# Patient Record
Sex: MALE | Race: WHITE | NOT HISPANIC OR LATINO | Employment: FULL TIME | URBAN - METROPOLITAN AREA
[De-identification: names, ages, dates, MRNs, and addresses within clinical notes are randomized per-mention and may not be internally consistent; named-entity substitution may affect disease eponyms.]

---

## 2017-03-17 ENCOUNTER — ALLSCRIPTS OFFICE VISIT (OUTPATIENT)
Dept: OTHER | Facility: OTHER | Age: 57
End: 2017-03-17

## 2017-03-24 ENCOUNTER — ALLSCRIPTS OFFICE VISIT (OUTPATIENT)
Dept: OTHER | Facility: OTHER | Age: 57
End: 2017-03-24

## 2017-03-31 ENCOUNTER — ALLSCRIPTS OFFICE VISIT (OUTPATIENT)
Dept: OTHER | Facility: OTHER | Age: 57
End: 2017-03-31

## 2017-05-18 ENCOUNTER — GENERIC CONVERSION - ENCOUNTER (OUTPATIENT)
Dept: OTHER | Facility: OTHER | Age: 57
End: 2017-05-18

## 2017-05-27 ENCOUNTER — OFFICE VISIT (OUTPATIENT)
Dept: URGENT CARE | Facility: CLINIC | Age: 57
End: 2017-05-27
Payer: COMMERCIAL

## 2017-05-27 PROCEDURE — 99213 OFFICE O/P EST LOW 20 MIN: CPT

## 2017-08-11 ENCOUNTER — ALLSCRIPTS OFFICE VISIT (OUTPATIENT)
Dept: OTHER | Facility: OTHER | Age: 57
End: 2017-08-11

## 2017-12-08 ENCOUNTER — GENERIC CONVERSION - ENCOUNTER (OUTPATIENT)
Dept: OTHER | Facility: OTHER | Age: 57
End: 2017-12-08

## 2017-12-18 ENCOUNTER — TRANSCRIBE ORDERS (OUTPATIENT)
Dept: ADMINISTRATIVE | Facility: HOSPITAL | Age: 57
End: 2017-12-18

## 2017-12-26 ENCOUNTER — GENERIC CONVERSION - ENCOUNTER (OUTPATIENT)
Dept: OTHER | Facility: OTHER | Age: 57
End: 2017-12-26

## 2017-12-26 ENCOUNTER — TRANSCRIBE ORDERS (OUTPATIENT)
Dept: ADMINISTRATIVE | Facility: HOSPITAL | Age: 57
End: 2017-12-26

## 2017-12-26 ENCOUNTER — LAB (OUTPATIENT)
Dept: LAB | Facility: HOSPITAL | Age: 57
End: 2017-12-26
Attending: ORTHOPAEDIC SURGERY
Payer: COMMERCIAL

## 2017-12-26 ENCOUNTER — HOSPITAL ENCOUNTER (OUTPATIENT)
Dept: NON INVASIVE DIAGNOSTICS | Facility: HOSPITAL | Age: 57
Discharge: HOME | End: 2017-12-26
Attending: ORTHOPAEDIC SURGERY

## 2017-12-26 DIAGNOSIS — M17.12 OSTEOARTHRITIS OF LEFT KNEE, UNSPECIFIED OSTEOARTHRITIS TYPE: ICD-10-CM

## 2017-12-26 DIAGNOSIS — M17.12 OSTEOARTHRITIS OF LEFT KNEE, UNSPECIFIED OSTEOARTHRITIS TYPE: Primary | ICD-10-CM

## 2017-12-26 LAB
ANION GAP SERPL CALCULATED.3IONS-SCNC: 10 MMOL/L (ref 4–13)
APTT PPP: 27 SECONDS (ref 23–35)
ATRIAL RATE: 89 BPM
BACTERIA UR QL AUTO: ABNORMAL /HPF
BASOPHILS # BLD AUTO: 0.08 THOUSANDS/ΜL (ref 0–0.1)
BASOPHILS NFR BLD AUTO: 1 % (ref 0–1)
BILIRUB UR QL STRIP: NEGATIVE
BUN SERPL-MCNC: 15 MG/DL (ref 5–25)
CALCIUM SERPL-MCNC: 9.2 MG/DL (ref 8.3–10.1)
CHLORIDE SERPL-SCNC: 105 MMOL/L (ref 100–108)
CLARITY UR: CLEAR
CO2 SERPL-SCNC: 24 MMOL/L (ref 21–32)
COLOR UR: YELLOW
CREAT SERPL-MCNC: 1.06 MG/DL (ref 0.6–1.3)
EOSINOPHIL # BLD AUTO: 0.23 THOUSAND/ΜL (ref 0–0.61)
EOSINOPHIL NFR BLD AUTO: 3 % (ref 0–6)
ERYTHROCYTE [DISTWIDTH] IN BLOOD BY AUTOMATED COUNT: 13.3 % (ref 11.6–15.1)
EST. AVERAGE GLUCOSE BLD GHB EST-MCNC: 151 MG/DL
GFR SERPL CREATININE-BSD FRML MDRD: 78 ML/MIN/1.73SQ M
GLUCOSE SERPL-MCNC: 168 MG/DL (ref 65–140)
GLUCOSE UR STRIP-MCNC: ABNORMAL MG/DL
HBA1C MFR BLD: 6.9 % (ref 4.2–6.3)
HCT VFR BLD AUTO: 48.7 % (ref 36.5–49.3)
HGB BLD-MCNC: 16.5 G/DL (ref 12–17)
HGB UR QL STRIP.AUTO: NEGATIVE
INR PPP: 0.9 (ref 0.86–1.16)
KETONES UR STRIP-MCNC: NEGATIVE MG/DL
LEUKOCYTE ESTERASE UR QL STRIP: ABNORMAL
LYMPHOCYTES # BLD AUTO: 1.4 THOUSANDS/ΜL (ref 0.6–4.47)
LYMPHOCYTES NFR BLD AUTO: 19 % (ref 14–44)
MCH RBC QN AUTO: 31.1 PG (ref 26.8–34.3)
MCHC RBC AUTO-ENTMCNC: 33.9 G/DL (ref 31.4–37.4)
MCV RBC AUTO: 92 FL (ref 82–98)
MONOCYTES # BLD AUTO: 0.89 THOUSAND/ΜL (ref 0.17–1.22)
MONOCYTES NFR BLD AUTO: 12 % (ref 4–12)
NEUTROPHILS # BLD AUTO: 4.96 THOUSANDS/ΜL (ref 1.85–7.62)
NEUTS SEG NFR BLD AUTO: 65 % (ref 43–75)
NITRITE UR QL STRIP: NEGATIVE
NON-SQ EPI CELLS URNS QL MICRO: ABNORMAL /HPF
P AXIS: 68 DEGREES
PH UR STRIP.AUTO: 5.5 [PH] (ref 4.5–8)
PLATELET # BLD AUTO: 161 THOUSANDS/UL (ref 149–390)
PMV BLD AUTO: 11.6 FL (ref 8.9–12.7)
POTASSIUM SERPL-SCNC: 4.2 MMOL/L (ref 3.5–5.3)
PR INTERVAL: 224 MS
PROT UR STRIP-MCNC: NEGATIVE MG/DL
PROTHROMBIN TIME: 12 SECONDS (ref 12.1–14.4)
QRS AXIS: 60 DEGREES
QRSD INTERVAL: 102 MS
QT INTERVAL: 366 MS
QTC INTERVAL: 445 MS
RBC # BLD AUTO: 5.31 MILLION/UL (ref 3.88–5.62)
RBC #/AREA URNS AUTO: ABNORMAL /HPF
SODIUM SERPL-SCNC: 139 MMOL/L (ref 136–145)
SP GR UR STRIP.AUTO: 1.01 (ref 1–1.03)
T WAVE AXIS: 13 DEGREES
UROBILINOGEN UR QL STRIP.AUTO: 0.2 E.U./DL
VENTRICULAR RATE: 89 BPM
WBC # BLD AUTO: 7.56 THOUSAND/UL (ref 4.31–10.16)
WBC #/AREA URNS AUTO: ABNORMAL /HPF

## 2017-12-26 PROCEDURE — 86850 RBC ANTIBODY SCREEN: CPT

## 2017-12-26 PROCEDURE — 81001 URINALYSIS AUTO W/SCOPE: CPT | Performed by: ORTHOPAEDIC SURGERY

## 2017-12-26 PROCEDURE — 86901 BLOOD TYPING SEROLOGIC RH(D): CPT

## 2017-12-26 PROCEDURE — 85025 COMPLETE CBC W/AUTO DIFF WBC: CPT

## 2017-12-26 PROCEDURE — 85610 PROTHROMBIN TIME: CPT

## 2017-12-26 PROCEDURE — 36415 COLL VENOUS BLD VENIPUNCTURE: CPT

## 2017-12-26 PROCEDURE — 85730 THROMBOPLASTIN TIME PARTIAL: CPT

## 2017-12-26 PROCEDURE — 83036 HEMOGLOBIN GLYCOSYLATED A1C: CPT

## 2017-12-26 PROCEDURE — 93005 ELECTROCARDIOGRAM TRACING: CPT

## 2017-12-26 PROCEDURE — 86900 BLOOD TYPING SEROLOGIC ABO: CPT

## 2017-12-26 PROCEDURE — 80048 BASIC METABOLIC PNL TOTAL CA: CPT

## 2017-12-26 NOTE — PERIOPERATIVE NURSING NOTE
Met with patient for Total Joint Class  All questions were answered to patient's verbal satisfaction  Printed exercise sheets were reviewed and given to patient along with the incentive spirometer  Patient was taken to [address] [place] [address] and explained the process of his surgical day  All questions were answered to patient's satisfaction

## 2017-12-27 RX ORDER — FLUTICASONE PROPIONATE 50 MCG
1 SPRAY, SUSPENSION (ML) NASAL 2 TIMES DAILY
COMMUNITY
Start: 2017-05-27 | End: 2017-12-27 | Stop reason: ALTCHOICE

## 2017-12-27 RX ORDER — ASCORBIC ACID 500 MG
500 TABLET ORAL 2 TIMES DAILY
COMMUNITY
End: 2022-06-27

## 2017-12-27 RX ORDER — FERROUS SULFATE 325(65) MG
325 TABLET ORAL 2 TIMES DAILY WITH MEALS
COMMUNITY
End: 2022-06-27

## 2017-12-27 RX ORDER — IBUPROFEN 400 MG/1
600 TABLET ORAL EVERY 6 HOURS PRN
COMMUNITY

## 2017-12-27 RX ORDER — ALPRAZOLAM 1 MG/1
TABLET ORAL
COMMUNITY
Start: 2016-12-16 | End: 2021-04-12 | Stop reason: SDUPTHER

## 2017-12-27 RX ORDER — ATORVASTATIN CALCIUM 40 MG/1
40 TABLET, FILM COATED ORAL
COMMUNITY
Start: 2016-01-26 | End: 2019-01-24 | Stop reason: SDUPTHER

## 2017-12-27 RX ORDER — LISINOPRIL AND HYDROCHLOROTHIAZIDE 20; 12.5 MG/1; MG/1
TABLET ORAL DAILY
COMMUNITY
Start: 2015-11-23 | End: 2019-06-18 | Stop reason: SDUPTHER

## 2017-12-27 RX ORDER — FOLIC ACID 1 MG/1
TABLET ORAL DAILY
COMMUNITY
End: 2022-06-27

## 2017-12-27 NOTE — PRE-PROCEDURE INSTRUCTIONS
Preop strength exercises explained and visual aide given for total knee  Before your operation, you play an important role in decreasing your risk for infection by washing with special antiseptic soap  This is an effective way to reduce bacteria on the skin which may help to prevent infections at the surgical site  Please read the following directions in advance  1  In the week before your operation purchase a 4 ounce bottle of antiseptic soap containing chlorhexidine gluconate 4%  Some brand names include: Aplicare, Endure, and Hibiclens  The cost is usually less than $5 00  · For your convenience, the [address] [place] [address] carries the soap  · It may also be available at your doctor's office or pre-admission testing center, and at most retail pharmacies  · If you are allergic or sensitive to soaps containing chlorhexidine gluconate (CHG), please let your doctor know so another antiseptic soap can be suggested  · CHG antiseptic soap is for external use only  2      The day before your operation follow these directions carefully to get ready  · Place clean lines (sheets) on your bed; you should sleep on clean sheets after your evening shower  · Get clean towels and washcloths ready - you need enough for 2 showers  · Set aside clean underwear, pajamas, and clothing to wear after the shower  Reminders:  · DO NOT use any other soap or body rinse on your skin during or after the antiseptic showers  · DO NOT use lotion , powder, deodorant, or perfume/aftershave of any kind on your skin after your antiseptic shower  · DO NOT shave any body parts in the 24 hours/the day before your operation  · DO NOT get the antiseptic soap in your eyes, ears, nose, mouth, or vaginal area  3      You will need to shower the night before AND the morning of your Surgery  Shower 1:  · The evening before your operation, take the fist shower    · First, shampoo your hair with regular shampoo and rinse it completely before you use the anitseptic soap  After washing and rinsing your hair, rinse your body  · Next, use a clean wash cloth to apply the antiseptic soap and wash your body from the neck down to your toes using 1/2 bottle of the antiseptic soap  You will use the other 1/2 bottle for the second shower  · Clean the area where your incision will be; later this area well for about 2 minutes  · If you ar having head or neck surgery, wash areas with the antiseptic soap  · Rinse yourself completely with running water  · Use a clean towel to dry off  · Wear clean underwear and clothing/pajamas  Shower 2:  · The Morning of your operation, take the second shower following the same steps as Shower 1 using the second 1/2 of the bottle of antiseptic soap  · Use clean cloths and towels to was and dry yourself off  · Wear clean underwear and clothing  Continue taking supplements for Blood Program as ordered by your Doctor  The following information was developed to assist you to prepare for your operation  What do I need to do before coming to the hospital?  · Arrange for a responsible person to drive you to and from the hospital   · Arrange care for your children at home  Children are not allowed in the recovery area of the hospital   · Plan to wear clothing that is easy to put on and take off  If you are having shoulder surgery, wear a shirt that buttons or zippers in front  Bathing  · Shower the evening before and the morning of your surgery with antibacterial soap  Please refer to the Pre Op Showering Instructions for Surgery Patient Sheet  · Remove nail polish and all body piercing jewelry  · Do not shave any body part for at least 24 hours before surgery-this includes face, arm, legs and upper body  Food  · Nothing to eat or drink after midnight the night before your surgery  This includes candy and chewing gum    Exception: If your surgery is after 12:00 pm (noon), you may have clear liquids such as 7-Up, ginger ale, apple or cranberry juice, Jello-O, water, or clear broth until 8:00 am   · Do not drink mild or juice with pulp on the morning before surgery  · Do not drink alcohol 24 hours before surgery  · Do not smoke 12 hours before surgery  Medicine  · Follow instructions you are received from your surgeon about which medicines you may take on the day of surgery  · If instructed to take medicine on the morning of surgery, take pills with just a small sip of water  Call your prescribing doctor for specific instructions on what to do if you take insulin  What should I bring to the hospital?  Bring  · Crutches or a walker, if you have them, for foot or knee surgery  · A list of the daily medications, vitamins, minerals, herbals and nutritional supplements you take  Include the dosages of medicines and the time you take them each day  · Glasses, dentures or hearing aids  · Minimal clothing; you will be wearing hospital sleepwear  · Photo ID; required to verify your identity  · If you have a Living Will or Power of , bring a copy of the documents  (only if being admitted)  · If you have an ostomy, bring an extra pouch and any supplies you use  Do Not Bring  · Medicines or Inhalers  · Money, valuables or jewelry    What other information should I know about the day of surgery? · Notify your surgeon if you develop a cold, sore throat, cough, fever, rash or any other illness  · Report to the Ambulatory Surgical/Same Day Surgery Unit  You will be instructed to stop at Registration only if you have not been pre-registered  · Inform your  if they do not stay that they will be asked by the staff to leave a phone number where they can be reached  · Be available to be reached before surgery  In the even the operating room schedule changes, you may be asked to come in earlier or later than expected      It is important to tell your doctor and others involved in your health care if you are taking or have been taking any non-prescription drugs, vitamins, minerals, herbals or other nutritional supplements  Any of these may interact with some food or medicines and cause a reaction  Pre-Surgery Instructions:   Medication Instructions    ALPRAZolam (XANAX) 1 mg tablet Patient was instructed to contact Physician for medication instruction   ascorbic acid (VITAMIN C) 500 mg tablet Patient was instructed to contact Physician for medication instruction   atorvastatin (LIPITOR) 40 mg tablet Patient was instructed to contact Physician for medication instruction   Canagliflozin (INVOKANA) 300 MG TABS Patient was instructed to contact Physician for medication instruction   Exenatide ER (BYDUREON) 2 MG PEN Patient was instructed to contact Physician for medication instruction   ferrous sulfate 325 (65 Fe) mg tablet Patient was instructed to contact Physician for medication instruction   folic acid (FOLVITE) 1 mg tablet Patient was instructed to contact Physician for medication instruction   ibuprofen (MOTRIN) 400 mg tablet Patient was instructed to contact Physician for medication instruction   Lansoprazole (PREVACID 24HR PO) Patient was instructed to contact Physician for medication instruction   lisinopril-hydrochlorothiazide (PRINZIDE,ZESTORETIC) 20-12 5 MG per tablet Patient was instructed to contact Physician for medication instruction   metFORMIN (GLUCOPHAGE) 1000 MG tablet Patient was instructed to contact Physician for medication instruction

## 2017-12-28 ENCOUNTER — ALLSCRIPTS OFFICE VISIT (OUTPATIENT)
Dept: OTHER | Facility: OTHER | Age: 57
End: 2017-12-28

## 2017-12-29 NOTE — PROGRESS NOTES
Assessment   1  Primary localized osteoarthritis of left knee (715 16) (M17 12)   2  Preoperative examination (V72 84) (Z01 818)   3  Controlled diabetes mellitus (250 00) (E11 9)   4  Hypertension (401 9) (I10)    Discussion/Summary   Surgical Clearance: He is at a LOW TO MODERATE risk from a cardiovascular standpoint at this time without any additional cardiac testing  Reevaluation needed, if he should present with symptoms prior to surgery/procedure  Comments: Miranda France May undergo the planned surgery!         Take no medications the morning of the surgery, be nothing by mouth  You can take Tylenol 500 mg 2 pills 3 times a day as needed for knee pain before the surgery, please avoid taking ibuprofen!      The patient was counseled regarding instructions for management  Chief Complaint   pt here today for p/o for LTK ON 1/8 18 WITH DR MCCONNELL , C/O PAIN      History of Present Illness   Pre-Op Visit (Brief): The patient is being seen for a preoperative visit  The procedure is a(n) L TKR scheduled for 1/8/18 with Dr Sean Sandhu  The indication for surgery is DJD  Surgical Risk Assessment: Pertinent Past Medical History: diabetes, but-- no angina,-- no arrhythmia,-- no CAD,-- no CHF,-- no pulmonary embolism,-- no DVT,-- no seizure disorder,-- no CVA,-- no asthma-- and-- no COPD  Exercise Capacity: physical activity is limited by knee pain but 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  Lifestyle Factors: denies tobacco use and drinks 4-5 beers a day  Chews tobacco  Symptoms: no chest pain,-- no cough,-- no dyspnea,-- no edema,-- no palpitations-- and-- no wheezing  Review of Systems        Constitutional: no fever-- and-- no chills  Cardiovascular: as noted in HPI  Respiratory: as noted in HPI  Active Problems   1  Controlled diabetes mellitus (250 00) (E11 9)   2  Esophageal reflux (530 81) (K21 9)   3  Hemorrhoids (455 6) (K64 9)   4   Hyperlipidemia (272 4) (E78 5) 5  Hypertension (401 9) (I10)   6  Primary localized osteoarthritis of left knee (715 16) (M17 12)    Past Medical History    · History of H/O colonoscopy (V45 89) (K42 041)   · History of Puncture wound of foot (892 0) (K97 013G)     The active problems and past medical history were reviewed and updated today  Surgical History    · Denied: History Of Prior Surgery     The surgical history was reviewed and updated today  Family History   Father    · Family history of Colon carcinoma     The family history was reviewed and updated today  Social History    · Chewing tobacco use (305 1) (Z72 0)   · Former smoker (V15 82) (J66 606)   · No drug use   · Social alcohol use (Z78 9)  The social history was reviewed and updated today  Current Meds    1  ALPRAZolam 1 MG Oral Tablet; TAKE 1 TAB PO 6-8 HRS AS NEEDED; Therapy: 20BVX6475 to (Last Rx:74Ils9010) Ordered   2  Atorvastatin Calcium 40 MG Oral Tablet; TAKE 1 TABLET DAILY (DUE FOR LAB WORK     AND OFFICE VISIT); Therapy: 19XAJ8159 to (Last Rx:95Oec7299)  Requested for: 59Spd1109 Ordered   3  Bydureon 2 MG Subcutaneous Pen-injector; INJECT 1 SQ WEEKLY; Therapy: 08FJG7660 to (Last Rx:60Kmi6610)  Requested for: 07MQZ6582 Ordered   4  Invokana 300 MG Oral Tablet; Take 1 tablet daily; Therapy: 47SFL5732 to (Daril Lean)  Requested for: 28Bli4882; Last     Rx:[id number]; Status: ACTIVE - Retrospective By Protocol Authorization Ordered   5  Lisinopril-Hydrochlorothiazide 20-12 5 MG Oral Tablet; Take 1 tablet daily; Therapy: 51ANT1893 to (Last Rx:27Htw4048)  Requested for: 35NTU6418 Ordered   6  MetFORMIN HCl - 1000 MG Oral Tablet; TAKE 1 TABLET TWICE DAILY; Therapy: 35QSP8752 to (Evaluate:31Mtc7175)  Requested for: 30JEA9697; Last     Rx:[id number] Ordered   7  OrthoVisc 30 MG/2ML Intra-articular Solution Prefilled Syringe; INJECT 2  ML     Intra-articular 2 ML's weekly in knee for 3 weeks;      Therapy: 66ZGS0035 to (Last Rx:81Ixf8935) Ordered     The medication list was reviewed and updated today  Allergies   1  No Known Drug Allergies    Vitals    Recorded: 28Dec2017 03:58PM Recorded: 28Dec2017 03:40PM   Temperature  96 8 F, Tympanic   Heart Rate 80 100   Respiration 14 16   Systolic 912, LUE, Sitting 160, LUE, Sitting   Diastolic 84, LUE, Sitting 90, LUE, Sitting   Height  5 ft 10 in   Weight  267 lb 5 oz   BMI Calculated  38 36   BSA Calculated  2 36   O2 Saturation  97     Physical Exam        Constitutional      General appearance: Abnormal   well developed,-- comfortable-- and-- obese  Ears, Nose, Mouth, and Throat      Oropharynx: Normal with no erythema, edema, exudate or lesions  Pulmonary      Auscultation of lungs: Clear to auscultation  Cardiovascular      Auscultation of heart: Normal rate and rhythm, normal S1 and S2, no murmurs  Carotid pulses: 2+ bilaterally  no bruit heard over the right carotid-- and-- no bruit heard over the left carotid  Abdomen      Abdomen: Non-tender, no masses  Musculoskeletal      Gait and station: Normal        Psychiatric      Orientation to person, place and time: Normal        Mood and affect: Normal        End of Encounter Meds   1  ALPRAZolam 1 MG Oral Tablet (Xanax); TAKE 1 TAB PO 6-8 HRS AS NEEDED; Therapy: 84RGY9378 to (Last Rx:75Nbm5562) Ordered   2  Bydureon 2 MG Subcutaneous Pen-injector; INJECT 1 SQ WEEKLY; Therapy: 97SKJ2602 to (Last Rx:85Phj6698)  Requested for: 79SCF5512 Ordered   3  Invokana 300 MG Oral Tablet; Take 1 tablet daily; Therapy: 81ACI8207 to ([id number])  Requested for: 07Yrc6783; Last     Rx:[id number]; Status: ACTIVE - Retrospective By Protocol Authorization Ordered   4  MetFORMIN HCl - 1000 MG Oral Tablet; TAKE 1 TABLET TWICE DAILY; Therapy: 39KPD7662 to (Evaluate:49Eyq9445)  Requested for: 07XQB3810; Last     Rx:[id number] Ordered  5   Atorvastatin Calcium 40 MG Oral Tablet; TAKE 1 TABLET DAILY (DUE FOR LAB WORK     AND OFFICE VISIT); Therapy: 98FWS2300 to (Last Rx:56Ofj4356)  Requested for: 73Cnz6128 Ordered  6  Lisinopril-Hydrochlorothiazide 20-12 5 MG Oral Tablet; Take 1 tablet daily; Therapy: 58IYA9619 to (Last Rx:97Lha6348)  Requested for: 39XUK0832 Ordered  7  OrthoVisc 30 MG/2ML Intra-articular Solution Prefilled Syringe; INJECT 2  ML     Intra-articular 2 ML's weekly in knee for 3 weeks;      Therapy: 86ODE8962 to (Last Rx:46Lsa2661) Ordered    Future Appointments      Date/Time Provider Specialty Site   01/08/2018 08:30 AM Hank Marmolejo MD Orthopedic Surgery [address] OR   01/23/2018 09:30 AM Hank Marmolejo MD Orthopedic Surgery [facility] [place]     Signatures    Electronically signed by : JUAN R Braxton ; Dec 28 2017  4:05PM EST                       (Author)

## 2017-12-30 LAB
ABO GROUP BLD: NORMAL
BLD GP AB SCN SERPL QL: NEGATIVE
RH BLD: POSITIVE
SPECIMEN EXPIRATION DATE: NORMAL

## 2018-01-08 ENCOUNTER — HOSPITAL ENCOUNTER (INPATIENT)
Facility: HOSPITAL | Age: 58
LOS: 2 days | Discharge: HOME HEALTH | DRG: 470 | End: 2018-01-10
Attending: ORTHOPAEDIC SURGERY | Admitting: ORTHOPAEDIC SURGERY
Payer: COMMERCIAL

## 2018-01-08 ENCOUNTER — ANESTHESIA EVENT (OUTPATIENT)
Dept: PERIOP | Facility: HOSPITAL | Age: 58
DRG: 470 | End: 2018-01-08
Payer: COMMERCIAL

## 2018-01-08 ENCOUNTER — ANESTHESIA (OUTPATIENT)
Dept: PERIOP | Facility: HOSPITAL | Age: 58
DRG: 470 | End: 2018-01-08
Payer: COMMERCIAL

## 2018-01-08 DIAGNOSIS — M17.12 PRIMARY LOCALIZED OSTEOARTHRITIS OF LEFT KNEE: Primary | ICD-10-CM

## 2018-01-08 PROBLEM — I10 HYPERTENSION: Status: ACTIVE | Noted: 2018-01-08

## 2018-01-08 PROBLEM — E78.5 HYPERLIPIDEMIA: Status: ACTIVE | Noted: 2018-01-08

## 2018-01-08 PROBLEM — K21.9 ESOPHAGEAL REFLUX: Status: ACTIVE | Noted: 2018-01-08

## 2018-01-08 PROBLEM — E11.9 DIABETES MELLITUS (HCC): Status: ACTIVE | Noted: 2018-01-08

## 2018-01-08 LAB
GLUCOSE SERPL-MCNC: 150 MG/DL (ref 65–140)
GLUCOSE SERPL-MCNC: 171 MG/DL (ref 65–140)
GLUCOSE SERPL-MCNC: 177 MG/DL (ref 65–140)
GLUCOSE SERPL-MCNC: 179 MG/DL (ref 65–140)
GLUCOSE SERPL-MCNC: 181 MG/DL (ref 65–140)

## 2018-01-08 PROCEDURE — 82948 REAGENT STRIP/BLOOD GLUCOSE: CPT

## 2018-01-08 PROCEDURE — C1776 JOINT DEVICE (IMPLANTABLE): HCPCS | Performed by: ORTHOPAEDIC SURGERY

## 2018-01-08 PROCEDURE — G8979 MOBILITY GOAL STATUS: HCPCS

## 2018-01-08 PROCEDURE — G8978 MOBILITY CURRENT STATUS: HCPCS

## 2018-01-08 PROCEDURE — 97116 GAIT TRAINING THERAPY: CPT

## 2018-01-08 PROCEDURE — 0SRD069 REPLACEMENT OF LEFT KNEE JOINT WITH OXIDIZED ZIRCONIUM ON POLYETHYLENE SYNTHETIC SUBSTITUTE, CEMENTED, OPEN APPROACH: ICD-10-PCS | Performed by: ORTHOPAEDIC SURGERY

## 2018-01-08 PROCEDURE — C1713 ANCHOR/SCREW BN/BN,TIS/BN: HCPCS | Performed by: ORTHOPAEDIC SURGERY

## 2018-01-08 PROCEDURE — 97163 PT EVAL HIGH COMPLEX 45 MIN: CPT

## 2018-01-08 DEVICE — ATTUNE KNEE SYSTEM TIBIAL INSERT ROTATING PLATFORM POSTERIOR STABILIZED 7 6MM AOX
Type: IMPLANTABLE DEVICE | Site: KNEE | Status: FUNCTIONAL
Brand: ATTUNE

## 2018-01-08 DEVICE — ATTUNE KNEE SYSTEM TIBIAL BASE ROTATING PLATFORM SIZE 7 CEMENTED
Type: IMPLANTABLE DEVICE | Site: KNEE | Status: FUNCTIONAL
Brand: ATTUNE

## 2018-01-08 DEVICE — ATTUNE PATELLA MEDIALIZED DOME 38MM CEMENTED AOX
Type: IMPLANTABLE DEVICE | Site: KNEE | Status: FUNCTIONAL
Brand: ATTUNE

## 2018-01-08 DEVICE — SMARTSET HIGH PERFORMANCE MV MEDIUM VISCOSITY BONE CEMENT 40G
Type: IMPLANTABLE DEVICE | Status: FUNCTIONAL
Brand: SMARTSET

## 2018-01-08 DEVICE — ATTUNE KNEE SYSTEM FEMORAL POSTERIOR STABILIZED SIZE 7 LEFT CEMENTED
Type: IMPLANTABLE DEVICE | Site: KNEE | Status: FUNCTIONAL
Brand: ATTUNE

## 2018-01-08 RX ORDER — ONDANSETRON 2 MG/ML
4 INJECTION INTRAMUSCULAR; INTRAVENOUS ONCE AS NEEDED
Status: DISCONTINUED | OUTPATIENT
Start: 2018-01-08 | End: 2018-01-08 | Stop reason: HOSPADM

## 2018-01-08 RX ORDER — SIMETHICONE 80 MG
80 TABLET,CHEWABLE ORAL 4 TIMES DAILY PRN
Status: DISCONTINUED | OUTPATIENT
Start: 2018-01-08 | End: 2018-01-10 | Stop reason: HOSPADM

## 2018-01-08 RX ORDER — CALCIUM CARBONATE 200(500)MG
1000 TABLET,CHEWABLE ORAL DAILY PRN
Status: DISCONTINUED | OUTPATIENT
Start: 2018-01-08 | End: 2018-01-10 | Stop reason: HOSPADM

## 2018-01-08 RX ORDER — SODIUM CHLORIDE, SODIUM LACTATE, POTASSIUM CHLORIDE, CALCIUM CHLORIDE 600; 310; 30; 20 MG/100ML; MG/100ML; MG/100ML; MG/100ML
100 INJECTION, SOLUTION INTRAVENOUS CONTINUOUS
Status: DISCONTINUED | OUTPATIENT
Start: 2018-01-08 | End: 2018-01-09

## 2018-01-08 RX ORDER — ALPRAZOLAM 0.5 MG/1
1 TABLET ORAL
Status: DISCONTINUED | OUTPATIENT
Start: 2018-01-08 | End: 2018-01-10 | Stop reason: HOSPADM

## 2018-01-08 RX ORDER — NICOTINE 21 MG/24HR
21 PATCH, TRANSDERMAL 24 HOURS TRANSDERMAL DAILY
Status: DISCONTINUED | OUTPATIENT
Start: 2018-01-08 | End: 2018-01-10 | Stop reason: HOSPADM

## 2018-01-08 RX ORDER — BISACODYL 10 MG
10 SUPPOSITORY, RECTAL RECTAL DAILY PRN
Status: DISCONTINUED | OUTPATIENT
Start: 2018-01-08 | End: 2018-01-10 | Stop reason: HOSPADM

## 2018-01-08 RX ORDER — MAGNESIUM HYDROXIDE 1200 MG/15ML
LIQUID ORAL AS NEEDED
Status: DISCONTINUED | OUTPATIENT
Start: 2018-01-08 | End: 2018-01-08 | Stop reason: HOSPADM

## 2018-01-08 RX ORDER — PROPOFOL 10 MG/ML
INJECTION, EMULSION INTRAVENOUS CONTINUOUS PRN
Status: DISCONTINUED | OUTPATIENT
Start: 2018-01-08 | End: 2018-01-08 | Stop reason: SURG

## 2018-01-08 RX ORDER — DOCUSATE SODIUM 100 MG/1
100 CAPSULE, LIQUID FILLED ORAL 2 TIMES DAILY
Status: DISCONTINUED | OUTPATIENT
Start: 2018-01-08 | End: 2018-01-10 | Stop reason: HOSPADM

## 2018-01-08 RX ORDER — SENNOSIDES 8.6 MG
1 TABLET ORAL DAILY
Status: DISCONTINUED | OUTPATIENT
Start: 2018-01-08 | End: 2018-01-10 | Stop reason: HOSPADM

## 2018-01-08 RX ORDER — ATORVASTATIN CALCIUM 40 MG/1
40 TABLET, FILM COATED ORAL
Status: DISCONTINUED | OUTPATIENT
Start: 2018-01-08 | End: 2018-01-10 | Stop reason: HOSPADM

## 2018-01-08 RX ORDER — FENTANYL CITRATE 50 UG/ML
INJECTION, SOLUTION INTRAMUSCULAR; INTRAVENOUS AS NEEDED
Status: DISCONTINUED | OUTPATIENT
Start: 2018-01-08 | End: 2018-01-08 | Stop reason: SURG

## 2018-01-08 RX ORDER — MORPHINE SULFATE 2 MG/ML
2 INJECTION, SOLUTION INTRAMUSCULAR; INTRAVENOUS EVERY 4 HOURS PRN
Status: DISCONTINUED | OUTPATIENT
Start: 2018-01-08 | End: 2018-01-10 | Stop reason: HOSPADM

## 2018-01-08 RX ORDER — TRAMADOL HYDROCHLORIDE 50 MG/1
50 TABLET ORAL EVERY 6 HOURS SCHEDULED
Status: DISCONTINUED | OUTPATIENT
Start: 2018-01-08 | End: 2018-01-10 | Stop reason: HOSPADM

## 2018-01-08 RX ORDER — SODIUM CHLORIDE, SODIUM LACTATE, POTASSIUM CHLORIDE, CALCIUM CHLORIDE 600; 310; 30; 20 MG/100ML; MG/100ML; MG/100ML; MG/100ML
20 INJECTION, SOLUTION INTRAVENOUS CONTINUOUS
Status: DISCONTINUED | OUTPATIENT
Start: 2018-01-08 | End: 2018-01-09

## 2018-01-08 RX ORDER — ACETAMINOPHEN 325 MG/1
650 TABLET ORAL EVERY 6 HOURS PRN
Status: DISCONTINUED | OUTPATIENT
Start: 2018-01-08 | End: 2018-01-10 | Stop reason: HOSPADM

## 2018-01-08 RX ORDER — FOLIC ACID 1 MG/1
1 TABLET ORAL DAILY
Status: DISCONTINUED | OUTPATIENT
Start: 2018-01-08 | End: 2018-01-10 | Stop reason: HOSPADM

## 2018-01-08 RX ORDER — ACETAMINOPHEN,DIPHENHYDRAMINE HCL 500; 25 MG/1; MG/1
1 TABLET, FILM COATED ORAL
COMMUNITY

## 2018-01-08 RX ORDER — OXYCODONE HYDROCHLORIDE AND ACETAMINOPHEN 5; 325 MG/1; MG/1
2 TABLET ORAL EVERY 4 HOURS PRN
Status: DISCONTINUED | OUTPATIENT
Start: 2018-01-08 | End: 2018-01-10 | Stop reason: HOSPADM

## 2018-01-08 RX ORDER — MIDAZOLAM HYDROCHLORIDE 1 MG/ML
INJECTION INTRAMUSCULAR; INTRAVENOUS AS NEEDED
Status: DISCONTINUED | OUTPATIENT
Start: 2018-01-08 | End: 2018-01-08 | Stop reason: SURG

## 2018-01-08 RX ORDER — GABAPENTIN 100 MG/1
100 CAPSULE ORAL EVERY 8 HOURS SCHEDULED
Status: DISCONTINUED | OUTPATIENT
Start: 2018-01-08 | End: 2018-01-10 | Stop reason: HOSPADM

## 2018-01-08 RX ORDER — HYDROCHLOROTHIAZIDE 25 MG/1
12.5 TABLET ORAL DAILY
Status: DISCONTINUED | OUTPATIENT
Start: 2018-01-08 | End: 2018-01-10 | Stop reason: HOSPADM

## 2018-01-08 RX ORDER — LISINOPRIL 20 MG/1
20 TABLET ORAL DAILY
Status: DISCONTINUED | OUTPATIENT
Start: 2018-01-08 | End: 2018-01-10 | Stop reason: HOSPADM

## 2018-01-08 RX ORDER — FERROUS SULFATE 325(65) MG
325 TABLET ORAL 2 TIMES DAILY WITH MEALS
Status: DISCONTINUED | OUTPATIENT
Start: 2018-01-08 | End: 2018-01-10 | Stop reason: HOSPADM

## 2018-01-08 RX ORDER — HYDROMORPHONE HCL 110MG/55ML
2 PATIENT CONTROLLED ANALGESIA SYRINGE INTRAVENOUS
Status: DISCONTINUED | OUTPATIENT
Start: 2018-01-08 | End: 2018-01-10 | Stop reason: HOSPADM

## 2018-01-08 RX ORDER — ASCORBIC ACID 500 MG
500 TABLET ORAL 2 TIMES DAILY
Status: DISCONTINUED | OUTPATIENT
Start: 2018-01-08 | End: 2018-01-10 | Stop reason: HOSPADM

## 2018-01-08 RX ORDER — OXYCODONE HYDROCHLORIDE AND ACETAMINOPHEN 5; 325 MG/1; MG/1
1 TABLET ORAL EVERY 4 HOURS PRN
Status: DISCONTINUED | OUTPATIENT
Start: 2018-01-08 | End: 2018-01-10 | Stop reason: HOSPADM

## 2018-01-08 RX ORDER — PANTOPRAZOLE SODIUM 20 MG/1
20 TABLET, DELAYED RELEASE ORAL
Status: DISCONTINUED | OUTPATIENT
Start: 2018-01-08 | End: 2018-01-10 | Stop reason: HOSPADM

## 2018-01-08 RX ORDER — BUPIVACAINE HYDROCHLORIDE 7.5 MG/ML
INJECTION, SOLUTION INTRASPINAL AS NEEDED
Status: DISCONTINUED | OUTPATIENT
Start: 2018-01-08 | End: 2018-01-08 | Stop reason: SURG

## 2018-01-08 RX ORDER — MAGNESIUM HYDROXIDE/ALUMINUM HYDROXICE/SIMETHICONE 120; 1200; 1200 MG/30ML; MG/30ML; MG/30ML
30 SUSPENSION ORAL EVERY 6 HOURS PRN
Status: DISCONTINUED | OUTPATIENT
Start: 2018-01-08 | End: 2018-01-10 | Stop reason: HOSPADM

## 2018-01-08 RX ORDER — ONDANSETRON 2 MG/ML
4 INJECTION INTRAMUSCULAR; INTRAVENOUS EVERY 4 HOURS PRN
Status: DISCONTINUED | OUTPATIENT
Start: 2018-01-08 | End: 2018-01-10 | Stop reason: HOSPADM

## 2018-01-08 RX ADMIN — SODIUM CHLORIDE, SODIUM LACTATE, POTASSIUM CHLORIDE, AND CALCIUM CHLORIDE: .6; .31; .03; .02 INJECTION, SOLUTION INTRAVENOUS at 08:32

## 2018-01-08 RX ADMIN — ATORVASTATIN CALCIUM 40 MG: 40 TABLET, FILM COATED ORAL at 16:36

## 2018-01-08 RX ADMIN — DOCUSATE SODIUM 100 MG: 100 CAPSULE, LIQUID FILLED ORAL at 11:24

## 2018-01-08 RX ADMIN — FENTANYL CITRATE 10 MCG: 50 INJECTION, SOLUTION INTRAMUSCULAR; INTRAVENOUS at 07:43

## 2018-01-08 RX ADMIN — CEFAZOLIN SODIUM 2000 MG: 2 SOLUTION INTRAVENOUS at 19:09

## 2018-01-08 RX ADMIN — CEFAZOLIN SODIUM 2000 MG: 2 SOLUTION INTRAVENOUS at 07:45

## 2018-01-08 RX ADMIN — Medication 1 TABLET: at 11:24

## 2018-01-08 RX ADMIN — OXYCODONE HYDROCHLORIDE AND ACETAMINOPHEN 500 MG: 500 TABLET ORAL at 11:24

## 2018-01-08 RX ADMIN — OXYCODONE HYDROCHLORIDE AND ACETAMINOPHEN 2 TABLET: 5; 325 TABLET ORAL at 18:53

## 2018-01-08 RX ADMIN — INSULIN LISPRO 2 UNITS: 100 INJECTION, SOLUTION INTRAVENOUS; SUBCUTANEOUS at 17:13

## 2018-01-08 RX ADMIN — GABAPENTIN 100 MG: 100 CAPSULE ORAL at 21:39

## 2018-01-08 RX ADMIN — MIDAZOLAM HYDROCHLORIDE 2 MG: 1 INJECTION, SOLUTION INTRAMUSCULAR; INTRAVENOUS at 07:12

## 2018-01-08 RX ADMIN — TRAMADOL HYDROCHLORIDE 50 MG: 50 TABLET, FILM COATED ORAL at 23:55

## 2018-01-08 RX ADMIN — MIDAZOLAM HYDROCHLORIDE 2 MG: 1 INJECTION, SOLUTION INTRAMUSCULAR; INTRAVENOUS at 07:47

## 2018-01-08 RX ADMIN — OXYCODONE HYDROCHLORIDE AND ACETAMINOPHEN 2 TABLET: 5; 325 TABLET ORAL at 12:56

## 2018-01-08 RX ADMIN — SODIUM CHLORIDE, POTASSIUM CHLORIDE, SODIUM LACTATE AND CALCIUM CHLORIDE 100 ML/HR: 600; 310; 30; 20 INJECTION, SOLUTION INTRAVENOUS at 11:27

## 2018-01-08 RX ADMIN — SENNOSIDES 8.6 MG: 8.6 TABLET, FILM COATED ORAL at 11:24

## 2018-01-08 RX ADMIN — MORPHINE SULFATE 2 MG: 2 INJECTION, SOLUTION INTRAMUSCULAR; INTRAVENOUS at 13:53

## 2018-01-08 RX ADMIN — LISINOPRIL 20 MG: 20 TABLET ORAL at 11:24

## 2018-01-08 RX ADMIN — TRAMADOL HYDROCHLORIDE 50 MG: 50 TABLET, FILM COATED ORAL at 17:13

## 2018-01-08 RX ADMIN — Medication 325 MG: at 16:36

## 2018-01-08 RX ADMIN — FOLIC ACID 1 MG: 1 TABLET ORAL at 11:24

## 2018-01-08 RX ADMIN — PROPOFOL 50 MCG/KG/MIN: 10 INJECTION, EMULSION INTRAVENOUS at 07:54

## 2018-01-08 RX ADMIN — METFORMIN HYDROCHLORIDE 500 MG: 500 TABLET, FILM COATED ORAL at 16:36

## 2018-01-08 RX ADMIN — OXYCODONE HYDROCHLORIDE AND ACETAMINOPHEN 500 MG: 500 TABLET ORAL at 17:13

## 2018-01-08 RX ADMIN — SODIUM CHLORIDE, SODIUM LACTATE, POTASSIUM CHLORIDE, AND CALCIUM CHLORIDE 20 ML/HR: .6; .31; .03; .02 INJECTION, SOLUTION INTRAVENOUS at 06:36

## 2018-01-08 RX ADMIN — HYDROMORPHONE HYDROCHLORIDE 2 MG: 2 INJECTION, SOLUTION INTRAMUSCULAR; INTRAVENOUS; SUBCUTANEOUS at 16:29

## 2018-01-08 RX ADMIN — HYDROMORPHONE HYDROCHLORIDE 2 MG: 2 INJECTION, SOLUTION INTRAMUSCULAR; INTRAVENOUS; SUBCUTANEOUS at 20:45

## 2018-01-08 RX ADMIN — MIDAZOLAM HYDROCHLORIDE 2 MG: 1 INJECTION, SOLUTION INTRAMUSCULAR; INTRAVENOUS at 08:53

## 2018-01-08 RX ADMIN — DOCUSATE SODIUM 100 MG: 100 CAPSULE, LIQUID FILLED ORAL at 17:13

## 2018-01-08 RX ADMIN — BUPIVACAINE HYDROCHLORIDE IN DEXTROSE 1.4 ML: 7.5 INJECTION, SOLUTION SUBARACHNOID at 07:43

## 2018-01-08 RX ADMIN — SODIUM CHLORIDE, POTASSIUM CHLORIDE, SODIUM LACTATE AND CALCIUM CHLORIDE 100 ML/HR: 600; 310; 30; 20 INJECTION, SOLUTION INTRAVENOUS at 18:56

## 2018-01-08 RX ADMIN — TRAMADOL HYDROCHLORIDE 50 MG: 50 TABLET, FILM COATED ORAL at 11:26

## 2018-01-08 RX ADMIN — FENTANYL CITRATE 90 MCG: 50 INJECTION, SOLUTION INTRAMUSCULAR; INTRAVENOUS at 07:12

## 2018-01-08 RX ADMIN — HYDROCHLOROTHIAZIDE 12.5 MG: 25 TABLET ORAL at 11:24

## 2018-01-08 RX ADMIN — GABAPENTIN 100 MG: 100 CAPSULE ORAL at 13:31

## 2018-01-08 NOTE — SOCIAL WORK
Met with patient discussed role of Care Management  Patient resides in  2 story home with his wife  PTA he was independent of ADL's and is employed full time  He has a ME on the first floor and could sleep on the couch/recliner   Discussed discharge options and he is unsure if he will go home with [facility] or need SNF  Will follow

## 2018-01-08 NOTE — OP NOTE
Orthopedics ARTHROPLASTY KNEE TOTAL  Op Note      Pre-op Diagnosis:   Primary osteoarthritis of left knee    Post-op Diagnosis:  Same    Procedure:  Left total knee arthroplasty     Surgeon:  Royer Coffman MD    Assistants:  Chrystal Spatz, MD Vennie Croft, PA-C    I was present for the entire procedure and A co-surgeon was required because of skills and techniques relevant to speciality  NOTE:  The presence of a physician assistant was necessary to help with patient positioning, surgical exposure, wound retraction, wound closure, and other key portions of the procedure  No qualified resident was available for this case  Anesthesia:  Regional block with spinal    Tourniquet Time:  65 min    Estimated Blood Loss:  Minimal    Material sent to lab:  None      Complications:  None    Findings:  Severe tricompartmental knee osteoarthritis of medial compartment and patellofemoral compartment    Indications:  62 y o  y/o male with pain in his left knee with exam and X-rays consistent with osteoarthritis of the knee  The patient was unresponsive to nonoperative management  Treatment options were discussed, and the patient wished to proceed with a total knee replacement  Risks and benefits of surgery were discussed which included but were not limited to infection, neurovascular damage, incomplete resolution of symptoms, wound healing problems, stiffness, instability, need for further surgery, failure of the implants, fracture, need for revision surgery, MI, DVT, PE, and death  Informed consent was obtained  Implants:  DePuy Attune Total Knee System  Femur: 7  Tibia: 7  Patella: 38  Poly: 6, rotating platform        Procedure:  Patient was met preoperatively and the left knee was identified and signed  The patient was taken back to the operating room where a Spinal was performed  A well-padded tourniquet was placed on the left thigh and the leg was sterilely prepped and draped in the usual fashion   A timeout was held identifying the patient, side, site, antibiotics, and allergies  The patient received Ancef preoperatively  The leg was exsanguinated with an Esmarch and the tourniquet inflated to 250 mmHg  A longitudinal incision was made on the anterior aspect of the knee  I dissected down through the subcutaneous tissue to Ruslan's layer  Ruslan's layer was split and elevated a full-thickness fashion over the tendon  A standard medial parapatellar approach was then made  The patella was everted, the fat pad was excised, the soft tissue off the proximal medial tibia was elevated, the medial and lateral menisci were debrided as tolerated  The knee was flexed and the ACL and PCL were excised  The femoral entry reamer was placed down the femoral canal, and the distal femoral cutting jig was placed  The distal femoral cut was made, removing 9 mm of distal femur  My attention was then turned to the proximal tibia  The external cutting guide was used  I secured it proximally, set the varus and valgus as well as the posterior slope, and elected to remove 2 mm off the  medial tibial plateau  I checked the extension gaps, removed an additional 2 mm of the distal femur, and once pleased with this returned to the femur  I then turned my attention back to the distal femur  All remaining menisci were removed at this time  The distal femur was sized at a size 7  The cutting block was then secured, the flexion gap checked, and then the anterior, posterior, anterior chamfer, and posterior chamfer cuts were made  The cutting block was removed, the notch cutting block was placed in the notch cut was made  I returned back to the tibia  I sized the proximal tibia at a size 7  The rotation was set, and the proximal tibia was reamed and then broached  The trial implants were then placed, the knee was ranged, the stability was assessed  At this time the stability and range of motion were excellent        Attention was then turned to the patella  The patella was measured at a thickness of 30 mm  9 5 mm of patella were removed  The patella was sized at a 38, and the drill was used for the peg holes  The knee was ranged with the trial patella and which showed excellent patellar tracking  The trial implants were removed  Osteophytes were removed posteriorly from the knee  Aqua mantis was used posteriorly and laterally and medially on the capsule  Duramorph was placed in the posterior capsule  Pulse lavage was used to thoroughly irrigate and clean the knee and remove all fluid from the cancellus bone  In a standard fashion the implants were cemented in, first the tibia, then the femur, then the patella  The poly was placed  The knee was held until the cement had hardened  At this point the knee had excellent range of motion and good stability with varus and valgus stress and no significant anterior posterior instability  The tourniquet was let down  Adequate hemostasis was obtained  The deep fascia was closed with interrupted Ethibond, subcutaneous skin was closed with 0 Vicryl, 2-0 Vicryl, and strata fix  Steri-Strips and a sterile dressing were placed  Disposition:  Patient tolerated the procedure well and was taken to PACU postoperatively  Plan:  Patient will be admitted postoperatively  - 24 hours of IV antibiotics  - DVT prophylaxis: Lovenox for 2 weeks followed by one month of aspirin, 325 mg twice a day, foot pumps  - Physical therapy and occupational therapy consults  - Weightbearing as tolerated  - Internal medicine consult for medical management postoperatively  - Social work consult for discharge planning  - Incentive spirometer every hour when awake  - We'll monitor patient's hematocrit daily, assess acute blood loss anemia, and transfuse only if necessary  - We'll follow patient's vital signs, blood pressure, temperature and address issues as needed    - Follow-up 10-14 days postoperatively with Dr Kari Schmidt in clinic  No x-rays will be needed at that time          @[facility]@     Date: 1/8/2018  Time: 9:50 AM

## 2018-01-08 NOTE — PLAN OF CARE
Problem: Potential for Falls  Goal: Patient will remain free of falls  INTERVENTIONS:  - Assess patient frequently for physical needs  -  Identify cognitive and physical deficits and behaviors that affect risk of falls    -  [place] fall precautions as indicated by assessment   - Educate patient/family on patient safety including physical limitations  - Instruct patient to call for assistance with activity based on assessment  - Modify environment to reduce risk of injury  - Consider OT/PT consult to assist with strengthening/mobility    Outcome: Progressing      Problem: Prexisting or High Potential for Compromised Skin Integrity  Goal: Skin integrity is maintained or improved  INTERVENTIONS:  - Identify patients at risk for skin breakdown  - Assess and monitor skin integrity  - Assess and monitor nutrition and hydration status  - Monitor labs (i e  albumin)  - Assess for incontinence   - Turn and reposition patient  - Assist with mobility/ambulation  - Relieve pressure over bony prominences  - Avoid friction and shearing  - Provide appropriate hygiene as needed including keeping skin clean and dry  - Evaluate need for skin moisturizer/barrier cream  - Collaborate with interdisciplinary team (i e  Nutrition, Rehabilitation, etc )   - Patient/family teaching   Outcome: Progressing      Problem: METABOLIC, FLUID AND ELECTROLYTES - ADULT  Goal: Glucose maintained within target range  INTERVENTIONS:  - Monitor Blood Glucose as ordered  - Assess for signs and symptoms of hyperglycemia and hypoglycemia  - Administer ordered medications to maintain glucose within target range  - Assess nutritional intake and initiate nutrition service referral as needed   Outcome: Progressing      Problem: SKIN/TISSUE INTEGRITY - ADULT  Goal: Skin integrity remains intact  INTERVENTIONS  - Identify patients at risk for skin breakdown  - Assess and monitor skin integrity  - Assess and monitor nutrition and hydration status  - Monitor labs (i e  albumin)  - Assess for incontinence   - Turn and reposition patient  - Assist with mobility/ambulation  - Relieve pressure over bony prominences  - Avoid friction and shearing  - Provide appropriate hygiene as needed including keeping skin clean and dry  - Evaluate need for skin moisturizer/barrier cream  - Collaborate with interdisciplinary team (i e  Nutrition, Rehabilitation, etc )   - Patient/family teaching   Outcome: Progressing    Goal: Incision(s), wounds(s) or drain site(s) healing without S/S of infection  INTERVENTIONS  - Assess and document risk factors for skin impairment   - Assess and document dressing, incision, wound bed, drain sites and surrounding tissue  - Initiate Nutrition services consult and/or wound management as needed   Outcome: Progressing      Problem: MUSCULOSKELETAL - ADULT  Goal: Maintain or return mobility to safest level of function  INTERVENTIONS:  - Assess patient's ability to carry out ADLs; assess patient's baseline for ADL function and identify physical deficits which impact ability to perform ADLs (bathing, care of mouth/teeth, toileting, grooming, dressing, etc )  - Assess/evaluate cause of self-care deficits   - Assess range of motion  - Assess patient's mobility; develop plan if impaired  - Assess patient's need for assistive devices and provide as appropriate  - Encourage maximum independence but intervene and supervise when necessary  - Involve family in performance of ADLs  - Assess for home care needs following discharge   - Request OT consult to assist with ADL evaluation and planning for discharge  - Provide patient education as appropriate   Outcome: Progressing    Goal: Maintain proper alignment of affected body part  INTERVENTIONS:  - Support, maintain and protect limb and body alignment  - Provide pt/fam with appropriate education   Outcome: Progressing      Problem: Nutrition/Hydration-ADULT  Goal: Nutrient/Hydration intake appropriate for improving, restoring or maintaining nutritional needs  Monitor and assess patient's nutrition/hydration status for malnutrition (ex- brittle hair, bruises, dry skin, pale skin and conjunctiva, muscle wasting, smooth red tongue, and disorientation)  Collaborate with interdisciplinary team and initiate plan and interventions as ordered  Monitor patient's weight and dietary intake as ordered or per policy  Utilize nutrition screening tool and intervene per policy  Determine patient's food preferences and provide high-protein, high-caloric foods as appropriate       INTERVENTIONS:  - Monitor oral intake, urinary output, labs, and treatment plans  - Assess nutrition and hydration status and recommend course of action  - Evaluate amount of meals eaten  - Assist patient with eating if necessary   - Allow adequate time for meals  - Recommend/ encourage appropriate diets, oral nutritional supplements, and vitamin/mineral supplements  - Order, calculate, and assess calorie counts as needed  - Recommend, monitor, and adjust tube feedings and TPN/PPN based on assessed needs  - Assess need for intravenous fluids  - Provide specific nutrition/hydration education as appropriate  - Include patient/family/caregiver in decisions related to nutrition   Outcome: Progressing      Problem: PAIN - ADULT  Goal: Verbalizes/displays adequate comfort level or baseline comfort level  Interventions:  - Encourage patient to monitor pain and request assistance  - Assess pain using appropriate pain scale  - Administer analgesics based on type and severity of pain and evaluate response  - Implement non-pharmacological measures as appropriate and evaluate response  - Consider cultural and social influences on pain and pain management  - Notify physician/advanced practitioner if interventions unsuccessful or patient reports new pain   Outcome: Progressing      Problem: SAFETY ADULT  Goal: Maintain or return to baseline ADL function  INTERVENTIONS:  -  Assess patient's ability to carry out ADLs; assess patient's baseline for ADL function and identify physical deficits which impact ability to perform ADLs (bathing, care of mouth/teeth, toileting, grooming, dressing, etc )  - Assess/evaluate cause of self-care deficits   - Assess range of motion  - Assess patient's mobility; develop plan if impaired  - Assess patient's need for assistive devices and provide as appropriate  - Encourage maximum independence but intervene and supervise when necessary  ¯ Involve family in performance of ADLs  ¯ Assess for home care needs following discharge   ¯ Request OT consult to assist with ADL evaluation and planning for discharge  ¯ Provide patient education as appropriate   Outcome: Progressing    Goal: Maintain or return mobility status to optimal level  INTERVENTIONS:  - Assess patient's baseline mobility status (ambulation, transfers, stairs, etc )    - Identify cognitive and physical deficits and behaviors that affect mobility  - Identify mobility aids required to assist with transfers and/or ambulation (gait belt, sit-to-stand, lift, walker, cane, etc )  - [place] fall precautions as indicated by assessment  - Record patient progress and toleration of activity level on Mobility SBAR; progress patient to next Phase/Stage  - Instruct patient to call for assistance with activity based on assessment  - Request Rehabilitation consult to assist with strengthening/weightbearing, etc    Outcome: Progressing    Goal: Patient will remain free of falls  INTERVENTIONS:  - Assess patient frequently for physical needs  -  Identify cognitive and physical deficits and behaviors that affect risk of falls    -  [place] fall precautions as indicated by assessment   - Educate patient/family on patient safety including physical limitations  - Instruct patient to call for assistance with activity based on assessment  - Modify environment to reduce risk of injury  - Consider OT/PT consult to assist with strengthening/mobility    Outcome: Progressing      Problem: DISCHARGE PLANNING  Goal: Discharge to home or other facility with appropriate resources  INTERVENTIONS:  - Identify barriers to discharge w/patient and caregiver  - Arrange for needed discharge resources and transportation as appropriate  - Identify discharge learning needs (meds, wound care, etc )  - Arrange for interpretive services to assist at discharge as needed  - Refer to Case Management Department for coordinating discharge planning if the patient needs post-hospital services based on physician/advanced practitioner order or complex needs related to functional status, cognitive ability, or social support system   Outcome: Progressing      Problem: Knowledge Deficit  Goal: Patient/family/caregiver demonstrates understanding of disease process, treatment plan, medications, and discharge instructions  Complete learning assessment and assess knowledge base    Interventions:  - Provide teaching at level of understanding  - Provide teaching via preferred learning methods   Outcome: Progressing

## 2018-01-08 NOTE — ANESTHESIA PREPROCEDURE EVALUATION
Review of Systems/Medical History          Cardiovascular  Hyperlipidemia, Hypertension controlled,    Pulmonary       GI/Hepatic    GERD well controlled,             Endo/Other  Diabetes , Arthritis     GYN       Hematology   Musculoskeletal  Obesity ,        Neurology   Psychology   Anxiety,            Physical Exam    Airway    Mallampati score: II         Dental   Comment: Chipped, broken, rotten teeth,     Cardiovascular  Rhythm: regular, Rate: normal,     Pulmonary  Breath sounds clear to auscultation,     Other Findings        Anesthesia Plan  ASA Score- 3     Anesthesia Type- spinal and regional with ASA Monitors  Additional Monitors:   Airway Plan:         Plan Factors-    Induction- intravenous  Postoperative Plan- Plan for postoperative opioid use  Informed Consent- Anesthetic plan and risks discussed with patient

## 2018-01-08 NOTE — CONSULTS
Consult Note  Earle Banks 62 y o  male MRN: [id number]  Unit/Bed#: [address] [place] [address] [id number] Encounter: [id number]      Reason for consult - management of medical comorbidities  HPI:  Earle Banks is a 62 y o  male who underwent left total knee arthroplasty earlier today by orthopedic surgeon Dr Tamera Tee  Patient tolerated the procedure well  Postoperatively patient denies of any left knee pain  Patient had a preoperative clearance done on December 28th by primary care physician Dr Ludin Aj  Medical consult was requested for management of patient's diabetes mellitus and hypertension  Patient went to OR and was given preoperatively Ancef  Denies any fever, chills, headache, cough, shortness of breath, nausea, vomiting, palpitation, diarrhea, abdominal pain or any urinary complaints  HISTORICAL INFO:  Past Medical History:   Diagnosis Date    Chews tobacco regularly     Consumes three beers daily     Controlled diabetes mellitus ([address] )     Esophageal reflux     Hyperlipidemia     Hypertension     Osteoarthritis     LEFT KNEE     Past Surgical History:   Procedure Laterality Date    COLONOSCOPY         SOCIAL HISTORY:  History   Alcohol Use    12 0 oz/week    20 Cans of beer per week     Comment: daily beer & "gin & tonics" on a weekend      History   Drug Use No     History   Smoking Status    Former Smoker    Packs/day: 1 00    Years: 11 00    Types: Cigarettes   Smokeless Tobacco    Current User     Comment: quit 30 + yrs ago      History reviewed  No pertinent family history      MEDS & ALLERGIES:  Prescriptions Prior to Admission   Medication    ALPRAZolam (XANAX) 1 mg tablet    ascorbic acid (VITAMIN C) 500 mg tablet    atorvastatin (LIPITOR) 40 mg tablet    Canagliflozin (INVOKANA) 300 MG TABS    diphenhydrAMINE-acetaminophen (TYLENOL PM)  MG TABS    Exenatide ER (BYDUREON) 2 MG PEN    ferrous sulfate 325 (65 Fe) mg tablet    folic acid (FOLVITE) 1 mg tablet    ibuprofen (MOTRIN) 400 mg tablet    Lansoprazole (PREVACID 24HR PO)    lisinopril-hydrochlorothiazide (PRINZIDE,ZESTORETIC) 20-12 5 MG per tablet    metFORMIN (GLUCOPHAGE) 1000 MG tablet     No Known Allergies    Review of Systems   Constitutional: Negative  HENT: Negative  Eyes: Negative  Respiratory: Negative  Cardiovascular: Negative  Gastrointestinal: Negative  Endocrine: Negative  Genitourinary: Negative  Musculoskeletal: Positive for arthralgias and joint swelling  Skin: Negative  Allergic/Immunologic: Negative  Neurological: Negative  Hematological: Negative  Psychiatric/Behavioral: Negative  OBJECTIVE:  Vitals: Blood pressure 118/62, pulse 82, temperature (!) 97 2 °F (36 2 °C), temperature source Oral, resp  rate 18, height 5' 10" (1 778 m), weight 120 kg (265 lb), SpO2 95 %  Intake/Output Summary (Last 24 hours) at 01/08/18 1139  Last data filed at 01/08/18 0950   Gross per 24 hour   Intake             1950 ml   Output                0 ml   Net             1950 ml     Invasive Devices     Peripheral Intravenous Line            Peripheral IV 01/08/18 Right Hand less than 1 day                Physical Exam   Constitutional: He is oriented to person, place, and time  He appears well-developed and well-nourished  No distress  HENT:   Head: Normocephalic and atraumatic  Mouth/Throat: Oropharynx is clear and moist    Eyes: Conjunctivae and EOM are normal  Pupils are equal, round, and reactive to light  Neck: Normal range of motion  Neck supple  No JVD present  Cardiovascular: Normal rate, regular rhythm, normal heart sounds and intact distal pulses  Pulmonary/Chest: Effort normal and breath sounds normal  He has no wheezes  He has no rales  Abdominal: Soft  Bowel sounds are normal  There is no tenderness  There is no rebound and no guarding  Musculoskeletal: Normal range of motion  He exhibits no edema, tenderness or deformity     Left lower extremity in Ace bandage which is clean, dry and intact   Neurological: He is alert and oriented to person, place, and time  No cranial nerve deficit  No focal deficits   Skin: Skin is warm  No rash noted  No erythema  Psychiatric: He has a normal mood and affect  His behavior is normal  Judgment normal    Nursing note and vitals reviewed  Lab Results:   Admission on 01/08/2018   Component Date Value    POC Glucose 01/08/2018 171*    POC Glucose 01/08/2018 150*    POC Glucose 01/08/2018 181*     Imaging: No results found  EKG, Pathology, and Other Studies: I have personally reviewed pertinent reports  Assessment:  Principal Problem:    Primary localized osteoarthritis of left knee  Active Problems:    Hyperlipidemia    Hypertension    Diabetes mellitus ([facility] [address] )    Esophageal reflux  Resolved Problems:    * No resolved hospital problems  *      Plan:  · Total left knee arthroplasty for primary osteoarthritis of left knee  Pain management and stool softener  IV fluids  Encourage incentive spirometer use  Monitor H&H and transfuse as needed  On iron and vitamin-C  Wound care and dressing changes per Orthopedics  · Diabetes mellitus  Accu-Chek a c  HS with Humalog sliding scale  Continue on metformin  · Hypertension  On lisinopril and hydrochlorothiazide  · Hyperlipidemia-on statin  · GERD-on Protonix  · DVT prophylaxis  · Discussed with patient and RN in detail  Continue rest of the management as per primary orthopedic service  Many thanks for the consult      "This note has been constructed using a voice recognition system"      Carline Wilde MD  1/8/2018,11:39 AM

## 2018-01-08 NOTE — PLAN OF CARE
Problem: PHYSICAL THERAPY ADULT  Goal: Performs mobility at highest level of function for planned discharge setting  See evaluation for individualized goals  Treatment/Interventions: ADL retraining, Functional transfer training, Therapeutic exercise, Endurance training, Patient/family training, Equipment eval/education, Bed mobility, Gait training, Spoke to MD, Spoke to case management, Spoke to nursing  Equipment Recommended: Rodgers Cranker       See flowsheet documentation for full assessment, interventions and recommendations  Outcome: Progressing  Prognosis: Good  Problem List: Decreased strength, Decreased range of motion, Impaired balance, Decreased mobility, Decreased coordination, Orthopedic restrictions  Assessment: Pt presents s/p L TKR with impaired rom, strength, trnasfers and giat  Able ot mobilize to chair and tx initiated for gait training wbat L with immobilizer  Ice and foot pumps repplaied after session  PT oob in recliner, needs in reach  Will conitnue to benefit form skilled TP services ot regain safe idn funcitonal mboility, improve L knee rom and strength for safe home d/c  will follow see goals        Recommendation: Home PT          See flowsheet documentation for full assessment

## 2018-01-08 NOTE — PHYSICAL THERAPY NOTE
PT eval/tx   01/08/18 1230   Note Type   Note type Eval/Treat   Pain Assessment   Pain Assessment No/denies pain   Pain Score No Pain   Home Living   Type of Home House   Home Layout Two level;Stairs to enter with rails   Home Equipment Walker   Additional Comments wife will bring in   Prior Function   Level of [place] Independent with ADLs and functional mobility   Lives With Spouse   Receives Help From Family   ADL Assistance Independent   IADLs Independent   Falls in the last 6 months 0   Vocational Full time employment   Comments  ind no AD pta pain L knee limits amb distance,steps   Restrictions/Precautions   Weight Bearing Precautions Per Order Yes   LLE Weight Bearing Per Order WBAT   Other Precautions (no knee flexion POD#0)   General   Additional Pertinent History VSS post-op, ate lunch ready for eval/tx   Family/Caregiver Present No   Cognition   Overall Cognitive Status WFL   Arousal/Participation Alert   Orientation Level Oriented X4   Memory Within functional limits   Following Commands Follows all commands and directions without difficulty   Comments anxious to progress   RUE Assessment   RUE Assessment WFL   LUE Assessment   LUE Assessment WFL   RLE Assessment   RLE Assessment WFL   LLE Assessment   LLE Assessment (hip and ankle wfl knee immobilized)   Coordination   Movements are Fluid and Coordinated 1   Proprioception   RLE Proprioception Grossly intact   LLE Proprioception Grossly Intact   Bed Mobility   Rolling R 4  Minimal assistance   Additional items LE management; Increased time required   Rolling L 4  Minimal assistance   Additional items Assist x 1; Increased time required;LE management   Supine to Sit 4  Minimal assistance   Additional items Assist x 1; Increased time required;LE management;Verbal cues   Transfers   Sit to Stand 6  Modified independent   Additional items Verbal cues; Increased time required;Armrests   Stand to Sit 6  Modified independent Additional items Verbal cues;Armrests; Increased time required   Stand pivot 4  Minimal assistance   Additional items Assist x 1; Increased time required;Verbal cues;Armrests  (assist for iv pole)   Ambulation/Elevation   Gait pattern Improper Weight shift;Narrow HYUN; Forward Flexion; Shuffling; Inconsistent fausto; Short stride;Decreased L stance   Gait Assistance 4  Minimal assist   Additional items Assist x 1;Verbal cues  (chair follow and ivpole)   Assistive Device Rolling walker  (knee immobilizer L)   Distance 5', 15'   Balance   Static Sitting Good   Dynamic Sitting Fair   Static Standing Fair -   Dynamic Standing Fair -   Ambulatory Fair -   Endurance Deficit   Endurance Deficit No   Activity Tolerance   Activity Tolerance Patient tolerated treatment well   Nurse Made Aware yes   Assessment   Prognosis Good   Problem List Decreased strength;Decreased range of motion; Impaired balance;Decreased mobility; Decreased coordination;Orthopedic restrictions   Assessment Pt presents s/p L TKR with impaired rom, strength, trnasfers and giat  Able ot mobilize to chair and tx initiated for gait training wbat L with immobilizer  Ice and foot pumps repplaied after session  PT oob in recliner, needs in reach  Will conitnue to benefit form skilled TP services ot regain safe idn funcitonal mboility, improve L knee rom and strength for safe home d/c  will follow see goals   Goals   Patient Goals dante rose [facility]   STG Expiration Date 01/13/18   Short Term Goal #1 1) safe ind transfers with rw adn wbat L le 2) safe ind amb w/ ' level wbat L le 3) up/down 5 steps with AD/rails 4) increase rom L knee0-90 degrees5) increase strength to 3/5 L knee available range   Plan   Treatment/Interventions ADL retraining;Functional transfer training; Therapeutic exercise; Endurance training;Patient/family training;Equipment eval/education; Bed mobility;Gait training;Spoke to MD;Spoke to case management;Spoke to nursing   PT Frequency 5x/wk; Twice a day   Recommendation   Recommendation Home PT   Equipment Recommended Walker   Barthel Index   Feeding 10   Bathing 0   Grooming Score 5   Dressing Score 5   Bladder Score 10   Bowels Score 10   Toilet Use Score 5   Transfers (Bed/Chair) Score 10   Mobility (Level Surface) Score 0   Stairs Score 0   Barthel Index Score 55   [place], PT

## 2018-01-09 LAB
ANION GAP SERPL CALCULATED.3IONS-SCNC: 11 MMOL/L (ref 4–13)
BUN SERPL-MCNC: 12 MG/DL (ref 5–25)
CALCIUM SERPL-MCNC: 8.8 MG/DL (ref 8.3–10.1)
CHLORIDE SERPL-SCNC: 102 MMOL/L (ref 100–108)
CO2 SERPL-SCNC: 25 MMOL/L (ref 21–32)
CREAT SERPL-MCNC: 0.87 MG/DL (ref 0.6–1.3)
ERYTHROCYTE [DISTWIDTH] IN BLOOD BY AUTOMATED COUNT: 13.3 % (ref 11.6–15.1)
GFR SERPL CREATININE-BSD FRML MDRD: 96 ML/MIN/1.73SQ M
GLUCOSE SERPL-MCNC: 173 MG/DL (ref 65–140)
GLUCOSE SERPL-MCNC: 179 MG/DL (ref 65–140)
GLUCOSE SERPL-MCNC: 193 MG/DL (ref 65–140)
GLUCOSE SERPL-MCNC: 202 MG/DL (ref 65–140)
GLUCOSE SERPL-MCNC: 226 MG/DL (ref 65–140)
HCT VFR BLD AUTO: 41.6 % (ref 36.5–49.3)
HGB BLD-MCNC: 13.8 G/DL (ref 12–17)
MCH RBC QN AUTO: 30.7 PG (ref 26.8–34.3)
MCHC RBC AUTO-ENTMCNC: 33.2 G/DL (ref 31.4–37.4)
MCV RBC AUTO: 93 FL (ref 82–98)
PLATELET # BLD AUTO: 135 THOUSANDS/UL (ref 149–390)
PMV BLD AUTO: 11.9 FL (ref 8.9–12.7)
POTASSIUM SERPL-SCNC: 3.8 MMOL/L (ref 3.5–5.3)
RBC # BLD AUTO: 4.49 MILLION/UL (ref 3.88–5.62)
SODIUM SERPL-SCNC: 138 MMOL/L (ref 136–145)
WBC # BLD AUTO: 7.9 THOUSAND/UL (ref 4.31–10.16)

## 2018-01-09 PROCEDURE — 85027 COMPLETE CBC AUTOMATED: CPT | Performed by: PHYSICIAN ASSISTANT

## 2018-01-09 PROCEDURE — 82948 REAGENT STRIP/BLOOD GLUCOSE: CPT

## 2018-01-09 PROCEDURE — 97530 THERAPEUTIC ACTIVITIES: CPT

## 2018-01-09 PROCEDURE — 97535 SELF CARE MNGMENT TRAINING: CPT

## 2018-01-09 PROCEDURE — G8988 SELF CARE GOAL STATUS: HCPCS

## 2018-01-09 PROCEDURE — G8987 SELF CARE CURRENT STATUS: HCPCS

## 2018-01-09 PROCEDURE — 97116 GAIT TRAINING THERAPY: CPT

## 2018-01-09 PROCEDURE — 97110 THERAPEUTIC EXERCISES: CPT

## 2018-01-09 PROCEDURE — 97165 OT EVAL LOW COMPLEX 30 MIN: CPT

## 2018-01-09 PROCEDURE — 80048 BASIC METABOLIC PNL TOTAL CA: CPT | Performed by: PHYSICIAN ASSISTANT

## 2018-01-09 RX ORDER — HYDRALAZINE HYDROCHLORIDE 20 MG/ML
10 INJECTION INTRAMUSCULAR; INTRAVENOUS EVERY 6 HOURS PRN
Status: DISCONTINUED | OUTPATIENT
Start: 2018-01-09 | End: 2018-01-10 | Stop reason: HOSPADM

## 2018-01-09 RX ADMIN — HYDROMORPHONE HYDROCHLORIDE 2 MG: 2 INJECTION, SOLUTION INTRAMUSCULAR; INTRAVENOUS; SUBCUTANEOUS at 01:07

## 2018-01-09 RX ADMIN — INSULIN LISPRO 4 UNITS: 100 INJECTION, SOLUTION INTRAVENOUS; SUBCUTANEOUS at 18:06

## 2018-01-09 RX ADMIN — METFORMIN HYDROCHLORIDE 500 MG: 500 TABLET, FILM COATED ORAL at 15:44

## 2018-01-09 RX ADMIN — ATORVASTATIN CALCIUM 40 MG: 40 TABLET, FILM COATED ORAL at 15:44

## 2018-01-09 RX ADMIN — OXYCODONE HYDROCHLORIDE AND ACETAMINOPHEN 500 MG: 500 TABLET ORAL at 08:01

## 2018-01-09 RX ADMIN — SENNOSIDES 8.6 MG: 8.6 TABLET, FILM COATED ORAL at 08:01

## 2018-01-09 RX ADMIN — TRAMADOL HYDROCHLORIDE 50 MG: 50 TABLET, FILM COATED ORAL at 06:19

## 2018-01-09 RX ADMIN — NICOTINE 21 MG: 21 PATCH, EXTENDED RELEASE TRANSDERMAL at 08:23

## 2018-01-09 RX ADMIN — GABAPENTIN 100 MG: 100 CAPSULE ORAL at 21:16

## 2018-01-09 RX ADMIN — HYDROMORPHONE HYDROCHLORIDE 2 MG: 2 INJECTION, SOLUTION INTRAMUSCULAR; INTRAVENOUS; SUBCUTANEOUS at 15:44

## 2018-01-09 RX ADMIN — DOCUSATE SODIUM 100 MG: 100 CAPSULE, LIQUID FILLED ORAL at 08:01

## 2018-01-09 RX ADMIN — HYDROMORPHONE HYDROCHLORIDE 2 MG: 2 INJECTION, SOLUTION INTRAMUSCULAR; INTRAVENOUS; SUBCUTANEOUS at 21:16

## 2018-01-09 RX ADMIN — Medication 325 MG: at 15:44

## 2018-01-09 RX ADMIN — SODIUM CHLORIDE, POTASSIUM CHLORIDE, SODIUM LACTATE AND CALCIUM CHLORIDE 100 ML/HR: 600; 310; 30; 20 INJECTION, SOLUTION INTRAVENOUS at 05:57

## 2018-01-09 RX ADMIN — INSULIN LISPRO 4 UNITS: 100 INJECTION, SOLUTION INTRAVENOUS; SUBCUTANEOUS at 11:44

## 2018-01-09 RX ADMIN — TRAMADOL HYDROCHLORIDE 50 MG: 50 TABLET, FILM COATED ORAL at 18:07

## 2018-01-09 RX ADMIN — LISINOPRIL 20 MG: 20 TABLET ORAL at 08:05

## 2018-01-09 RX ADMIN — HYDROMORPHONE HYDROCHLORIDE 2 MG: 2 INJECTION, SOLUTION INTRAMUSCULAR; INTRAVENOUS; SUBCUTANEOUS at 04:47

## 2018-01-09 RX ADMIN — HYDROCHLOROTHIAZIDE 12.5 MG: 25 TABLET ORAL at 08:01

## 2018-01-09 RX ADMIN — INSULIN LISPRO 2 UNITS: 100 INJECTION, SOLUTION INTRAVENOUS; SUBCUTANEOUS at 07:56

## 2018-01-09 RX ADMIN — TRAMADOL HYDROCHLORIDE 50 MG: 50 TABLET, FILM COATED ORAL at 11:43

## 2018-01-09 RX ADMIN — PANTOPRAZOLE SODIUM 20 MG: 20 TABLET, DELAYED RELEASE ORAL at 06:19

## 2018-01-09 RX ADMIN — METFORMIN HYDROCHLORIDE 500 MG: 500 TABLET, FILM COATED ORAL at 08:01

## 2018-01-09 RX ADMIN — Medication 1 TABLET: at 08:01

## 2018-01-09 RX ADMIN — OXYCODONE HYDROCHLORIDE AND ACETAMINOPHEN 2 TABLET: 5; 325 TABLET ORAL at 13:14

## 2018-01-09 RX ADMIN — FOLIC ACID 1 MG: 1 TABLET ORAL at 08:01

## 2018-01-09 RX ADMIN — OXYCODONE HYDROCHLORIDE AND ACETAMINOPHEN 2 TABLET: 5; 325 TABLET ORAL at 07:57

## 2018-01-09 RX ADMIN — ENOXAPARIN SODIUM 40 MG: 40 INJECTION SUBCUTANEOUS at 09:34

## 2018-01-09 RX ADMIN — DOCUSATE SODIUM 100 MG: 100 CAPSULE, LIQUID FILLED ORAL at 18:07

## 2018-01-09 RX ADMIN — GABAPENTIN 100 MG: 100 CAPSULE ORAL at 13:16

## 2018-01-09 RX ADMIN — Medication 325 MG: at 08:01

## 2018-01-09 RX ADMIN — GABAPENTIN 100 MG: 100 CAPSULE ORAL at 06:19

## 2018-01-09 RX ADMIN — OXYCODONE HYDROCHLORIDE AND ACETAMINOPHEN 500 MG: 500 TABLET ORAL at 18:07

## 2018-01-09 RX ADMIN — HYDROMORPHONE HYDROCHLORIDE 2 MG: 2 INJECTION, SOLUTION INTRAMUSCULAR; INTRAVENOUS; SUBCUTANEOUS at 09:34

## 2018-01-09 NOTE — CASE MANAGEMENT
Initial Clinical Review    Age/Sex: 62 y o  male    Surgery Date: 1/8/2018    Procedure: Left total knee arthroplasty     Anesthesia: Regional block with spinal    Admission Orders: Date/Time/Statement: 1/8/18 @ 0739     Orders Placed This Encounter   Procedures    Inpatient Admission     Standing Status:   Standing     Number of Occurrences:   1     Order Specific Question:   Admitting Physician     Answer:   Jim April     Order Specific Question:   Level of Care     Answer:   Med Surg [16]     Order Specific Question:   Estimated length of stay     Answer:   More than 2 Midnights     Order Specific Question:   Certification     Answer:   I certify that inpatient services are medically necessary for this patient for a duration of greater than two midnights  See H&P and MD Progress Notes for additional information about the patient's course of treatment  Vital Signs: /88   Pulse (!) 108   Temp 97 8 °F (36 6 °C) (Oral)   Resp 20   Ht 5' 10" (1 778 m)   Wt 132 kg (291 lb 3 6 oz)   SpO2 95%   BMI 41 79 kg/m²     Diet:        Diet Orders            Start     Ordered    01/08/18 1136  Room Service  Once     Question:  Type of Service  Answer:  Room Service-Appropriate    01/08/18 1135    01/08/18 1045  Diet Cardiac; Sodium 2 GM  Diet effective now     Question Answer Comment   Diet Type Cardiac    Cardiac Sodium 2 GM    RD to adjust diet per protocol? Yes        01/08/18 1044          Mobility:  Up with assistance  PT/OT    DVT Prophylaxis: scds  lovenox 40 sq daily  Pain Control:   Percocet 2 tabs prn - used x 2  Morphine 2 mg iv - used x 1  Dilaudid 2 mg iv - used x 2     Pain Medications             ibuprofen (MOTRIN) 400 mg tablet Take 600 mg by mouth every 6 (six) hours as needed for mild pain Also takes 400mg in the evening          Continued Stay Review  Date/POD#: 1/9/2018    Vital Signs: /88   Pulse (!) 108   Temp 97 8 °F (36 6 °C) (Oral)   Resp 20   Ht 5' 10" (1 778 m)   Wt 132 kg (291 lb 3 6 oz)   SpO2 95%   BMI 41 79 kg/m²     Medication:   Scheduled Meds:   ascorbic acid 500 mg Oral BID   atorvastatin 40 mg Oral Daily With Dinner   docusate sodium 100 mg Oral BID   enoxaparin 40 mg Subcutaneous Daily   ferrous sulfate 325 mg Oral BID With Meals   folic acid 1 mg Oral Daily   gabapentin 100 mg Oral Q8H TRACEY   lisinopril 20 mg Oral Daily   And      hydrochlorothiazide 12 5 mg Oral Daily   insulin lispro 2-12 Units Subcutaneous TID AC   metFORMIN 500 mg Oral BID With Meals   multivitamin-minerals 1 tablet Oral Daily   nicotine 21 mg Transdermal Daily   pantoprazole 20 mg Oral Early Morning   senna 1 tablet Oral Daily   traMADol 50 mg Oral Q6H [address]     Continuous Infusions:    PRN Meds:   acetaminophen    ALPRAZolam    aluminum-magnesium hydroxide-simethicone    bisacodyl    calcium carbonate    HYDROmorphone  2 mg iv - used x 3 thus far today    morphine injection    ondansetron    oxyCODONE-acetaminophen - used x 1      oxyCODONE-acetaminophen    simethicone    Abnormal Labs/Diagnostic Results: platelets 398  Glucose 193  Age/Sex: 62 y o  male     Assessment/Plan: 62 y  o male post operative day #1 left total knee arthroplasty  Doing well     Plan:  · Weight Bearing as tolerated  · Up and out of bed  · DVT prophylaxis- Lovenox and foot pumps  · Analgesics  · PT/OT  · Will continue to assess for acute blood loss anemia   · May open up knee immobilizer while in bed and put ice of knee  Knee immobilizer can come off when out of bed  DC planning    Discharge Plan: PT recommends home PT        Thank you,  [facility] in the [facility] by Yash Donovan for 2017  Network Utilization Review Department  Phone: [phone]; Fax [phone]  ATTENTION: The Network Utilization Review Department is now centralized for our 7 Facilities  Make a note that we have a new phone and fax numbers for our Department   Please call with any questions or concerns to [phone] and carefully follow the prompts so that you are directed to the right person  All voicemails are confidential  Fax any determinations, approvals, denials, and requests for initial or continue stay review clinical to [phone]  Due to HIGH CALL volume, it would be easier if you could please send faxed requests to expedite your requests and in part, help us provide discharge notifications faster

## 2018-01-09 NOTE — PROGRESS NOTES
Progress Note - Earle Banks 62 y o  male MRN: [id number]  Unit/Bed#: [address] [place] [address] [id number] Encounter: [id number]    Assessment:  Principal Problem:    Primary localized osteoarthritis of left knee  Active Problems:    Hyperlipidemia    Hypertension    Diabetes mellitus ([address] )    Esophageal reflux  Resolved Problems:    * No resolved hospital problems  *      Plan:  · Total left knee arthroplasty for primary osteoarthritis of left knee  Pain management and stool softener  Discontinue IV fluids  Encourage incentive spirometer use  Monitor H&H and transfuse as needed  On iron and vitamin-C  Wound care and dressing changes per Orthopedics  · Diabetes mellitus  Accu-Chek a c  HS with Humalog sliding scale  Continue on metformin  · Hypertension  On lisinopril and hydrochlorothiazide  · Hyperlipidemia-on statin  · GERD-on Protonix  · Continue rest of the management as per primary orthopedic service  · DVT prophylaxis  · Discussed with patient and RN in detail  Subjective:   Patient is seen and examined at bedside  Patient complains of left knee pain which is 6/10 in intensity, sharp, constant, worse with movement  Afebrile  Denies any other complaints  All other ROS are negative  Objective:   Vitals: Blood pressure 169/88, pulse (!) 108, temperature 97 8 °F (36 6 °C), temperature source Oral, resp  rate 20, height 5' 10" (1 778 m), weight 132 kg (291 lb 3 6 oz), SpO2 95 %  ,Body mass index is 41 79 kg/m²  SPO2 RA Rest    Flowsheet Row Admission (Current) from 1/8/2018 in [facility] Surg Unit   SpO2  95 %   SpO2 Activity  At Rest   O2 Device  None (Room air)   O2 Flow Rate  2 L/min        I&O:   Intake/Output Summary (Last 24 hours) at 01/09/18 1015  Last data filed at 01/09/18 1001   Gross per 24 hour   Intake          1461 67 ml   Output             2000 ml   Net          -538 33 ml       Physical Exam:    General- Alert, lying comfortably in bed  Not in any acute distress    HEENT- RENÉ, EOM intact  Neck- Supple, No JVD  CVS- regular, S1 and S2 normal   Chest- Bilateral Air entry, No rhochi, crackles or wheezing present  Abdomen- soft, nontender, not distended, no guarding or rigidity, BS+  Extremities-  No pedal edema, No calf tenderness  Left knee in a dressing which is clean, dry and intact  CNS-   Alert, awake and orientedx3  No focal deficits present  Invasive Devices     Peripheral Intravenous Line            Peripheral IV 01/08/18 Right Hand less than 1 day                      Social History  reviewed  History reviewed  No pertinent family history   reviewed    Meds:  Current Facility-Administered Medications   Medication Dose Route Frequency Provider Last Rate Last Dose    acetaminophen (TYLENOL) tablet 650 mg  650 mg Oral Q6H PRN Nany Hendrickson PA-C        ALPRAZolam Scherer Glance) tablet 1 mg  1 mg Oral HS PRN Nany Hendrickson PA-C        aluminum-magnesium hydroxide-simethicone (MYLANTA) 200-200-20 mg/5 mL oral suspension 30 mL  30 mL Oral Q6H PRN Nany Hendrickson PA-C        ascorbic acid (VITAMIN C) tablet 500 mg  500 mg Oral BID Nany Hendrickson PA-C   500 mg at 01/09/18 0801    atorvastatin (LIPITOR) tablet 40 mg  40 mg Oral Daily With General EMELY Kelsey   40 mg at 01/08/18 1636    bisacodyl (DULCOLAX) rectal suppository 10 mg  10 mg Rectal Daily PRN Nany Hendrickson PA-C        calcium carbonate (TUMS) chewable tablet 1,000 mg  1,000 mg Oral Daily PRN Nany Hendrickson PA-C        docusate sodium (COLACE) capsule 100 mg  100 mg Oral BID Nany Hendrickson PA-C   100 mg at 01/09/18 0801    enoxaparin (LOVENOX) subcutaneous injection 40 mg  40 mg Subcutaneous Daily Nany Hendrickson PA-C   40 mg at 01/09/18 6342    ferrous sulfate tablet 325 mg  325 mg Oral BID With Meals Nany Hendrickson PA-C   325 mg at 67/44/67 7393    folic acid (FOLVITE) tablet 1 mg  1 mg Oral Daily Nany Hendrickson PA-C   1 mg at 01/09/18 0801    gabapentin (NEURONTIN) capsule 100 mg  100 mg Oral [facility] Nany Hendrickson PA-C   100 mg at 01/09/18 3365    lisinopril (ZESTRIL) tablet 20 mg  20 mg Oral Daily Nathanel Pippins, PA-C   20 mg at 01/09/18 0805    And    hydrochlorothiazide (HYDRODIURIL) tablet 12 5 mg  12 5 mg Oral Daily Nathanel Pippins, PA-C   12 5 mg at 01/09/18 0801    HYDROmorphone (DILAUDID) injection 2 mg  2 mg Intravenous Q3H PRN Prateek Gruber MD   2 mg at 01/09/18 0934    insulin lispro (HumaLOG) 100 units/mL subcutaneous injection 2-12 Units  2-12 Units Subcutaneous TID [facility] Carline Wilde MD   2 Units at 01/09/18 0756    lactated ringers infusion  100 mL/hr Intravenous Continuous Nathanel Pippins, PA-C   Stopped at 01/09/18 1750    metFORMIN (GLUCOPHAGE) tablet 500 mg  500 mg Oral BID With Meals Nathanel Pipamishs, PA-C   500 mg at 01/09/18 0801    morphine injection 2 mg  2 mg Intravenous Q4H PRN Nathanel Pippins, PA-C   2 mg at 01/08/18 1353    multivitamin-minerals (CENTRUM) tablet 1 tablet  1 tablet Oral Daily Nathanel Pippins, PA-C   1 tablet at 01/09/18 0801    nicotine (NICODERM CQ) 21 mg/24 hr TD 24 hr patch 21 mg  21 mg Transdermal Daily Carline Wilde MD   21 mg at 01/09/18 0823    ondansetron (ZOFRAN) injection 4 mg  4 mg Intravenous Q4H PRN Nathanel Pippins, PA-C        oxyCODONE-acetaminophen (PERCOCET) 5-325 mg per tablet 1 tablet  1 tablet Oral Q4H PRN Nathanel Pippins, PA-C        oxyCODONE-acetaminophen (PERCOCET) 5-325 mg per tablet 2 tablet  2 tablet Oral Q4H PRN Nathanel Pippins, PA-C   2 tablet at 01/09/18 0757    pantoprazole (PROTONIX) EC tablet 20 mg  20 mg Oral Early Morning Nathanel Pippins, PA-C   20 mg at 01/09/18 2202    senna (SENOKOT) tablet 8 6 mg  1 tablet Oral Daily Nathanel Pippins, PA-C   8 6 mg at 01/09/18 0801    simethicone (MYLICON) chewable tablet 80 mg  80 mg Oral 4x Daily PRN Tayo Mendoza PA-C        traMADol Dyane Pellant) tablet 50 mg  50 mg Oral Q6H [facility] Natwang Murphys, PA-C   50 mg at 01/09/18 5249      Prescriptions Prior to Admission   Medication    ALPRAZolam (XANAX) 1 mg tablet    ascorbic acid (VITAMIN C) 500 mg tablet    atorvastatin (LIPITOR) 40 mg tablet    Canagliflozin (INVOKANA) 300 MG TABS    diphenhydrAMINE-acetaminophen (TYLENOL PM)  MG TABS    Exenatide ER (BYDUREON) 2 MG PEN    ferrous sulfate 325 (65 Fe) mg tablet    folic acid (FOLVITE) 1 mg tablet    ibuprofen (MOTRIN) 400 mg tablet    Lansoprazole (PREVACID 24HR PO)    lisinopril-hydrochlorothiazide (PRINZIDE,ZESTORETIC) 20-12 5 MG per tablet    metFORMIN (GLUCOPHAGE) 1000 MG tablet       Labs:    Results from last 7 days  Lab Units 01/09/18  0430   WBC Thousand/uL 7 90   HEMOGLOBIN g/dL 13 8   HEMATOCRIT % 41 6   PLATELETS Thousands/uL 135*       Results from last 7 days  Lab Units 01/09/18  0430   SODIUM mmol/L 138   POTASSIUM mmol/L 3 8   CHLORIDE mmol/L 102   CO2 mmol/L 25   BUN mg/dL 12   CREATININE mg/dL 0 87   CALCIUM mg/dL 8 8   GLUCOSE RANDOM mg/dL 193*     No results found for: TROPONINI, CKMB, CKTOTAL      No results found for: Teresa Blnad, SPUTUMCULTUR      Imaging:  No results found for this or any previous visit  No results found for this or any previous visit  Labs & Imaging: I have personally reviewed pertinent reports        VTE Pharmacologic Prophylaxis: Enoxaparin (Lovenox)  VTE Mechanical Prophylaxis: sequential compression device    Code Status:   Level 1 - Full Code      "This note has been constructed using a voice recognition system"      Sahara Rodriguez MD  1/9/2018,10:15 AM

## 2018-01-09 NOTE — PHYSICAL THERAPY NOTE
PT tx     01/09/18 1015   Pain Assessment   Pain Assessment 0-10   Pain Score 6   Pain Type Surgical pain   Pain Location Knee   Pain Orientation Left   Precautions   Total Knee Replacement Knee immobilizer  (in bed only)   Restrictions/Precautions   LLE Weight Bearing Per Order WBAT   General   Chart Reviewed Yes   Additional Pertinent History ok to tx   Cognition   Overall Cognitive Status WFL   Arousal/Participation Alert   Attention Within functional limits   Orientation Level Oriented X4   Memory Within functional limits   Following Commands Follows all commands and directions without difficulty   Comments was going to try to quit tobacco this adm but requested nicotine replacement   Subjective   Subjective had pain meds prior to session  ate breakfast   Bed Mobility   Supine to Sit 4  Minimal assistance   Additional items Increased time required;LE management;Verbal cues   Transfers   Sit to Stand 6  Modified independent   Additional items Assist x 1;Verbal cues; Increased time required   Stand to Sit 4  Minimal assistance   Additional items Assist x 1; Increased time required;Verbal cues;Armrests   Stand pivot 4  Minimal assistance   Additional items Assist x 1   Ambulation/Elevation   Gait pattern Wide HYUN; Forward Flexion; Shuffling; Inconsistent fausto; Short stride; Excessively slow; Step to   Gait Assistance 4  Minimal assist   Additional items Assist x 1;Verbal cues   Assistive Device Rolling walker   Distance 5'x2   Balance   Static Sitting Normal   Dynamic Sitting Fair   Static Standing Fair   Dynamic Standing Fair -   Ambulatory Fair -   Endurance Deficit   Endurance Deficit Yes   Endurance Deficit Description tires quickly   Activity Tolerance   Activity Tolerance Patient tolerated treatment well;Patient limited by pain   Nurse Made Aware yes   Exercises   Quad Sets Supine;10 reps;AROM; Left   Knee AROM Extension Supine (-10 degrees full ext with asssit)   Knee Flexion Stretch (AA flexion to 60 degrees) Knee AROM Long Arc Quad 10 reps;AAROM; Left   Ankle Pumps 10 reps; Sitting;Left;AROM   Assessment   Prognosis Good   Problem List Decreased strength;Decreased range of motion;Decreased endurance; Impaired balance;Decreased mobility; Decreased safety awareness;Orthopedic restrictions   Assessment Pt is able to mobilize oob with rw adn few steps without immobilizer  Abl eto hold L knee in extension w o buckling  ernesto ther ex well  Ice for after session will focus on gait training next session  Goals   Patient Goals get better   Plan   Treatment/Interventions ADL retraining;Functional transfer training;LE strengthening/ROM; Elevations; Therapeutic exercise; Endurance training;Cognitive reorientation;Patient/family training;Equipment eval/education; Bed mobility;Gait training;Spoke to nursing;OT   Progress Improving as expected   PT Frequency 5x/wk; Twice a day   Recommendation   Recommendation Home PT   Equipment Recommended Walker   PT - OK to Discharge Yes   Soheila Garcia, PT

## 2018-01-09 NOTE — SOCIAL WORK
Met with patient, discussed his progress and he feels he will be able to return home with his wife and Home PT/SN  Elmer Burnett Discussed VNA and Freedom of Choice given, he has no preference  Referral to [facility] made via [facility]   He has a RW at [facility] Will follow,

## 2018-01-09 NOTE — PLAN OF CARE
DISCHARGE PLANNING     Discharge to home or other facility with appropriate resources Progressing        DISCHARGE PLANNING - CARE MANAGEMENT     Discharge to post-acute care or home with appropriate resources Progressing        Knowledge Deficit     Patient/family/caregiver demonstrates understanding of disease process, treatment plan, medications, and discharge instructions Progressing        METABOLIC, FLUID AND ELECTROLYTES - ADULT     Glucose maintained within target range Progressing        MUSCULOSKELETAL - ADULT     Maintain or return mobility to safest level of function Progressing     Maintain proper alignment of affected body part Progressing        Nutrition/Hydration-ADULT     Nutrient/Hydration intake appropriate for improving, restoring or maintaining nutritional needs Progressing        PAIN - ADULT     Verbalizes/displays adequate comfort level or baseline comfort level Progressing        Potential for Falls     Patient will remain free of falls Progressing        Prexisting or High Potential for Compromised Skin Integrity     Skin integrity is maintained or improved Progressing        SAFETY ADULT     Maintain or return to baseline ADL function Progressing     Maintain or return mobility status to optimal level Progressing     Patient will remain free of falls Progressing        SKIN/TISSUE INTEGRITY - ADULT     Skin integrity remains intact Progressing     Incision(s), wounds(s) or drain site(s) healing without S/S of infection Progressing

## 2018-01-09 NOTE — OCCUPATIONAL THERAPY NOTE
[address]  Evaluation     Patient Name: Kacy Marley  AWHWE'P Date: 1/9/2018  Problem List  Patient Active Problem List   Diagnosis    Primary localized osteoarthritis of left knee    Hyperlipidemia    Hypertension    Diabetes mellitus [facility] [address] )    Esophageal reflux     Past Medical History  Past Medical History:   Diagnosis Date    Chews tobacco regularly     Consumes three beers daily     Controlled diabetes mellitus ([facility] [address] )     Esophageal reflux     Hyperlipidemia     Hypertension     Osteoarthritis     LEFT KNEE     Past Surgical History  Past Surgical History:   Procedure Laterality Date    COLONOSCOPY      IL TOTAL KNEE ARTHROPLASTY Left 1/8/2018    Procedure: ARTHROPLASTY KNEE TOTAL;  Surgeon: Dani Vargas MD;  Location:  MAIN OR;  Service: Orthopedics           01/09/18 1005   Note Type   Note type Eval/Treat   Restrictions/Precautions   LLE Weight Bearing Per Order WBAT   Pain Assessment   Pain Score 7   Pain Type Acute pain   Pain Location Knee   Pain Orientation Left   Home Living   Type of [address] [facility] Two level; Able to live on main level with bedroom/bathroom   Bathroom Equipment (no BR dme)   [address] [facility]   Prior Function   Level of [place] Independent with ADLs and functional mobility   Lives With Spouse  (works)   Ovidio Horta 32 in the last 6 months 0   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)   Comments indep all adl, aidl, drives   Psychosocial   Patient Behaviors/Mood Cooperative   Subjective   Subjective ' now the pain is 7-8" " not like yesterdaY- block wore off"   ADL   Where Assessed Chair   Grooming Assistance 6  Modified Independent   Grooming Deficit Setup   UB Bathing Assistance 6  Modified Independent   UB Bathing Deficit Setup  (seated)   LB Bathing Assistance 5  Supervision/Setup   LB Bathing Deficit Setup; Increased time to complete LB Dressing Assistance 5  Supervision/Setup   LB Dressing Deficit Setup;Verbal cueing; Increased time to complete; Thread RLE into pants; Thread LLE into pants;Pull up over hips   Transfers   Sit to Stand 6  Modified independent   Additional items Armrests; Verbal cues   Stand to Sit 6  Modified independent   Additional items Armrests; Verbal cues   Stand pivot 5  Supervision   Additional items Armrests; Increased time required;Verbal cues  (rw)   Additional Comments reinfroce no twisitng LLE   Balance   Static Sitting Normal   Dynamic Sitting Good   Static Standing Fair   Dynamic Standing Fair -   Ambulatory Fair -   Activity Tolerance   Activity Tolerance Patient limited by pain   Nurse Made Aware yes, tru hamilton pain level and nicotene patch falling off   RUE Assessment   RUE Assessment WFL   LUE Assessment   LUE Assessment WFL   Hand Function   Gross Motor Coordination Functional   Fine Motor Coordination Functional   Sensation   Light Touch No apparent deficits   Cognition   Overall Cognitive Status WFL   Arousal/Participation Cooperative   Attention Within functional limits   Orientation Level Oriented X4   Memory Within functional limits   Following Commands Follows all commands and directions without difficulty   Assessment   Limitation Decreased ADL status; Decreased Safe judgement during ADL;Decreased self-care trans   Prognosis Good   Assessment This 62 yom is adm to [address] for LTKA  He is wbat and toelrating no immobilizer  His pain is signnficant since block wore off   Pt requires vc for safe technique all adls and mobility  but expect will be safe for d/c home   Goals   Patient Goals get better   STG Time Frame 3-5   Short Term Goal #1 stadn ernesto 1-3 min w f/f+ bal for adls and funcitonal moiblity, adls to mod I w safe technique   Plan   Treatment Interventions ADL retraining;Functional transfer training;Patient/family training;Neuromuscular reeducation   Goal Expiration Date 01/14/18   OT Frequency 2-3x/wk Additional Treatment Session   Start Time 1010   End Time 1020   Treatment Assessment adl- reinforced stabilizing self in stance before follow through w LB adl  pt demos reach to feet to don pants  vc to not twist on LLE during stand pvt txfers  pt w significant pain   pt has no home BR dme   Recommendation   OT Discharge Recommendation Home with family support   Equipment Recommended Bedside commode  (prn)   Barthel Index   Feeding 10   Bathing 0   Grooming Score 5   Dressing Score 5   Bladder Score 10   Bowels Score 10   Toilet Use Score 5   Transfers (Bed/Chair) Score 10   Mobility (Level Surface) Score 0   Stairs Score 0   Barthel Index Score 55   Cruz Mcqueen, OT

## 2018-01-09 NOTE — PLAN OF CARE
Problem: OCCUPATIONAL THERAPY ADULT  Goal: Performs self-care activities at highest level of function for planned discharge setting  See evaluation for individualized goals  Treatment Interventions: ADL retraining, Functional transfer training, Patient/family training, Neuromuscular reeducation  Equipment Recommended: Bedside commode (prn)       See flowsheet documentation for full assessment, interventions and recommendations  Limitation: Decreased ADL status, Decreased Safe judgement during ADL, Decreased self-care trans  Prognosis: Good  Assessment: This 62 yom is adm to [facility] for [place] [address]  He is wbat and toelrating no immobilizer  His pain is signnficant since block wore off   Pt requires vc for safe technique all adls and mobility  but expect will be safe for d/c home     OT Discharge Recommendation: Home with family support

## 2018-01-09 NOTE — PROGRESS NOTES
Orthopedics   Caitlyn Cornejo 62 y o  male MRN: [id number]  Unit/Bed#: [address] [place], [place] [address]      Subjective:  62 y  o male post operative day #1 left total knee arthroplasty  Pt doing well overall  Pain not controlled well with Percocet and getting Dilaudid for breakthrough pain  Denies chest pain, SOB, nausea, vomiting, fever or chills       Labs:    0  Lab Value Date/Time   HCT 41 6 01/09/2018 0430   HCT 48 7 12/26/2017 1051   HGB 13 8 01/09/2018 0430   HGB 16 5 12/26/2017 1051   INR 0 90 12/26/2017 1051   WBC 7 90 01/09/2018 0430   WBC 7 56 12/26/2017 1051       Meds:    Current Facility-Administered Medications:     acetaminophen (TYLENOL) tablet 650 mg, 650 mg, Oral, Q6H PRN, RENETTA Colvin-C    ALPRAZolam Lata Pearce) tablet 1 mg, 1 mg, Oral, HS PRN, RENETTA Colvin-C    aluminum-magnesium hydroxide-simethicone (MYLANTA) 200-200-20 mg/5 mL oral suspension 30 mL, 30 mL, Oral, Q6H PRN, Hayley Devries PA-C    ascorbic acid (VITAMIN C) tablet 500 mg, 500 mg, Oral, BID, RENETTA Colvin-SKYLER, 500 mg at 01/08/18 1713    atorvastatin (LIPITOR) tablet 40 mg, 40 mg, Oral, Daily With Dinner, RENETTA Colvin-C, 40 mg at 01/08/18 1636    bisacodyl (DULCOLAX) rectal suppository 10 mg, 10 mg, Rectal, Daily PRN, Hayley Devries PA-C    calcium carbonate (TUMS) chewable tablet 1,000 mg, 1,000 mg, Oral, Daily PRN, RENETTA Colvin-C    docusate sodium (COLACE) capsule 100 mg, 100 mg, Oral, BID, RENETTA Colvin-C, 100 mg at 01/08/18 1713    enoxaparin (LOVENOX) subcutaneous injection 40 mg, 40 mg, Subcutaneous, Daily, RENETTA Colvin-C    ferrous sulfate tablet 325 mg, 325 mg, Oral, BID With Meals, RENETTA Colvin-C, 325 mg at 32/94/26 9095    folic acid (FOLVITE) tablet 1 mg, 1 mg, Oral, Daily, [facility], 1 mg at 01/08/18 1124    gabapentin (NEURONTIN) capsule 100 mg, 100 mg, Oral, Q8H [facility], [facility] PAHOWARD, 100 mg at 01/09/18 8543    lisinopril (ZESTRIL) tablet 20 mg, 20 mg, Oral, Daily, 20 mg at 01/08/18 1124 **AND** hydrochlorothiazide (HYDRODIURIL) tablet 12 5 mg, 12 5 mg, Oral, Daily, Willie Lucas PA-C, 12 5 mg at 01/08/18 1124    HYDROmorphone (DILAUDID) injection 2 mg, 2 mg, Intravenous, Q3H PRN, Rosario Borges MD, 2 mg at 01/09/18 0447    insulin lispro (HumaLOG) 100 units/mL subcutaneous injection 2-12 Units, 2-12 Units, Subcutaneous, TID AC, 2 Units at 01/09/18 0756 **AND** Fingerstick Glucose (POCT), , , TID AC, Bang Coombs MD    lactated ringers infusion, 100 mL/hr, Intravenous, Continuous, Willie Lucas PA-C, Last Rate: 100 mL/hr at 01/09/18 0557, 100 mL/hr at 01/09/18 0557    metFORMIN (GLUCOPHAGE) tablet 500 mg, 500 mg, Oral, BID With Meals, Willie Lucas PA-C, 500 mg at 01/08/18 1636    morphine injection 2 mg, 2 mg, Intravenous, Q4H PRN, Willie Lucas PA-C, 2 mg at 01/08/18 1353    multivitamin-minerals (CENTRUM) tablet 1 tablet, 1 tablet, Oral, Daily, Willie Lucas PA-C, 1 tablet at 01/08/18 1124    nicotine (NICODERM CQ) 21 mg/24 hr TD 24 hr patch 21 mg, 21 mg, Transdermal, Daily, Bang Coombs MD    ondansetron (ZOFRAN) injection 4 mg, 4 mg, Intravenous, Q4H PRN, Willie Lucas PA-C    oxyCODONE-acetaminophen (PERCOCET) 5-325 mg per tablet 1 tablet, 1 tablet, Oral, Q4H PRN, Willie Lucas PA-C    oxyCODONE-acetaminophen (PERCOCET) 5-325 mg per tablet 2 tablet, 2 tablet, Oral, Q4H PRN, Willie Lucas PA-C, 2 tablet at 01/08/18 1853    pantoprazole (PROTONIX) EC tablet 20 mg, 20 mg, Oral, Early Morning, Willie Lucas PA-C, 20 mg at 01/09/18 9012    senna (SENOKOT) tablet 8 6 mg, 1 tablet, Oral, Daily, Willie Lucas PA-C, 8 6 mg at 01/08/18 1124    simethicone (MYLICON) chewable tablet 80 mg, 80 mg, Oral, 4x Daily PRN, Willie Lucas PA-C    traMADol Floretta Cleaves) tablet 50 mg, 50 mg, Oral, Q6H [facility], Willie Lucas PA-C, 50 mg at 01/09/18 0452    Blood Culture:   No results found for: BLOODCX    Wound Culture:   No results found for: WOUNDCULT    Ins and Outs:  I/O last 24 hours:   In: 2940 [I V :2940]  Out: 850 [Urine:850]          Physical:  Vitals:    01/09/18 0322   BP: 137/83   Pulse: 100   Resp: 18   Temp: 98 3 °F (36 8 °C)   SpO2: 92%     left lower extremity:  · Dressings C/D/I  · Toes warm and well perfused  · Knee immobilizer on  · Calf soft, nontender    _*_*_*_*_*_*_*_*_*_*_*_*_*_*_*_*_*_*_*_*_*_*_*_*_*_*_*_*_*_*_*_*_*_*_*_*_*_*_*_*_*    Assessment: 62 y  o male post operative day #1 left total knee arthroplasty  Doing well    Plan:  · Weight Bearing as tolerated  · Up and out of bed  · DVT prophylaxis- Lovenox and foot pumps  · Analgesics  · PT/OT  · Will continue to assess for acute blood loss anemia   · May open up knee immobilizer while in bed and put ice of knee  Knee immobilizer can come off when out of bed    · DC planning    Ruth Harper PA-C

## 2018-01-09 NOTE — PLAN OF CARE
Problem: PHYSICAL THERAPY ADULT  Goal: Performs mobility at highest level of function for planned discharge setting  See evaluation for individualized goals  Treatment/Interventions: ADL retraining, Functional transfer training, LE strengthening/ROM, Elevations, Therapeutic exercise, Endurance training, Cognitive reorientation, Patient/family training, Equipment eval/education, Bed mobility, Gait training, Spoke to nursing, OT  Equipment Recommended: Lorna Cornejo       See flowsheet documentation for full assessment, interventions and recommendations  Outcome: Progressing  Prognosis: Good  Problem List: Decreased strength, Decreased range of motion, Decreased endurance, Impaired balance, Decreased mobility, Decreased safety awareness, Orthopedic restrictions  Assessment: Pt is able to mobilize oob with rw adn few steps without immobilizer  Abl eto hold L knee in extension w o buckling  ernesto ther ex well  Ice for after session will focus on gait training next session  Recommendation: Home PT     PT - OK to Discharge: Yes    See flowsheet documentation for full assessment

## 2018-01-09 NOTE — PHYSICAL THERAPY NOTE
PT tx     01/09/18 1350   Pain Assessment   Pain Assessment 0-10   Pain Score 6   Pain Location Knee   Pain Orientation Left   Restrictions/Precautions   LLE Weight Bearing Per Order WBAT   General   Chart Reviewed Yes   Cognition   Overall Cognitive Status WFL   Attention Within functional limits   Orientation Level Oriented X4   Memory Within functional limits   Following Commands Follows all commands and directions without difficulty   Subjective   Subjective ready to walk   Bed Mobility   Supine to Sit 4  Minimal assistance   Additional items Assist x 1   Sit to Supine 4  Minimal assistance   Additional items Assist x 1   Transfers   Sit to Stand 6  Modified independent   Additional items Assist x 1; Increased time required;Verbal cues   Stand to Sit 6  Modified independent   Additional items Assist x 1; Armrests; Verbal cues   Stand pivot 4  Minimal assistance   Additional items Assist x 1; Increased time required;Verbal cues  (rw)   Ambulation/Elevation   Gait pattern Decreased L stance; Inconsistent fausto; Short stride   Gait Assistance 4  Minimal assist   Additional items Assist x 1   Assistive Device Rolling walker   Distance 15'x2   Balance   Static Sitting Good   Dynamic Sitting Fair +   Static Standing Fair   Dynamic Standing Fair   Ambulatory Fair   Activity Tolerance   Activity Tolerance Patient tolerated treatment well   Nurse Made Aware yes   Exercises   Knee AROM Long Arc Quad Sitting;10 reps;AROM; Left   TKR Supine;10 reps;AAROM; Left   Assessment   Prognosis Good   Problem List Decreased strength;Decreased range of motion;Decreased endurance; Impaired balance;Decreased mobility;Orthopedic restrictions   Assessment Progressing well  Longer amb distance tolerated but pt tired by end of session  Ice applied after session  Pt requesting iv pain meds nurse aware    Gradual progress   Con't PT bid   Goals   Patient Goals get better   Plan   Treatment/Interventions ADL retraining;Functional transfer training;LE strengthening/ROM; Elevations; Therapeutic exercise; Endurance training;Patient/family training;Equipment eval/education; Bed mobility;Gait training;Spoke to nursing   Progress Progressing toward goals   PT Frequency 5x/wk; Twice a day   Recommendation   Recommendation Home PT   Equipment Recommended Tamara Kelley, PT

## 2018-01-10 VITALS
WEIGHT: 291.01 LBS | HEIGHT: 70 IN | RESPIRATION RATE: 20 BRPM | OXYGEN SATURATION: 96 % | HEART RATE: 114 BPM | DIASTOLIC BLOOD PRESSURE: 90 MMHG | TEMPERATURE: 98.2 F | SYSTOLIC BLOOD PRESSURE: 142 MMHG | BODY MASS INDEX: 41.66 KG/M2

## 2018-01-10 LAB
ANION GAP SERPL CALCULATED.3IONS-SCNC: 9 MMOL/L (ref 4–13)
BUN SERPL-MCNC: 10 MG/DL (ref 5–25)
CALCIUM SERPL-MCNC: 8.6 MG/DL (ref 8.3–10.1)
CHLORIDE SERPL-SCNC: 101 MMOL/L (ref 100–108)
CO2 SERPL-SCNC: 27 MMOL/L (ref 21–32)
CREAT SERPL-MCNC: 0.86 MG/DL (ref 0.6–1.3)
ERYTHROCYTE [DISTWIDTH] IN BLOOD BY AUTOMATED COUNT: 13.1 % (ref 11.6–15.1)
GFR SERPL CREATININE-BSD FRML MDRD: 96 ML/MIN/1.73SQ M
GLUCOSE SERPL-MCNC: 164 MG/DL (ref 65–140)
GLUCOSE SERPL-MCNC: 188 MG/DL (ref 65–140)
GLUCOSE SERPL-MCNC: 195 MG/DL (ref 65–140)
HCT VFR BLD AUTO: 40.6 % (ref 36.5–49.3)
HGB BLD-MCNC: 13.6 G/DL (ref 12–17)
MCH RBC QN AUTO: 30.9 PG (ref 26.8–34.3)
MCHC RBC AUTO-ENTMCNC: 33.5 G/DL (ref 31.4–37.4)
MCV RBC AUTO: 92 FL (ref 82–98)
PLATELET # BLD AUTO: 148 THOUSANDS/UL (ref 149–390)
PMV BLD AUTO: 11.6 FL (ref 8.9–12.7)
POTASSIUM SERPL-SCNC: 3.7 MMOL/L (ref 3.5–5.3)
RBC # BLD AUTO: 4.4 MILLION/UL (ref 3.88–5.62)
SODIUM SERPL-SCNC: 137 MMOL/L (ref 136–145)
WBC # BLD AUTO: 9.81 THOUSAND/UL (ref 4.31–10.16)

## 2018-01-10 PROCEDURE — 97535 SELF CARE MNGMENT TRAINING: CPT

## 2018-01-10 PROCEDURE — 85027 COMPLETE CBC AUTOMATED: CPT | Performed by: PHYSICIAN ASSISTANT

## 2018-01-10 PROCEDURE — 97116 GAIT TRAINING THERAPY: CPT

## 2018-01-10 PROCEDURE — 82948 REAGENT STRIP/BLOOD GLUCOSE: CPT

## 2018-01-10 PROCEDURE — 97530 THERAPEUTIC ACTIVITIES: CPT

## 2018-01-10 PROCEDURE — 97110 THERAPEUTIC EXERCISES: CPT

## 2018-01-10 PROCEDURE — 80048 BASIC METABOLIC PNL TOTAL CA: CPT | Performed by: PHYSICIAN ASSISTANT

## 2018-01-10 RX ORDER — CEPHALEXIN 500 MG/1
500 CAPSULE ORAL EVERY 6 HOURS SCHEDULED
Qty: 28 CAPSULE | Refills: 0 | Status: SHIPPED | OUTPATIENT
Start: 2018-01-10 | End: 2018-01-17

## 2018-01-10 RX ORDER — OXYCODONE HYDROCHLORIDE AND ACETAMINOPHEN 5; 325 MG/1; MG/1
TABLET ORAL
Qty: 60 TABLET | Refills: 0 | Status: SHIPPED | OUTPATIENT
Start: 2018-01-10

## 2018-01-10 RX ORDER — ASPIRIN 325 MG
325 TABLET, DELAYED RELEASE (ENTERIC COATED) ORAL 2 TIMES DAILY
Qty: 60 TABLET | Refills: 0 | Status: SHIPPED | OUTPATIENT
Start: 2018-01-10 | End: 2018-02-09

## 2018-01-10 RX ADMIN — HYDROCHLOROTHIAZIDE 12.5 MG: 25 TABLET ORAL at 08:18

## 2018-01-10 RX ADMIN — GABAPENTIN 100 MG: 100 CAPSULE ORAL at 06:10

## 2018-01-10 RX ADMIN — TRAMADOL HYDROCHLORIDE 50 MG: 50 TABLET, FILM COATED ORAL at 06:09

## 2018-01-10 RX ADMIN — Medication 1 TABLET: at 08:19

## 2018-01-10 RX ADMIN — ENOXAPARIN SODIUM 40 MG: 40 INJECTION SUBCUTANEOUS at 08:20

## 2018-01-10 RX ADMIN — OXYCODONE HYDROCHLORIDE AND ACETAMINOPHEN 500 MG: 500 TABLET ORAL at 08:18

## 2018-01-10 RX ADMIN — PANTOPRAZOLE SODIUM 20 MG: 20 TABLET, DELAYED RELEASE ORAL at 06:10

## 2018-01-10 RX ADMIN — HYDROMORPHONE HYDROCHLORIDE 2 MG: 2 INJECTION, SOLUTION INTRAMUSCULAR; INTRAVENOUS; SUBCUTANEOUS at 01:44

## 2018-01-10 RX ADMIN — LISINOPRIL 20 MG: 20 TABLET ORAL at 08:19

## 2018-01-10 RX ADMIN — OXYCODONE HYDROCHLORIDE AND ACETAMINOPHEN 2 TABLET: 5; 325 TABLET ORAL at 08:25

## 2018-01-10 RX ADMIN — TRAMADOL HYDROCHLORIDE 50 MG: 50 TABLET, FILM COATED ORAL at 01:39

## 2018-01-10 RX ADMIN — SENNOSIDES 8.6 MG: 8.6 TABLET, FILM COATED ORAL at 08:18

## 2018-01-10 RX ADMIN — DOCUSATE SODIUM 100 MG: 100 CAPSULE, LIQUID FILLED ORAL at 08:18

## 2018-01-10 RX ADMIN — INSULIN LISPRO 2 UNITS: 100 INJECTION, SOLUTION INTRAVENOUS; SUBCUTANEOUS at 11:39

## 2018-01-10 RX ADMIN — TRAMADOL HYDROCHLORIDE 50 MG: 50 TABLET, FILM COATED ORAL at 11:38

## 2018-01-10 RX ADMIN — FOLIC ACID 1 MG: 1 TABLET ORAL at 08:19

## 2018-01-10 RX ADMIN — INSULIN LISPRO 2 UNITS: 100 INJECTION, SOLUTION INTRAVENOUS; SUBCUTANEOUS at 08:20

## 2018-01-10 RX ADMIN — NICOTINE 21 MG: 21 PATCH, EXTENDED RELEASE TRANSDERMAL at 08:19

## 2018-01-10 RX ADMIN — METFORMIN HYDROCHLORIDE 500 MG: 500 TABLET, FILM COATED ORAL at 08:18

## 2018-01-10 RX ADMIN — Medication 325 MG: at 08:19

## 2018-01-10 NOTE — PROGRESS NOTES
Orthopedics   Yanet Session 62 y o  male MRN: [id number]  Unit/Bed#: [address] [place] [address]      Subjective:  62 y  o male post operative day #2 left total knee arthroplasty  Pt doing well overall  Pain much better controlled today  Passing gas but no BM yet       Labs:    0  Lab Value Date/Time   HCT 40 6 01/10/2018 0432   HCT 41 6 01/09/2018 0430   HCT 48 7 12/26/2017 1051   HGB 13 6 01/10/2018 0432   HGB 13 8 01/09/2018 0430   HGB 16 5 12/26/2017 1051   INR 0 90 12/26/2017 1051   WBC 9 81 01/10/2018 0432   WBC 7 90 01/09/2018 0430   WBC 7 56 12/26/2017 1051       Meds:    Current Facility-Administered Medications:     acetaminophen (TYLENOL) tablet 650 mg, 650 mg, Oral, Q6H PRN, Italo Valentin PA-C    ALPRAZolam [place] [facility]) tablet 1 mg, 1 mg, Oral, HS PRN, Italo Valentin PA-C    aluminum-magnesium hydroxide-simethicone (MYLANTA) 200-200-20 mg/5 mL oral suspension 30 mL, 30 mL, Oral, Q6H PRN, Italo Valentin PA-C    ascorbic acid (VITAMIN C) tablet 500 mg, 500 mg, Oral, BID, Italo Valentin PA-C, 500 mg at 01/10/18 0818    atorvastatin (LIPITOR) tablet 40 mg, 40 mg, Oral, Daily With Dinner, Italo Valentin PA-C, 40 mg at 01/09/18 1544    bisacodyl (DULCOLAX) rectal suppository 10 mg, 10 mg, Rectal, Daily PRN, Italo Valentin PA-C    calcium carbonate (TUMS) chewable tablet 1,000 mg, 1,000 mg, Oral, Daily PRN, Italo Valentin PA-C    docusate sodium (COLACE) capsule 100 mg, 100 mg, Oral, BID, Italo Valentin PA-C, 100 mg at 01/10/18 0818    enoxaparin (LOVENOX) subcutaneous injection 40 mg, 40 mg, Subcutaneous, Daily, Italo Valentin PA-C, 40 mg at 01/10/18 0820    ferrous sulfate tablet 325 mg, 325 mg, Oral, BID With Meals, Italo Valentin PA-C, 325 mg at 88/57/51 4670    folic acid (FOLVITE) tablet 1 mg, 1 mg, Oral, Daily, Italo Valentin PA-C, 1 mg at 01/10/18 8215    gabapentin (NEURONTIN) capsule 100 mg, 100 mg, Oral, Q8H [address], Italo Valentin PA-C, 100 mg at 01/10/18 0610    hydrALAZINE (APRESOLINE) injection 10 mg, 10 mg, Intravenous, Q6H PRN, Stalin JIMÉNEZ Eric Levine MD    lisinopril (ZESTRIL) tablet 20 mg, 20 mg, Oral, Daily, 20 mg at 01/10/18 0819 **AND** hydrochlorothiazide (HYDRODIURIL) tablet 12 5 mg, 12 5 mg, Oral, Daily, Willie Lucas PA-C, 12 5 mg at 01/10/18 0818    HYDROmorphone (DILAUDID) injection 2 mg, 2 mg, Intravenous, Q3H PRN, Rosario Borges MD, 2 mg at 01/10/18 0144    insulin lispro (HumaLOG) 100 units/mL subcutaneous injection 2-12 Units, 2-12 Units, Subcutaneous, TID AC, 2 Units at 01/10/18 0820 **AND** Fingerstick Glucose (POCT), , , TID AC, Bang Coombs MD    metFORMIN (GLUCOPHAGE) tablet 500 mg, 500 mg, Oral, BID With Meals, Willie Lucas PA-C, 500 mg at 01/10/18 0818    morphine injection 2 mg, 2 mg, Intravenous, Q4H PRN, Willie Lucas PA-C, 2 mg at 01/08/18 1353    multivitamin-minerals (CENTRUM) tablet 1 tablet, 1 tablet, Oral, Daily, Willie Lucas PA-C, 1 tablet at 01/10/18 0819    nicotine (NICODERM CQ) 21 mg/24 hr TD 24 hr patch 21 mg, 21 mg, Transdermal, Daily, Bang Coombs MD, 21 mg at 01/10/18 0819    ondansetron (ZOFRAN) injection 4 mg, 4 mg, Intravenous, Q4H PRN, Willie Lucas PA-C    oxyCODONE-acetaminophen (PERCOCET) 5-325 mg per tablet 1 tablet, 1 tablet, Oral, Q4H PRN, Willie Lucas PA-C    oxyCODONE-acetaminophen (PERCOCET) 5-325 mg per tablet 2 tablet, 2 tablet, Oral, Q4H PRN, Willie Lucas PA-C, 2 tablet at 01/10/18 0825    pantoprazole (PROTONIX) EC tablet 20 mg, 20 mg, Oral, Early Morning, Willie Lucas PA-C, 20 mg at 01/10/18 1544    senna (SENOKOT) tablet 8 6 mg, 1 tablet, Oral, Daily, Willie Lucas PA-C, 8 6 mg at 01/10/18 0818    simethicone (MYLICON) chewable tablet 80 mg, 80 mg, Oral, 4x Daily PRN, RENETTA Billings-SKYLER    traMADol Floretta Cleaves) tablet 50 mg, 50 mg, Oral, Q6H [address], RENETTA Billings-SKYLER, 50 mg at 01/10/18 0609    Blood Culture:   No results found for: BLOODCX    Wound Culture:   No results found for: WOUNDCULT    Ins and Outs:  I/O last 24 hours: In: 951 7 [P O :600;  I V :351 7]  Out: 6440 [Urine:3875]          Physical:  Vitals:    01/10/18 0747   BP: 142/90   Pulse: (!) 114   Resp: 20   Temp: 98 2 °F (36 8 °C)   SpO2: 96%     left lower extremity:  · Dressings C/D/I  · Dressing taken down: healing and intact incision  Mild erythema over anterior knee  · Toes warm and well perfused  · Calf soft, nontender    _*_*_*_*_*_*_*_*_*_*_*_*_*_*_*_*_*_*_*_*_*_*_*_*_*_*_*_*_*_*_*_*_*_*_*_*_*_*_*_*_*    Assessment: 62 y  o male post operative day #2 left total knee arthroplasty  Doing well    Plan:  · Weight Bearing as tolerated  · Up and out of bed  · DVT prophylaxis- Lovenox and foot pumps  Will discharge on  mg BID x 30 days  · Analgesics  · PT/OT  · ABLA- stable  Monitor H/H and vitals   · DC home today  Follow up in office with Dr Deangelo Crowley in 10-14 days  · DC with Keflex PO for cellulitis over knee       Willie Lucas PA-C

## 2018-01-10 NOTE — DISCHARGE SUMMARY
ORTHOPEDICS DISCHARGE SUMMARY  Smiley Hernandez 62 y o  male MRN: [id number]  Unit/Bed#:     Attending Physician: Dr Mell Langley    Admitting diagnosis: Primary osteoarthritis of left knee [M17 12]    Discharge diagnosis: Primary osteoarthritis of left knee [M17 12]    Date of admission: 1/8/2018    Date of discharge: 01/10/18         Procedure: left total knee arthroplasty    HPI:  This is a 62y o  year old male that presented to the office with signs and symptoms of left knee osteoarthritis  They tried and failed conservative treatment measures and wished to proceed with surgical intervention  The risks, benefits, and complications of the procedure were discussed with the patient and informed consent was obtained  Hospital Course: The patient was admitted to the hospital on 1/8/2018 and underwent an uncomplicated left total knee arthroplasty  They were transferred to the floor after a brief stay in the post-anesthesia care unit  Their pain was well managed with IV and oral pain medications  They began therapy on post operative day #1  Lovenox was also started for DVT prophylaxis post operative day #1  On discharge date pt was cleared by PT and the medicine team and determined to be safe for discharge  Daily discussion was had with the patient, nursing staff, orthopaedic team, and family members if present  All questions were answered to the patients satisifaction  0  Lab Value Date/Time   HGB 13 6 01/10/2018 0432   HGB 13 8 01/09/2018 0430   HGB 16 5 12/26/2017 1051       Greater than 2 gram drop which qualifies for diagnosis of acute blood loss anemia  Vital signs remained stable and pt was resuscitated with IVF as needed     Discharge Instructions: The patient was discharged weight bearing as tolerated to the left lower extremity  He will take  mg BID x 30 days  Continue PT/OT  Take pain medications as instructed  Discharge Medications:   For the complete list of discharge medications, please refer to the patient's medication reconciliation

## 2018-01-10 NOTE — PLAN OF CARE

## 2018-01-10 NOTE — PLAN OF CARE
DISCHARGE PLANNING     Discharge to home or other facility with appropriate resources Adequate for Discharge        DISCHARGE PLANNING - CARE MANAGEMENT     Discharge to post-acute care or home with appropriate resources Adequate for Discharge        Knowledge Deficit     Patient/family/caregiver demonstrates understanding of disease process, treatment plan, medications, and discharge instructions Adequate for Discharge        METABOLIC, FLUID AND ELECTROLYTES - ADULT     Glucose maintained within target range Adequate for Discharge        MUSCULOSKELETAL - ADULT     Maintain or return mobility to safest level of function Adequate for Discharge     Maintain proper alignment of affected body part Adequate for Discharge        Nutrition/Hydration-ADULT     Nutrient/Hydration intake appropriate for improving, restoring or maintaining nutritional needs Adequate for Discharge        PAIN - ADULT     Verbalizes/displays adequate comfort level or baseline comfort level Adequate for Discharge        Potential for Falls     Patient will remain free of falls Adequate for Discharge        Prexisting or High Potential for Compromised Skin Integrity     Skin integrity is maintained or improved Adequate for Discharge        SAFETY ADULT     Maintain or return to baseline ADL function Adequate for Discharge     Maintain or return mobility status to optimal level Adequate for Discharge     Patient will remain free of falls Adequate for Discharge        SKIN/TISSUE INTEGRITY - ADULT     Skin integrity remains intact Adequate for Discharge     Incision(s), wounds(s) or drain site(s) healing without S/S of infection Adequate for Discharge

## 2018-01-10 NOTE — PLAN OF CARE
Problem: OCCUPATIONAL THERAPY ADULT  Goal: Performs self-care activities at highest level of function for planned discharge setting  See evaluation for individualized goals  Treatment Interventions: ADL retraining, Functional transfer training, Patient/family training, Neuromuscular reeducation  Equipment Recommended: Bedside commode (prn)       See flowsheet documentation for full assessment, interventions and recommendations  Outcome: Progressing  Limitation: Decreased ADL status, Decreased Safe judgement during ADL, Decreased self-care trans  Prognosis: Good  Assessment: this 62 yom conts to progress towards goals and home d/c is expected   He will stay 1st fl and bsc has been orderdd     OT Discharge Recommendation: Home with family support

## 2018-01-10 NOTE — RESULT NOTES
Verified Results  [facility]) CMP14+eGFR [id number] 08:49AM Kimberley Escalante     Test Name Result Flag Reference   Glucose, Serum 119 mg/dL H 65-99   BUN 14 mg/dL  6-24   Creatinine, Serum 1 08 mg/dL  0 76-1 27   eGFR If NonAfricn Am 76 mL/min/1 73  >59   eGFR If Africn Am 88 mL/min/1 73  >59   BUN/Creatinine Ratio 13  9-20   Sodium, Serum 137 mmol/L  134-144   **Please note reference interval change**   Potassium, Serum 4 2 mmol/L  3 5-5 2   Chloride, Serum 98 mmol/L     **Please note reference interval change**   Carbon Dioxide, Total 22 mmol/L  18-29   Calcium, Serum 9 4 mg/dL  8 7-10 2   Protein, Total, Serum 6 9 g/dL  6 0-8 5   Albumin, Serum 4 3 g/dL  3 5-5 5   Globulin, Total 2 6 g/dL  1 5-4 5   A/G Ratio 1 7  1 1-2 5   Bilirubin, Total 0 3 mg/dL  0 0-1 2   Alkaline Phosphatase, S 80 IU/L     AST (SGOT) 28 IU/L  0-40   ALT (SGPT) 35 IU/L  0-44     (LC) Lipid Panel With LDL/HDL Ratio [id number] 08:49AM Kimberley Escalante     Test Name Result Flag Reference   Cholesterol, Total 193 mg/dL  100-199   Triglycerides 431 mg/dL H 0-149   HDL Cholesterol 32 mg/dL L >39   VLDL Cholesterol Armaan Comment mg/dL  5-40   The calculation for the VLDL cholesterol is not valid when  triglyceride level is >400 mg/dL  LDL Cholesterol Calc Comment mg/dL  0-99   Triglyceride result indicated is too high for an accurate LDL  cholesterol estimation  LDL/HDL Ratio UPTCAL ratio units     Unable to calculate result since non-numeric result obtained for  component test                                                      LDL/HDL Ratio                                                             Men  Women                                               1/2 Avg  Risk  1 0    1 5                                                   Avg Risk  3 6    3 2                                                2X Avg  Risk  6 2    5 0                                                3X Avg  Risk  8 0    6 1     (1) HEMOGLOBIN A1C [id number] 08:49AM Mavis Zuniga Robbi     Test Name Result Flag Reference   Hemoglobin A1c 6 6 % H 4 8-5 6   Pre-diabetes: 5 7 - 6 4           Diabetes: >6 4           Glycemic control for adults with diabetes: <7 0     (1) PSA (SCREEN) (Dx V76 44 Screen for Prostate Cancer) [id number] 08:49AM Evgeny Tripathi     Test Name Result Flag Reference   Prostate Specific Ag, Serum 0 3 ng/mL  0 0-4 0   Roche ECLIA methodology  According to the American [employer] Association, Serum PSA should  decrease and remain at undetectable levels after radical  prostatectomy  The AUA defines biochemical recurrence as an initial  PSA value 0 2 ng/mL or greater followed by a subsequent confirmatory  PSA value 0 2 ng/mL or greater  Values obtained with different assay methods or kits cannot be used  interchangeably  Results cannot be interpreted as absolute evidence  of the presence or absence of malignant disease

## 2018-01-10 NOTE — PROGRESS NOTES
Progress Note - Shari Ragland 62 y o  male MRN: [id number]  Unit/Bed#: [address] [place] [address] 202-01 Encounter: [id number]    Assessment:  Principal Problem:    Primary localized osteoarthritis of left knee  Active Problems:    Hyperlipidemia    Hypertension    Diabetes mellitus ([address] )    Esophageal reflux  Resolved Problems:    * No resolved hospital problems  *      Plan:  · Total left knee arthroplasty for primary osteoarthritis of left knee  Pain management and stool softener  Encourage incentive spirometer use  H&H is stable  On iron and vitamin-C  Wound care and dressing changes per Orthopedics  · Diabetes mellitus  Accu-Chek a c  HS with Humalog sliding scale  Continue on metformin  · Hypertension  On lisinopril and hydrochlorothiazide  · Hyperlipidemia-on statin  · GERD-on Protonix  · On nicotine patch  · Continue rest of the management as per primary orthopedic service  · DVT prophylaxis  · Discussed with patient in detail  Subjective:   Patient is seen and examined at bedside  Pain is well controlled  Afebrile  Denies any other complaints  All other ROS are negative  Objective:   Vitals: Blood pressure 142/90, pulse (!) 114, temperature 98 2 °F (36 8 °C), temperature source Oral, resp  rate 20, height 5' 10" (1 778 m), weight 132 kg (291 lb 0 1 oz), SpO2 96 %  ,Body mass index is 41 76 kg/m²  SPO2 RA Rest    Flowsheet Row Admission (Current) from 1/8/2018 in [facility]   SpO2  96 %   SpO2 Activity  At Rest   O2 Device  None (Room air)   O2 Flow Rate  2 L/min        I&O:   Intake/Output Summary (Last 24 hours) at 01/10/18 0802  Last data filed at 01/10/18 0441   Gross per 24 hour   Intake           951 67 ml   Output             3875 ml   Net         -2923 33 ml       Physical Exam:    General- Alert, sitting comfortably in chair  Not in any acute distress  HEENT- RENÉ, EOM intact    Neck- Supple, No JVD  CVS- regular, S1 and S2 normal   Chest- Bilateral Air entry, No rhochi, crackles or wheezing present  Abdomen- soft, nontender, not distended, no guarding or rigidity, BS+  Extremities-  No pedal edema, No calf tenderness  Left knee-dressing and brace in place  CNS-   Alert, awake and orientedx3  No focal deficits present  Invasive Devices     Peripheral Intravenous Line            Peripheral IV 01/10/18 Left Hand less than 1 day                      Social History  reviewed  History reviewed  No pertinent family history   reviewed    Meds:  Current Facility-Administered Medications   Medication Dose Route Frequency Provider Last Rate Last Dose    acetaminophen (TYLENOL) tablet 650 mg  650 mg Oral Q6H PRN Tyson Dixon PA-C        ALPRAZolam [facility]) tablet 1 mg  1 mg Oral HS PRN Tyson Dixon PA-C        aluminum-magnesium hydroxide-simethicone (MYLANTA) 200-200-20 mg/5 mL oral suspension 30 mL  30 mL Oral Q6H PRN Tyson Dixon PA-C        ascorbic acid (VITAMIN C) tablet 500 mg  500 mg Oral BID Tyson Dixon PA-C   500 mg at 01/09/18 1807    atorvastatin (LIPITOR) tablet 40 mg  40 mg Oral Daily With General EMELY Kelsey   40 mg at 01/09/18 1544    bisacodyl (DULCOLAX) rectal suppository 10 mg  10 mg Rectal Daily PRN Tyson Dixon PA-C        calcium carbonate (TUMS) chewable tablet 1,000 mg  1,000 mg Oral Daily PRN Tyson Dixon PA-C        docusate sodium (COLACE) capsule 100 mg  100 mg Oral BID Tyson Dixon PA-C   100 mg at 01/09/18 1807    enoxaparin (LOVENOX) subcutaneous injection 40 mg  40 mg Subcutaneous Daily Tyson Dixon PA-C   40 mg at 01/09/18 5926    ferrous sulfate tablet 325 mg  325 mg Oral BID With Meals Tyson Dixon PA-C   325 mg at 27/95/31 0765    folic acid (FOLVITE) tablet 1 mg  1 mg Oral Daily Tyson Dixon PA-C   1 mg at 01/09/18 0801    gabapentin (NEURONTIN) capsule 100 mg  100 mg Oral [facility] Tyson Dixon PA-C   100 mg at 01/10/18 8980    hydrALAZINE (APRESOLINE) injection 10 mg  10 mg Intravenous Q6H PRN Rod Enrique MD        lisinopril (ZESTRIL) tablet 20 mg  20 mg Oral Daily Jed Hoyt PA-C   20 mg at 01/09/18 0805    And    hydrochlorothiazide (HYDRODIURIL) tablet 12 5 mg  12 5 mg Oral Daily Jed Hoyt PA-C   12 5 mg at 01/09/18 0801    HYDROmorphone (DILAUDID) injection 2 mg  2 mg Intravenous Q3H PRN Howard Cedeno MD   2 mg at 01/10/18 0144    insulin lispro (HumaLOG) 100 units/mL subcutaneous injection 2-12 Units  2-12 Units Subcutaneous TID [facility] Dorota Martino MD   4 Units at 01/09/18 1806    metFORMIN (GLUCOPHAGE) tablet 500 mg  500 mg Oral BID With Meals Jed Hoyt PA-C   500 mg at 01/09/18 1544    morphine injection 2 mg  2 mg Intravenous Q4H PRN Jed Hoyt PA-C   2 mg at 01/08/18 1353    multivitamin-minerals (CENTRUM) tablet 1 tablet  1 tablet Oral Daily Jed Hoyt PA-C   1 tablet at 01/09/18 0801    nicotine (NICODERM CQ) 21 mg/24 hr TD 24 hr patch 21 mg  21 mg Transdermal Daily Dorota Martino MD   21 mg at 01/09/18 0823    ondansetron (ZOFRAN) injection 4 mg  4 mg Intravenous Q4H PRN Jed Hoyt PA-C        oxyCODONE-acetaminophen (PERCOCET) 5-325 mg per tablet 1 tablet  1 tablet Oral Q4H PRN Jed Hoyt PA-C        oxyCODONE-acetaminophen (PERCOCET) 5-325 mg per tablet 2 tablet  2 tablet Oral Q4H PRN Jed Hoyt PA-C   2 tablet at 01/09/18 1314    pantoprazole (PROTONIX) EC tablet 20 mg  20 mg Oral Early Morning Jed Hoyt PA-C   20 mg at 01/10/18 7694    senna (SENOKOT) tablet 8 6 mg  1 tablet Oral Daily Jed Hyot PA-C   8 6 mg at 01/09/18 0801    simethicone (MYLICON) chewable tablet 80 mg  80 mg Oral 4x Daily PRN Jed Hoyt PA-C        traMADol [place]) tablet 50 mg  50 mg Oral Q6H [address] Jed Hoyt PA-C   50 mg at 01/10/18 4445      Prescriptions Prior to Admission   Medication    ALPRAZolam (XANAX) 1 mg tablet    ascorbic acid (VITAMIN C) 500 mg tablet    atorvastatin (LIPITOR) 40 mg tablet    Canagliflozin (INVOKANA) 300 MG TABS    diphenhydrAMINE-acetaminophen (TYLENOL PM)  MG TABS    Exenatide ER (BYDUREON) 2 MG PEN    ferrous sulfate 325 (65 Fe) mg tablet    folic acid (FOLVITE) 1 mg tablet    ibuprofen (MOTRIN) 400 mg tablet    Lansoprazole (PREVACID 24HR PO)    lisinopril-hydrochlorothiazide (PRINZIDE,ZESTORETIC) 20-12 5 MG per tablet    metFORMIN (GLUCOPHAGE) 1000 MG tablet       Labs:    Results from last 7 days  Lab Units 01/10/18  0432 01/09/18  0430   WBC Thousand/uL 9 81 7 90   HEMOGLOBIN g/dL 13 6 13 8   HEMATOCRIT % 40 6 41 6   PLATELETS Thousands/uL 148* 135*       Results from last 7 days  Lab Units 01/10/18  0432 01/09/18  0430   SODIUM mmol/L 137 138   POTASSIUM mmol/L 3 7 3 8   CHLORIDE mmol/L 101 102   CO2 mmol/L 27 25   BUN mg/dL 10 12   CREATININE mg/dL 0 86 0 87   CALCIUM mg/dL 8 6 8 8   GLUCOSE RANDOM mg/dL 188* 193*     No results found for: TROPONINI, CKMB, CKTOTAL      No results found for: Channing Goltz, SPUTUMCULTUR      Imaging:  No results found for this or any previous visit  No results found for this or any previous visit  Labs & Imaging: I have personally reviewed pertinent reports        VTE Pharmacologic Prophylaxis: Enoxaparin (Lovenox)  VTE Mechanical Prophylaxis: sequential compression device    Code Status:   Level 1 - Full Code      "This note has been constructed using a voice recognition system"      Bang Coombs MD  1/10/2018,8:02 AM

## 2018-01-10 NOTE — PHYSICAL THERAPY NOTE
PHYSICAL THERAPY NOTE          Patient Name: Katie Velasquez  EECQC'M Date: 1/10/2018   01/10/18 0044   Pain Assessment   Pain Assessment FLACC   Pain Type Acute pain;Surgical pain   Pain Location Knee   Pain Orientation Left   Pain Descriptors Aching   Pain Frequency Intermittent   Pain Onset Ongoing   Clinical Progression Gradually improving   Effect of Pain on Daily Activities initial guarding and discomfort; appeared to improve as the session went on   Patient's Stated Pain Goal No pain   Hospital Pain Intervention(s) Medication (See MAR); Cold applied;Repositioned; Ambulation/increased activity; Distraction;Elevated; Emotional support   Response to Interventions appears to be more comfortable at the end of session   Pain Rating: FLACC (Rest) - Face 0   Pain Rating: FLACC (Rest) - Legs 0   Pain Rating: FLACC (Rest) - Activity 0   Pain Rating: FLACC (Rest) - Cry 0   Pain Rating: FLACC (Rest) - Consolability 0   Score: FLACC (Rest) 0   Pain Rating: FLACC (Activity) - Face 1   Pain Rating: FLACC (Activity) - Legs 0   Pain Rating: FLACC (Activity) - Activity 0   Pain Rating: FLACC (Activity) - Cry 1   Pain Rating: FLACC (Activity) - Consolability 1   Score: FLACC (Activity) 3   Restrictions/Precautions   LLE Weight Bearing Per Order WBAT   Braces or Orthoses LE Immobilizer  (in bed only)   Other Precautions Fall Risk;Pain   General   Chart Reviewed Yes   Additional Pertinent History cleared for Tx session (spoke to nsg)   Response to Previous Treatment Patient with no complaints from previous session     Cognition   Overall Cognitive Status WFL   Arousal/Participation Alert   Attention Attends with cues to redirect   Orientation Level Oriented to person;Oriented to place;Oriented to situation   Memory Decreased recall of precautions   Following Commands Follows one step commands without difficulty   Subjective   Subjective Pt is observed reclined in the chair; reports he got OOB to chair on his own; agreeable to perform therex and mobilize;   Transfers   Sit to Stand 6  Modified independent  (2 trials;)   Stand to Sit 6  Modified independent  (2 trals; reviewed the technique incl (L) LE management)   Additional Comments Reviewed rw set up prior to steps negotiation at home; pt reports he is able to access the house w/ 2 HAILEY and hand rail; family support is also available if needed as per pt; Ambulation/Elevation   Gait pattern Step to;Excessively slow; Short stride;Decreased L stance   Gait Assistance 5  Supervision   Additional items Assist x 1;Verbal cues   Assistive Device Rolling walker   Distance 2 x 15 ft w/ steps negotiation in between; after sitted rest period, another 2 x 15 ft w/ steps negotiation in between;   Stair Management Assistance 4  Minimal assist  (side ways w/ (L) hand rail only; fwd w/ hand rail and SPC)   Additional items Assist x 1;Verbal cues; Tactile cues  ( steps --> 4" each (similar to at home as per pt))   Stair Management Technique One rail L;Step to pattern; Sideways;Nonreciprocal;With cane; Foreward  (hand rail only --> hand rail and SPC (2 trials))   Number of Stairs 3  (2 trials; )   Balance   Static Sitting Good   Static Standing Fair   Ambulatory Fair -   Activity Tolerance   Activity Tolerance Patient limited by fatigue;Patient limited by pain   Nurse Made Aware spoke to [place] sh[place], RN  (spoke to [place] w/ ortho)   Exercises   TKR Sitting;10 reps;AAROM; Left  (2 sets (reclined and sitting up))   Equipment Use   Comments Pt was reclined in the chair w/ LE elevated at the end of session; pillow placed for distal (L) LE support (while promoting knee extension); foot pumps back on and activated; call bell placed w/in reach; Assessment   Prognosis Good   Problem List Decreased strength;Decreased range of motion;Decreased endurance; Impaired balance;Obesity;Pain   Assessment Pt cont to demonstrate improvement in functional mobility skills progressing to steps negotiation w/ close guarding required; overall level of (I) is also improving --> anticipate pt will return home w/ family support upon D/C when medically cleared; home PT follow up is recommended; will follow  Goals   Patient Goals to go home   STG Expiration Date 01/13/18   Treatment Day 4   Plan   Treatment/Interventions Functional transfer training;LE strengthening/ROM; Elevations; Therapeutic exercise; Endurance training;Bed mobility;Gait training;Spoke to nursing;Spoke to advanced practitioner;Spoke to case management;OT   Progress Progressing toward goals   PT Frequency 5x/wk; Twice a day   Recommendation   Recommendation Home with family support;Home PT  (1st floor set-up)   Equipment Recommended Walker  (for amb; [facility] w/ hand rail on the steps (pt prefers);  BSC)       Rosalio Ocampo PT

## 2018-01-10 NOTE — PLAN OF CARE
Problem: PHYSICAL THERAPY ADULT  Goal: Performs mobility at highest level of function for planned discharge setting  See evaluation for individualized goals  Treatment/Interventions: Functional transfer training, LE strengthening/ROM, Elevations, Therapeutic exercise, Endurance training, Bed mobility, Gait training, Spoke to nursing, Spoke to advanced practitioner, Spoke to case management, OT  Equipment Recommended: Maren Aj (for amb; [facility] w/ hand rail on the steps (pt prefers); BSC)       See flowsheet documentation for full assessment, interventions and recommendations  Outcome: Progressing  Prognosis: Good  Problem List: Decreased strength, Decreased range of motion, Decreased endurance, Impaired balance, Obesity, Pain  Assessment: Pt cont to demonstrate improvement in functional mobility skills progressing to steps negotiation w/ close guarding required; overall level of (I) is also improving --> anticipate pt will return home w/ family support upon D/C when medically cleared; home PT follow up is recommended; will follow  Recommendation: Home with family support, Home PT (1st floor set-up)     PT - OK to Discharge: Yes    See flowsheet documentation for full assessment

## 2018-01-10 NOTE — SOCIAL WORK
Continuing to follow patient, as per PT pateint will need a BSC  Met with patient and hs is agreeable to having a BSC delivered to his home  [place] [facility] [place] given and he has no preference  Referral to [employer] DME made via [facility]  Patient's wife will transport home this afternoon

## 2018-01-10 NOTE — OCCUPATIONAL THERAPY NOTE
[address] Progress Note    Patient Name: Chad Londono  BLGXR'X Date: 1/10/2018  Problem List  Patient Active Problem List   Diagnosis    Primary localized osteoarthritis of left knee    Hyperlipidemia    Hypertension    Diabetes mellitus ([address] )    Esophageal reflux               01/10/18 1055   Restrictions/Precautions   LLE Weight Bearing Per Order WBAT   Pain Assessment   Pain Score 4   Pain Type Surgical pain   Pain Location Knee   Pain Orientation Left   ADL   LB Dressing Assistance 4  Minimal Assistance   LB Dressing Deficit (reaches to  L midfoot wo diff)   Functional Standing Tolerance   Time 3 min - tends to guard LLE WB   Comments able to inc WB when cued   Bed Mobility   Sit to Supine 6  Modified independent   Additional items Increased time required   Additional Comments pt uses pant leg to pull LLE into bed   Transfers   Sit to Stand 6  Modified independent   Stand to Sit 6  Modified independent   Stand pivot 6  Modified independent   Functional Mobility   Functional Mobility 5  Supervision   Additional Comments rw   Cognition   Overall Cognitive Status WFL   Arousal/Participation Cooperative   Attention Within functional limits   Orientation Level Oriented X4   Memory Within functional limits   Following Commands Follows all commands and directions without difficulty   Activity Tolerance   Activity Tolerance Patient tolerated treatment well   Assessment   Assessment this 57 yom conts to progress towards goals and home d/c is expected   He will stay 1st fl and bsc has been orderdd   Recommendation   OT Discharge Recommendation Home with family support   Equipment Recommended Bedside commode  (has been ordered)   Dany Mendoza, OT

## 2018-01-10 NOTE — PLAN OF CARE
DISCHARGE PLANNING     Discharge to home or other facility with appropriate resources Not Progressing        DISCHARGE PLANNING - CARE MANAGEMENT     Discharge to post-acute care or home with appropriate resources Not Progressing        Knowledge Deficit     Patient/family/caregiver demonstrates understanding of disease process, treatment plan, medications, and discharge instructions Not Progressing        METABOLIC, FLUID AND ELECTROLYTES - ADULT     Glucose maintained within target range Not Progressing        MUSCULOSKELETAL - ADULT     Maintain or return mobility to safest level of function Not Progressing     Maintain proper alignment of affected body part Not Progressing        Nutrition/Hydration-ADULT     Nutrient/Hydration intake appropriate for improving, restoring or maintaining nutritional needs Not Progressing        PAIN - ADULT     Verbalizes/displays adequate comfort level or baseline comfort level Not Progressing        Potential for Falls     Patient will remain free of falls Not Progressing        Prexisting or High Potential for Compromised Skin Integrity     Skin integrity is maintained or improved Not Progressing        SAFETY ADULT     Maintain or return to baseline ADL function Not Progressing     Maintain or return mobility status to optimal level Not Progressing     Patient will remain free of falls Not Progressing        SKIN/TISSUE INTEGRITY - ADULT     Skin integrity remains intact Not Progressing     Incision(s), wounds(s) or drain site(s) healing without S/S of infection Not Progressing

## 2018-01-11 NOTE — CASE MANAGEMENT
Nany Hendrickson PA-C Physician Assistant Attested Orthopedics  Discharge Summaries Date of Service: 1/10/2018  1:02 PM      Attestation signed by Ricco Broussard MD at 1/10/2018 1:30 PM       Split/Shared Statement  I saw/examined the patient  I agree with the Advanced Practitioner's note          []Hide copied text  []Hover for attribution information  ORTHOPEDICS DISCHARGE SUMMARY  Caitlin Mclaughlin 62 y o  male MRN: [id number]  Unit/Bed#:      Attending Physician: Dr Ricco Broussard     Admitting diagnosis: Primary osteoarthritis of left knee [M17 12]     Discharge diagnosis: Primary osteoarthritis of left knee [M17 12]     Date of admission: 1/8/2018     Date of discharge: 01/10/18          Procedure: left total knee arthroplasty     HPI:  This is a 62y o  year old male that presented to the office with signs and symptoms of left knee osteoarthritis  They tried and failed conservative treatment measures and wished to proceed with surgical intervention  The risks, benefits, and complications of the procedure were discussed with the patient and informed consent was obtained   [facility] Course: The patient was admitted to the hospital on 1/8/2018 and underwent an uncomplicated left total knee arthroplasty  They were transferred to the floor after a brief stay in the post-anesthesia care unit  Their pain was well managed with IV and oral pain medications  They began therapy on post operative day #1  Lovenox was also started for DVT prophylaxis post operative day #1  On discharge date pt was cleared by PT and the medicine team and determined to be safe for discharge  Daily discussion was had with the patient, nursing staff, orthopaedic team, and family members if present    All questions were answered to the patients satisifaction            0  Lab Value Date/Time   HGB 13 6 01/10/2018 0432   HGB 13 8 01/09/2018 0430   HGB 16 5 12/26/2017 1051         Greater than 2 gram drop which qualifies for diagnosis of acute blood loss anemia  Vital signs remained stable and pt was resuscitated with IVF as needed      Discharge Instructions: The patient was discharged weight bearing as tolerated to the left lower extremity  He will take  mg BID x 30 days  Continue PT/OT  Take pain medications as instructed      Discharge Medications:   For the complete list of discharge medications, please refer to the patient's medication reconciliation                 Cosigned by: Breann Vines MD at 1/10/2018  1:30 PM   Revision History

## 2018-01-13 VITALS
HEART RATE: 84 BPM | DIASTOLIC BLOOD PRESSURE: 87 MMHG | WEIGHT: 263 LBS | SYSTOLIC BLOOD PRESSURE: 148 MMHG | BODY MASS INDEX: 37.65 KG/M2 | HEIGHT: 70 IN

## 2018-01-13 NOTE — RESULT NOTES
Verified Results  (1) MICROALBUMIN CREATININE RATIO, RANDOM URINE [id number] 08:20AM Giorgi Maria     Test Name Result Flag Reference   Creatinine, Urine 118 2 mg/dL  22 0-328 0   **Effective May 9, 2016 the reference interval for**                   Creatinine, Urine will be changing to: Not Estab  Microalbumin, Urine 11 6 ug/mL  0 0-17 0   **Effective May 9, 2016 the reference interval for**                   Microalbumin, Urine will be changing to: Not Estab     Microalb/Creat Ratio 9 8 mg/g creat  0 0-30 0

## 2018-01-14 VITALS
RESPIRATION RATE: 20 BRPM | HEART RATE: 96 BPM | DIASTOLIC BLOOD PRESSURE: 94 MMHG | BODY MASS INDEX: 38.02 KG/M2 | SYSTOLIC BLOOD PRESSURE: 146 MMHG | WEIGHT: 265 LBS

## 2018-01-14 VITALS
BODY MASS INDEX: 37.74 KG/M2 | RESPIRATION RATE: 20 BRPM | WEIGHT: 263 LBS | SYSTOLIC BLOOD PRESSURE: 149 MMHG | DIASTOLIC BLOOD PRESSURE: 97 MMHG | HEART RATE: 88 BPM

## 2018-01-15 VITALS
HEART RATE: 90 BPM | DIASTOLIC BLOOD PRESSURE: 92 MMHG | HEIGHT: 70 IN | SYSTOLIC BLOOD PRESSURE: 164 MMHG | WEIGHT: 264.13 LBS | BODY MASS INDEX: 37.81 KG/M2

## 2018-01-15 NOTE — RESULT NOTES
Verified Results  (1) HEMOGLOBIN A1C [id number] 08:20AM [place] Sill     Test Name Result Flag Reference   Hemoglobin A1c 6 4 % H 4 8-5 6   Pre-diabetes: 5 7 - 6 4           Diabetes: >6 4           Glycemic control for adults with diabetes: <7 0     (1) COMPREHENSIVE METABOLIC PANEL [id number] 30:84DJ Brian Sill     Test Name Result Flag Reference   Glucose, Serum 110 mg/dL H 65-99   BUN 12 mg/dL  6-24   Creatinine, Serum 0 91 mg/dL  0 76-1 27   eGFR If NonAfricn Am 94 mL/min/1 73  >59   eGFR If Africn Am 109 mL/min/1 73  >59   BUN/Creatinine Ratio 13  9-20   Sodium, Serum 139 mmol/L  134-144   Potassium, Serum 4 4 mmol/L  3 5-5 2   Chloride, Serum 99 mmol/L     Carbon Dioxide, Total 24 mmol/L  18-29   Calcium, Serum 9 5 mg/dL  8 7-10 2   Protein, Total, Serum 7 0 g/dL  6 0-8 5   Albumin, Serum 4 4 g/dL  3 5-5 5   Globulin, Total 2 6 g/dL  1 5-4 5   A/G Ratio 1 7  1 1-2 5   Bilirubin, Total 0 5 mg/dL  0 0-1 2   Alkaline Phosphatase, S 66 IU/L     AST (SGOT) 25 IU/L  0-40   ALT (SGPT) 32 IU/L  0-44     (LC) Lipid Panel With LDL/HDL Ratio [id number] 08:20AM Brian Sill     Test Name Result Flag Reference   Cholesterol, Total 193 mg/dL  100-199   Triglycerides 347 mg/dL H 0-149   HDL Cholesterol 36 mg/dL L >39   According to ATP-III Guidelines, HDL-C >59 mg/dL is considered a  negative risk factor for CHD  VLDL Cholesterol Armaan 69 mg/dL H 5-40   LDL Cholesterol Calc 88 mg/dL  0-99   LDL/HDL Ratio 2 4 ratio units  0 0-3 6   LDL/HDL Ratio                                                             Men  Women                                               1/2 Avg  Risk  1 0    1 5                                                   Avg Risk  3 6    3 2                                                2X Avg  Risk  6 2    5 0                                                3X Avg  Risk  8 0    6 1

## 2018-01-16 ENCOUNTER — ALLSCRIPTS OFFICE VISIT (OUTPATIENT)
Dept: OTHER | Facility: OTHER | Age: 58
End: 2018-01-16

## 2018-01-16 NOTE — MISCELLANEOUS
Message   Recorded as Task   Date: 05/18/2017 12:56 PM, Created By: Aldo Rubio   Task Name: Call Back   Assigned To: Aldo Rubio   Regarding Patient: Jun Rosen, Status: Active   Comment:    Aldo Rubio - 18 May 2017 12:56 PM     TASK CREATED  Caller: Self; [phone] (Home); [phone] (Work)  PT HAS SINUS INFECTION AGAIN AND NEEDS ANTIBIOTIC OR CB IF HE NEEDS TO COME IN FOR OV  Mabel Kellogg - 18 May 2017 1:13 PM     TASK EDITED   France Cedillo - 18 May 2017 1:22 PM     TASK EDITED  PT HAD A COLD FOR 10DAYS BUT FOUND OUT IT WAS A SINUS INFECTION OVER THE WEEKEND  TOOK 4 PILLS OF AMOXICILLIN HAS 4 LEFT OVER  [facility]  WILL SEND RX OF AMOX TO PHARM        Active Problems    1  Controlled diabetes mellitus (250 00) (E11 9)   2  Encounter for prostate cancer screening (V76 44) (Z12 5)   3  Esophageal reflux (530 81) (K21 9)   4  Hemorrhoids (455 6) (K64 9)   5  Hyperlipidemia (272 4) (E78 5)   6  Hypertension (401 9) (I10)   7  Knee bursitis, left (726 60) (M70 52)   8  Left knee pain (719 46) (M25 562)   9  Primary localized osteoarthritis of left knee (715 16) (M17 12)   10  Puncture wound of foot (892 0) (S91 339A)    Current Meds   1  ALPRAZolam 1 MG Oral Tablet (Xanax); TAKE 1 TAB PO 6-8 HRS AS NEEDED; Therapy: 23SUL6232 to (Last Rx:82Rsz5592) Ordered   2  Atorvastatin Calcium 40 MG Oral Tablet; TAKE 1 TABLET DAILY (DUE FOR LAB WORK   AND OFFICE VISIT); Therapy: 01TXZ3606 to (Last Rx:71Cvz0456)  Requested for: 92See2969 Ordered   3  Bydureon 2 MG Subcutaneous Pen-injector; INJECT 1 SQ WEEKLY; Therapy: 39IKH6926 to (Last Rx:39Dmf1897)  Requested for: 83JMX5057 Ordered   4  Invokana 300 MG Oral Tablet; Take 1 tablet daily; Therapy: 21ABC8893 to (Evaluate:76Kck2007); Last Rx:05Yya1445 Ordered   5  Lisinopril-Hydrochlorothiazide 20-12 5 MG Oral Tablet; Take 1 tablet daily; Therapy: 67KKQ1465 to (Last Rx:97Nqj4829)  Requested for: 36JFN9763 Ordered   6   MetFORMIN HCl - 1000 MG Oral Tablet; TAKE 1 TABLET TWICE DAILY; Therapy: 60MHY5197 to (Evaluate:39Lpv4197)  Requested for: 20AAK1208; Last   Rx:02Fcb8348 Ordered   7  OrthoVisc 30 MG/2ML Intra-articular Solution Prefilled Syringe; INJECT 2  ML   Intra-articular 2 ML's weekly in knee for 3 weeks; Therapy: 95XCY5028 to (Last Rx:83Pxc8387) Ordered    Allergies    1   No Known Drug Allergies    Signatures   Electronically signed by : Ileana Conley, ; May 18 2017  1:22PM EST                       (Author)

## 2018-01-18 NOTE — RESULT NOTES
Message   Recorded as Task   Date: 12/17/2016 10:10 AM, Created By: Gretel Shannon   Task Name: Call Patient with results   Assigned To: Gretel Shannon   Regarding Patient: Nicolás Luis, Status: Active   [facility] - 17 Dec 2016 10:10 AM     Patient Phone: [phone]    6 6 HgA1c-----good job! !!!-----labs 6mos   Mabel Kellogg - 19 Dec 2016 8:32 AM     TASK EDITED  PT aware          Signatures   Electronically signed by : Lorie Sandoval, ; Dec 19 2016  8:32AM EST                       (Author)

## 2018-01-23 ENCOUNTER — ALLSCRIPTS OFFICE VISIT (OUTPATIENT)
Dept: OTHER | Facility: OTHER | Age: 58
End: 2018-01-23

## 2018-01-23 ENCOUNTER — EVALUATION (OUTPATIENT)
Dept: PHYSICAL THERAPY | Facility: CLINIC | Age: 58
End: 2018-01-23
Payer: COMMERCIAL

## 2018-01-23 VITALS
HEIGHT: 70 IN | OXYGEN SATURATION: 96 % | SYSTOLIC BLOOD PRESSURE: 140 MMHG | HEART RATE: 100 BPM | WEIGHT: 262.31 LBS | BODY MASS INDEX: 37.55 KG/M2 | DIASTOLIC BLOOD PRESSURE: 90 MMHG | RESPIRATION RATE: 16 BRPM

## 2018-01-23 VITALS
HEART RATE: 80 BPM | SYSTOLIC BLOOD PRESSURE: 122 MMHG | WEIGHT: 267.31 LBS | TEMPERATURE: 96.8 F | OXYGEN SATURATION: 97 % | BODY MASS INDEX: 38.27 KG/M2 | DIASTOLIC BLOOD PRESSURE: 84 MMHG | RESPIRATION RATE: 14 BRPM | HEIGHT: 70 IN

## 2018-01-23 DIAGNOSIS — Z47.1 AFTERCARE FOLLOWING JOINT REPLACEMENT SURGERY: ICD-10-CM

## 2018-01-23 PROCEDURE — G8978 MOBILITY CURRENT STATUS: HCPCS

## 2018-01-23 PROCEDURE — G8979 MOBILITY GOAL STATUS: HCPCS

## 2018-01-23 PROCEDURE — 97110 THERAPEUTIC EXERCISES: CPT

## 2018-01-23 PROCEDURE — 97161 PT EVAL LOW COMPLEX 20 MIN: CPT

## 2018-01-23 NOTE — CONSULTS
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  Lifestyle Factors: denies tobacco use and drinks 4-5 beers a day  Chews tobacco  Symptoms: no chest pain, no cough, no dyspnea, no edema, no palpitations and no wheezing  Review of Systems    Constitutional: no fever and no chills  Cardiovascular: as noted in HPI  Respiratory: as noted in HPI  Active Problems    1  Controlled diabetes mellitus (250 00) (E11 9)   2  Esophageal reflux (530 81) (K21 9)   3  Hemorrhoids (455 6) (K64 9)   4  Hyperlipidemia (272 4) (E78 5)   5  Hypertension (401 9) (I10)   6  Primary localized osteoarthritis of left knee (715 16) (M17 12)    Past Medical History    · History of H/O colonoscopy (V45 89) (N08 025)   · History of Puncture wound of foot (892 0) (X25 360I)    The active problems and past medical history were reviewed and updated today  Surgical History    · Denied: History Of Prior Surgery    The surgical history was reviewed and updated today  Family History    · Family history of Colon carcinoma    The family history was reviewed and updated today  Social History    · Chewing tobacco use (305 1) (Z72 0)   · Former smoker (V15 82) (E44 389)   · No drug use   · Social alcohol use (Z78 9)  The social history was reviewed and updated today  Current Meds   1   ALPRAZolam 1 MG Oral Tablet; TAKE 1 TAB PO 6-8 HRS AS NEEDED; Therapy: 40FEG6530 to (Last Rx:16Ddb1935) Ordered   2  Atorvastatin Calcium 40 MG Oral Tablet; TAKE 1 TABLET DAILY (DUE FOR LAB WORK   AND OFFICE VISIT); Therapy: 68UQD8829 to (Last Rx:12Ceg4921)  Requested for: 86Qnj8361 Ordered   3  Bydureon 2 MG Subcutaneous Pen-injector; INJECT 1 SQ WEEKLY; Therapy: 90SZI8021 to (Last Rx:67Yfx0716)  Requested for: 99WBF3504 Ordered   4  Invokana 300 MG Oral Tablet; Take 1 tablet daily; Therapy: 33MMK4708 to ([id number])  Requested for: 56Hdv8023; Last   Rx:73Bye8851; Status: ACTIVE - Retrospective By Protocol Authorization Ordered   5  Lisinopril-Hydrochlorothiazide 20-12 5 MG Oral Tablet; Take 1 tablet daily; Therapy: 03DAY8357 to (Last Rx:14Udg0961)  Requested for: 11FAS0570 Ordered   6  MetFORMIN HCl - 1000 MG Oral Tablet; TAKE 1 TABLET TWICE DAILY; Therapy: 98OLI2947 to (Evaluate:96Gdd8423)  Requested for: 85HXK8411; Last   Rx:35Vjf4108 Ordered   7  OrthoVisc 30 MG/2ML Intra-articular Solution Prefilled Syringe; INJECT 2  ML   Intra-articular 2 ML's weekly in knee for 3 weeks; Therapy: 12ZXS8204 to (Last Rx:99Arp7831) Ordered    The medication list was reviewed and updated today  Allergies    1  No Known Drug Allergies    Vitals  Signs    Heart Rate: 80  Respiration: 14  Systolic: 947, LUE, Sitting  Diastolic: 84, LUE, Sitting   Temperature: 96 8 F, Tympanic  Heart Rate: 100  Respiration: 16  Systolic: 886, LUE, Sitting  Diastolic: 90, LUE, Sitting  Height: 5 ft 10 in  Weight: 267 lb 5 oz  BMI Calculated: 38 36  BSA Calculated: 2 36  O2 Saturation: 97    Physical Exam    Constitutional   General appearance: Abnormal   well developed, comfortable and obese  Ears, Nose, Mouth, and Throat   Oropharynx: Normal with no erythema, edema, exudate or lesions  Pulmonary   Auscultation of lungs: Clear to auscultation  Cardiovascular   Auscultation of heart: Normal rate and rhythm, normal S1 and S2, no murmurs  Carotid pulses: 2+ bilaterally    no bruit heard over the right carotid and no bruit heard over the left carotid  Abdomen   Abdomen: Non-tender, no masses  Musculoskeletal   Gait and station: Normal     Psychiatric   Orientation to person, place and time: Normal     Mood and affect: Normal        Assessment    1  Primary localized osteoarthritis of left knee (715 16) (M17 12)   2  Preoperative examination (V72 84) (Z01 818)   3  Controlled diabetes mellitus (250 00) (E11 9)   4  Hypertension (401 9) (I10)    Discussion/Summary  Surgical Clearance: He is at a LOW TO MODERATE risk from a cardiovascular standpoint at this time without any additional cardiac testing  Reevaluation needed, if he should present with symptoms prior to surgery/procedure  Comments: Rosella Goldmann May undergo the planned surgery!       Take no medications the morning of the surgery, be nothing by mouth  You can take Tylenol 500 mg 2 pills 3 times a day as needed for knee pain before the surgery, please avoid taking ibuprofen!     The patient was counseled regarding instructions for management  End of Encounter Meds    1  ALPRAZolam 1 MG Oral Tablet (Xanax); TAKE 1 TAB PO 6-8 HRS AS NEEDED; Therapy: 16ALR2943 to (Last Rx:41Jce2779) Ordered   2  Bydureon 2 MG Subcutaneous Pen-injector; INJECT 1 SQ WEEKLY; Therapy: 02HQW3965 to (Last Rx:63Lbh4094)  Requested for: 69YPH3621 Ordered   3  Invokana 300 MG Oral Tablet; Take 1 tablet daily; Therapy: 59BJQ9704 to ([phone])  Requested for: 58Xno3362; Last   Rx:35Gfc6677; Status: ACTIVE - Retrospective By Protocol Authorization Ordered   4  MetFORMIN HCl - 1000 MG Oral Tablet; TAKE 1 TABLET TWICE DAILY; Therapy: 49XVU9245 to (Evaluate:82Aah0433)  Requested for: 89CZB2653; Last   Rx:43Yyv2129 Ordered    5  Atorvastatin Calcium 40 MG Oral Tablet; TAKE 1 TABLET DAILY (DUE FOR LAB WORK   AND OFFICE VISIT); Therapy: 76TVM7911 to (Last Rx:41Syk6843)  Requested for: 85Vks8814 Ordered    6   Lisinopril-Hydrochlorothiazide 20-12 5 MG Oral Tablet; Take 1 tablet daily; Therapy: 70ZUQ6868 to (Last Rx:89Fuf7378)  Requested for: 66BHK9731 Ordered    7  OrthoVisc 30 MG/2ML Intra-articular Solution Prefilled Syringe; INJECT 2  ML   Intra-articular 2 ML's weekly in knee for 3 weeks;    Therapy: 77MYQ4216 to (Last Rx:44Avb2975) Ordered    Signatures   Electronically signed by : JUAN R Angel ; Dec 28 2017  4:05PM EST                       (Author)

## 2018-01-23 NOTE — PROGRESS NOTES
Assessment    1  Controlled diabetes mellitus (250 00) (E11 9)   2  Encounter for preventive health examination (V70 0) (Z00 00)   3  Hyperlipidemia (272 4) (E78 5)   4  Primary localized osteoarthritis of left knee (715 16) (M17 12)    Plan  Controlled diabetes mellitus    · Bydureon 2 MG Subcutaneous Pen-injector   · Oxycodone-Acetaminophen 5-325 MG Oral Tablet; take 1 tablet every 4 to 6 hours  as needed for pain   · Trulicity 8 76 PT/2 6QN Subcutaneous Solution Pen-injector; inject 1x weekly   · ALPRAZolam 1 MG Oral Tablet (Xanax); TAKE 1 TAB PO 6-8 HRS AS NEEDED   · MetFORMIN HCl - 1000 MG Oral Tablet; TAKE 1 TABLET TWICE DAILY   · *VB - Eye Exam; Status:Active - Retrospective By Protocol Authorization; Requested  SKD:[id number];   Hyperlipidemia    · Atorvastatin Calcium 40 MG Oral Tablet; TAKE 1 TABLET DAILY (DUE FOR LAB  WORK AND OFFICE VISIT)  Hypertension    · Lisinopril-Hydrochlorothiazide 20-12 5 MG Oral Tablet; Take 1 tablet daily    Discussion/Summary  Advice and education were given regarding nutrition and weight loss  Chief Complaint  annual     Advance Directives  Advance Directive [facility]:   NO - Patient does not have an advance health care directive  History of Present Illness  , Adult Male: The patient is being seen for a health maintenance evaluation  Social History: Household members include spouse  He is   Work status: working full time  The patient is a former cigarette smoker and is a current smokeless tobacco user  He is not ready to quit using tobacco  He reports occasional alcohol use  Alcohol concern:  no personal concern about alcohol use  General Health: The patient's health since the last visit is described as good  He does not have regular dental visits  He denies vision problems  He denies hearing loss  Immunizations status: up to date  Lifestyle:  He consumes a diverse and healthy diet  He does not have any weight concerns  He exercises regularly  He uses tobacco  The patient is a former cigarette smoker and is a current smokeless tobacco user  He is not ready to quit using tobacco  He consumes alcohol  He reports occasional alcohol use  Alcohol concern: no personal concern about alcohol use  He denies drug use  Screening: Active Problems    1  Controlled diabetes mellitus (250 00) (E11 9)   2  Esophageal reflux (530 81) (K21 9)   3  Hemorrhoids (455 6) (K64 9)   4  Hyperlipidemia (272 4) (E78 5)   5  Hypertension (401 9) (I10)   6  Preoperative examination (V72 84) (Z01 818)   7  Primary localized osteoarthritis of left knee (715 16) (M17 12)    Past Medical History    · History of H/O colonoscopy (V45 89) (Z98 890)   · History of Puncture wound of foot (892 0) (U28 564S)    Surgical History    · Denied: History Of Prior Surgery    Family History  Father    · Family history of Colon carcinoma  Family History    · Denied: Family history of substance abuse   · No family history of mental disorder    Social History    · Chewing tobacco use (305 1) (Z72 0)   · Former smoker (V15 82) (S56 337)   · No drug use   · Social alcohol use (Z78 9)    Current Meds   1  ALPRAZolam 1 MG Oral Tablet; TAKE 1 TAB PO 6-8 HRS AS NEEDED; Therapy: 29SOB1858 to (Last Rx:58Okd8794) Ordered   2  Atorvastatin Calcium 40 MG Oral Tablet; TAKE 1 TABLET DAILY (DUE FOR LAB WORK   AND OFFICE VISIT); Therapy: 56KUR5825 to (Last Rx:45Axb9175)  Requested for: 61Bmt5457 Ordered   3  Bydureon 2 MG Subcutaneous Pen-injector; INJECT 1 SQ WEEKLY; Therapy: 29QGU5624 to (Last Rx:31Een5155)  Requested for: 34OAC1509 Ordered   4  Invokana 300 MG Oral Tablet; Take 1 tablet daily; Therapy: 84IGS4853 to (Penny Alston)  Requested for: 23Lem8342; Last   Rx:95Qtc5199; Status: ACTIVE - Retrospective By Protocol Authorization Ordered   5  Lisinopril-Hydrochlorothiazide 20-12 5 MG Oral Tablet; Take 1 tablet daily;    Therapy: 04IGU7100 to (Last Rx:82Ssa6548)  Requested for: 11XZI2278 Ordered   6  MetFORMIN HCl - 1000 MG Oral Tablet; TAKE 1 TABLET TWICE DAILY; Therapy: 87LZQ2412 to (Evaluate:34Tyf1518)  Requested for: 36RMO3395; Last   Rx:76Sbi4170 Ordered   7  OrthoVisc 30 MG/2ML Intra-articular Solution Prefilled Syringe; INJECT 2  ML   Intra-articular 2 ML's weekly in knee for 3 weeks; Therapy: 69LUX2223 to (Last Rx:47Xjm0899) Ordered    Allergies    1  No Known Drug Allergies    Vitals   Recorded: 72KTJ1324 10:53AM   Heart Rate 100   Respiration 16   Systolic 709   Diastolic 90   Height 5 ft 10 in   Weight 262 lb 4 96 oz   BMI Calculated 37 64   BSA Calculated 2 35   O2 Saturation 96     Physical Exam    Constitutional   General appearance: No acute distress, well appearing and well nourished  Neck   Neck: Supple, symmetric, trachea midline, no masses  Thyroid: Normal, no thyromegaly  Pulmonary   Auscultation of lungs: Clear to auscultation  Cardiovascular   Auscultation of heart: Normal rate and rhythm, normal S1 and S2, no murmurs  Pedal pulses: 2+ bilaterally  Abdomen   Abdomen: Non-tender, no masses  Results/Data  PHQ-2 Adult Depression Screening 20KKC6430 10:59AM User, Ahs     Test Name Result Flag Reference   PHQ-2 Adult Depression Score 0     Over the last two weeks, how often have you been bothered by any of the following problems?   Little interest or pleasure in doing things: Not at all - 0  Feeling down, depressed, or hopeless: Not at all - 0   PHQ-2 Adult Depression Screening Negative         Future Appointments    Date/Time Provider Specialty Site   01/23/2018 09:30 AM Fang Skinner MD Orthopedic Surgery [facility] [place]     Signatures   Electronically signed by : Valencia Blanton MD; Jan 16 2018 12:20PM EST                       (Author)

## 2018-01-23 NOTE — MISCELLANEOUS
Assessment    1  Controlled diabetes mellitus (250 00) (E11 9)   2  Encounter for preventive health examination (V70 0) (Z00 00)   3  Hyperlipidemia (272 4) (E78 5)   4  Primary localized osteoarthritis of left knee (715 16) (M17 12)    Plan  Controlled diabetes mellitus    · Bydureon 2 MG Subcutaneous Pen-injector   Rx By: [employer]; Dispense: 0 Days ; #:12 Pen-injector; Refill: 1; For: Controlled diabetes mellitus; SATYA = N; Sent To: [facility]   · Oxycodone-Acetaminophen 5-325 MG Oral Tablet; take 1 tablet every 4 to 6 hours  as needed for pain   Rx By: [employer]; Dispense: 0 Days ; #:60 Tablet; Refill: 0; For: Controlled diabetes mellitus; SATYA = N; Print Rx   · Trulicity 3 56 MU/4 8YP Subcutaneous Solution Pen-injector; inject 1x weekly   Rx By: [employer]; Dispense: 0 Days ; #:4 ML; Refill: 10; For: Controlled diabetes mellitus; SATYA = N; Print Rx   · ALPRAZolam 1 MG Oral Tablet (Xanax); TAKE 1 TAB PO 6-8 HRS AS NEEDED   Rx By: [employer]; Dispense: 0 Days ; #:90 Tablet; Refill: 5; For: Controlled diabetes mellitus; SATYA = N; Print Rx   · MetFORMIN HCl - 1000 MG Oral Tablet; TAKE 1 TABLET TWICE DAILY   Rx By: [employer]; Dispense: 90 Days ; #:180 Tablet; Refill: 1; For: Controlled diabetes mellitus; SATYA = N; Faxed To: [address]   · *VB - Eye Exam; Status:Active - Retrospective By Protocol Authorization; Requested  LLF:[id number]; Perform:Other; DPQ:[id number]; Last Updated By:Shaylee Anguiano; 1/16/2018 10:47:20 AM;Ordered; For:Controlled diabetes mellitus; Ordered By:Demetra Amanda; Hyperlipidemia    · Atorvastatin Calcium 40 MG Oral Tablet; TAKE 1 TABLET DAILY (DUE FOR LAB  WORK AND OFFICE VISIT)   Rx By: [employer]; Dispense: 0 Days ; #:90 Tablet; Refill: 2; For: Hyperlipidemia; SATYA = N; Faxed To: [facility]  Hypertension    · Lisinopril-Hydrochlorothiazide 20-12 5 MG Oral Tablet;  Take 1 tablet daily   Rx By: [employer]; Dispense: 0 Days ; #:90 Tablet; Refill: 3; For: Hypertension; SATYA = N; Faxed To: [address]    Chief Complaint  Chief Complaint Free Text Note Form: ramon - l tkr    med refill      History of Present Illness  TCM Communication [facility]: The patient is being contacted for follow-up after hospitalization and  [facility]AB[facility] QTWN  The date of admission: 1/8/18, date of discharge: 1/10/17  Diagnosis: LEFT KNEE REPLACEMENT  He was discharged to home  Medications were not reviewed today  He scheduled a follow up appointment  Follow-up appointments with other specialists: PT & VISITING NURSE  The patient is currently asymptomatic  Communication performed and completed by JARRELL & PT WIFE      Active Problems    1  Controlled diabetes mellitus (250 00) (E11 9)   2  Esophageal reflux (530 81) (K21 9)   3  Hemorrhoids (455 6) (K64 9)   4  Hyperlipidemia (272 4) (E78 5)   5  Hypertension (401 9) (I10)   6  Preoperative examination (V72 84) (Z01 818)   7  Primary localized osteoarthritis of left knee (715 16) (M17 12)    Past Medical History    1  History of H/O colonoscopy (V45 89) (B51 309)   2  History of Puncture wound of foot (892 0) (N67 600G)    Surgical History    1  Denied: History Of Prior Surgery    Family History  Father    1  Family history of Colon carcinoma    Social History    · Chewing tobacco use (305 1) (Z72 0)   · Former smoker (V15 82) (D60 040)   · No drug use   · Social alcohol use (Z78 9)    Current Meds   1  ALPRAZolam 1 MG Oral Tablet; TAKE 1 TAB PO 6-8 HRS AS NEEDED; Therapy: 87EYK7270 to (Last Rx:83Rsl4532) Ordered   2  Atorvastatin Calcium 40 MG Oral Tablet; TAKE 1 TABLET DAILY (DUE FOR LAB WORK   AND OFFICE VISIT); Therapy: 04RZU5995 to (Last Rx:03Idm7446)  Requested for: 06Utt9052 Ordered   3  Bydureon 2 MG Subcutaneous Pen-injector; INJECT 1 SQ WEEKLY; Therapy: 19BKA4871 to (Last Rx:76Phe6562)  Requested for: 20KDP9295 Ordered   4  Invokana 300 MG Oral Tablet; Take 1 tablet daily;    Therapy: 81LAT3051 to (Evaluate:12Mar2018)  Requested for: 98Qmz9077; Last   Rx:23Onh2227; Status: ACTIVE - Retrospective By Protocol Authorization Ordered   5  Lisinopril-Hydrochlorothiazide 20-12 5 MG Oral Tablet; Take 1 tablet daily; Therapy: 41FLJ0254 to (Last Rx:80Ify7307)  Requested for: 04SYN3882 Ordered   6  MetFORMIN HCl - 1000 MG Oral Tablet; TAKE 1 TABLET TWICE DAILY; Therapy: 65PNY9452 to (Evaluate:14Jun2017)  Requested for: 53IMK1554; Last   Rx:08Qwx2980 Ordered   7  OrthoVisc 30 MG/2ML Intra-articular Solution Prefilled Syringe; INJECT 2  ML   Intra-articular 2 ML's weekly in knee for 3 weeks; Therapy: 05JDI9830 to (Last Rx:26Nws0592) Ordered    Allergies    1  No Known Drug Allergies    Vitals  Signs   Recorded: 73LZG1267 10:53AM   Heart Rate: 100  Respiration: 16  Systolic: 688  Diastolic: 90  Height: 5 ft 10 in  Weight: 262 lb 4 96 oz  BMI Calculated: 37 64  BSA Calculated: 2 35  O2 Saturation: 96    Physical Exam    Pulmonary   Auscultation of lungs: Clear to auscultation, equal breath sounds bilaterally, no wheezes, no rales, no rhonci  Cardiovascular   Auscultation of heart: Normal rate and rhythm, normal S1 and S2, without murmurs  Results/Data  PHQ-2 Adult Depression Screening 39AFJ6868 10:59AM User, Ahs     Test Name Result Flag Reference   PHQ-2 Adult Depression Score 0     Over the last two weeks, how often have you been bothered by any of the following problems? Little interest or pleasure in doing things: Not at all - 0  Feeling down, depressed, or hopeless: Not at all - 0   PHQ-2 Adult Depression Screening Negative         Message   Recorded as Task   Date: 01/10/2018 01:02 PM, Created By: System   Task Name: [facility]   Assigned To: [address]   Regarding Patient: RAMIRO ZAMORANO A, Status:  In Progress   Comment:    System - [address] 2018 1:02 PM     Patient discharged from hospital   Patient Name: Justin Weber  Patient Date of Birth: 1960  Discharge Date: 1/10/2018  [facility] - [address] 2018 1:22 PM     TASK REASSIGNED: Previously Assigned To Mayur Quevedo - 11 Jan 2018 10:04 AM     TASK EDITED  SPOKE WITH PATIENT AND STATED HE WILL HAVE HIS WIFE CALL TO SET UP APPT--Cierra Gar - 11 Jan 2018 10:04 AM     TASK IN PROGRESS     Future Appointments    Date/Time Provider Specialty Site   01/23/2018 09:30 AM Danny Lancaster MD Orthopedic Surgery [facility] [place]     Signatures   Electronically signed by : Xiomara Watkins MD; Jan 16 2018 12:21PM EST                       (Author)

## 2018-01-24 VITALS
SYSTOLIC BLOOD PRESSURE: 130 MMHG | BODY MASS INDEX: 37.94 KG/M2 | WEIGHT: 265 LBS | DIASTOLIC BLOOD PRESSURE: 86 MMHG | HEIGHT: 70 IN | HEART RATE: 95 BPM

## 2018-01-24 NOTE — PROGRESS NOTES
Assessment    1  Aftercare following left knee joint replacement surgery (V54 81,V43 65) (Z47 1,Z62 652)    Plan  Aftercare following left knee joint replacement surgery    · Baclofen 10 MG Oral Tablet; TAKE 1 TABLET 3 TIMES DAILY AS NEEDED FOR  MUSCLE SPASM   · TraMADol HCl - 50 MG Oral Tablet; TAKE 1 TABLET 3 TIMES DAILY AS NEEDED   · Follow-up visit in 1 month Evaluation and Treatment  Follow-up  Status: Hold For -  Scheduling  Requested for: [id number]    Discussion/Summary    49-year-old male status post a left total knee arthroplasty on January 8, 2018  He will continue working with physical therapy  I discussed with him the importance of stretching of the hamstrings  I see no evidence of DVT today  He will follow up with me in one month with x-rays of the left knee  This documentation was recorded using voice recognition software     Chief Complaint  Postop left knee      Post-Op  HPI: 49-year-old male status post a left total knee arthroplasty on January 8, 2018  Overall he is doing well, but he does complain about pain in the posterior aspect of his thigh and into his buttock  He complains of a lot of stiffness, tightness, muscle spasms especially at night  He is having difficulty sleeping  He is working with physical therapy  He uses a cane to get around  Review of Systems    Constitutional: No fever or chills, feels well, no tiredness, no recent weight loss or weight gain  Eyes: No complaints of red eyes, no eyesight problems  ENT: no complaints of loss of hearing, no nosebleeds, no sore throat  Cardiovascular: No complaints of chest pain, no palpitations, no leg claudication or lower extremity edema  Respiratory: No complaints of shortness of breath, no wheezing, no cough  Gastrointestinal: No complaints of abdominal pain, no constipation, no nausea or vomiting, no diarrhea or bloody stools  Genitourinary: No complaints of dysuria or incontinence, no hesitancy, no nocturia  Musculoskeletal: joint swelling and joint stiffness, but as noted in HPI  Integumentary: No complaints of skin rash or lesion, no itching or dry skin, no skin wounds  Neurological: No complaints of headache, no confusion, no numbness or tingling, no dizziness  Psychiatric: No suicidal thoughts, no anxiety, no depression  Endocrine: No muscle weakness, no frequent urination, no excessive thirst, no feelings of weakness  Active Problems    1  Controlled diabetes mellitus (250 00) (E11 9)   2  Esophageal reflux (530 81) (K21 9)   3  Hemorrhoids (455 6) (K64 9)   4  Hyperlipidemia (272 4) (E78 5)   5  Hypertension (401 9) (I10)   6  Preoperative examination (V72 84) (Z01 818)    Social History    · Chewing tobacco use (305 1) (Z72 0)   · Former smoker (V15 82) (X82 254)   · No drug use   · Social alcohol use (Z78 9)    Current Meds   1  ALPRAZolam 1 MG Oral Tablet; TAKE 1 TAB PO 6-8 HRS AS NEEDED; Therapy: 90RPI2982 to (Last Rx:16Jan2018) Ordered   2  Atorvastatin Calcium 40 MG Oral Tablet; TAKE 1 TABLET DAILY (DUE FOR LAB WORK   AND OFFICE VISIT); Therapy: 12CEU4646 to (Last Rx:16Jan2018)  Requested for: 76FCE7819 Ordered   3  Invokana 300 MG Oral Tablet; Take 1 tablet daily; Therapy: 70ZDQ7498 to ([id number])  Requested for: 10Dsv5262; Last   Rx:[id number]; Status: ACTIVE - Retrospective By Protocol Authorization Ordered   4  Lisinopril-Hydrochlorothiazide 20-12 5 MG Oral Tablet; Take 1 tablet daily; Therapy: 75GHQ4754 to (Last Rx:16Jan2018)  Requested for: 15THV6763 Ordered   5  MetFORMIN HCl - 1000 MG Oral Tablet; TAKE 1 TABLET TWICE DAILY; Therapy: 84XZO9242 to (Natalya Bynum)  Requested for: 26YDW9393; Last   Rx:16Jan2018 Ordered   6  OrthoVisc 30 MG/2ML Intra-articular Solution Prefilled Syringe; INJECT 2  ML   Intra-articular 2 ML's weekly in knee for 3 weeks; Therapy: 49YXK1345 to (Last Rx:65Ate7152) Ordered   7   Oxycodone-Acetaminophen 5-325 MG Oral Tablet; TAKE 1 TABLET EVERY 8 HOURS   AS NEEDED; Therapy: 34RBQ9779 to (Evaluate:30Jan2018); Last Rx:16Jan2018 Ordered   8  Trulicity 3 44 RP/7 7XB Subcutaneous Solution Pen-injector; inject 1x weekly; Therapy: 24HTN0676 to (Last Rx:16Jan2018) Ordered    Allergies    1  No Known Drug Allergies    Vitals   Recorded: 75ONP0473 09:46AM   Heart Rate 768   Systolic 227   Diastolic 87   Height 5 ft 10 in   Weight 263 lb    BMI Calculated 37 74   BSA Calculated 2 34     Physical Exam    Left Knee: Appearance: swelling  Surgical incision was clean, dry, and intact  Tender to palpation around the hamstrings and proximal ITB band  Flexion: restricted AROM 80 degrees  Extension: restricted AROM 5 degrees  Motor: Normal  Special Test: no laxity on Valgus Stress and no laxity on Varus Stress        Signatures   Electronically signed by : Chris Vergara MD; Jan 23 2018 10:11AM EST                       (Author)

## 2018-01-29 ENCOUNTER — OFFICE VISIT (OUTPATIENT)
Dept: PHYSICAL THERAPY | Facility: CLINIC | Age: 58
End: 2018-01-29
Payer: COMMERCIAL

## 2018-01-29 DIAGNOSIS — Z96.652 PRESENCE OF LEFT ARTIFICIAL KNEE JOINT: Primary | ICD-10-CM

## 2018-01-29 PROCEDURE — 97010 HOT OR COLD PACKS THERAPY: CPT | Performed by: PHYSICAL THERAPIST

## 2018-01-29 PROCEDURE — 97110 THERAPEUTIC EXERCISES: CPT | Performed by: PHYSICAL THERAPIST

## 2018-01-29 PROCEDURE — 97140 MANUAL THERAPY 1/> REGIONS: CPT | Performed by: PHYSICAL THERAPIST

## 2018-01-29 NOTE — PROGRESS NOTES
Daily Note     Today's date: 2018  Patient name: Yanet Session  : 1960  MRN: [id number]  Referring provider: Teagan Campbell MD  Dx:   Encounter Diagnosis   Name Primary?  Presence of left artificial knee joint Yes                Subjective: Pt reports sleep continues to be disturbed, wakes up every  minutes but he notes it takes less time to go back to bed  Objective: See treatment diary below    Precautions: WBAT, DOS: 18    Daily Treatment Diary     Manual  18            PROM L Knee Y            STM ITB Y                                                       Exercise Diary              Heel slides 10x10''            Quad set 10x10''            HR/TR x10            SLR flex/abd x10            LAQ 2x10, 2 5#            Standing HS Curls x10            Standing marches 2x10            Tandem stance 5x15''            Hamstring stretch supine                                                                                                                                                                Modalities              Cold pack 10'                                                  Assessment: Tolerated treatment well  Patient demonstrated fatigue post treatment  Pt with improved response post manuals this visit  Moderate discomfort during session but pt reports a feeling of improved ability to move knee at end of session  Plan: Continue per plan of care

## 2018-02-01 ENCOUNTER — OFFICE VISIT (OUTPATIENT)
Dept: PHYSICAL THERAPY | Facility: CLINIC | Age: 58
End: 2018-02-01
Payer: COMMERCIAL

## 2018-02-01 DIAGNOSIS — Z96.652 PRESENCE OF LEFT ARTIFICIAL KNEE JOINT: Primary | ICD-10-CM

## 2018-02-01 PROCEDURE — 97140 MANUAL THERAPY 1/> REGIONS: CPT | Performed by: PHYSICAL THERAPIST

## 2018-02-01 PROCEDURE — 97010 HOT OR COLD PACKS THERAPY: CPT | Performed by: PHYSICAL THERAPIST

## 2018-02-01 PROCEDURE — 97110 THERAPEUTIC EXERCISES: CPT | Performed by: PHYSICAL THERAPIST

## 2018-02-01 NOTE — PROGRESS NOTES
Daily Note     Today's date: 2018  Patient name: Roseanna Steven  : 1960  MRN: [id number]  Referring provider: Vaishali Leon MD  Dx:   Encounter Diagnosis   Name Primary?  Presence of left artificial knee joint Yes                Subjective: Pt reports his knee continues to feel less sore during his day, however he thinks he over did it a little bit yesterday when he was running errands, he had trouble sleeping and soreness post       Objective: See treatment diary below    Precautions: WBAT, DOS: 18     Daily Treatment Diary      Manual  18                   PROM L Knee Y  Y                   STM ITB Y  Y                                                                                                 Exercise Diary                      Heel slides 10x10'' 10x10''                  Quad set 10x10''  10x10''                 HR/TR x10  2x10                 SLR flex/abd x10  2x10                 LAQ 2x10, 2 5# 2x10, 4#                 Standing HS Curls x10 2x10                 Standing marches 2x10 2x10                 Tandem stance 5x15'' 5x15''                 Hamstring stretch supine                   Mini squats  nv                 Step Ups  nv                 Wobble board   2x10 fwd/bck                                                                                                                                                                                                       Modalities                        Cold pack 10'  10'                                                                      Assessment: Tolerated treatment well  Patient demonstrated fatigue post treatmentPt with some challenge during proprioceptive/balance exercises  Fatigue at end of session but no increase in pain  Plan: Continue per plan of care

## 2018-02-06 ENCOUNTER — OFFICE VISIT (OUTPATIENT)
Dept: PHYSICAL THERAPY | Facility: CLINIC | Age: 58
End: 2018-02-06
Payer: COMMERCIAL

## 2018-02-06 DIAGNOSIS — Z96.652 PRESENCE OF LEFT ARTIFICIAL KNEE JOINT: Primary | ICD-10-CM

## 2018-02-06 PROCEDURE — 97140 MANUAL THERAPY 1/> REGIONS: CPT | Performed by: PHYSICAL THERAPIST

## 2018-02-06 PROCEDURE — 97110 THERAPEUTIC EXERCISES: CPT | Performed by: PHYSICAL THERAPIST

## 2018-02-06 NOTE — PROGRESS NOTES
Daily Note     Today's date: 2018  Patient name: Ignacio Persaud  : 1960  MRN: [id number]  Referring provider: Shannon Nicole MD  Dx:   Encounter Diagnosis   Name Primary?  Presence of left artificial knee joint Yes                Subjective: Pt reports that his knee is feeling like it is moving more each day but when he wakes up in the morning it is just as stiff as the day previously  Objective: See treatment diary below    Precautions: WBAT, DOS: 18     Daily Treatment Diary      Manual                   PROM L Knee Y  Y  Y                 STM ITB Y  Y  Y                                                                                               Exercise Diary                      Heel slides 10x10'' 10x10''   10x10''               Quad set 10x10''  10x10''  10x10''               HR/TR x10  2x10                 SLR flex/abd x10  2x10 2x10                LAQ 2x10, 2 5# 2x10, 4#                 Standing HS Curls x10 2x10                 Standing marches 2x10 2x10                 Tandem stance 5x15'' 5x15''                 Hamstring stretch supine                     Mini squats   nv                 Step Ups   nv                 Wobble board   2x10 fwd/bck                                                                                                                                                                                                       Modalities                        Cold pack 10'  10'                                                                      Assessment: Tolerated treatment well  Patient exhibited good technique with therapeutic exercises  Plan to increase intensity of manuals each visit to maximize range of motion as pain level continue to decrease  Plan: Continue per plan of care

## 2018-02-08 ENCOUNTER — OFFICE VISIT (OUTPATIENT)
Dept: PHYSICAL THERAPY | Facility: CLINIC | Age: 58
End: 2018-02-08
Payer: COMMERCIAL

## 2018-02-08 DIAGNOSIS — Z96.652 PRESENCE OF LEFT ARTIFICIAL KNEE JOINT: Primary | ICD-10-CM

## 2018-02-08 PROCEDURE — 97140 MANUAL THERAPY 1/> REGIONS: CPT | Performed by: PHYSICAL THERAPIST

## 2018-02-08 PROCEDURE — 97110 THERAPEUTIC EXERCISES: CPT | Performed by: PHYSICAL THERAPIST

## 2018-02-08 NOTE — PROGRESS NOTES
Daily Note     Today's date: 2018  Patient name: Kacy Marley  : 1960  MRN: [id number]  Referring provider: Esther Christian MD  Dx:   Encounter Diagnosis   Name Primary?  Presence of left artificial knee joint Yes                  Subjective: Pt reports his best night of sleep thus far, took about 20-30 minutes to sleep as compared to 1 5 hours  Objective: See treatment diary below    Precautions: WBAT, DOS: 18     Daily Treatment Diary      Manual                 PROM L Knee Y  Y  Y  Y               STM ITB Y  Y  Y  Y                                                                                             Exercise Diary                      Heel slides 10x10'' 10x10''   10x10'' 10x10''             Quad set 10x10''  10x10''  10x10'' 10x10''             HR/TR x10  2x10   2x10             SLR flex/abd x10  2x10 2x10  2x10             LAQ 2x10, 2 5# 2x10, 4#   3x10 4#             Standing HS Curls x10 2x10   2x10             Standing marches 2x10 2x10                 Tandem stance 5x15'' 5x15''   5x15''             Hamstring stretch supine                     Mini squats   nv                Step Ups   nv                Wobble board   2x10 fwd/bck   2x10 fwd/bck            Standing calf stretch       3x30''                                                                                                                                                                            Modalities                        Cold pack 10'  10'                                                                      Assessment: Tolerated treatment well  Patient demonstrated fatigue post treatmentNoted challenge during tandem stance, no pain reproduced in knee  Changed intensity of exercises this visit and updated HEP, only fatigue noted post session  Plan: Continue per plan of care

## 2018-02-13 ENCOUNTER — OFFICE VISIT (OUTPATIENT)
Dept: PHYSICAL THERAPY | Facility: CLINIC | Age: 58
End: 2018-02-13
Payer: COMMERCIAL

## 2018-02-13 DIAGNOSIS — Z96.652 PRESENCE OF LEFT ARTIFICIAL KNEE JOINT: ICD-10-CM

## 2018-02-13 DIAGNOSIS — M25.562 ACUTE PAIN OF LEFT KNEE: Primary | ICD-10-CM

## 2018-02-13 PROCEDURE — 97140 MANUAL THERAPY 1/> REGIONS: CPT | Performed by: PHYSICAL THERAPIST

## 2018-02-13 PROCEDURE — 97110 THERAPEUTIC EXERCISES: CPT | Performed by: PHYSICAL THERAPIST

## 2018-02-13 NOTE — PROGRESS NOTES
Daily Note     Today's date: 2018  Patient name: Shari Ragland  : 1960  MRN: [id number]  Referring provider: Shirley Matthew MD  Dx:   Encounter Diagnosis   Name Primary?  Presence of left artificial knee joint Yes                  Subjective: Pt reports that sleep continues to be disturbed, he continues to complete all of his stretches and notes he feels like it is getting looser each time  Objective: See treatment diary below    Precautions: WBAT, DOS: 18     Daily Treatment Diary      Manual               PROM L Knee Y  Y  Y  Y  Y             STM ITB Y  Y  Y  Y  Y                                                                                           Exercise Diary                      Heel slides 10x10'' 10x10''   10x10'' 10x10'' 10x10''           Quad set 10x10''  10x10''  10x10'' 10x10'' 10x10''           HR/TR x10  2x10   2x10            SLR flex/abd x10  2x10 2x10  2x10 3x10           LAQ 2x10, 2 5# 2x10, 4#   3x10 4# 3x10, 7 5#           Standing HS Curls x10 2x10   2x10 2x10           Standing marches 2x10 2x10                Tandem stance 5x15'' 5x15''   5x15'' 5x15''           Hamstring stretch supine                    Mini squats   nv                Step Ups   nv                Wobble board   2x10 fwd/bck   2x10 fwd/bck 2x10 fwd/back           Standing calf stretch       3x30'' 3x30''           Prone quad stretch        10x10''           Sit to stands         x10                                                                                                                                Modalities                        Cold pack 10'  10'                                                                          Assessment: Tolerated treatment well  Patient demonstrated fatigue post treatment  Pt with discomfort during manuals, unable to progress instensity this visit secondary to pain levels   Will continue to progress intensity of manuals, resistance and return to functional activities as able nv  Plan: Continue per plan of care

## 2018-02-16 ENCOUNTER — OFFICE VISIT (OUTPATIENT)
Dept: PHYSICAL THERAPY | Facility: CLINIC | Age: 58
End: 2018-02-16
Payer: COMMERCIAL

## 2018-02-16 DIAGNOSIS — Z96.652 PRESENCE OF LEFT ARTIFICIAL KNEE JOINT: ICD-10-CM

## 2018-02-16 DIAGNOSIS — M25.562 ACUTE PAIN OF LEFT KNEE: Primary | ICD-10-CM

## 2018-02-16 PROCEDURE — 97140 MANUAL THERAPY 1/> REGIONS: CPT | Performed by: PHYSICAL THERAPIST

## 2018-02-16 PROCEDURE — 97110 THERAPEUTIC EXERCISES: CPT | Performed by: PHYSICAL THERAPIST

## 2018-02-16 NOTE — LETTER
2018    Trin Zoie, [address] [address]    Patient: Angel Felipe   YOB: 1960   Date of Visit: 2018     Encounter Diagnosis     ICD-10-CM    1  Acute pain of left knee M25 562    2  Presence of left artificial knee joint N97 047        Dear Dr Gavin Homans:    Please review the attached progress note from Darby Zavala recent visit in anticipation of his check up appointment with you  If you have any questions or concerns, please don't hesitate to call  Sincerely,    Polo Daniel, PT                  Trinda Zoie, [address]: [phone]          Daily Note     Today's date: 2018  Patient name: Angel Felipe  : 1960  MRN: [id number]  Referring provider: Marina Perla MD  Dx:   Encounter Diagnoses   Name Primary?  Acute pain of left knee Yes    Presence of left artificial knee joint               Subjective: Pt reports he had a long day yesterday, he was at a  up and down a lot and his knee was sore by the end of the day  He also reports that he wants to try and return to work in 2 weeks and cut PT down to 1x/week secondary to a high copay amount      Objective: See treatment diary below    Precautions: WBAT, DOS: 18     Daily Treatment Diary      Manual             PROM L Knee Y  Y  Y  Y  Y  Y           STM ITB Y  Y  Y  Y  Y  Y                                                                                         Exercise Diary                      Heel slides 10x10'' 10x10''   10x10'' 10x10'' 10x10''  10x10''         Quad set 10x10''  10x10''  10x10'' 10x10'' 10x10''  10x10''         HR/TR x10  2x10   2x10    2x10         SLR flex/abd x10  2x10 2x10  2x10 3x10  3x10, 3# ABD         LAQ 2x10, 2 5# 2x10, 4#   3x10 4# 3x10, 7 5#  3x10, 7 5#         Standing HS Curls x10 2x10   2x10 2x10  2x10         Standing marches 2x10 2x10                 Tandem stance 5x15'' 5x15''   5x15'' 5x15''  5x15''         Hamstring stretch supine                     Mini squats   nv                 Step Ups   nv                 Wobble board   2x10 fwd/bck   2x10 fwd/bck 2x10 fwd/back  2x10         Standing calf stretch       3x30'' 3x30''  3x30''         Prone quad stretch         10x10''  10x10''         Sit to stands         x10   2x10                                                                                                                             Modalities                        Cold pack 10'  10'                                                                         Assessment: Tolerated treatment well  Patient demonstrated fatigue post treatment  Pt continues to have limitations in range of motion however they are much improved since his first visit  Pts ability to complete standing exercises and WB evenly are improved, quality of gait is unremarkable  Updated pts HEP to include long duration stretches with the knee in extension to gain last few degrees as soon as possible  L Knee AROM:   Flexion: 107  Extension: -5    PROM  Flexion: 110  Extension: -3    Plan: Continue per plan of care

## 2018-02-16 NOTE — PROGRESS NOTES
Daily Note     Today's date: 2018  Patient name: Caitlyn Cornejo  : 1960  MRN: [id number]  Referring provider: Blessing Rubin MD  Dx:   Encounter Diagnoses   Name Primary?  Acute pain of left knee Yes    Presence of left artificial knee joint               Subjective: Pt reports he had a long day yesterday, he was at a  up and down a lot and his knee was sore by the end of the day  He also reports that he wants to try and return to work in 2 weeks and cut PT down to 1x/week secondary to a high copay amount  Objective: See treatment diary below    Precautions: WBAT, DOS: 18     Daily Treatment Diary      Manual             PROM L Knee Y  Y  Y  Y  Y  Y           STM ITB Y  Y  Y  Y  Y  Y                                                                                         Exercise Diary                      Heel slides 10x10'' 10x10''   10x10'' 10x10'' 10x10''  10x10''         Quad set 10x10''  10x10''  10x10'' 10x10'' 10x10''  10x10''         HR/TR x10  2x10   2x10    2x10         SLR flex/abd x10  2x10 2x10  2x10 3x10  3x10, 3# ABD         LAQ 2x10, 2 5# 2x10, 4#   3x10 4# 3x10, 7 5#  3x10, 7 5#         Standing HS Curls x10 2x10   2x10 2x10  2x10         Standing marches 2x10 2x10                 Tandem stance 5x15'' 5x15''   5x15'' 5x15''  5x15''         Hamstring stretch supine                     Mini squats   nv                 Step Ups   nv                 Wobble board   2x10 fwd/bck   2x10 fwd/bck 2x10 fwd/back  2x10         Standing calf stretch       3x30'' 3x30''  3x30''         Prone quad stretch         10x10''  10x10''         Sit to stands         x10   2x10                                                                                                                             Modalities                        Cold pack 10'  10'                                                                         Assessment: Tolerated treatment well  Patient demonstrated fatigue post treatment  Pt continues to have limitations in range of motion however they are much improved since his first visit  Pts ability to complete standing exercises and WB evenly are improved, quality of gait is unremarkable  Updated pts HEP to include long duration stretches with the knee in extension to gain last few degrees as soon as possible  L Knee AROM:   Flexion: 107  Extension: -5    PROM  Flexion: 110  Extension: -3    Plan: Continue per plan of care

## 2018-02-20 ENCOUNTER — APPOINTMENT (OUTPATIENT)
Dept: RADIOLOGY | Facility: CLINIC | Age: 58
End: 2018-02-20
Payer: COMMERCIAL

## 2018-02-20 ENCOUNTER — OFFICE VISIT (OUTPATIENT)
Dept: OBGYN CLINIC | Facility: CLINIC | Age: 58
End: 2018-02-20

## 2018-02-20 VITALS
BODY MASS INDEX: 37.51 KG/M2 | WEIGHT: 262 LBS | DIASTOLIC BLOOD PRESSURE: 85 MMHG | HEIGHT: 70 IN | HEART RATE: 87 BPM | SYSTOLIC BLOOD PRESSURE: 130 MMHG

## 2018-02-20 DIAGNOSIS — Z47.1 AFTERCARE FOLLOWING LEFT KNEE JOINT REPLACEMENT SURGERY: Primary | ICD-10-CM

## 2018-02-20 DIAGNOSIS — Z96.652 AFTERCARE FOLLOWING LEFT KNEE JOINT REPLACEMENT SURGERY: Primary | ICD-10-CM

## 2018-02-20 PROBLEM — M17.12 PRIMARY LOCALIZED OSTEOARTHRITIS OF LEFT KNEE: Status: RESOLVED | Noted: 2018-01-08 | Resolved: 2018-02-20

## 2018-02-20 PROCEDURE — 73562 X-RAY EXAM OF KNEE 3: CPT

## 2018-02-20 PROCEDURE — 99024 POSTOP FOLLOW-UP VISIT: CPT | Performed by: ORTHOPAEDIC SURGERY

## 2018-02-20 RX ORDER — BACLOFEN 10 MG/1
1 TABLET ORAL 3 TIMES DAILY PRN
COMMUNITY
Start: 2018-01-23 | End: 2022-06-27

## 2018-02-20 NOTE — LETTER
February 20, 2018     Patient: Jaqueline Khan   YOB: 1960   Date of Visit: 2/20/2018       To Whom it May Concern:    Sofyanil Palacios is under my professional care  He was seen in my office on 2/20/2018  He may return to work on 3/5/18 with limited walking and no lifting, pushing, or pulling       If you have any questions or concerns, please don't hesitate to call           Sincerely,          Charlotte Stanley MD        CC: No Recipients

## 2018-02-20 NOTE — ASSESSMENT & PLAN NOTE
59-year-old male status post a left total knee arthroplasty in January of 2018  Overall he is doing very well  I counseled him a continuing icing for any swelling  I showed him ITB band stretches which will address that tightness in the lateral aspect of his knee  He was counseled on continued expectations  He plans to return to work with limited duty the 1st Monday in March  I will see him back in six weeks  No x-rays will be needed

## 2018-02-22 ENCOUNTER — OFFICE VISIT (OUTPATIENT)
Dept: FAMILY MEDICINE CLINIC | Facility: CLINIC | Age: 58
End: 2018-02-22
Payer: COMMERCIAL

## 2018-02-22 ENCOUNTER — OFFICE VISIT (OUTPATIENT)
Dept: PHYSICAL THERAPY | Facility: CLINIC | Age: 58
End: 2018-02-22
Payer: COMMERCIAL

## 2018-02-22 VITALS
WEIGHT: 260 LBS | HEART RATE: 74 BPM | DIASTOLIC BLOOD PRESSURE: 82 MMHG | SYSTOLIC BLOOD PRESSURE: 136 MMHG | HEIGHT: 70 IN | OXYGEN SATURATION: 96 % | BODY MASS INDEX: 37.22 KG/M2

## 2018-02-22 DIAGNOSIS — Z96.652 PRESENCE OF LEFT ARTIFICIAL KNEE JOINT: ICD-10-CM

## 2018-02-22 DIAGNOSIS — M25.562 ACUTE PAIN OF LEFT KNEE: Primary | ICD-10-CM

## 2018-02-22 DIAGNOSIS — C44.41 BASAL CELL CARCINOMA OF SCALP: ICD-10-CM

## 2018-02-22 DIAGNOSIS — L81.9 PIGMENTED SKIN LESION: Primary | ICD-10-CM

## 2018-02-22 PROCEDURE — 97140 MANUAL THERAPY 1/> REGIONS: CPT | Performed by: PHYSICAL THERAPIST

## 2018-02-22 PROCEDURE — 11301 SHAVE SKIN LESION 0.6-1.0 CM: CPT | Performed by: FAMILY MEDICINE

## 2018-02-22 PROCEDURE — 97110 THERAPEUTIC EXERCISES: CPT | Performed by: PHYSICAL THERAPIST

## 2018-02-22 NOTE — PROGRESS NOTES
Daily Note     Today's date: 2018  Patient name: Robbie Schmitt  : 1960  MRN: [id number]  Referring provider: Haris Gomes MD  Dx:   Encounter Diagnosis     ICD-10-CM    1  Acute pain of left knee M25 562    2  Presence of left artificial knee joint [id number] [id number]                   Subjective: Pt reports he has had increased pain since seeing his doctor, she gave him a stretch which is working his outside of his knee  His pain has gone back up and he has lost sleep but feels that his knee has kept his range of motion      Objective: See treatment diary below    Precautions: WBAT, DOS: 18     Daily Treatment Diary      Manual           PROM L Knee Y  Y  Y  Y  Y  Y  Y         STM ITB Y  Y  Y  Y  Y  Y  Y                                                                                       Exercise Diary                      Heel slides 10x10'' 10x10''   10x10'' 10x10'' 10x10''  10x10''  10x10''       Quad set 10x10''  10x10''  10x10'' 10x10'' 10x10''  10x10''  10x10''       HR/TR x10  2x10   2x10    2x10         SLR flex/abd x10  2x10 2x10  2x10 3x10  3x10, 3# ABD 3x10 3# ABD       LAQ 2x10, 2 5# 2x10, 4#   3x10 4# 3x10, 7 5#  3x10, 7 5# 3x10, 7 5#       Standing HS Curls x10 2x10   2x10 2x10  2x10        Standing marches 2x10 2x10                 Tandem stance 5x15'' 5x15''   5x15'' 5x15''  5x15''  5x15''       Hamstring stretch supine                     Mini squats   nv                 Step Ups   nv                 Wobble board   2x10 fwd/bck   2x10 fwd/bck 2x10 fwd/back  2x10        Standing calf stretch       3x30'' 3x30''  3x30''         Prone quad stretch         10x10'' 10x10'' 10x10''        Sit to stands         x10  2x10        Leg press             2x10, 115#                                                                                                     Modalities                        Cold pack 10'  10'                                                                          Assessment: Tolerated treatment well  Patient demonstrated fatigue post treatment  Pt responded well to leg press machine, some discomfort during manuals to left knee but no more than usual despite his increased pain upon arrival  Will continue to progress towards maximal level of function as able nv  Plan: Continue per plan of care

## 2018-02-22 NOTE — PROGRESS NOTES
Procedures  SKIN SURGERY    Excision lesion scalp  Size 10mm  Consent obtained  2 cc pl lido  Path sent  Return prn  Shave BX/cautery

## 2018-02-26 ENCOUNTER — OFFICE VISIT (OUTPATIENT)
Dept: PHYSICAL THERAPY | Facility: CLINIC | Age: 58
End: 2018-02-26
Payer: COMMERCIAL

## 2018-02-26 DIAGNOSIS — Z96.652 PRESENCE OF LEFT ARTIFICIAL KNEE JOINT: ICD-10-CM

## 2018-02-26 DIAGNOSIS — IMO0002 ANTIBIOTIC PROPHYLAXIS FOR DENTAL PROCEDURE INDICATED DUE TO PRIOR JOINT REPLACEMENT: Primary | ICD-10-CM

## 2018-02-26 DIAGNOSIS — M25.562 ACUTE PAIN OF LEFT KNEE: Primary | ICD-10-CM

## 2018-02-26 PROCEDURE — 97110 THERAPEUTIC EXERCISES: CPT | Performed by: PHYSICAL THERAPIST

## 2018-02-26 PROCEDURE — 97140 MANUAL THERAPY 1/> REGIONS: CPT | Performed by: PHYSICAL THERAPIST

## 2018-02-26 RX ORDER — AMOXICILLIN 500 MG/1
1000 CAPSULE ORAL ONCE
Qty: 6 CAPSULE | Refills: 1 | Status: SHIPPED | OUTPATIENT
Start: 2018-02-26 | End: 2018-02-26

## 2018-02-26 NOTE — PROGRESS NOTES
Daily Note     Today's date: 2018  Patient name: Caitlyn Cornejo  : 1960  MRN: [id number]  Referring provider: Blessing Rubin MD  Dx:   Encounter Diagnosis     ICD-10-CM    1  Acute pain of left knee M25 562    2  Presence of left artificial knee joint Z96 652                   Subjective: Pt reports that the outside of his left knee continues to be sore, he continues to complete all of his exercises at home   He was able to get out and walk the dog a bit yesterday but it gave him soreness post, walked about 45 minutes total     Objective: See treatment diary below    Precautions: WBAT, DOS: 18     Daily Treatment Diary      Manual         PROM L Knee Y  Y  Y  Y  Y  Y  Y  Y       STM ITB Y  Y  Y  Y  Y  Y  Y  Y                                                                                     Exercise Diary                      Heel slides 10x10'' 10x10''   10x10'' 10x10'' 10x10''  10x10''  10x10''  10x10''     Quad set 10x10''  10x10''  10x10'' 10x10'' 10x10''  10x10''  10x10''  10x10''     HR/TR x10  2x10   2x10    2x10         SLR flex/abd x10  2x10 2x10  2x10 3x10  3x10, 3# ABD 3x10 3# ABD  3x10 3# ABD     LAQ 2x10, 2 5# 2x10, 4#   3x10 4# 3x10, 7 5#  3x10, 7 5# 3x10, 7 5# 3x10, 7 5#      Standing HS Curls x10 2x10   2x10 2x10  2x10         Standing marches 2x10 2x10                 Tandem stance 5x15'' 5x15''   5x15'' 5x15''  5x15''  5x15'' 5x15''      Hamstring stretch supine                     Mini squats   nv                 Step Ups   nv                 Wobble board   2x10 fwd/bck   2x10 fwd/bck 2x10 fwd/back  2x10         Standing calf stretch       3x30'' 3x30''  3x30''    3x30''     Prone quad stretch         10x10'' 10x10'' 10x10''   10x10''     Sit to stands         x10  2x10         Leg press             2x10, 115#                                                                                                     Modalities                      Cold pack 10'  10'                                                                          Assessment: Tolerated treatment well  Patient demonstrated fatigue post treatment  Will continue to progress manuals and exercises to facilitate full return to work as able nv  Plan: Continue per plan of care

## 2018-02-28 ENCOUNTER — TELEPHONE (OUTPATIENT)
Dept: FAMILY MEDICINE CLINIC | Facility: CLINIC | Age: 58
End: 2018-02-28

## 2018-02-28 LAB
PATH REPORT.SITE OF ORIGIN SPEC: NORMAL
PAYMENT PROCEDURE: NORMAL
SL AMB .: NORMAL

## 2018-02-28 NOTE — TELEPHONE ENCOUNTER
Spoke to patient, advised of pathology results per TW -- he states he is going back to work on Monday after being out for 6 weeks and will need to find a time to be able to do this  He is asking how soon this has to be done and could it be done on a Friday    Advised will have TW review and get back to him to schedule         ----- Message from Isa Faria MD sent at 2/28/2018  3:14 PM EST -----  Basal cell cancer---sched complete excision---11 AM or 3 PM

## 2018-03-01 NOTE — TELEPHONE ENCOUNTER
I SPOKE TO PATIENT    APPOINTMENT PROVIDED FOR Friday April 13TH 1500 ([id number])    I WAS UNABLE TO PUT THE APPOINTMENT IN AS I GOT A MESSAGE "YOU DON NOT HAVE OVERRIDE ACCESS "    I GAVE THIS TO JAMES HERRERA TO PUT THE APPOINTMENT IN

## 2018-03-06 ENCOUNTER — OFFICE VISIT (OUTPATIENT)
Dept: PHYSICAL THERAPY | Facility: CLINIC | Age: 58
End: 2018-03-06
Payer: COMMERCIAL

## 2018-03-06 DIAGNOSIS — Z96.652 PRESENCE OF LEFT ARTIFICIAL KNEE JOINT: ICD-10-CM

## 2018-03-06 DIAGNOSIS — M25.562 ACUTE PAIN OF LEFT KNEE: Primary | ICD-10-CM

## 2018-03-06 PROCEDURE — 97110 THERAPEUTIC EXERCISES: CPT | Performed by: PHYSICAL THERAPIST

## 2018-03-06 PROCEDURE — 97010 HOT OR COLD PACKS THERAPY: CPT | Performed by: PHYSICAL THERAPIST

## 2018-03-06 PROCEDURE — 97140 MANUAL THERAPY 1/> REGIONS: CPT | Performed by: PHYSICAL THERAPIST

## 2018-03-06 NOTE — PROGRESS NOTES
Daily Note     Today's date: 3/6/2018  Patient name: Kacy Marley  : 1960  MRN: [id number]  Referring provider: Esther Christian MD  Dx:   Encounter Diagnosis     ICD-10-CM    1  Acute pain of left knee M25 562    2  Presence of left artificial knee joint [id number] [id number]                   Subjective: Pt reports that he had a fall last Wednesday and his knee bent up behind him and caused him a great deal of pain  He notes this became swollen and was sore for the next few days  He notes he continued to do the exercises as the pain subsided and he says there is still some remaining soreness on the inside of his thigh at this point        Objective: See treatment diary below    Precautions: WBAT, DOS: 18     Daily Treatment Diary      Manual  1/29  2/1  2/6  2/8  2/12  2/16  2/22  2/26  3/6     PROM L Knee Y  Y  Y  Y  Y  Y  Y  Y  Y     STM ITB Y  Y  Y  Y  Y  Y  Y  Y  Y      STM Distal/medial adductors                 Y                                                           Exercise Diary                      Heel slides 10x10'' 10x10''   10x10'' 10x10'' 10x10''  10x10''  10x10''  10x10''  10x10''   Quad set 10x10''  10x10''  10x10'' 10x10'' 10x10''  10x10''  10x10''  10x10''  10x10''   HR/TR x10  2x10   2x10    2x10         SLR flex/abd x10  2x10 2x10  2x10 3x10  3x10, 3# ABD 3x10 3# ABD  3x10 3# ABD     LAQ 2x10, 2 5# 2x10, 4#   3x10 4# 3x10, 7 5#  3x10, 7 5# 3x10, 7 5# 3x10, 7 5#      Standing HS Curls x10 2x10   2x10 2x10  2x10         Standing marches 2x10 2x10                 Tandem stance 5x15'' 5x15''   5x15'' 5x15''  5x15''  5x15'' 5x15''      Hamstring stretch supine                     Mini squats   nv                 Step Ups   nv                 Wobble board   2x10 fwd/bck   2x10 fwd/bck 2x10 fwd/back  2x10         Standing calf stretch       3x30'' 3x30''  3x30''    3x30''  3x30''   Prone quad stretch         10x10'' 10x10'' 10x10''   10x10''  10x10''   Sit to stands         x10  2x10         Leg press             2x10, 115#                                                                                                     Modalities                        Cold pack 10'  10'              10'                                               Assessment: Tolerated treatment fair  Patient demonstrated fatigue post treatment  Pt with pain during exercises this visit, focused on manuals and soft tissue mobilization to decrease pain levels  Will continue nv as able secondary to sx  Pt educated to continue icing and only perform exercises that do not increase knee pain at this time  Plan: Continue per plan of care

## 2018-03-13 ENCOUNTER — OFFICE VISIT (OUTPATIENT)
Dept: PHYSICAL THERAPY | Facility: CLINIC | Age: 58
End: 2018-03-13
Payer: COMMERCIAL

## 2018-03-13 DIAGNOSIS — M25.562 ACUTE PAIN OF LEFT KNEE: Primary | ICD-10-CM

## 2018-03-13 DIAGNOSIS — Z96.652 PRESENCE OF LEFT ARTIFICIAL KNEE JOINT: ICD-10-CM

## 2018-03-13 PROCEDURE — 97110 THERAPEUTIC EXERCISES: CPT | Performed by: PHYSICAL THERAPIST

## 2018-03-13 PROCEDURE — 97140 MANUAL THERAPY 1/> REGIONS: CPT | Performed by: PHYSICAL THERAPIST

## 2018-03-13 NOTE — PROGRESS NOTES
Daily Note     Today's date: 3/13/2018  Patient name: Paradise Newton  : 1960  MRN: [id number]  Referring provider: Mandy Murray MD  Dx:   Encounter Diagnosis     ICD-10-CM    1  Acute pain of left knee M25 562    2  Presence of left artificial knee joint [id number] [id number]                   Subjective: Pt reports his knee is feeling better than last visit, he needs to be more consistent with his exercises though    **DC from PT at this time secondary to progress made, high co pay amount, and independence in HEP **      Objective: See treatment diary below    Precautions: WBAT, DOS: 18     Daily Treatment Diary      Manual  1/29  2/1  2/6  2/8  2/12  2/16  2/22  2/26  3/6  3/13   PROM L Knee Y  Y  Y  Y  Y  Y  Y  Y  Y  Y   STM ITB Y  Y  Y  Y  Y  Y  Y  Y  Y  Y    STM Distal/medial adductors                 Y  Y                                                         Exercise Diary                       Heel slides 10x10'' 10x10''   10x10'' 10x10'' 10x10''  10x10''  10x10''  10x10''  10x10''    Quad set 10x10''  10x10''  10x10'' 10x10'' 10x10''  10x10''  10x10''  10x10''  10x10''    HR/TR x10  2x10   2x10    2x10          SLR flex/abd x10  2x10 2x10  2x10 3x10  3x10, 3# ABD 3x10 3# ABD  3x10 3# ABD   2x10, 5#   LAQ 2x10, 2 5# 2x10, 4#   3x10 4# 3x10, 7 5#  3x10, 7 5# 3x10, 7 5# 3x10, 7 5#       Standing HS Curls x10 2x10   2x10 2x10  2x10          Standing marches 2x10 2x10                  Tandem stance 5x15'' 5x15''   5x15'' 5x15''  5x15''  5x15'' 5x15''       Hamstring stretch supine                      Mini squats   nv                  Step Ups   nv                  Wobble board   2x10 fwd/bck   2x10 fwd/bck 2x10 fwd/back  2x10       2x10 fwd/back   Standing calf stretch       3x30'' 3x30''  3x30''    3x30''  3x30'' 3x30''   Prone quad stretch         10x10'' 10x10'' 10x10''   10x10''  10x10'' 10x10''   Sit to stands         x10  2x10       2x10   Leg press             2x10, 115#     2x10, 115#   Stairs                   3x10   Recumbent bike                   5' lvl 4                                                       Modalities                        Cold pack 10'  10'              10'                                                            Assessment: Tolerated treatment well  Patient demonstrated fatigue post treatment      Plan: Continue per plan of care

## 2018-03-14 ENCOUNTER — TELEPHONE (OUTPATIENT)
Dept: OBGYN CLINIC | Facility: HOSPITAL | Age: 58
End: 2018-03-14

## 2018-03-14 NOTE — TELEPHONE ENCOUNTER
Patient sees Dr Teofilo Kamara  He had a work note stating that he can return 03/05 however he did not feel like it was safe to return and he has been out another week  Can he have a new note stating that he will return today 03/14   Patient will  the note in the office

## 2018-03-14 NOTE — LETTER
March 14, 2018     Patient: Chad Londono   YOB: 1960   Date of Visit: 3/14/2018       To Whom it May Concern:    Rosa Roland is under my professional care  He was seen in my office on 2/20/18  He may return to work with limitations of limited walking and no lifting, pushing, or pulling  He may return to work on 3/14/18  If you have any questions or concerns, please don't hesitate to call           Sincerely,          Nikko Silva MD        CC: No Recipients

## 2018-03-27 ENCOUNTER — APPOINTMENT (OUTPATIENT)
Dept: PHYSICAL THERAPY | Facility: CLINIC | Age: 58
End: 2018-03-27
Payer: COMMERCIAL

## 2018-04-05 ENCOUNTER — TELEPHONE (OUTPATIENT)
Dept: FAMILY MEDICINE CLINIC | Facility: CLINIC | Age: 58
End: 2018-04-05

## 2018-04-06 ENCOUNTER — OFFICE VISIT (OUTPATIENT)
Dept: OBGYN CLINIC | Facility: CLINIC | Age: 58
End: 2018-04-06

## 2018-04-06 VITALS
HEIGHT: 70 IN | BODY MASS INDEX: 36.79 KG/M2 | HEART RATE: 100 BPM | DIASTOLIC BLOOD PRESSURE: 80 MMHG | WEIGHT: 257 LBS | SYSTOLIC BLOOD PRESSURE: 116 MMHG

## 2018-04-06 DIAGNOSIS — Z96.652 AFTERCARE FOLLOWING LEFT KNEE JOINT REPLACEMENT SURGERY: Primary | ICD-10-CM

## 2018-04-06 DIAGNOSIS — Z47.1 AFTERCARE FOLLOWING LEFT KNEE JOINT REPLACEMENT SURGERY: Primary | ICD-10-CM

## 2018-04-06 PROCEDURE — 99024 POSTOP FOLLOW-UP VISIT: CPT | Performed by: ORTHOPAEDIC SURGERY

## 2018-04-06 NOTE — ASSESSMENT & PLAN NOTE
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

## 2018-04-06 NOTE — PROGRESS NOTES
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  I reviewed the diagnosis with him, we went over appropriate in successful ways to do the stretches  We also talked about a foam roller  I will see him back in six months unless he has problems in the meantime  Subjective:     Patient ID: Leatha Crisostomo is a 62 y o  male  Chief Complaint:  62 y  o male presents for 6 weeks postoperative visit status post left TKA (op date: 1/8/18)  His biggest complaint is lateral tightness around the knee  He has been doing the ITB band stretches that he was instructed on in shows progress in that  His pain was getting better until he had a fall about a month ago that set him back several weeks  He worked through that and is now starting to make progress with his knee again  He has returned to work  He is no longer working with therapy, but does his exercises daily  Allergy:  No Known Allergies  Medications:  all current active meds have been reviewed  Past Medical History:  Past Medical History:   Diagnosis Date    Chews tobacco regularly     Consumes three beers daily     Controlled diabetes mellitus ([address] )     Esophageal reflux     Hyperlipidemia     Hypertension     Osteoarthritis     LEFT KNEE     Past Surgical History:  Past Surgical History:   Procedure Laterality Date    COLONOSCOPY      [place] TOTAL KNEE ARTHROPLASTY Left 1/8/2018    Procedure: ARTHROPLASTY KNEE TOTAL;  Surgeon: Emy Mckenzie MD;  Location: [facility] OR;  Service: Orthopedics     Family History:  History reviewed  No pertinent family history    Social History:  History   Alcohol Use    12 0 oz/week    20 Cans of beer per week     Comment: daily beer & "gin & tonics" on a weekend      History   Drug Use No     History   Smoking Status    Former Smoker    Packs/day: 1 00    Years: 11 00    Types: Cigarettes   Smokeless Tobacco    Current User     Comment: quit 30 + yrs ago      Review of Systems   Respiratory: Positive for cough  Endocrine: Positive for polydipsia  Objective:  BP Readings from Last 1 Encounters:   04/06/18 116/80      Wt Readings from Last 1 Encounters:   04/06/18 117 kg (257 lb)      BMI:   Estimated body mass index is 36 88 kg/m² as calculated from the following:    Height as of this encounter: 5' 10" (1 778 m)  Weight as of this encounter: 117 kg (257 lb)  BSA:   Estimated body surface area is 2 33 meters squared as calculated from the following:    Height as of this encounter: 5' 10" (1 778 m)  Weight as of this encounter: 117 kg (257 lb)  Physical Exam  Left Knee Exam     Comments:  Incision healing very well, very minimal knee effusion, tender to palpation along the lateral ITB band and posterior lateral corner  Knee extension actively is to 0 degrees, full active flexion  No numbness or tingling in the foot, good active dorsiflexion and plantar flexion of the foot  The knee is stable to varus and valgus stress  No anterior-posterior instability            I have personally reviewed pertinent films in PACS and my interpretation is Knee in good alignment

## 2018-04-13 ENCOUNTER — OFFICE VISIT (OUTPATIENT)
Dept: FAMILY MEDICINE CLINIC | Facility: CLINIC | Age: 58
End: 2018-04-13
Payer: COMMERCIAL

## 2018-04-13 VITALS
DIASTOLIC BLOOD PRESSURE: 72 MMHG | HEIGHT: 70 IN | WEIGHT: 257 LBS | SYSTOLIC BLOOD PRESSURE: 122 MMHG | HEART RATE: 88 BPM | BODY MASS INDEX: 36.79 KG/M2

## 2018-04-13 DIAGNOSIS — C44.41 BASAL CELL CARCINOMA OF HEAD: Primary | ICD-10-CM

## 2018-04-13 PROCEDURE — 11422 EXC H-F-NK-SP B9+MARG 1.1-2: CPT | Performed by: FAMILY MEDICINE

## 2018-04-16 NOTE — PROGRESS NOTES
Procedures  Re-excision basal cell CA  SKIN SURGERY    Excision lesion scalp  Size 2 5  Consent obtained  3 cc pl lido  Repair with 3/0 nylon 10 suture  Path sent  Return 14   days

## 2018-04-20 LAB
PATH REPORT.SITE OF ORIGIN SPEC: NORMAL
PAYMENT PROCEDURE: NORMAL
SL AMB .: NORMAL

## 2018-04-21 ENCOUNTER — TELEPHONE (OUTPATIENT)
Dept: FAMILY MEDICINE CLINIC | Facility: CLINIC | Age: 58
End: 2018-04-21

## 2018-04-21 NOTE — TELEPHONE ENCOUNTER
----- Message from Yazmin Camacho MD sent at 4/21/2018  7:11 AM EDT -----  No CA left--clean margins

## 2018-04-28 ENCOUNTER — OFFICE VISIT (OUTPATIENT)
Dept: FAMILY MEDICINE CLINIC | Facility: CLINIC | Age: 58
End: 2018-04-28
Payer: COMMERCIAL

## 2018-04-28 VITALS — DIASTOLIC BLOOD PRESSURE: 78 MMHG | HEART RATE: 80 BPM | SYSTOLIC BLOOD PRESSURE: 124 MMHG

## 2018-04-28 DIAGNOSIS — Z48.02 VISIT FOR SUTURE REMOVAL: ICD-10-CM

## 2018-04-28 DIAGNOSIS — Z09 POSTOP CHECK: Primary | ICD-10-CM

## 2018-04-28 PROCEDURE — 99212 OFFICE O/P EST SF 10 MIN: CPT | Performed by: FAMILY MEDICINE

## 2018-04-28 NOTE — PROGRESS NOTES
Assessment/Plan:    No problem-specific Assessment & Plan notes found for this encounter  There are no diagnoses linked to this encounter  Subjective:      Patient ID: Abe Rodriguez is a 62 y o  male  Patient here for suture removal   Surgery was re-excision of previous biopsy basal cell  Cancer  2nd specimen showed no evidence of residual cancer  The following portions of the patient's history were reviewed and updated as appropriate: allergies, current medications, past family history, past medical history, past social history, past surgical history and problem list     Review of Systems      Objective:      /78   Pulse 80          Physical Exam   Skin:     10 sutures were removed  Poor primary closure  Some ulceration at site noted  Antibiotic ointment placed to wound

## 2018-06-22 ENCOUNTER — TELEPHONE (OUTPATIENT)
Dept: FAMILY MEDICINE CLINIC | Facility: CLINIC | Age: 58
End: 2018-06-22

## 2018-06-22 DIAGNOSIS — E11.9 TYPE 2 DIABETES MELLITUS WITHOUT COMPLICATION, WITHOUT LONG-TERM CURRENT USE OF INSULIN (HCC): Primary | ICD-10-CM

## 2018-07-14 DIAGNOSIS — E11.9 TYPE 2 DIABETES MELLITUS WITHOUT COMPLICATION, WITHOUT LONG-TERM CURRENT USE OF INSULIN (HCC): Primary | ICD-10-CM

## 2018-07-16 DIAGNOSIS — E11.69 HYPERLIPIDEMIA ASSOCIATED WITH TYPE 2 DIABETES MELLITUS (HCC): Primary | ICD-10-CM

## 2018-07-16 DIAGNOSIS — Z12.5 SCREENING PSA (PROSTATE SPECIFIC ANTIGEN): ICD-10-CM

## 2018-07-16 DIAGNOSIS — E78.5 HYPERLIPIDEMIA ASSOCIATED WITH TYPE 2 DIABETES MELLITUS (HCC): Primary | ICD-10-CM

## 2018-07-16 DIAGNOSIS — E11.9 TYPE 2 DIABETES MELLITUS WITHOUT COMPLICATION, WITHOUT LONG-TERM CURRENT USE OF INSULIN (HCC): ICD-10-CM

## 2018-07-16 RX ORDER — CANAGLIFLOZIN 300 MG/1
TABLET, FILM COATED ORAL
Qty: 30 TABLET | Refills: 0 | Status: SHIPPED | OUTPATIENT
Start: 2018-07-16 | End: 2018-08-29 | Stop reason: SDUPTHER

## 2018-08-04 LAB
ALBUMIN SERPL-MCNC: 4.7 G/DL (ref 3.5–5.5)
ALBUMIN/GLOB SERPL: 1.6 {RATIO} (ref 1.2–2.2)
ALP SERPL-CCNC: 86 IU/L (ref 39–117)
ALT SERPL-CCNC: 33 IU/L (ref 0–44)
AST SERPL-CCNC: 29 IU/L (ref 0–40)
BILIRUB SERPL-MCNC: 0.5 MG/DL (ref 0–1.2)
BUN SERPL-MCNC: 17 MG/DL (ref 6–24)
BUN/CREAT SERPL: 16 (ref 9–20)
CALCIUM SERPL-MCNC: 9.5 MG/DL (ref 8.7–10.2)
CHLORIDE SERPL-SCNC: 99 MMOL/L (ref 96–106)
CHOLEST SERPL-MCNC: 213 MG/DL (ref 100–199)
CHOLEST/HDLC SERPL: 6.3 RATIO (ref 0–5)
CO2 SERPL-SCNC: 17 MMOL/L (ref 20–29)
CREAT SERPL-MCNC: 1.07 MG/DL (ref 0.76–1.27)
GLOBULIN SER-MCNC: 3 G/DL (ref 1.5–4.5)
GLUCOSE SERPL-MCNC: 175 MG/DL (ref 65–99)
HBA1C MFR BLD: 7.5 % (ref 4.8–5.6)
HDLC SERPL-MCNC: 34 MG/DL
LDLC SERPL CALC-MCNC: ABNORMAL MG/DL (ref 0–99)
LDLC SERPL DIRECT ASSAY-MCNC: 89 MG/DL (ref 0–99)
POTASSIUM SERPL-SCNC: 4.3 MMOL/L (ref 3.5–5.2)
PROT SERPL-MCNC: 7.7 G/DL (ref 6–8.5)
PSA SERPL-MCNC: 0.3 NG/ML (ref 0–4)
SL AMB EGFR AFRICAN AMERICAN: 88 ML/MIN/1.73
SL AMB EGFR NON AFRICAN AMERICAN: 76 ML/MIN/1.73
SL AMB REFLEX CRITERIA: NORMAL
SL AMB VLDL CHOLESTEROL CALC: ABNORMAL MG/DL (ref 5–40)
SODIUM SERPL-SCNC: 137 MMOL/L (ref 134–144)
TRIGL SERPL-MCNC: 543 MG/DL (ref 0–149)

## 2018-08-06 ENCOUNTER — TELEPHONE (OUTPATIENT)
Dept: FAMILY MEDICINE CLINIC | Facility: CLINIC | Age: 58
End: 2018-08-06

## 2018-08-29 ENCOUNTER — OFFICE VISIT (OUTPATIENT)
Dept: FAMILY MEDICINE CLINIC | Facility: CLINIC | Age: 58
End: 2018-08-29
Payer: COMMERCIAL

## 2018-08-29 VITALS
SYSTOLIC BLOOD PRESSURE: 138 MMHG | HEIGHT: 70 IN | DIASTOLIC BLOOD PRESSURE: 84 MMHG | BODY MASS INDEX: 36.36 KG/M2 | WEIGHT: 254 LBS

## 2018-08-29 DIAGNOSIS — I10 ESSENTIAL HYPERTENSION: ICD-10-CM

## 2018-08-29 DIAGNOSIS — E11.9 TYPE 2 DIABETES MELLITUS WITHOUT COMPLICATION, WITHOUT LONG-TERM CURRENT USE OF INSULIN (HCC): ICD-10-CM

## 2018-08-29 DIAGNOSIS — Z79.4 TYPE 2 DIABETES MELLITUS WITHOUT COMPLICATION, WITH LONG-TERM CURRENT USE OF INSULIN (HCC): Primary | ICD-10-CM

## 2018-08-29 DIAGNOSIS — E11.9 TYPE 2 DIABETES MELLITUS WITHOUT COMPLICATION, WITH LONG-TERM CURRENT USE OF INSULIN (HCC): Primary | ICD-10-CM

## 2018-08-29 DIAGNOSIS — E78.01 FAMILIAL HYPERCHOLESTEROLEMIA: ICD-10-CM

## 2018-08-29 PROCEDURE — 1036F TOBACCO NON-USER: CPT | Performed by: FAMILY MEDICINE

## 2018-08-29 PROCEDURE — 3079F DIAST BP 80-89 MM HG: CPT | Performed by: FAMILY MEDICINE

## 2018-08-29 PROCEDURE — 99214 OFFICE O/P EST MOD 30 MIN: CPT | Performed by: FAMILY MEDICINE

## 2018-08-29 PROCEDURE — 3008F BODY MASS INDEX DOCD: CPT | Performed by: FAMILY MEDICINE

## 2018-08-29 PROCEDURE — 3075F SYST BP GE 130 - 139MM HG: CPT | Performed by: FAMILY MEDICINE

## 2018-08-29 NOTE — PROGRESS NOTES
[facility]        NAME: Hiral Cardozo is a 62 y o  male  : 1960    MRN: [id number]  DATE: 2018  TIME: 5:56 PM    Assessment and Plan   Type 2 diabetes mellitus without complication, with long-term current use of insulin ([facility]) [E11 9, Z79 4]  1  Type 2 diabetes mellitus without complication, with long-term current use of insulin ([facility])  metFORMIN (GLUCOPHAGE) 1000 MG tablet   2  Essential hypertension     3  Familial hypercholesterolemia     4  Type 2 diabetes mellitus without complication, without long-term current use of insulin ([facility])  Canagliflozin (INVOKANA) 300 MG TABS         Patient Instructions     Patient Instructions   Better diet          Chief Complaint     Chief Complaint   Patient presents with    Well Check     mm/labs         History of Present Illness       DM f/u--lipids/A1c not controlled        Review of Systems   Review of Systems   Constitutional: Negative for chills and fever  HENT: Negative for facial swelling and sinus pressure  Eyes: Negative for visual disturbance  Respiratory: Negative for shortness of breath and wheezing  Cardiovascular: Negative for chest pain  Gastrointestinal: Negative for abdominal pain  Musculoskeletal: Negative for myalgias  Skin: Negative for rash  Neurological: Negative for weakness  Psychiatric/Behavioral: Negative for confusion and hallucinations           Current Medications       Current Outpatient Prescriptions:     ALPRAZolam (XANAX) 1 mg tablet, Take by mouth daily at bedtime as needed  , Disp: , Rfl:     atorvastatin (LIPITOR) 40 mg tablet, Take 40 mg by mouth daily in the early morning  , Disp: , Rfl:     Canagliflozin (INVOKANA) 300 MG TABS, Take 1 tablet (300 mg total) by mouth daily, Disp: 30 tablet, Rfl: 10    diphenhydrAMINE-acetaminophen (TYLENOL PM)  MG TABS, Take 1 tablet by mouth daily at bedtime as needed for sleep, Disp: , Rfl:     Exenatide ER (BYDUREON) 2 MG PEN, Inject once per week on Sundays  , Disp: 12 each, Rfl: 1    Lansoprazole (PREVACID 24HR PO), Take 1 tablet by mouth daily in the early morning  , Disp: , Rfl:     lisinopril-hydrochlorothiazide (PRINZIDE,ZESTORETIC) 20-12 5 MG per tablet, Take by mouth daily  , Disp: , Rfl:     metFORMIN (GLUCOPHAGE) 1000 MG tablet, Take 1 tablet (1,000 mg total) by mouth 2 (two) times a day with meals, Disp: 180 tablet, Rfl: 3    ascorbic acid (VITAMIN C) 500 mg tablet, Take 500 mg by mouth 2 (two) times a day, Disp: , Rfl:     aspirin (ECOTRIN) 325 mg EC tablet, Take 1 tablet by mouth 2 (two) times a day for 30 days, Disp: 60 tablet, Rfl: 0    baclofen 10 mg tablet, Take 1 tablet by mouth 3 (three) times a day as needed, Disp: , Rfl:     ferrous sulfate 325 (65 Fe) mg tablet, Take 325 mg by mouth 2 (two) times a day with meals, Disp: , Rfl:     folic acid (FOLVITE) 1 mg tablet, Take by mouth daily, Disp: , Rfl:     ibuprofen (MOTRIN) 400 mg tablet, Take 600 mg by mouth every 6 (six) hours as needed for mild pain Also takes 400mg in the evening, Disp: , Rfl:     oxyCODONE-acetaminophen (PERCOCET) 5-325 mg per tablet, 1-2 tabs PO q 4-6 hours prn pain (Patient not taking: Reported on 8/29/2018 ), Disp: 60 tablet, Rfl: 0    Current Allergies     Allergies as of 08/29/2018    (No Known Allergies)            The following portions of the patient's history were reviewed and updated as appropriate: allergies, current medications, past family history, past medical history, past social history, past surgical history and problem list      Past Medical History:   Diagnosis Date    Chews tobacco regularly     Consumes three beers daily     Controlled diabetes mellitus ([address] )     Esophageal reflux     Hyperlipidemia     Hypertension     Osteoarthritis     LEFT KNEE    Primary localized osteoarthritis of left knee     last assessed: 1/16/2018       Past Surgical History:   Procedure Laterality Date    COLONOSCOPY      [place] TOTAL KNEE ARTHROPLASTY Left 1/8/2018    Procedure: ARTHROPLASTY KNEE TOTAL;  Surgeon: Latoya Holland MD;  Location:  MAIN OR;  Service: Orthopedics    REPLACEMENT TOTAL KNEE Left        Family History   Problem Relation Age of Onset    Colon cancer Father     Mental illness Family         mental disorder    Substance Abuse Neg Hx          Medications have been verified  Objective   /84   Ht 5' 10" (1 778 m)   Wt 115 kg (254 lb)   BMI 36 45 kg/m²        Physical Exam     Physical Exam   Constitutional: He is oriented to person, place, and time  Vital signs are normal  He appears well-developed and well-nourished  No distress  HENT:   Right Ear: Ear canal normal  Tympanic membrane is not injected  Left Ear: Ear canal normal  Tympanic membrane is not injected  Nose: Nose normal    Mouth/Throat: Oropharynx is clear and moist  No oropharyngeal exudate  Eyes: Conjunctivae are normal  Pupils are equal, round, and reactive to light  Neck: Normal range of motion  Neck supple  No thyromegaly present  Cardiovascular: Normal rate, regular rhythm and normal heart sounds  No murmur heard  Pulmonary/Chest: Effort normal and breath sounds normal  No respiratory distress  He has no wheezes  Abdominal: Soft  Bowel sounds are normal  He exhibits no mass  There is no splenomegaly or hepatomegaly  There is no tenderness  Musculoskeletal: Normal range of motion  He exhibits no edema, tenderness or deformity  Neurological: He is alert and oriented to person, place, and time  He has normal strength  He is not disoriented  No sensory deficit  Gait normal    Skin: Skin is warm and dry  No rash noted  He is not diaphoretic  No pallor  Psychiatric: He has a normal mood and affect  His speech is normal and behavior is normal  Cognition and memory are normal            Patient's shoes and socks removed  Assign Risk Category:  ; ;        Risk: 0

## 2018-12-05 ENCOUNTER — TELEPHONE (OUTPATIENT)
Dept: FAMILY MEDICINE CLINIC | Facility: CLINIC | Age: 58
End: 2018-12-05

## 2018-12-05 NOTE — TELEPHONE ENCOUNTER
[address]  [facility]--[id number]  Dx--Z47 1  [id number]  khpe  appt 12/6/18    Referral #: [id number] Effective: 12/05/2018 Expires: 03/04/2019

## 2018-12-06 ENCOUNTER — OFFICE VISIT (OUTPATIENT)
Dept: OBGYN CLINIC | Facility: CLINIC | Age: 58
End: 2018-12-06
Payer: COMMERCIAL

## 2018-12-06 ENCOUNTER — APPOINTMENT (OUTPATIENT)
Dept: RADIOLOGY | Facility: CLINIC | Age: 58
End: 2018-12-06
Payer: COMMERCIAL

## 2018-12-06 VITALS
SYSTOLIC BLOOD PRESSURE: 138 MMHG | HEIGHT: 70 IN | BODY MASS INDEX: 37.22 KG/M2 | WEIGHT: 260 LBS | HEART RATE: 98 BPM | DIASTOLIC BLOOD PRESSURE: 85 MMHG

## 2018-12-06 DIAGNOSIS — Z47.1 AFTERCARE FOLLOWING LEFT KNEE JOINT REPLACEMENT SURGERY: ICD-10-CM

## 2018-12-06 DIAGNOSIS — Z96.652 AFTERCARE FOLLOWING LEFT KNEE JOINT REPLACEMENT SURGERY: Primary | ICD-10-CM

## 2018-12-06 DIAGNOSIS — Z96.652 AFTERCARE FOLLOWING LEFT KNEE JOINT REPLACEMENT SURGERY: ICD-10-CM

## 2018-12-06 DIAGNOSIS — Z47.1 AFTERCARE FOLLOWING LEFT KNEE JOINT REPLACEMENT SURGERY: Primary | ICD-10-CM

## 2018-12-06 PROCEDURE — 99213 OFFICE O/P EST LOW 20 MIN: CPT | Performed by: ORTHOPAEDIC SURGERY

## 2018-12-06 PROCEDURE — 73562 X-RAY EXAM OF KNEE 3: CPT

## 2018-12-06 NOTE — ASSESSMENT & PLAN NOTE
60-year-old male with continued pain along the lateral soft tissue of his left knee replacement  Has a very tight band of tissue  We went over deep tissue massage  He will work on this aggressively for the next two months  I will see him back  At that point if he has failed to have any significant improvement I would consider a knee arthroscopy with possible lateral release as appropriate

## 2018-12-06 NOTE — PROGRESS NOTES
Assessment:     1  Aftercare following left knee joint replacement surgery          Plan:     Problem List Items Addressed This Visit        Other    Aftercare following left knee joint replacement surgery - Primary     60-year-old male with continued pain along the lateral soft tissue of his left knee replacement  Has a very tight band of tissue  We went over deep tissue massage  He will work on this aggressively for the next two months  I will see him back  At that point if he has failed to have any significant improvement I would consider a knee arthroscopy with possible lateral release as appropriate  Relevant Orders    XR knee 3 vw left non injury           Patient ID: Angeline Guaman is a 62 y o  male  Chief Complaint:  Follow-up left knee    Subjective:  60-year-old male nearly one year status post left total knee arthroplasty  He comes in today complaining of pain in the lateral aspect of the knee  He has had trouble with his ITB band ever since surgery  He continues to complain of pain laterally  States that when he does the ITB band stretches it can flare things up  He uses the foam roller  He has continued to be as aggressive as possible with his stretching  If he is up on his feet for any length of time, especially doing a lot of walking on uneven ground he has severe pain  He is very frustrated by it  He continues to be very active both at work as well as on his own  He is wanting to know what else can be done for his he is very frustrated  His primary care provider gave him an injection around his ITB band distally a month and a half ago  This is not give him any significant relief  Allergy:  No Known Allergies    Medications:  all current active meds have been reviewed    ROS:  Review of Systems   Musculoskeletal: Positive for arthralgias, joint swelling and myalgias  All other systems reviewed and are negative        Objective:  BP Readings from Last 1 Encounters: 12/06/18 138/85      Wt Readings from Last 1 Encounters:   12/06/18 118 kg (260 lb)        Exam:   Physical Exam   Musculoskeletal:        Left knee: He exhibits no effusion  Left Knee Exam     Range of Motion   The patient has normal left knee ROM  Muscle Strength     The patient has normal left knee strength  Tests   Drawer:       Posterior - positive  Varus: negative  Valgus: negative    Other   Erythema: absent  Sensation: normal  Pulse: present  Swelling: none  Effusion: no effusion present    Comments:  Well-healed incision, good patellar tracking, good quadriceps strength, the weaker laterally compared to medially  Equal to the other side  Patient has a tight, painful band of tissue between the patella and the ITB band which seems to be the primary source of his pain  This is approximately 3-4 mm wide, runs parallel to anterior edge of the ITB band, approximately 3 cm in length              Radiographs:  I have personally reviewed pertinent films in PACS and my interpretation is Total knee arthroplasty in good alignment  No evidence of failure  No patellar tilt  Angie Rod

## 2019-01-24 DIAGNOSIS — Z79.4 TYPE 2 DIABETES MELLITUS WITHOUT COMPLICATION, WITH LONG-TERM CURRENT USE OF INSULIN (HCC): Primary | ICD-10-CM

## 2019-01-24 DIAGNOSIS — E11.9 TYPE 2 DIABETES MELLITUS WITHOUT COMPLICATION, WITH LONG-TERM CURRENT USE OF INSULIN (HCC): Primary | ICD-10-CM

## 2019-01-24 DIAGNOSIS — E78.5 HYPERLIPIDEMIA, UNSPECIFIED HYPERLIPIDEMIA TYPE: Primary | ICD-10-CM

## 2019-01-24 DIAGNOSIS — E78.01 FAMILIAL HYPERCHOLESTEROLEMIA: ICD-10-CM

## 2019-01-26 RX ORDER — ATORVASTATIN CALCIUM 40 MG/1
40 TABLET, FILM COATED ORAL
Qty: 90 TABLET | Refills: 0 | Status: SHIPPED | OUTPATIENT
Start: 2019-01-26 | End: 2019-06-18 | Stop reason: SDUPTHER

## 2019-02-07 ENCOUNTER — OFFICE VISIT (OUTPATIENT)
Dept: OBGYN CLINIC | Facility: CLINIC | Age: 59
End: 2019-02-07
Payer: COMMERCIAL

## 2019-02-07 VITALS
WEIGHT: 264 LBS | DIASTOLIC BLOOD PRESSURE: 80 MMHG | HEART RATE: 76 BPM | SYSTOLIC BLOOD PRESSURE: 160 MMHG | BODY MASS INDEX: 37.8 KG/M2 | HEIGHT: 70 IN

## 2019-02-07 DIAGNOSIS — Z47.1 AFTERCARE FOLLOWING LEFT KNEE JOINT REPLACEMENT SURGERY: ICD-10-CM

## 2019-02-07 DIAGNOSIS — Z96.652 AFTERCARE FOLLOWING LEFT KNEE JOINT REPLACEMENT SURGERY: ICD-10-CM

## 2019-02-07 DIAGNOSIS — M76.32 IT BAND SYNDROME, LEFT: Primary | ICD-10-CM

## 2019-02-07 PROCEDURE — 99213 OFFICE O/P EST LOW 20 MIN: CPT | Performed by: ORTHOPAEDIC SURGERY

## 2019-02-07 PROCEDURE — 20610 DRAIN/INJ JOINT/BURSA W/O US: CPT | Performed by: ORTHOPAEDIC SURGERY

## 2019-02-07 RX ORDER — LIDOCAINE HYDROCHLORIDE 5 MG/ML
1 INJECTION, SOLUTION INFILTRATION; PERINEURAL
Status: COMPLETED | OUTPATIENT
Start: 2019-02-07 | End: 2019-02-07

## 2019-02-07 RX ORDER — BETAMETHASONE SODIUM PHOSPHATE AND BETAMETHASONE ACETATE 3; 3 MG/ML; MG/ML
6 INJECTION, SUSPENSION INTRA-ARTICULAR; INTRALESIONAL; INTRAMUSCULAR; SOFT TISSUE
Status: COMPLETED | OUTPATIENT
Start: 2019-02-07 | End: 2019-02-07

## 2019-02-07 RX ADMIN — LIDOCAINE HYDROCHLORIDE 1 ML: 5 INJECTION, SOLUTION INFILTRATION; PERINEURAL at 17:00

## 2019-02-07 RX ADMIN — BETAMETHASONE SODIUM PHOSPHATE AND BETAMETHASONE ACETATE 6 MG: 3; 3 INJECTION, SUSPENSION INTRA-ARTICULAR; INTRALESIONAL; INTRAMUSCULAR; SOFT TISSUE at 17:00

## 2019-02-07 NOTE — PROGRESS NOTES
Assessment:     1  It band syndrome, left    2  Aftercare following left knee joint replacement surgery          Plan:     Problem List Items Addressed This Visit        Musculoskeletal and Integument    It band syndrome, left - Primary     59-year-old male with continued pain around his distal ITB band  We went over ITB band stretches and deep tissue massage again  I do feel that a cortisone injection would be appropriate for him today  Please refer to the procedure note  Follow-up in three months  No x-rays will be needed  Other    Aftercare following left knee joint replacement surgery           Patient ID: Lupe Galvez is a 62 y o  male  Chief Complaint:  Follow-up left knee    Subjective:  59-year-old male nearly one year status post left total knee arthroplasty  He comes in today to follow up on pain in the lateral aspect of the knee  He has had trouble with his ITB band ever since surgery  He continues to complain of pain laterally  States that when he does the ITB band stretches it can flare things up  He uses the foam roller  He's doing doing some deep tissue massage which helps, but he's not consistant in it  Feels better overall  Allergy:  No Known Allergies    Medications:  all current active meds have been reviewed    ROS:  Review of Systems   Musculoskeletal: Positive for arthralgias  All other systems reviewed and are negative  Objective:  BP Readings from Last 1 Encounters:   02/07/19 160/80      Wt Readings from Last 1 Encounters:   02/07/19 120 kg (264 lb)        Exam:   Physical Exam   Musculoskeletal:        Left knee: He exhibits no effusion  Left Knee Exam     Range of Motion   The patient has normal left knee ROM  Muscle Strength     The patient has normal left knee strength      Tests   Drawer:       Posterior - positive  Varus: negative  Valgus: negative    Other   Erythema: absent  Sensation: normal  Pulse: present  Swelling: none  Effusion: no effusion present    Comments:  Well-healed incision, good patellar tracking, good quadriceps strength, the weaker laterally compared to medially  Equal to the other side  Patient has a tight, painful band of tissue at the anterior ITB band which seems to be the primary source of his pain    Less prominent than previous           Large joint arthrocentesis  Date/Time: 2/7/2019 5:00 PM  Consent given by: patient  Timeout: Immediately prior to procedure a time out was called to verify the correct patient, procedure, equipment, support staff and site/side marked as required   Supporting Documentation  Indications: pain   Procedure Details  Location: knee - L knee (IT bursa distally)  Preparation: Patient was prepped and draped in the usual sterile fashion  Needle size: 22 G  Ultrasound guidance: no  Approach: superior  Medications administered: 6 mg betamethasone acetate-betamethasone sodium phosphate 6 (3-3) mg/mL; 1 mL lidocaine 0 5 %    Patient tolerance: patient tolerated the procedure well with no immediate complications  Dressing:  Sterile dressing applied

## 2019-02-07 NOTE — ASSESSMENT & PLAN NOTE
59-year-old male with continued pain around his distal ITB band  We went over ITB band stretches and deep tissue massage again  I do feel that a cortisone injection would be appropriate for him today  Please refer to the procedure note  Follow-up in three months  No x-rays will be needed

## 2019-04-24 DIAGNOSIS — E11.9 TYPE 2 DIABETES MELLITUS WITHOUT COMPLICATION, WITHOUT LONG-TERM CURRENT USE OF INSULIN (HCC): ICD-10-CM

## 2019-05-04 ENCOUNTER — TELEPHONE (OUTPATIENT)
Dept: FAMILY MEDICINE CLINIC | Facility: CLINIC | Age: 59
End: 2019-05-04

## 2019-06-17 LAB
ALBUMIN SERPL-MCNC: 4.4 G/DL (ref 3.5–5.5)
ALBUMIN/CREAT UR: 20.1 MG/G CREAT (ref 0–30)
ALBUMIN/GLOB SERPL: 1.8 {RATIO} (ref 1.2–2.2)
ALP SERPL-CCNC: 75 IU/L (ref 39–117)
ALT SERPL-CCNC: 37 IU/L (ref 0–44)
AST SERPL-CCNC: 28 IU/L (ref 0–40)
BILIRUB SERPL-MCNC: <0.2 MG/DL (ref 0–1.2)
BUN SERPL-MCNC: 15 MG/DL (ref 6–24)
BUN/CREAT SERPL: 15 (ref 9–20)
CALCIUM SERPL-MCNC: 9.1 MG/DL (ref 8.7–10.2)
CHLORIDE SERPL-SCNC: 104 MMOL/L (ref 96–106)
CHOLEST SERPL-MCNC: 243 MG/DL (ref 100–199)
CO2 SERPL-SCNC: 19 MMOL/L (ref 20–29)
CREAT SERPL-MCNC: 1.01 MG/DL (ref 0.76–1.27)
CREAT UR-MCNC: 157.5 MG/DL
GLOBULIN SER-MCNC: 2.5 G/DL (ref 1.5–4.5)
GLUCOSE SERPL-MCNC: 145 MG/DL (ref 65–99)
HBA1C MFR BLD: 7.6 % (ref 4.8–5.6)
HDLC SERPL-MCNC: 27 MG/DL
LDLC SERPL CALC-MCNC: ABNORMAL MG/DL (ref 0–99)
LDLC SERPL DIRECT ASSAY-MCNC: 68 MG/DL (ref 0–99)
LDLC/HDLC SERPL: ABNORMAL RATIO
MICROALBUMIN UR-MCNC: 31.6 UG/ML
POTASSIUM SERPL-SCNC: 4.1 MMOL/L (ref 3.5–5.2)
PROT SERPL-MCNC: 6.9 G/DL (ref 6–8.5)
SL AMB EGFR AFRICAN AMERICAN: 94 ML/MIN/1.73
SL AMB EGFR NON AFRICAN AMERICAN: 81 ML/MIN/1.73
SL AMB VLDL CHOLESTEROL CALC: ABNORMAL MG/DL (ref 5–40)
SODIUM SERPL-SCNC: 138 MMOL/L (ref 134–144)
TRIGL SERPL-MCNC: 791 MG/DL (ref 0–149)

## 2019-06-18 ENCOUNTER — TELEPHONE (OUTPATIENT)
Dept: FAMILY MEDICINE CLINIC | Facility: CLINIC | Age: 59
End: 2019-06-18

## 2019-06-18 DIAGNOSIS — E11.9 TYPE 2 DIABETES MELLITUS WITHOUT COMPLICATION, WITHOUT LONG-TERM CURRENT USE OF INSULIN (HCC): ICD-10-CM

## 2019-06-18 DIAGNOSIS — I10 ESSENTIAL HYPERTENSION: Primary | ICD-10-CM

## 2019-06-18 DIAGNOSIS — E78.5 HYPERLIPIDEMIA, UNSPECIFIED HYPERLIPIDEMIA TYPE: ICD-10-CM

## 2019-06-18 RX ORDER — LISINOPRIL AND HYDROCHLOROTHIAZIDE 20; 12.5 MG/1; MG/1
1 TABLET ORAL DAILY
Qty: 30 TABLET | Refills: 0 | Status: SHIPPED | OUTPATIENT
Start: 2019-06-18 | End: 2019-07-18 | Stop reason: SDUPTHER

## 2019-06-18 RX ORDER — ATORVASTATIN CALCIUM 40 MG/1
40 TABLET, FILM COATED ORAL
Qty: 30 TABLET | Refills: 0 | Status: SHIPPED | OUTPATIENT
Start: 2019-06-18 | End: 2019-07-18 | Stop reason: SDUPTHER

## 2019-07-18 DIAGNOSIS — E11.9 TYPE 2 DIABETES MELLITUS WITHOUT COMPLICATION, WITHOUT LONG-TERM CURRENT USE OF INSULIN (HCC): ICD-10-CM

## 2019-07-18 DIAGNOSIS — E78.5 HYPERLIPIDEMIA, UNSPECIFIED HYPERLIPIDEMIA TYPE: ICD-10-CM

## 2019-07-18 DIAGNOSIS — I10 ESSENTIAL HYPERTENSION: ICD-10-CM

## 2019-07-18 RX ORDER — ATORVASTATIN CALCIUM 40 MG/1
40 TABLET, FILM COATED ORAL
Qty: 30 TABLET | Refills: 0 | Status: SHIPPED | OUTPATIENT
Start: 2019-07-18 | End: 2019-08-05 | Stop reason: SDUPTHER

## 2019-07-18 RX ORDER — LISINOPRIL AND HYDROCHLOROTHIAZIDE 20; 12.5 MG/1; MG/1
TABLET ORAL
Qty: 30 TABLET | Refills: 0 | Status: SHIPPED | OUTPATIENT
Start: 2019-07-18 | End: 2019-08-05 | Stop reason: SDUPTHER

## 2019-07-31 ENCOUNTER — OFFICE VISIT (OUTPATIENT)
Dept: FAMILY MEDICINE CLINIC | Facility: CLINIC | Age: 59
End: 2019-07-31
Payer: COMMERCIAL

## 2019-07-31 VITALS
WEIGHT: 256 LBS | BODY MASS INDEX: 36.65 KG/M2 | SYSTOLIC BLOOD PRESSURE: 124 MMHG | DIASTOLIC BLOOD PRESSURE: 84 MMHG | OXYGEN SATURATION: 97 % | HEIGHT: 70 IN | HEART RATE: 111 BPM | TEMPERATURE: 97.6 F

## 2019-07-31 DIAGNOSIS — Z00.00 WELLNESS EXAMINATION: Primary | ICD-10-CM

## 2019-07-31 DIAGNOSIS — Z79.4 TYPE 2 DIABETES MELLITUS WITHOUT COMPLICATION, WITH LONG-TERM CURRENT USE OF INSULIN (HCC): ICD-10-CM

## 2019-07-31 DIAGNOSIS — E78.01 FAMILIAL HYPERCHOLESTEROLEMIA: ICD-10-CM

## 2019-07-31 DIAGNOSIS — E11.9 TYPE 2 DIABETES MELLITUS WITHOUT COMPLICATION, WITH LONG-TERM CURRENT USE OF INSULIN (HCC): ICD-10-CM

## 2019-07-31 DIAGNOSIS — I10 ESSENTIAL HYPERTENSION: ICD-10-CM

## 2019-07-31 DIAGNOSIS — K21.00 GASTROESOPHAGEAL REFLUX DISEASE WITH ESOPHAGITIS: ICD-10-CM

## 2019-07-31 PROCEDURE — 99214 OFFICE O/P EST MOD 30 MIN: CPT | Performed by: FAMILY MEDICINE

## 2019-07-31 PROCEDURE — 3074F SYST BP LT 130 MM HG: CPT | Performed by: FAMILY MEDICINE

## 2019-07-31 PROCEDURE — 1036F TOBACCO NON-USER: CPT | Performed by: FAMILY MEDICINE

## 2019-07-31 PROCEDURE — 3008F BODY MASS INDEX DOCD: CPT | Performed by: FAMILY MEDICINE

## 2019-07-31 PROCEDURE — 99396 PREV VISIT EST AGE 40-64: CPT | Performed by: FAMILY MEDICINE

## 2019-07-31 NOTE — PROGRESS NOTES
[facility]        NAME: lAeja Gaytan is a 61 y o  male  : 1960    MRN: [id number]  DATE: 2019  TIME: 6:04 PM    Assessment and Plan   Wellness examination [U35 12]  1  Wellness examination     2  Essential hypertension     3  Type 2 diabetes mellitus without complication, with long-term current use of insulin ([address] )     4  Gastroesophageal reflux disease with esophagitis     5  Familial hypercholesterolemia           Patient Instructions     Patient Instructions   Pt setting up acct w/ [employer]--will send in Rxes ---need to add [address]          Chief Complaint     Chief Complaint   Patient presents with    Annual Exam         History of Present Illness       F/u assessed med cond---clrvf=[id number]      Review of Systems   Review of Systems   Constitutional: Negative for fatigue, fever and unexpected weight change  HENT: Negative for congestion, sinus pain and sore throat  Eyes: Negative for visual disturbance  Respiratory: Negative for shortness of breath and wheezing  Cardiovascular: Negative for chest pain and palpitations  Gastrointestinal: Negative for abdominal pain, nausea and vomiting  Musculoskeletal: Negative  Negative for arthralgias and myalgias  Neurological: Negative for syncope, weakness and numbness  Psychiatric/Behavioral: Negative  Negative for confusion, dysphoric mood and suicidal ideas           Current Medications       Current Outpatient Medications:     ALPRAZolam (XANAX) 1 mg tablet, Take by mouth daily at bedtime as needed  , Disp: , Rfl:     ascorbic acid (VITAMIN C) 500 mg tablet, Take 500 mg by mouth 2 (two) times a day, Disp: , Rfl:     aspirin (ECOTRIN) 325 mg EC tablet, Take 1 tablet by mouth 2 (two) times a day for 30 days, Disp: 60 tablet, Rfl: 0    atorvastatin (LIPITOR) 40 mg tablet, TAKE 1 TABLET (40 MG TOTAL) BY MOUTH DAILY IN THE EARLY MORNING, Disp: 30 tablet, Rfl: 0    baclofen 10 mg tablet, Take 1 tablet by mouth 3 (three) times a day as needed, Disp: , Rfl:     Canagliflozin (INVOKANA) 300 MG TABS, Take 1 tablet (300 mg total) by mouth daily, Disp: 30 tablet, Rfl: 0    diphenhydrAMINE-acetaminophen (TYLENOL PM)  MG TABS, Take 1 tablet by mouth daily at bedtime as needed for sleep, Disp: , Rfl:     Exenatide ER (BYDUREON) 2 MG PEN, INJECT ONCE PER WEEK ON SUNDAYS , Disp: 12 each, Rfl: 1    ferrous sulfate 325 (65 Fe) mg tablet, Take 325 mg by mouth 2 (two) times a day with meals, Disp: , Rfl:     folic acid (FOLVITE) 1 mg tablet, Take by mouth daily, Disp: , Rfl:     ibuprofen (MOTRIN) 400 mg tablet, Take 600 mg by mouth every 6 (six) hours as needed for mild pain Also takes 400mg in the evening, Disp: , Rfl:     Lansoprazole (PREVACID 24HR PO), Take 1 tablet by mouth daily in the early morning  , Disp: , Rfl:     lisinopril-hydrochlorothiazide (PRINZIDE,ZESTORETIC) 20-12 5 MG per tablet, TAKE 1 TABLET BY MOUTH EVERY DAY, Disp: 30 tablet, Rfl: 0    metFORMIN (GLUCOPHAGE) 1000 MG tablet, Take 1 tablet (1,000 mg total) by mouth 2 (two) times a day with meals, Disp: 180 tablet, Rfl: 3    oxyCODONE-acetaminophen (PERCOCET) 5-325 mg per tablet, 1-2 tabs PO q 4-6 hours prn pain, Disp: 60 tablet, Rfl: 0    Current Allergies     Allergies as of 07/31/2019    (No Known Allergies)            The following portions of the patient's history were reviewed and updated as appropriate: allergies, current medications, past family history, past medical history, past social history, past surgical history and problem list      Past Medical History:   Diagnosis Date    Chews tobacco regularly     Consumes three beers daily     Controlled diabetes mellitus ([address] )     Esophageal reflux     Hyperlipidemia     Hypertension     Osteoarthritis     LEFT KNEE    Primary localized osteoarthritis of left knee     last assessed: 1/16/2018       Past Surgical History:   Procedure Laterality Date    COLONOSCOPY      UT TOTAL KNEE ARTHROPLASTY Left 1/8/2018    Procedure: ARTHROPLASTY KNEE TOTAL;  Surgeon: Kareem Monroy MD;  Location:  MAIN OR;  Service: Orthopedics    REPLACEMENT TOTAL KNEE Left        Family History   Problem Relation Age of Onset    Colon cancer Father     Mental illness Family         mental disorder    Substance Abuse Neg Hx          Medications have been verified  Objective   /84   Pulse (!) 111   Temp 97 6 °F (36 4 °C)   Ht 5' 10" (1 778 m)   Wt 116 kg (256 lb)   SpO2 97%   BMI 36 73 kg/m²        Physical Exam     Physical Exam   Constitutional: He is oriented to person, place, and time  Vital signs are normal  He appears well-developed and well-nourished  HENT:   Right Ear: Ear canal normal  Tympanic membrane is not injected  Left Ear: Ear canal normal  Tympanic membrane is not injected  Nose: Nose normal    Mouth/Throat: Oropharynx is clear and moist    Eyes: Pupils are equal, round, and reactive to light  Conjunctivae and EOM are normal  Right eye exhibits no discharge  Left eye exhibits no discharge  Neck: Normal range of motion  Neck supple  No thyromegaly present  Cardiovascular: Normal rate, regular rhythm and normal heart sounds  No murmur heard  Pulmonary/Chest: Effort normal and breath sounds normal  No respiratory distress  He has no wheezes  Abdominal: Soft  Bowel sounds are normal  He exhibits no distension  There is no tenderness  Musculoskeletal: Normal range of motion  Lymphadenopathy:     He has no cervical adenopathy  Neurological: He is alert and oriented to person, place, and time  He has normal strength and normal reflexes  He is not disoriented  No sensory deficit  Gait normal    Skin: Skin is warm and dry  Psychiatric: He has a normal mood and affect   His speech is normal and behavior is normal  Judgment and thought content normal  Cognition and memory are normal

## 2019-07-31 NOTE — PROGRESS NOTES
HPI:  bAe Rodriguez is a 61 y o  male here for his yearly health maintenance exam    Patient Active Problem List   Diagnosis    Hyperlipidemia    Hypertension    Diabetes mellitus ([facility] [address] )    Esophageal reflux    Aftercare following left knee joint replacement surgery    It band syndrome, left     Past Medical History:   Diagnosis Date    Chews tobacco regularly     Consumes three beers daily     Controlled diabetes mellitus ([facility] [address] )     Esophageal reflux     Hyperlipidemia     Hypertension     Osteoarthritis     LEFT KNEE    Primary localized osteoarthritis of left knee     last assessed: 1/16/2018       1  Advanced Directive: n     2  Durable Power of  for Healthcare: n     3  Social History:           Drug and alcohol History: n                  4  Immunizations up to date: y                 Lifestyle:                           Healthy Diet:y                          Alcohol Use:y                          Tobacco Use:n                          Regular exercise:y                          Weight concerns:y                               5   Over the past 2 weeks, how often have you been bothered by the following:              Little interest or pleasure in doing things:n              Felling down, depressed or hopeless:n       Current Outpatient Medications   Medication Sig Dispense Refill    ALPRAZolam (XANAX) 1 mg tablet Take by mouth daily at bedtime as needed        ascorbic acid (VITAMIN C) 500 mg tablet Take 500 mg by mouth 2 (two) times a day      aspirin (ECOTRIN) 325 mg EC tablet Take 1 tablet by mouth 2 (two) times a day for 30 days 60 tablet 0    atorvastatin (LIPITOR) 40 mg tablet TAKE 1 TABLET (40 MG TOTAL) BY MOUTH DAILY IN THE EARLY MORNING 30 tablet 0    baclofen 10 mg tablet Take 1 tablet by mouth 3 (three) times a day as needed      Canagliflozin (INVOKANA) 300 MG TABS Take 1 tablet (300 mg total) by mouth daily 30 tablet 0    diphenhydrAMINE-acetaminophen (TYLENOL PM)  MG TABS Take 1 tablet by mouth daily at bedtime as needed for sleep      Exenatide ER (BYDUREON) 2 MG PEN INJECT ONCE PER WEEK ON SUNDAYS  12 each 1    ferrous sulfate 325 (65 Fe) mg tablet Take 325 mg by mouth 2 (two) times a day with meals      folic acid (FOLVITE) 1 mg tablet Take by mouth daily      ibuprofen (MOTRIN) 400 mg tablet Take 600 mg by mouth every 6 (six) hours as needed for mild pain Also takes 400mg in the evening      Lansoprazole (PREVACID 24HR PO) Take 1 tablet by mouth daily in the early morning        lisinopril-hydrochlorothiazide (PRINZIDE,ZESTORETIC) 20-12 5 MG per tablet TAKE 1 TABLET BY MOUTH EVERY DAY 30 tablet 0    metFORMIN (GLUCOPHAGE) 1000 MG tablet Take 1 tablet (1,000 mg total) by mouth 2 (two) times a day with meals 180 tablet 3    oxyCODONE-acetaminophen (PERCOCET) 5-325 mg per tablet 1-2 tabs PO q 4-6 hours prn pain 60 tablet 0     No current facility-administered medications for this visit  No Known Allergies  Immunization History   Administered Date(s) Administered     Influenza (IM) Preservative Free 09/30/2016    Influenza Quadrivalent Preservative Free 3 years and older IM 01/16/2018       Patient Care Team:  Cathy Dougherty MD as PCP - General  MD Jaspreet Judge MD    Review of Systems   Constitutional: Negative for fatigue, fever and unexpected weight change  HENT: Negative for congestion, sinus pain and sore throat  Eyes: Negative for visual disturbance  Respiratory: Negative for shortness of breath and wheezing  Cardiovascular: Negative for chest pain and palpitations  Gastrointestinal: Negative for abdominal pain, nausea and vomiting  Musculoskeletal: Negative  Negative for arthralgias and myalgias  Neurological: Negative for syncope, weakness and numbness  Psychiatric/Behavioral: Negative  Negative for confusion, dysphoric mood and suicidal ideas         Physical Exam :  Physical Exam   Constitutional: He appears well-developed and well-nourished  No distress  HENT:   Right Ear: Tympanic membrane, external ear and ear canal normal  Tympanic membrane is not injected  Left Ear: Tympanic membrane, external ear and ear canal normal  Tympanic membrane is not injected  Nose: Nose normal    Mouth/Throat: Uvula is midline, oropharynx is clear and moist and mucous membranes are normal    Eyes: Pupils are equal, round, and reactive to light  Conjunctivae are normal    Neck: Normal range of motion  Neck supple  No thyromegaly present  Cardiovascular: Normal rate, regular rhythm and normal heart sounds  No murmur heard  Pulmonary/Chest: Effort normal and breath sounds normal  No respiratory distress  He has no wheezes  Lymphadenopathy:     He has no cervical adenopathy  Skin: He is not diaphoretic  Assessment and Plan:  1  Wellness examination     2  Essential hypertension     3  Type 2 diabetes mellitus without complication, with long-term current use of insulin ([facility] [address] )     4  Gastroesophageal reflux disease with esophagitis     5   Familial hypercholesterolemia         Health Maintenance Due   Topic Date Due    Hepatitis C Screening  1960    Pneumococcal Vaccine: Pediatrics (0 to 5 Years) and At-Risk Patients (6 to 59 Years) (1 of 3 - PCV13) 03/21/1966    DM Eye Exam  03/21/1970    BMI: Followup Plan  03/21/1978    HEPATITIS B VACCINES (1 of 3 - Risk 3-dose series) 03/21/1979    DTaP,Tdap,and Td Vaccines (1 - Tdap) 03/21/1981    INFLUENZA VACCINE  07/01/2019

## 2019-08-05 DIAGNOSIS — E78.5 HYPERLIPIDEMIA, UNSPECIFIED HYPERLIPIDEMIA TYPE: ICD-10-CM

## 2019-08-05 DIAGNOSIS — Z79.4 TYPE 2 DIABETES MELLITUS WITHOUT COMPLICATION, WITH LONG-TERM CURRENT USE OF INSULIN (HCC): ICD-10-CM

## 2019-08-05 DIAGNOSIS — I10 ESSENTIAL HYPERTENSION: ICD-10-CM

## 2019-08-05 DIAGNOSIS — E11.9 TYPE 2 DIABETES MELLITUS WITHOUT COMPLICATION, WITH LONG-TERM CURRENT USE OF INSULIN (HCC): ICD-10-CM

## 2019-08-05 DIAGNOSIS — E11.9 TYPE 2 DIABETES MELLITUS WITHOUT COMPLICATION, WITHOUT LONG-TERM CURRENT USE OF INSULIN (HCC): ICD-10-CM

## 2019-08-05 RX ORDER — LISINOPRIL AND HYDROCHLOROTHIAZIDE 20; 12.5 MG/1; MG/1
1 TABLET ORAL DAILY
Qty: 30 TABLET | Refills: 0 | Status: SHIPPED | OUTPATIENT
Start: 2019-08-05 | End: 2019-11-29 | Stop reason: SDUPTHER

## 2019-08-05 RX ORDER — FENOFIBRATE 145 MG/1
145 TABLET, COATED ORAL DAILY
Qty: 90 TABLET | Refills: 3 | Status: SHIPPED | OUTPATIENT
Start: 2019-08-05 | End: 2020-08-11

## 2019-08-05 RX ORDER — ATORVASTATIN CALCIUM 40 MG/1
40 TABLET, FILM COATED ORAL
Qty: 90 TABLET | Refills: 3 | Status: SHIPPED | OUTPATIENT
Start: 2019-08-05 | End: 2020-08-11

## 2019-11-29 DIAGNOSIS — I10 ESSENTIAL HYPERTENSION: ICD-10-CM

## 2019-11-29 RX ORDER — LISINOPRIL AND HYDROCHLOROTHIAZIDE 20; 12.5 MG/1; MG/1
1 TABLET ORAL DAILY
Qty: 90 TABLET | Refills: 2 | Status: SHIPPED | OUTPATIENT
Start: 2019-11-29 | End: 2020-08-11

## 2019-11-29 NOTE — TELEPHONE ENCOUNTER
"  Preventive Care at the 2 Year Visit  Growth Measurements & Percentiles  Head Circumference: 32 %ile based on [facility] 0-36 Months head circumference-for-age data using vitals from 11/14/2018. 18.9\" (48 cm) (32 %, Source: CDC 0-36 Months)                         Weight: 25 lbs 6.4 oz / 11.5 kg (actual weight)  19 %ile based on CDC 2-20 Years weight-for-age data using vitals from 11/14/2018.                         Length: 2' 9\" / 83.8 cm  22 %ile based on CDC 2-20 Years stature-for-age data using vitals from 11/14/2018.         Weight for length: 37 %ile based on [facility] 2-20 Years weight-for-recumbent length data using vitals from 11/14/2018.     Your child s next Preventive Check-up will be at 30 months of age    Development  At this age, your child may:    climb and go down steps alone, one step at a time, holding the railing or holding someone s hand    open doors and climb on furniture    use a cup and spoon well    kick a ball    throw a ball overhand    take off clothing    stack five or six blocks    have a vocabulary of at least 20 to 50 words, make two-word phrases and call himself by name    respond to two-part verbal commands    show interest in toilet training    enjoy imitating adults    show interest in helping get dressed, and washing and drying his hands    use toys well    Feeding Tips    Let your child feed himself.  It will be messy, but this is another step toward independence.    Give your child healthy snacks like fruits and vegetables.    Do not to let your child eat non-food things such as dirt, rocks or paper.  Call the clinic if your child will not stop this behavior.    Do not let your child run around while eating.  This will prevent choking.    Sleep    You may move your child from a crib to a regular bed, however, do not rush this until your child is ready.  This is important if your child climbs out of the crib.    Your child may or may not take naps.  If your toddler does not nap, you may " PC--refill  Lisinopril   [facility]     HM due 7/2020 want to start a  quiet time.     He or she may  fight  sleep as a way of controlling his or her surroundings. Continue your regular nighttime routine: bath, brushing teeth and reading. This will help your child take charge of the nighttime process.    Let your child talk about nightmares.  Provide comfort and reassurance.    If your toddler has night terrors, he may cry, look terrified, be confused and look glassy-eyed.  This typically occurs during the first half of the night and can last up to 15 minutes.  Your toddler should fall asleep after the episode.  It s common if your toddler doesn t remember what happened in the morning.  Night terrors are not a problem.  Try to not let your toddler get too tired before bed.      Safety    Use an approved toddler car seat every time your child rides in the car.      Any child, 2 years or older, who has outgrown the rear-facing weight or height limit for their car seat, should use a forward-facing car seat with a harness.    Every child needs to be in the back seat through age 12.    Adults should model car safety by always using seatbelts.    Keep all medicines, cleaning supplies and poisons out of your child s reach.  Call the poison control center or your health care provider for directions in case your child swallows poison.    Put the poison control number on all phones:  1-218.512.5064.    Use sunscreen with a SPF > 15 every 2 hours.    Do not let your child play with plastic bags or latex balloons.    Always watch your child when playing outside near a street.    Always watch your child near water.  Never leave your child alone in the bathtub or near water.    Give your child safe toys.  Do not let him or her play with toys that have small or sharp parts.    Do not leave your child alone in the car, even if he or she is asleep.    What Your Toddler Needs    Make sure your child is getting consistent discipline at home and at day care.  Talk with your   provider if this isn t the case.    If you choose to use  time-out,  calmly but firmly tell your child why they are in time-out.  Time-out should be immediate.  The time-out spot should be non-threatening (for example - sit on a step).  You can use a timer that beeps at one minute, or ask your child to  come back when you are ready to say sorry.   Treat your child normally when the time-out is over.    Praise your child for positive behavior.    Limit screen time (TV, computer, video games) to no more than 1 hour per day of high quality programming watched with a caregiver.    Dental Care    Brush your child s teeth two times each day with a soft-bristled toothbrush.    Use a small amount (the size of a grain of rice) of fluoride toothpaste two times daily.    Bring your child to a dentist regularly.     Discuss the need for fluoride supplements if you have well water.

## 2019-12-04 ENCOUNTER — TELEPHONE (OUTPATIENT)
Dept: GASTROENTEROLOGY | Facility: CLINIC | Age: 59
End: 2019-12-04

## 2020-01-20 NOTE — TELEPHONE ENCOUNTER
Dr Elizabeth Espinal has been contacted to schedule recall colon with no response-please advise   Thank you

## 2020-02-20 DIAGNOSIS — E11.9 TYPE 2 DIABETES MELLITUS WITHOUT COMPLICATION, WITHOUT LONG-TERM CURRENT USE OF INSULIN (HCC): ICD-10-CM

## 2020-03-13 DIAGNOSIS — Z12.12 ENCOUNTER FOR SCREENING FOR MALIGNANT NEOPLASM OF RECTUM: Primary | ICD-10-CM

## 2020-03-13 DIAGNOSIS — Z12.11 ENCOUNTER FOR SCREENING FOR MALIGNANT NEOPLASM OF COLON: ICD-10-CM

## 2020-04-28 ENCOUNTER — TELEPHONE (OUTPATIENT)
Dept: FAMILY MEDICINE CLINIC | Facility: CLINIC | Age: 60
End: 2020-04-28

## 2020-08-08 DIAGNOSIS — Z11.59 ENCOUNTER FOR HEPATITIS C SCREENING TEST FOR LOW RISK PATIENT: Primary | ICD-10-CM

## 2020-08-08 DIAGNOSIS — I10 ESSENTIAL HYPERTENSION: ICD-10-CM

## 2020-08-08 DIAGNOSIS — E11.9 TYPE 2 DIABETES MELLITUS WITHOUT COMPLICATION, WITHOUT LONG-TERM CURRENT USE OF INSULIN (HCC): ICD-10-CM

## 2020-08-08 DIAGNOSIS — Z79.4 TYPE 2 DIABETES MELLITUS WITHOUT COMPLICATION, WITH LONG-TERM CURRENT USE OF INSULIN (HCC): ICD-10-CM

## 2020-08-08 DIAGNOSIS — E78.5 HYPERLIPIDEMIA, UNSPECIFIED HYPERLIPIDEMIA TYPE: ICD-10-CM

## 2020-08-08 DIAGNOSIS — E78.01 FAMILIAL HYPERCHOLESTEROLEMIA: ICD-10-CM

## 2020-08-08 DIAGNOSIS — E11.9 TYPE 2 DIABETES MELLITUS WITHOUT COMPLICATION, WITH LONG-TERM CURRENT USE OF INSULIN (HCC): ICD-10-CM

## 2020-08-08 NOTE — LETTER
August 11, 2020    [id number] [id number]      Dear   Сергей Lyons:    Please call to schedule an appointment today  Attached to this letter there is a copy of the blood work that you need to have done prior your office visit  If any questions please call the office           Sincerely,  Jacquelyn Wiggins MD

## 2020-08-11 RX ORDER — CANAGLIFLOZIN 300 MG/1
TABLET, FILM COATED ORAL
Qty: 90 TABLET | Refills: 0 | Status: SHIPPED | OUTPATIENT
Start: 2020-08-11 | End: 2020-10-31 | Stop reason: SDUPTHER

## 2020-08-11 RX ORDER — FENOFIBRATE 145 MG/1
TABLET, COATED ORAL
Qty: 90 TABLET | Refills: 0 | Status: SHIPPED | OUTPATIENT
Start: 2020-08-11 | End: 2020-10-31 | Stop reason: SDUPTHER

## 2020-08-11 RX ORDER — LISINOPRIL AND HYDROCHLOROTHIAZIDE 20; 12.5 MG/1; MG/1
1 TABLET ORAL DAILY
Qty: 90 TABLET | Refills: 0 | Status: SHIPPED | OUTPATIENT
Start: 2020-08-11 | End: 2020-10-31 | Stop reason: SDUPTHER

## 2020-08-11 RX ORDER — ATORVASTATIN CALCIUM 40 MG/1
TABLET, FILM COATED ORAL
Qty: 90 TABLET | Refills: 0 | Status: SHIPPED | OUTPATIENT
Start: 2020-08-11 | End: 2020-10-31 | Stop reason: SDUPTHER

## 2020-08-11 NOTE — TELEPHONE ENCOUNTER
Approve    Only for 9 days, letter mailed to patient today , along with blood work order/instructions, patient has not been seeing for over a year

## 2020-10-11 DIAGNOSIS — E11.9 TYPE 2 DIABETES MELLITUS WITHOUT COMPLICATION, WITHOUT LONG-TERM CURRENT USE OF INSULIN (HCC): ICD-10-CM

## 2020-10-12 RX ORDER — EXENATIDE 2 MG/.65ML
INJECTION, SUSPENSION, EXTENDED RELEASE SUBCUTANEOUS
Qty: 12 EACH | Refills: 0 | OUTPATIENT
Start: 2020-10-12

## 2020-10-17 LAB
ALBUMIN SERPL-MCNC: 4.5 G/DL (ref 3.8–4.9)
ALBUMIN/CREAT UR: 4 MG/G CREAT (ref 0–29)
ALBUMIN/GLOB SERPL: 1.6 {RATIO} (ref 1.2–2.2)
ALP SERPL-CCNC: 75 IU/L (ref 39–117)
ALT SERPL-CCNC: 33 IU/L (ref 0–44)
AST SERPL-CCNC: 30 IU/L (ref 0–40)
BILIRUB SERPL-MCNC: 0.5 MG/DL (ref 0–1.2)
BUN SERPL-MCNC: 21 MG/DL (ref 8–27)
BUN/CREAT SERPL: 15 (ref 10–24)
CALCIUM SERPL-MCNC: 10.9 MG/DL (ref 8.6–10.2)
CHLORIDE SERPL-SCNC: 103 MMOL/L (ref 96–106)
CHOLEST SERPL-MCNC: 209 MG/DL (ref 100–199)
CO2 SERPL-SCNC: 22 MMOL/L (ref 20–29)
CREAT SERPL-MCNC: 1.42 MG/DL (ref 0.76–1.27)
CREAT UR-MCNC: 93.4 MG/DL
GLOBULIN SER-MCNC: 2.8 G/DL (ref 1.5–4.5)
GLUCOSE SERPL-MCNC: 198 MG/DL (ref 65–99)
HBA1C MFR BLD: 8.3 % (ref 4.8–5.6)
HCV AB S/CO SERPL IA: <0.1 S/CO RATIO (ref 0–0.9)
HDLC SERPL-MCNC: 32 MG/DL
LDLC SERPL CALC-MCNC: 96 MG/DL (ref 0–99)
LDLC/HDLC SERPL: 3 RATIO (ref 0–3.6)
MICROALBUMIN UR-MCNC: 3.6 UG/ML
POTASSIUM SERPL-SCNC: 4.5 MMOL/L (ref 3.5–5.2)
PROT SERPL-MCNC: 7.3 G/DL (ref 6–8.5)
SL AMB EGFR AFRICAN AMERICAN: 62 ML/MIN/1.73
SL AMB EGFR NON AFRICAN AMERICAN: 53 ML/MIN/1.73
SL AMB VLDL CHOLESTEROL CALC: 81 MG/DL (ref 5–40)
SODIUM SERPL-SCNC: 139 MMOL/L (ref 134–144)
TRIGL SERPL-MCNC: 483 MG/DL (ref 0–149)

## 2020-10-19 ENCOUNTER — TELEPHONE (OUTPATIENT)
Dept: FAMILY MEDICINE CLINIC | Facility: CLINIC | Age: 60
End: 2020-10-19

## 2020-10-31 ENCOUNTER — OFFICE VISIT (OUTPATIENT)
Dept: FAMILY MEDICINE CLINIC | Facility: CLINIC | Age: 60
End: 2020-10-31
Payer: COMMERCIAL

## 2020-10-31 VITALS
BODY MASS INDEX: 36.76 KG/M2 | OXYGEN SATURATION: 98 % | HEIGHT: 70 IN | WEIGHT: 256.8 LBS | TEMPERATURE: 98.4 F | HEART RATE: 88 BPM

## 2020-10-31 DIAGNOSIS — E78.5 HYPERLIPIDEMIA, UNSPECIFIED HYPERLIPIDEMIA TYPE: ICD-10-CM

## 2020-10-31 DIAGNOSIS — Z79.4 TYPE 2 DIABETES MELLITUS WITHOUT COMPLICATION, WITH LONG-TERM CURRENT USE OF INSULIN (HCC): ICD-10-CM

## 2020-10-31 DIAGNOSIS — E11.9 TYPE 2 DIABETES MELLITUS WITHOUT COMPLICATION, WITHOUT LONG-TERM CURRENT USE OF INSULIN (HCC): ICD-10-CM

## 2020-10-31 DIAGNOSIS — E78.01 FAMILIAL HYPERCHOLESTEROLEMIA: Primary | ICD-10-CM

## 2020-10-31 DIAGNOSIS — K21.00 GASTROESOPHAGEAL REFLUX DISEASE WITH ESOPHAGITIS WITHOUT HEMORRHAGE: ICD-10-CM

## 2020-10-31 DIAGNOSIS — I10 ESSENTIAL HYPERTENSION: ICD-10-CM

## 2020-10-31 DIAGNOSIS — Z00.00 WELLNESS EXAMINATION: ICD-10-CM

## 2020-10-31 DIAGNOSIS — E11.9 TYPE 2 DIABETES MELLITUS WITHOUT COMPLICATION, WITH LONG-TERM CURRENT USE OF INSULIN (HCC): ICD-10-CM

## 2020-10-31 PROCEDURE — 3008F BODY MASS INDEX DOCD: CPT | Performed by: FAMILY MEDICINE

## 2020-10-31 PROCEDURE — 99396 PREV VISIT EST AGE 40-64: CPT | Performed by: FAMILY MEDICINE

## 2020-10-31 PROCEDURE — 99214 OFFICE O/P EST MOD 30 MIN: CPT | Performed by: FAMILY MEDICINE

## 2020-10-31 PROCEDURE — 3725F SCREEN DEPRESSION PERFORMED: CPT | Performed by: FAMILY MEDICINE

## 2020-10-31 RX ORDER — EXENATIDE 2 MG/.65ML
INJECTION, SUSPENSION, EXTENDED RELEASE SUBCUTANEOUS
Qty: 12 EACH | Refills: 1 | Status: SHIPPED | OUTPATIENT
Start: 2020-10-31 | End: 2021-08-03

## 2020-10-31 RX ORDER — LISINOPRIL AND HYDROCHLOROTHIAZIDE 20; 12.5 MG/1; MG/1
1 TABLET ORAL DAILY
Qty: 90 TABLET | Refills: 1 | Status: SHIPPED | OUTPATIENT
Start: 2020-10-31 | End: 2021-06-26

## 2020-10-31 RX ORDER — FENOFIBRATE 145 MG/1
145 TABLET, COATED ORAL DAILY
Qty: 90 TABLET | Refills: 1 | Status: SHIPPED | OUTPATIENT
Start: 2020-10-31 | End: 2021-08-18 | Stop reason: SDUPTHER

## 2020-10-31 RX ORDER — PANTOPRAZOLE SODIUM 40 MG/1
40 TABLET, DELAYED RELEASE ORAL
Qty: 90 TABLET | Refills: 3 | Status: SHIPPED | OUTPATIENT
Start: 2020-10-31 | End: 2021-08-18 | Stop reason: SDUPTHER

## 2020-10-31 RX ORDER — ATORVASTATIN CALCIUM 40 MG/1
40 TABLET, FILM COATED ORAL
Qty: 90 TABLET | Refills: 1 | Status: SHIPPED | OUTPATIENT
Start: 2020-10-31 | End: 2021-08-18 | Stop reason: SDUPTHER

## 2020-10-31 RX ORDER — CANAGLIFLOZIN 300 MG/1
300 TABLET, FILM COATED ORAL DAILY
Qty: 90 TABLET | Refills: 1 | Status: SHIPPED | OUTPATIENT
Start: 2020-10-31 | End: 2020-11-18 | Stop reason: SDUPTHER

## 2020-11-02 ENCOUNTER — TELEPHONE (OUTPATIENT)
Dept: ADMINISTRATIVE | Facility: OTHER | Age: 60
End: 2020-11-02

## 2020-11-18 ENCOUNTER — TELEPHONE (OUTPATIENT)
Dept: FAMILY MEDICINE CLINIC | Facility: CLINIC | Age: 60
End: 2020-11-18

## 2020-11-18 DIAGNOSIS — E11.9 TYPE 2 DIABETES MELLITUS WITHOUT COMPLICATION, WITHOUT LONG-TERM CURRENT USE OF INSULIN (HCC): ICD-10-CM

## 2020-11-19 RX ORDER — CANAGLIFLOZIN 300 MG/1
300 TABLET, FILM COATED ORAL DAILY
Qty: 90 TABLET | Refills: 2 | Status: SHIPPED | OUTPATIENT
Start: 2020-11-19 | End: 2021-08-18

## 2021-02-13 DIAGNOSIS — Z79.4 TYPE 2 DIABETES MELLITUS WITHOUT COMPLICATION, WITH LONG-TERM CURRENT USE OF INSULIN (HCC): ICD-10-CM

## 2021-02-13 DIAGNOSIS — E11.9 TYPE 2 DIABETES MELLITUS WITHOUT COMPLICATION, WITH LONG-TERM CURRENT USE OF INSULIN (HCC): ICD-10-CM

## 2021-03-10 DIAGNOSIS — Z23 ENCOUNTER FOR IMMUNIZATION: ICD-10-CM

## 2021-03-16 ENCOUNTER — IMMUNIZATIONS (OUTPATIENT)
Dept: FAMILY MEDICINE CLINIC | Facility: HOSPITAL | Age: 61
End: 2021-03-16

## 2021-03-16 DIAGNOSIS — Z23 ENCOUNTER FOR IMMUNIZATION: Primary | ICD-10-CM

## 2021-03-16 PROCEDURE — 0001A SARS-COV-2 / COVID-19 MRNA VACCINE (PFIZER-BIONTECH) 30 MCG: CPT

## 2021-03-16 PROCEDURE — 91300 SARS-COV-2 / COVID-19 MRNA VACCINE (PFIZER-BIONTECH) 30 MCG: CPT

## 2021-04-09 ENCOUNTER — IMMUNIZATIONS (OUTPATIENT)
Dept: FAMILY MEDICINE CLINIC | Facility: HOSPITAL | Age: 61
End: 2021-04-09

## 2021-04-09 DIAGNOSIS — Z23 ENCOUNTER FOR IMMUNIZATION: Primary | ICD-10-CM

## 2021-04-09 PROCEDURE — 91300 SARS-COV-2 / COVID-19 MRNA VACCINE (PFIZER-BIONTECH) 30 MCG: CPT

## 2021-04-09 PROCEDURE — 0002A SARS-COV-2 / COVID-19 MRNA VACCINE (PFIZER-BIONTECH) 30 MCG: CPT

## 2021-04-12 DIAGNOSIS — F41.9 ANXIETY: Primary | ICD-10-CM

## 2021-04-12 RX ORDER — ALPRAZOLAM 1 MG/1
1 TABLET ORAL
Qty: 30 TABLET | Refills: 0 | Status: SHIPPED | OUTPATIENT
Start: 2021-04-12

## 2021-05-20 ENCOUNTER — VBI (OUTPATIENT)
Dept: ADMINISTRATIVE | Facility: OTHER | Age: 61
End: 2021-05-20

## 2021-06-25 DIAGNOSIS — I10 ESSENTIAL HYPERTENSION: ICD-10-CM

## 2021-06-26 RX ORDER — LISINOPRIL AND HYDROCHLOROTHIAZIDE 20; 12.5 MG/1; MG/1
1 TABLET ORAL DAILY
Qty: 90 TABLET | Refills: 3 | Status: SHIPPED | OUTPATIENT
Start: 2021-06-26 | End: 2021-08-18 | Stop reason: SDUPTHER

## 2021-08-02 DIAGNOSIS — E11.9 TYPE 2 DIABETES MELLITUS WITHOUT COMPLICATION, WITHOUT LONG-TERM CURRENT USE OF INSULIN (HCC): ICD-10-CM

## 2021-08-03 RX ORDER — EXENATIDE 2 MG/.65ML
INJECTION, SUSPENSION, EXTENDED RELEASE SUBCUTANEOUS
Qty: 12 EACH | Refills: 3 | Status: SHIPPED | OUTPATIENT
Start: 2021-08-03 | End: 2021-08-09 | Stop reason: SDUPTHER

## 2021-08-09 DIAGNOSIS — E11.9 TYPE 2 DIABETES MELLITUS WITHOUT COMPLICATION, WITHOUT LONG-TERM CURRENT USE OF INSULIN (HCC): ICD-10-CM

## 2021-08-10 RX ORDER — EXENATIDE 2 MG/.65ML
INJECTION, SUSPENSION, EXTENDED RELEASE SUBCUTANEOUS
Qty: 12 EACH | Refills: 3 | Status: SHIPPED | OUTPATIENT
Start: 2021-08-10

## 2021-08-16 ENCOUNTER — TELEPHONE (OUTPATIENT)
Dept: FAMILY MEDICINE CLINIC | Facility: CLINIC | Age: 61
End: 2021-08-16

## 2021-08-16 NOTE — TELEPHONE ENCOUNTER
Pt called stating pharmacy is not making this medication anymore (Exenatide ER (Bydureon) 2 MG PEN )  Pt called requesting another prescription    to be send to [facility]

## 2021-08-18 ENCOUNTER — OFFICE VISIT (OUTPATIENT)
Dept: FAMILY MEDICINE CLINIC | Facility: CLINIC | Age: 61
End: 2021-08-18
Payer: COMMERCIAL

## 2021-08-18 VITALS
BODY MASS INDEX: 34.58 KG/M2 | DIASTOLIC BLOOD PRESSURE: 100 MMHG | TEMPERATURE: 98 F | SYSTOLIC BLOOD PRESSURE: 140 MMHG | HEIGHT: 71 IN | WEIGHT: 247 LBS

## 2021-08-18 DIAGNOSIS — Z79.4 TYPE 2 DIABETES MELLITUS WITHOUT COMPLICATION, WITH LONG-TERM CURRENT USE OF INSULIN (HCC): Primary | ICD-10-CM

## 2021-08-18 DIAGNOSIS — E78.01 FAMILIAL HYPERCHOLESTEROLEMIA: ICD-10-CM

## 2021-08-18 DIAGNOSIS — E78.5 HYPERLIPIDEMIA, UNSPECIFIED HYPERLIPIDEMIA TYPE: ICD-10-CM

## 2021-08-18 DIAGNOSIS — E11.9 TYPE 2 DIABETES MELLITUS WITHOUT COMPLICATION, WITH LONG-TERM CURRENT USE OF INSULIN (HCC): Primary | ICD-10-CM

## 2021-08-18 DIAGNOSIS — N40.1 BENIGN PROSTATIC HYPERPLASIA WITH INCOMPLETE BLADDER EMPTYING: ICD-10-CM

## 2021-08-18 DIAGNOSIS — R39.14 BENIGN PROSTATIC HYPERPLASIA WITH INCOMPLETE BLADDER EMPTYING: ICD-10-CM

## 2021-08-18 DIAGNOSIS — I10 ESSENTIAL HYPERTENSION: ICD-10-CM

## 2021-08-18 DIAGNOSIS — M75.51 BURSITIS OF RIGHT SHOULDER: ICD-10-CM

## 2021-08-18 DIAGNOSIS — K21.00 GASTROESOPHAGEAL REFLUX DISEASE WITH ESOPHAGITIS WITHOUT HEMORRHAGE: ICD-10-CM

## 2021-08-18 PROCEDURE — 20610 DRAIN/INJ JOINT/BURSA W/O US: CPT | Performed by: FAMILY MEDICINE

## 2021-08-18 PROCEDURE — 3077F SYST BP >= 140 MM HG: CPT | Performed by: FAMILY MEDICINE

## 2021-08-18 PROCEDURE — 99214 OFFICE O/P EST MOD 30 MIN: CPT | Performed by: FAMILY MEDICINE

## 2021-08-18 PROCEDURE — 3725F SCREEN DEPRESSION PERFORMED: CPT | Performed by: FAMILY MEDICINE

## 2021-08-18 PROCEDURE — 3080F DIAST BP >= 90 MM HG: CPT | Performed by: FAMILY MEDICINE

## 2021-08-18 PROCEDURE — 1036F TOBACCO NON-USER: CPT | Performed by: FAMILY MEDICINE

## 2021-08-18 PROCEDURE — 3008F BODY MASS INDEX DOCD: CPT | Performed by: FAMILY MEDICINE

## 2021-08-18 RX ORDER — EXENATIDE 2 MG/.85ML
2 INJECTION, SUSPENSION, EXTENDED RELEASE SUBCUTANEOUS WEEKLY
Qty: 10.2 ML | Refills: 3 | Status: SHIPPED | OUTPATIENT
Start: 2021-08-18

## 2021-08-18 RX ORDER — FENOFIBRATE 145 MG/1
145 TABLET, COATED ORAL DAILY
Qty: 90 TABLET | Refills: 3 | Status: SHIPPED | OUTPATIENT
Start: 2021-08-18

## 2021-08-18 RX ORDER — PANTOPRAZOLE SODIUM 40 MG/1
40 TABLET, DELAYED RELEASE ORAL
Qty: 90 TABLET | Refills: 3 | Status: SHIPPED | OUTPATIENT
Start: 2021-08-18

## 2021-08-18 RX ORDER — ATORVASTATIN CALCIUM 40 MG/1
40 TABLET, FILM COATED ORAL
Qty: 90 TABLET | Refills: 3 | Status: SHIPPED | OUTPATIENT
Start: 2021-08-18 | End: 2022-07-12

## 2021-08-18 RX ORDER — LISINOPRIL AND HYDROCHLOROTHIAZIDE 20; 12.5 MG/1; MG/1
1 TABLET ORAL DAILY
Qty: 90 TABLET | Refills: 3 | Status: SHIPPED | OUTPATIENT
Start: 2021-08-18 | End: 2022-07-13

## 2021-08-18 RX ORDER — EMPAGLIFLOZIN 25 MG/1
25 TABLET, FILM COATED ORAL EVERY MORNING
Qty: 90 TABLET | Refills: 3 | Status: SHIPPED | OUTPATIENT
Start: 2021-08-18

## 2021-08-19 RX ORDER — DEXAMETHASONE SODIUM PHOSPHATE 100 MG/10ML
40 INJECTION INTRAMUSCULAR; INTRAVENOUS
Status: COMPLETED | OUTPATIENT
Start: 2021-08-19 | End: 2021-08-19

## 2021-08-19 RX ORDER — LIDOCAINE HYDROCHLORIDE 10 MG/ML
2 INJECTION, SOLUTION INFILTRATION; PERINEURAL
Status: COMPLETED | OUTPATIENT
Start: 2021-08-19 | End: 2021-08-19

## 2021-08-19 RX ADMIN — DEXAMETHASONE SODIUM PHOSPHATE 40 MG: 100 INJECTION INTRAMUSCULAR; INTRAVENOUS at 07:09

## 2021-08-19 RX ADMIN — LIDOCAINE HYDROCHLORIDE 2 ML: 10 INJECTION, SOLUTION INFILTRATION; PERINEURAL at 07:09

## 2021-08-19 NOTE — PROGRESS NOTES
Large joint arthrocentesis: R subacromial bursa  Universal Protocol:  Consent: Verbal consent obtained    Consent given by: patient  Patient understanding: patient states understanding of the procedure being performed    Supporting Documentation  Indications: pain   Procedure Details  Location: shoulder - R subacromial bursa  Needle size: 22 G  Approach: posterolateral  Medications administered: 40 mg dexamethasone 100 mg/10 mL; 2 mL lidocaine 1 %    Patient tolerance: patient tolerated the procedure well with no immediate complications  Dressing:  Sterile dressing applied

## 2021-08-19 NOTE — PROGRESS NOTES
[facility]        NAME: Chelsy Barger is a 64 y o  male  : 1960    MRN: [id number]  DATE: 2021  TIME: 7:07 AM    Assessment and Plan   Type 2 diabetes mellitus without complication, with long-term current use of insulin (HCC) [E11 9, Z79 4]  1  Type 2 diabetes mellitus without complication, with long-term current use of insulin (HCC)  Exenatide ER (Bydureon BCise) 2 MG/0 85ML AUIJ    Empagliflozin (Jardiance) 25 MG TABS    metFORMIN (GLUCOPHAGE) 1000 MG tablet    fenofibrate (TRICOR) 145 mg tablet    CBC and differential    Hemoglobin A1c (w/out EAG)    Lipid Panel with Direct LDL reflex    PSA Total (Reflex To Free)    Comprehensive metabolic panel    CBC and differential    Hemoglobin A1c (w/out EAG)    Lipid Panel with Direct LDL reflex    PSA Total (Reflex To Free)    Comprehensive metabolic panel   2  Essential hypertension  lisinopril-hydrochlorothiazide (PRINZIDE,ZESTORETIC) 20-12 5 MG per tablet   3  Familial hypercholesterolemia     4  Hyperlipidemia, unspecified hyperlipidemia type  fenofibrate (TRICOR) 145 mg tablet    atorvastatin (LIPITOR) 40 mg tablet   5  Gastroesophageal reflux disease with esophagitis without hemorrhage  pantoprazole (PROTONIX) 40 mg tablet   6  Benign prostatic hyperplasia with incomplete bladder emptying  PSA Total (Reflex To Free)    PSA Total (Reflex To Free)   7  Bursitis of right shoulder           Patient Instructions     Patient Instructions   Labs 3mos--was off some meds for sev wks          Chief Complaint     Chief Complaint   Patient presents with    Shoulder Pain    Medication Refill         History of Present Illness       F/u Aassessed med cond--c/o R shouldetr pain    Shoulder Pain   Pertinent negatives include no fever or numbness  Medication Refill  Pertinent negatives include no abdominal pain, arthralgias, chest pain, congestion, fatigue, fever, myalgias, nausea, numbness, sore throat, vomiting or weakness  Review of Systems   Review of Systems   Constitutional: Negative for fatigue, fever and unexpected weight change  HENT: Negative for congestion, sinus pain and sore throat  Eyes: Negative for visual disturbance  Respiratory: Negative for shortness of breath and wheezing  Cardiovascular: Negative for chest pain and palpitations  Gastrointestinal: Negative for abdominal pain, nausea and vomiting  Musculoskeletal: Negative  Negative for arthralgias and myalgias  Neurological: Negative for syncope, weakness and numbness  Psychiatric/Behavioral: Negative  Negative for confusion, dysphoric mood and suicidal ideas           Current Medications       Current Outpatient Medications:     ALPRAZolam (XANAX) 1 mg tablet, Take 1 tablet (1 mg total) by mouth daily at bedtime as needed for anxiety, Disp: 30 tablet, Rfl: 0    ascorbic acid (VITAMIN C) 500 mg tablet, Take 500 mg by mouth 2 (two) times a day, Disp: , Rfl:     aspirin (ECOTRIN) 325 mg EC tablet, Take 1 tablet by mouth 2 (two) times a day for 30 days, Disp: 60 tablet, Rfl: 0    atorvastatin (LIPITOR) 40 mg tablet, Take 1 tablet (40 mg total) by mouth daily in the early morning, Disp: 90 tablet, Rfl: 3    baclofen 10 mg tablet, Take 1 tablet by mouth 3 (three) times a day as needed, Disp: , Rfl:     diphenhydrAMINE-acetaminophen (TYLENOL PM)  MG TABS, Take 1 tablet by mouth daily at bedtime as needed for sleep, Disp: , Rfl:     Empagliflozin (Jardiance) 25 MG TABS, Take 1 tablet (25 mg total) by mouth every morning, Disp: 90 tablet, Rfl: 3    Exenatide ER (Bydureon BCise) 2 MG/0 85ML AUIJ, Inject 2 mg under the skin once a week, Disp: 10 2 mL, Rfl: 3    Exenatide ER (Bydureon) 2 MG PEN, INJECT THE CONTENTS OF 1  PEN SUBCUTANEOUSLY WEEKLY  ON SUNDAYS, Disp: 12 each, Rfl: 3    fenofibrate (TRICOR) 145 mg tablet, Take 1 tablet (145 mg total) by mouth daily, Disp: 90 tablet, Rfl: 3    ferrous sulfate 325 (65 Fe) mg tablet, Take 325 mg by mouth 2 (two) times a day with meals, Disp: , Rfl:     folic acid (FOLVITE) 1 mg tablet, Take by mouth daily, Disp: , Rfl:     ibuprofen (MOTRIN) 400 mg tablet, Take 600 mg by mouth every 6 (six) hours as needed for mild pain Also takes 400mg in the evening, Disp: , Rfl:     lisinopril-hydrochlorothiazide (PRINZIDE,ZESTORETIC) 20-12 5 MG per tablet, Take 1 tablet by mouth daily, Disp: 90 tablet, Rfl: 3    metFORMIN (GLUCOPHAGE) 1000 MG tablet, Take 1 tablet (1,000 mg total) by mouth 2 (two) times a day with meals, Disp: 180 tablet, Rfl: 3    oxyCODONE-acetaminophen (PERCOCET) 5-325 mg per tablet, 1-2 tabs PO q 4-6 hours prn pain, Disp: 60 tablet, Rfl: 0    pantoprazole (PROTONIX) 40 mg tablet, Take 1 tablet (40 mg total) by mouth daily before breakfast, Disp: 90 tablet, Rfl: 3    Current Allergies     Allergies as of 08/18/2021    (No Known Allergies)            The following portions of the patient's history were reviewed and updated as appropriate: allergies, current medications, past family history, past medical history, past social history, past surgical history and problem list      Past Medical History:   Diagnosis Date    Chews tobacco regularly     Consumes three beers daily     Controlled diabetes mellitus ([address] )     Esophageal reflux     Hyperlipidemia     Hypertension     Osteoarthritis     LEFT KNEE    Primary localized osteoarthritis of left knee     last assessed: 1/16/2018       Past Surgical History:   Procedure Laterality Date    COLONOSCOPY      [place] TOTAL KNEE ARTHROPLASTY Left 1/8/2018    Procedure: ARTHROPLASTY KNEE TOTAL;  Surgeon: Luh Reyes MD;  Location:  MAIN OR;  Service: Orthopedics    REPLACEMENT TOTAL KNEE Left        Family History   Problem Relation Age of Onset    Colon cancer Father     Mental illness Family         mental disorder    Substance Abuse Neg Hx          Medications have been verified          Objective   /100 (BP Location: Left arm, Patient Position: Sitting, Cuff Size: Large)   Temp 98 °F (36 7 °C) (Temporal)   Ht 5' 11" (1 803 m)   Wt 112 kg (247 lb)   BMI 34 45 kg/m²        Physical Exam     Physical Exam  Constitutional:       Appearance: He is well-developed  HENT:      Right Ear: Ear canal normal  Tympanic membrane is not injected  Left Ear: Ear canal normal  Tympanic membrane is not injected  Nose: Nose normal    Eyes:      General:         Right eye: No discharge  Left eye: No discharge  Conjunctiva/sclera: Conjunctivae normal       Pupils: Pupils are equal, round, and reactive to light  Neck:      Thyroid: No thyromegaly  Cardiovascular:      Rate and Rhythm: Normal rate and regular rhythm  Heart sounds: Normal heart sounds  No murmur heard  Pulmonary:      Effort: Pulmonary effort is normal  No respiratory distress  Breath sounds: Normal breath sounds  No wheezing  Abdominal:      General: Bowel sounds are normal  There is no distension  Palpations: Abdomen is soft  Tenderness: There is no abdominal tenderness  Musculoskeletal:         General: Normal range of motion  Cervical back: Normal range of motion and neck supple  Comments: R shoulder c/w impingement syndr   Lymphadenopathy:      Cervical: No cervical adenopathy  Skin:     General: Skin is warm and dry  Neurological:      Mental Status: He is alert and oriented to person, place, and time  He is not disoriented  Sensory: No sensory deficit  Gait: Gait normal       Deep Tendon Reflexes: Reflexes are normal and symmetric  Psychiatric:         Speech: Speech normal          Behavior: Behavior normal          Thought Content:  Thought content normal          Judgment: Judgment normal

## 2021-09-15 ENCOUNTER — OFFICE VISIT (OUTPATIENT)
Dept: FAMILY MEDICINE CLINIC | Facility: CLINIC | Age: 61
End: 2021-09-15
Payer: COMMERCIAL

## 2021-09-15 VITALS
SYSTOLIC BLOOD PRESSURE: 140 MMHG | WEIGHT: 246 LBS | HEART RATE: 92 BPM | BODY MASS INDEX: 34.44 KG/M2 | TEMPERATURE: 98.2 F | OXYGEN SATURATION: 96 % | HEIGHT: 71 IN | DIASTOLIC BLOOD PRESSURE: 90 MMHG

## 2021-09-15 DIAGNOSIS — E11.9 TYPE 2 DIABETES MELLITUS WITHOUT COMPLICATION, WITHOUT LONG-TERM CURRENT USE OF INSULIN (HCC): ICD-10-CM

## 2021-09-15 DIAGNOSIS — M75.90 BURSITIS AND TENDINITIS OF SHOULDER REGION: ICD-10-CM

## 2021-09-15 DIAGNOSIS — I10 ESSENTIAL HYPERTENSION: ICD-10-CM

## 2021-09-15 DIAGNOSIS — M75.50 BURSITIS AND TENDINITIS OF SHOULDER REGION: ICD-10-CM

## 2021-09-15 DIAGNOSIS — E78.2 MIXED HYPERLIPIDEMIA: Primary | ICD-10-CM

## 2021-09-15 PROCEDURE — 1036F TOBACCO NON-USER: CPT | Performed by: FAMILY MEDICINE

## 2021-09-15 PROCEDURE — 99214 OFFICE O/P EST MOD 30 MIN: CPT | Performed by: FAMILY MEDICINE

## 2021-09-15 PROCEDURE — 3008F BODY MASS INDEX DOCD: CPT | Performed by: FAMILY MEDICINE

## 2021-09-15 PROCEDURE — 3077F SYST BP >= 140 MM HG: CPT | Performed by: FAMILY MEDICINE

## 2021-09-15 PROCEDURE — 3080F DIAST BP >= 90 MM HG: CPT | Performed by: FAMILY MEDICINE

## 2021-09-15 NOTE — PROGRESS NOTES
[facility]        NAME: Hiral Cardozo is a 64 y o  male  : 1960    MRN: [id number]  DATE: September 15, 2021  TIME: 4:02 PM  Patient's shoes and socks removed  Right Foot/Ankle   Right Foot Inspection  Skin Exam: skin normal and skin intact  No dry skin, no warmth, no callus, no erythema, no maceration, no abnormal color, no pre-ulcer, no ulcer and no callus  Left Foot/Ankle  Left Foot Inspection  Skin Exam: skin normal and skin intact  No dry skin, no warmth, no erythema, no maceration, normal color, no pre-ulcer, no ulcer and no callus  Assign Risk Category  No deformity present  No loss of protective sensation  No weak pulses  Risk: 0    Assessment and Plan   Mixed hyperlipidemia [E78 2]  1  Mixed hyperlipidemia     2  Essential hypertension     3  Type 2 diabetes mellitus without complication, without long-term current use of insulin ([address] )     4  Bursitis and tendinitis of shoulder region           Patient Instructions     Patient Instructions   Same meds---R shoulder re-injected          Chief Complaint     Chief Complaint   Patient presents with    Injections     cortizone shot         History of Present Illness       Re-injected R shoulder bursa      Review of Systems   Review of Systems   Constitutional: Negative for fatigue, fever and unexpected weight change  HENT: Negative for congestion, sinus pain and sore throat  Eyes: Negative for visual disturbance  Respiratory: Negative for shortness of breath and wheezing  Cardiovascular: Negative for chest pain and palpitations  Gastrointestinal: Negative for abdominal pain, nausea and vomiting  Musculoskeletal: Negative  Negative for arthralgias and myalgias  Neurological: Negative for syncope, weakness and numbness  Psychiatric/Behavioral: Negative  Negative for confusion, dysphoric mood and suicidal ideas           Current Medications       Current Outpatient Medications:     ALPRAZolam Zion Mclaughlin) 1 mg tablet, Take 1 tablet (1 mg total) by mouth daily at bedtime as needed for anxiety, Disp: 30 tablet, Rfl: 0    atorvastatin (LIPITOR) 40 mg tablet, Take 1 tablet (40 mg total) by mouth daily in the early morning, Disp: 90 tablet, Rfl: 3    diphenhydrAMINE-acetaminophen (TYLENOL PM)  MG TABS, Take 1 tablet by mouth daily at bedtime as needed for sleep, Disp: , Rfl:     Empagliflozin (Jardiance) 25 MG TABS, Take 1 tablet (25 mg total) by mouth every morning, Disp: 90 tablet, Rfl: 3    Exenatide ER (Bydureon BCise) 2 MG/0 85ML AUIJ, Inject 2 mg under the skin once a week, Disp: 10 2 mL, Rfl: 3    Exenatide ER (Bydureon) 2 MG PEN, INJECT THE CONTENTS OF 1  PEN SUBCUTANEOUSLY WEEKLY  ON SUNDAYS, Disp: 12 each, Rfl: 3    fenofibrate (TRICOR) 145 mg tablet, Take 1 tablet (145 mg total) by mouth daily, Disp: 90 tablet, Rfl: 3    ibuprofen (MOTRIN) 400 mg tablet, Take 600 mg by mouth every 6 (six) hours as needed for mild pain Also takes 400mg in the evening, Disp: , Rfl:     lisinopril-hydrochlorothiazide (PRINZIDE,ZESTORETIC) 20-12 5 MG per tablet, Take 1 tablet by mouth daily, Disp: 90 tablet, Rfl: 3    metFORMIN (GLUCOPHAGE) 1000 MG tablet, Take 1 tablet (1,000 mg total) by mouth 2 (two) times a day with meals, Disp: 180 tablet, Rfl: 3    pantoprazole (PROTONIX) 40 mg tablet, Take 1 tablet (40 mg total) by mouth daily before breakfast, Disp: 90 tablet, Rfl: 3    ascorbic acid (VITAMIN C) 500 mg tablet, Take 500 mg by mouth 2 (two) times a day (Patient not taking: Reported on 9/15/2021), Disp: , Rfl:     aspirin (ECOTRIN) 325 mg EC tablet, Take 1 tablet by mouth 2 (two) times a day for 30 days, Disp: 60 tablet, Rfl: 0    baclofen 10 mg tablet, Take 1 tablet by mouth 3 (three) times a day as needed (Patient not taking: Reported on 9/15/2021), Disp: , Rfl:     ferrous sulfate 325 (65 Fe) mg tablet, Take 325 mg by mouth 2 (two) times a day with meals (Patient not taking: Reported on 9/15/2021), Disp: , Rfl:     folic acid (FOLVITE) 1 mg tablet, Take by mouth daily (Patient not taking: Reported on 9/15/2021), Disp: , Rfl:     oxyCODONE-acetaminophen (PERCOCET) 5-325 mg per tablet, 1-2 tabs PO q 4-6 hours prn pain (Patient not taking: Reported on 9/15/2021), Disp: 60 tablet, Rfl: 0    Current Allergies     Allergies as of 09/15/2021    (No Known Allergies)            The following portions of the patient's history were reviewed and updated as appropriate: allergies, current medications, past family history, past medical history, past social history, past surgical history and problem list      Past Medical History:   Diagnosis Date    Chews tobacco regularly     Consumes three beers daily     Controlled diabetes mellitus ([address] )     Esophageal reflux     Hyperlipidemia     Hypertension     Osteoarthritis     LEFT KNEE    Primary localized osteoarthritis of left knee     last assessed: 1/16/2018       Past Surgical History:   Procedure Laterality Date    COLONOSCOPY      [place] TOTAL KNEE ARTHROPLASTY Left 1/8/2018    Procedure: ARTHROPLASTY KNEE TOTAL;  Surgeon: Halina Garcia MD;  Location: [facility] OR;  Service: Orthopedics    REPLACEMENT TOTAL KNEE Left        Family History   Problem Relation Age of Onset    Colon cancer Father     Mental illness Family         mental disorder    Substance Abuse Neg Hx          Medications have been verified  Objective   /90 (BP Location: Left arm, Patient Position: Sitting, Cuff Size: Adult)   Pulse 92   Temp 98 2 °F (36 8 °C) (Tympanic)   Ht 5' 11" (1 803 m)   Wt 112 kg (246 lb)   SpO2 96%   BMI 34 31 kg/m²        Physical Exam     Physical Exam  Constitutional:       Appearance: He is well-developed  HENT:      Right Ear: Ear canal normal  Tympanic membrane is not injected  Left Ear: Ear canal normal  Tympanic membrane is not injected  Nose: Nose normal    Eyes:      General:         Right eye: No discharge           Left eye: No discharge  Conjunctiva/sclera: Conjunctivae normal       Pupils: Pupils are equal, round, and reactive to light  Neck:      Thyroid: No thyromegaly  Cardiovascular:      Rate and Rhythm: Normal rate and regular rhythm  Pulses: no weak pulses     Heart sounds: Normal heart sounds  No murmur heard  Pulmonary:      Effort: Pulmonary effort is normal  No respiratory distress  Breath sounds: Normal breath sounds  No wheezing  Abdominal:      General: Bowel sounds are normal  There is no distension  Palpations: Abdomen is soft  Tenderness: There is no abdominal tenderness  Musculoskeletal:         General: Normal range of motion  Cervical back: Normal range of motion and neck supple  Feet:      Right foot:      Skin integrity: No ulcer, skin breakdown, erythema, warmth, callus or dry skin  Left foot:      Skin integrity: No ulcer, skin breakdown, erythema, warmth, callus or dry skin  Lymphadenopathy:      Cervical: No cervical adenopathy  Skin:     General: Skin is warm and dry  Neurological:      Mental Status: He is alert and oriented to person, place, and time  He is not disoriented  Sensory: No sensory deficit  Gait: Gait normal       Deep Tendon Reflexes: Reflexes are normal and symmetric  Psychiatric:         Speech: Speech normal          Behavior: Behavior normal          Thought Content:  Thought content normal          Judgment: Judgment normal

## 2021-11-20 LAB
ALBUMIN SERPL-MCNC: 4.7 G/DL (ref 3.8–4.8)
ALBUMIN/GLOB SERPL: 1.9 {RATIO} (ref 1.2–2.2)
ALP SERPL-CCNC: 61 IU/L (ref 44–121)
ALT SERPL-CCNC: 33 IU/L (ref 0–44)
AST SERPL-CCNC: 25 IU/L (ref 0–40)
BASOPHILS # BLD AUTO: 0.1 X10E3/UL (ref 0–0.2)
BASOPHILS NFR BLD AUTO: 1 %
BILIRUB SERPL-MCNC: 0.4 MG/DL (ref 0–1.2)
BUN SERPL-MCNC: 21 MG/DL (ref 8–27)
BUN/CREAT SERPL: 16 (ref 10–24)
CALCIUM SERPL-MCNC: 10.1 MG/DL (ref 8.6–10.2)
CHLORIDE SERPL-SCNC: 98 MMOL/L (ref 96–106)
CHOLEST SERPL-MCNC: 225 MG/DL (ref 100–199)
CO2 SERPL-SCNC: 23 MMOL/L (ref 20–29)
CREAT SERPL-MCNC: 1.29 MG/DL (ref 0.76–1.27)
EOSINOPHIL # BLD AUTO: 0.3 X10E3/UL (ref 0–0.4)
EOSINOPHIL NFR BLD AUTO: 4 %
ERYTHROCYTE [DISTWIDTH] IN BLOOD BY AUTOMATED COUNT: 12.6 % (ref 11.6–15.4)
GLOBULIN SER-MCNC: 2.5 G/DL (ref 1.5–4.5)
GLUCOSE SERPL-MCNC: 139 MG/DL (ref 65–99)
HBA1C MFR BLD: 7.8 % (ref 4.8–5.6)
HCT VFR BLD AUTO: 48.9 % (ref 37.5–51)
HDLC SERPL-MCNC: 33 MG/DL
HGB BLD-MCNC: 16.9 G/DL (ref 13–17.7)
IMM GRANULOCYTES # BLD: 0.1 X10E3/UL (ref 0–0.1)
IMM GRANULOCYTES NFR BLD: 1 %
LDLC SERPL CALC-MCNC: 130 MG/DL (ref 0–99)
LDLC/HDLC SERPL: 3.9 RATIO (ref 0–3.6)
LYMPHOCYTES # BLD AUTO: 1.9 X10E3/UL (ref 0.7–3.1)
LYMPHOCYTES NFR BLD AUTO: 27 %
MCH RBC QN AUTO: 31.2 PG (ref 26.6–33)
MCHC RBC AUTO-ENTMCNC: 34.6 G/DL (ref 31.5–35.7)
MCV RBC AUTO: 90 FL (ref 79–97)
MONOCYTES # BLD AUTO: 0.9 X10E3/UL (ref 0.1–0.9)
MONOCYTES NFR BLD AUTO: 13 %
NEUTROPHILS # BLD AUTO: 4 X10E3/UL (ref 1.4–7)
NEUTROPHILS NFR BLD AUTO: 54 %
PLATELET # BLD AUTO: 212 X10E3/UL (ref 150–450)
POTASSIUM SERPL-SCNC: 4.4 MMOL/L (ref 3.5–5.2)
PROT SERPL-MCNC: 7.2 G/DL (ref 6–8.5)
PSA SERPL-MCNC: 0.3 NG/ML (ref 0–4)
RBC # BLD AUTO: 5.42 X10E6/UL (ref 4.14–5.8)
SL AMB EGFR AFRICAN AMERICAN: 69 ML/MIN/1.73
SL AMB EGFR NON AFRICAN AMERICAN: 59 ML/MIN/1.73
SL AMB REFLEX CRITERIA: NORMAL
SL AMB VLDL CHOLESTEROL CALC: 62 MG/DL (ref 5–40)
SODIUM SERPL-SCNC: 140 MMOL/L (ref 134–144)
TRIGL SERPL-MCNC: 343 MG/DL (ref 0–149)
WBC # BLD AUTO: 7.3 X10E3/UL (ref 3.4–10.8)

## 2021-11-22 ENCOUNTER — TELEPHONE (OUTPATIENT)
Dept: FAMILY MEDICINE CLINIC | Facility: CLINIC | Age: 61
End: 2021-11-22

## 2021-11-22 NOTE — TELEPHONE ENCOUNTER
----- Message from Isela Best MD sent at 11/20/2021  9:33 AM EST -----  Labs better than usual--repeat 6mos

## 2022-06-27 ENCOUNTER — OFFICE VISIT (OUTPATIENT)
Dept: FAMILY MEDICINE CLINIC | Facility: CLINIC | Age: 62
End: 2022-06-27
Payer: COMMERCIAL

## 2022-06-27 VITALS
OXYGEN SATURATION: 98 % | WEIGHT: 246 LBS | HEART RATE: 83 BPM | HEIGHT: 71 IN | SYSTOLIC BLOOD PRESSURE: 134 MMHG | RESPIRATION RATE: 18 BRPM | BODY MASS INDEX: 34.44 KG/M2 | DIASTOLIC BLOOD PRESSURE: 86 MMHG

## 2022-06-27 DIAGNOSIS — Z00.00 WELLNESS EXAMINATION: ICD-10-CM

## 2022-06-27 DIAGNOSIS — I10 PRIMARY HYPERTENSION: ICD-10-CM

## 2022-06-27 DIAGNOSIS — E11.9 TYPE 2 DIABETES MELLITUS WITHOUT COMPLICATION, WITHOUT LONG-TERM CURRENT USE OF INSULIN (HCC): Primary | ICD-10-CM

## 2022-06-27 DIAGNOSIS — F11.20 CONTINUOUS OPIOID DEPENDENCE (HCC): ICD-10-CM

## 2022-06-27 DIAGNOSIS — R52 PAIN: ICD-10-CM

## 2022-06-27 LAB — SL AMB POCT HEMOGLOBIN AIC: 7.6 (ref ?–6.5)

## 2022-06-27 PROCEDURE — 99396 PREV VISIT EST AGE 40-64: CPT | Performed by: FAMILY MEDICINE

## 2022-06-27 PROCEDURE — 99214 OFFICE O/P EST MOD 30 MIN: CPT | Performed by: FAMILY MEDICINE

## 2022-06-27 PROCEDURE — 83036 HEMOGLOBIN GLYCOSYLATED A1C: CPT | Performed by: FAMILY MEDICINE

## 2022-06-27 RX ORDER — PREDNISONE 20 MG/1
TABLET ORAL
Qty: 15 TABLET | Refills: 0 | Status: SHIPPED | OUTPATIENT
Start: 2022-06-27 | End: 2022-07-28 | Stop reason: SDUPTHER

## 2022-06-27 RX ORDER — OXYCODONE HYDROCHLORIDE AND ACETAMINOPHEN 5; 325 MG/1; MG/1
1 TABLET ORAL EVERY 4 HOURS PRN
Qty: 30 TABLET | Refills: 0 | Status: SHIPPED | OUTPATIENT
Start: 2022-06-27

## 2022-06-27 NOTE — PROGRESS NOTES
Assessment/Plan:    No problem-specific Assessment & Plan notes found for this encounter  Diagnoses and all orders for this visit:    Type 2 diabetes mellitus without complication, without long-term current use of insulin ([facility])  -     POCT hemoglobin A1c    Pain  -     predniSONE 20 mg tablet; TAKE 1 TABLET BID X 5 DAYS THEN TAKE 1 TABLET QD FOR 5 DAYS  -     oxyCODONE-acetaminophen (PERCOCET) 5-325 mg per tablet; Take 1 tablet by mouth every 4 (four) hours as needed for severe pain Max Daily Amount: 6 tablets    Continuous opioid dependence ([address] )    Primary hypertension    Wellness examination          Subjective:   Chief Complaint   Patient presents with    Back Pain     Low back pain Left side X 3 weeks        Patient ID: Mark Perry is a 58 y o  male  HPI    The following portions of the patient's history were reviewed and updated as appropriate: allergies, current medications, past family history, past medical history, past social history, past surgical history and problem list     Review of Systems   Constitutional: Negative for fatigue, fever and unexpected weight change  HENT: Negative for congestion, sinus pressure and sore throat  Eyes: Negative for visual disturbance  Respiratory: Negative for shortness of breath and wheezing  Cardiovascular: Negative for chest pain and palpitations  Gastrointestinal: Negative for abdominal pain, diarrhea, nausea and vomiting  Objective:  Vitals:    06/27/22 1430   BP: 134/86   Pulse: 83   Resp: 18   SpO2: 98%   Weight: 112 kg (246 lb)   Height: 5' 11" (1 803 m)      Physical Exam  Constitutional:       General: He is not in acute distress  Appearance: He is well-developed  He is not diaphoretic  HENT:      Right Ear: Tympanic membrane, ear canal and external ear normal  Tympanic membrane is not injected  Left Ear: Tympanic membrane, ear canal and external ear normal  Tympanic membrane is not injected        Nose: Nose normal  Mouth/Throat:      Pharynx: Uvula midline  Eyes:      Conjunctiva/sclera: Conjunctivae normal       Pupils: Pupils are equal, round, and reactive to light  Neck:      Thyroid: No thyromegaly  Cardiovascular:      Rate and Rhythm: Normal rate and regular rhythm  Pulses:           Dorsalis pedis pulses are 2+ on the right side and 2+ on the left side  Posterior tibial pulses are 2+ on the right side and 2+ on the left side  Heart sounds: Normal heart sounds  No murmur heard  Pulmonary:      Effort: Pulmonary effort is normal  No respiratory distress  Breath sounds: Normal breath sounds  No wheezing  Musculoskeletal:      Cervical back: Normal range of motion and neck supple  Feet:      Right foot:      Skin integrity: No ulcer, skin breakdown, erythema, warmth, callus or dry skin  Left foot:      Skin integrity: No ulcer, skin breakdown, erythema, warmth, callus or dry skin  Lymphadenopathy:      Cervical: No cervical adenopathy  Patient's shoes and socks removed  Right Foot/Ankle   Right Foot Inspection  Skin Exam: skin normal and skin intact  No dry skin, no warmth, no callus, no erythema, no maceration, no abnormal color, no pre-ulcer, no ulcer and no callus  Toe Exam: ROM and strength within normal limits  Sensory   Vibration: intact  Proprioception: intact  Monofilament testing: intact    Vascular  Capillary refills: < 3 seconds  The right DP pulse is 2+  The right PT pulse is 2+  Left Foot/Ankle  Left Foot Inspection  Skin Exam: skin normal and skin intact  No dry skin, no warmth, no erythema, no maceration, normal color, no pre-ulcer, no ulcer and no callus  Toe Exam: ROM and strength within normal limits  Sensory   Vibration: intact  Proprioception: intact      Vascular  Capillary refills: elevated  The left DP pulse is 2+  The left PT pulse is 2+       Assign Risk Category  No deformity present  No loss of protective sensation  Risk: 0

## 2022-06-27 NOTE — PROGRESS NOTES
HPI:  Carolyn Garcia is a 58 y o  male here for his yearly health maintenance exam    Patient Active Problem List   Diagnosis    Hyperlipidemia    Hypertension    Type 2 diabetes mellitus without complication, without long-term current use of insulin ([facility] [address] )    Esophageal reflux    Aftercare following left knee joint replacement surgery    It band syndrome, left    Continuous opioid dependence ([address] )     Past Medical History:   Diagnosis Date    Chews tobacco regularly     Consumes three beers daily     Controlled diabetes mellitus ([facility] [address] )     Esophageal reflux     Hyperlipidemia     Hypertension     Osteoarthritis     LEFT KNEE    Primary localized osteoarthritis of left knee     last assessed: 1/16/2018       1  Advanced Directive: n     2  Durable Power of  for Healthcare: n     3  Social History:           Drug and alcohol History: y                  4  Immunizations up to date: y                 Lifestyle:                           Healthy Diet:y                          Alcohol Use:y                          Tobacco Use:n                          Regular exercise:y                          Weight concerns:y                               5   Over the past 2 weeks, how often have you been bothered by the following:              Little interest or pleasure in doing things:n              Felling down, depressed or hopeless:n       Current Outpatient Medications   Medication Sig Dispense Refill    oxyCODONE-acetaminophen (PERCOCET) 5-325 mg per tablet Take 1 tablet by mouth every 4 (four) hours as needed for severe pain Max Daily Amount: 6 tablets 30 tablet 0    predniSONE 20 mg tablet TAKE 1 TABLET BID X 5 DAYS THEN TAKE 1 TABLET QD FOR 5 DAYS 15 tablet 0    ALPRAZolam (XANAX) 1 mg tablet Take 1 tablet (1 mg total) by mouth daily at bedtime as needed for anxiety 30 tablet 0    aspirin (ECOTRIN) 325 mg EC tablet Take 1 tablet by mouth 2 (two) times a day for 30 days 60 tablet 0    atorvastatin (LIPITOR) 40 mg tablet Take 1 tablet (40 mg total) by mouth daily in the early morning 90 tablet 3    diphenhydrAMINE-acetaminophen (TYLENOL PM)  MG TABS Take 1 tablet by mouth daily at bedtime as needed for sleep      Empagliflozin (Jardiance) 25 MG TABS Take 1 tablet (25 mg total) by mouth every morning 90 tablet 3    Exenatide ER (Bydureon BCise) 2 MG/0 85ML AUIJ Inject 2 mg under the skin once a week 10 2 mL 3    Exenatide ER (Bydureon) 2 MG PEN INJECT THE CONTENTS OF 1  PEN SUBCUTANEOUSLY WEEKLY  ON SUNDAYS 12 each 3    fenofibrate (TRICOR) 145 mg tablet Take 1 tablet (145 mg total) by mouth daily 90 tablet 3    ibuprofen (MOTRIN) 400 mg tablet Take 600 mg by mouth every 6 (six) hours as needed for mild pain Also takes 400mg in the evening      lisinopril-hydrochlorothiazide (PRINZIDE,ZESTORETIC) 20-12 5 MG per tablet Take 1 tablet by mouth daily 90 tablet 3    metFORMIN (GLUCOPHAGE) 1000 MG tablet Take 1 tablet (1,000 mg total) by mouth 2 (two) times a day with meals 180 tablet 3    oxyCODONE-acetaminophen (PERCOCET) 5-325 mg per tablet 1-2 tabs PO q 4-6 hours prn pain (Patient not taking: Reported on 9/15/2021) 60 tablet 0    pantoprazole (PROTONIX) 40 mg tablet Take 1 tablet (40 mg total) by mouth daily before breakfast 90 tablet 3     No current facility-administered medications for this visit  No Known Allergies  Immunization History   Administered Date(s) Administered    COVID-19 PFIZER VACCINE 0 3 ML IM 03/16/2021, 04/09/2021, 12/18/2021    Influenza Quadrivalent Preservative Free 3 years and older IM 01/16/2018    Influenza, seasonal, injectable, preservative free 09/30/2016       Patient Care Team:  Sandi Lynn MD as PCP - General  MD Nguyễn Olivares MD    Review of Systems   Constitutional: Negative for fatigue, fever and unexpected weight change  HENT: Negative for congestion, sinus pain and sore throat  Eyes: Negative for visual disturbance     Respiratory: Negative for shortness of breath and wheezing  Cardiovascular: Negative for chest pain and palpitations  Gastrointestinal: Negative for abdominal pain, nausea and vomiting  Musculoskeletal: Negative  Negative for arthralgias and myalgias  Neurological: Negative for syncope, weakness and numbness  Psychiatric/Behavioral: Negative  Negative for confusion, dysphoric mood and suicidal ideas  Physical Exam :  Physical Exam  Constitutional:       Appearance: He is well-developed  HENT:      Right Ear: Ear canal normal  Tympanic membrane is not injected  Left Ear: Ear canal normal  Tympanic membrane is not injected  Nose: Nose normal    Eyes:      General:         Right eye: No discharge  Left eye: No discharge  Conjunctiva/sclera: Conjunctivae normal       Pupils: Pupils are equal, round, and reactive to light  Neck:      Thyroid: No thyromegaly  Cardiovascular:      Rate and Rhythm: Normal rate and regular rhythm  Heart sounds: Normal heart sounds  No murmur heard  Pulmonary:      Effort: Pulmonary effort is normal  No respiratory distress  Breath sounds: Normal breath sounds  No wheezing  Abdominal:      General: Bowel sounds are normal  There is no distension  Palpations: Abdomen is soft  Tenderness: There is no abdominal tenderness  Musculoskeletal:         General: Normal range of motion  Cervical back: Normal range of motion and neck supple  Lymphadenopathy:      Cervical: No cervical adenopathy  Skin:     General: Skin is warm and dry  Neurological:      Mental Status: He is alert and oriented to person, place, and time  He is not disoriented  Sensory: No sensory deficit  Gait: Gait normal       Deep Tendon Reflexes: Reflexes are normal and symmetric  Psychiatric:         Speech: Speech normal          Behavior: Behavior normal          Thought Content:  Thought content normal          Judgment: Judgment normal  Assessment and Plan:  1  Type 2 diabetes mellitus without complication, without long-term current use of insulin (HCC)  POCT hemoglobin A1c   2  Pain  predniSONE 20 mg tablet    oxyCODONE-acetaminophen (PERCOCET) 5-325 mg per tablet   3  Continuous opioid dependence ([address] )     4  Primary hypertension     5   Wellness examination         Health Maintenance Due   Topic Date Due    Pneumococcal Vaccine: Pediatrics (0 to 5 Years) and At-Risk Patients (6 to 59 Years) (1 - PCV) Never done    DM Eye Exam  Never done    HIV Screening  Never done    BMI: Followup Plan  Never done    DTaP,Tdap,and Td Vaccines (1 - Tdap) Never done    Colorectal Cancer Screening  04/23/2020    COVID-19 Vaccine (4 - Booster for Pfizer series) 04/18/2022    Depression Screening  08/18/2022    Influenza Vaccine (Season Ended) 09/01/2022

## 2022-07-12 DIAGNOSIS — E78.5 HYPERLIPIDEMIA, UNSPECIFIED HYPERLIPIDEMIA TYPE: ICD-10-CM

## 2022-07-12 DIAGNOSIS — I10 ESSENTIAL HYPERTENSION: ICD-10-CM

## 2022-07-12 RX ORDER — ATORVASTATIN CALCIUM 40 MG/1
TABLET, FILM COATED ORAL
Qty: 90 TABLET | Refills: 3 | Status: SHIPPED | OUTPATIENT
Start: 2022-07-12 | End: 2022-09-19 | Stop reason: SDUPTHER

## 2022-07-13 RX ORDER — LISINOPRIL AND HYDROCHLOROTHIAZIDE 20; 12.5 MG/1; MG/1
1 TABLET ORAL DAILY
Qty: 90 TABLET | Refills: 3 | Status: SHIPPED | OUTPATIENT
Start: 2022-07-13 | End: 2022-09-19 | Stop reason: SDUPTHER

## 2022-07-28 ENCOUNTER — TELEPHONE (OUTPATIENT)
Dept: FAMILY MEDICINE CLINIC | Facility: CLINIC | Age: 62
End: 2022-07-28

## 2022-07-28 DIAGNOSIS — R52 PAIN: ICD-10-CM

## 2022-07-28 RX ORDER — PREDNISONE 20 MG/1
TABLET ORAL
Qty: 15 TABLET | Refills: 0 | Status: SHIPPED | OUTPATIENT
Start: 2022-07-28

## 2022-07-28 NOTE — TELEPHONE ENCOUNTER
Patient called into the office wanting to know the next step to his occurring lower back pain  He was prescribed prednisone last OV on 6/27 and he said it worked in the meantime but now his back is causing him pain again  Patient wanted to know if he needs to see a specialist at this point, try another medication or come in for another OV  Patient schedule is limited, can only do late afternoons

## 2022-07-30 ENCOUNTER — HOSPITAL ENCOUNTER (OUTPATIENT)
Dept: RADIOLOGY | Facility: HOSPITAL | Age: 62
Discharge: HOME | End: 2022-07-30
Payer: COMMERCIAL

## 2022-07-30 DIAGNOSIS — R52 PAIN: ICD-10-CM

## 2022-07-30 PROCEDURE — 72110 X-RAY EXAM L-2 SPINE 4/>VWS: CPT

## 2022-08-05 ENCOUNTER — TELEPHONE (OUTPATIENT)
Dept: FAMILY MEDICINE CLINIC | Facility: CLINIC | Age: 62
End: 2022-08-05

## 2022-08-05 DIAGNOSIS — M54.42 ACUTE BILATERAL LOW BACK PAIN WITH BILATERAL SCIATICA: Primary | ICD-10-CM

## 2022-08-05 DIAGNOSIS — M54.41 ACUTE BILATERAL LOW BACK PAIN WITH BILATERAL SCIATICA: Primary | ICD-10-CM

## 2022-08-05 NOTE — TELEPHONE ENCOUNTER
----- Message from López Peck MD sent at 8/5/2022  7:16 AM EDT -----  Lots of arthritis---recommend MRI--6wks bilat sciatica/failed home PT/needs pain MD consult--MA can order with this info--might go through

## 2022-08-05 NOTE — RESULT ENCOUNTER NOTE
Lots of arthritis---recommend MRI--6wks bilat sciatica/failed home PT/needs pain MD consult--MA can order with this info--might go through

## 2022-08-05 NOTE — TELEPHONE ENCOUNTER
Pt is aware of result  Mabel can you please order the MRI and everything else Dr Marjan Baker wants ordered for this patient  Thanks

## 2022-08-16 ENCOUNTER — TELEPHONE (OUTPATIENT)
Dept: FAMILY MEDICINE CLINIC | Facility: CLINIC | Age: 62
End: 2022-08-16

## 2022-08-18 DIAGNOSIS — M48.061 SPINAL STENOSIS OF LUMBAR REGION, UNSPECIFIED WHETHER NEUROGENIC CLAUDICATION PRESENT: Primary | ICD-10-CM

## 2022-08-18 DIAGNOSIS — R52 PAIN: Primary | ICD-10-CM

## 2022-08-18 RX ORDER — METHOCARBAMOL 500 MG/1
500 TABLET, FILM COATED ORAL 4 TIMES DAILY
Qty: 60 TABLET | Refills: 5 | Status: SHIPPED | OUTPATIENT
Start: 2022-08-18

## 2022-09-04 ENCOUNTER — HOSPITAL ENCOUNTER (EMERGENCY)
Facility: HOSPITAL | Age: 62
Discharge: HOME | End: 2022-09-04
Attending: EMERGENCY MEDICINE
Payer: COMMERCIAL

## 2022-09-04 VITALS
OXYGEN SATURATION: 99 % | RESPIRATION RATE: 17 BRPM | TEMPERATURE: 97.4 F | DIASTOLIC BLOOD PRESSURE: 87 MMHG | SYSTOLIC BLOOD PRESSURE: 184 MMHG | HEART RATE: 85 BPM

## 2022-09-04 DIAGNOSIS — M54.50 ACUTE EXACERBATION OF CHRONIC LOW BACK PAIN: Primary | ICD-10-CM

## 2022-09-04 DIAGNOSIS — G89.29 ACUTE EXACERBATION OF CHRONIC LOW BACK PAIN: Primary | ICD-10-CM

## 2022-09-04 PROCEDURE — 99283 EMERGENCY DEPT VISIT LOW MDM: CPT

## 2022-09-04 PROCEDURE — 99284 EMERGENCY DEPT VISIT MOD MDM: CPT | Performed by: PHYSICIAN ASSISTANT

## 2022-09-04 PROCEDURE — 96372 THER/PROPH/DIAG INJ SC/IM: CPT

## 2022-09-04 RX ORDER — KETOROLAC TROMETHAMINE 30 MG/ML
30 INJECTION, SOLUTION INTRAMUSCULAR; INTRAVENOUS ONCE
Status: COMPLETED | OUTPATIENT
Start: 2022-09-04 | End: 2022-09-04

## 2022-09-04 RX ORDER — LIDOCAINE 50 MG/G
1 PATCH TOPICAL ONCE
Status: DISCONTINUED | OUTPATIENT
Start: 2022-09-04 | End: 2022-09-04 | Stop reason: HOSPADM

## 2022-09-04 RX ORDER — HYDROCODONE BITARTRATE AND ACETAMINOPHEN 5; 325 MG/1; MG/1
1 TABLET ORAL ONCE
Status: COMPLETED | OUTPATIENT
Start: 2022-09-04 | End: 2022-09-04

## 2022-09-04 RX ORDER — HYDROCODONE BITARTRATE AND ACETAMINOPHEN 5; 325 MG/1; MG/1
1 TABLET ORAL EVERY 4 HOURS PRN
Qty: 15 TABLET | Refills: 0 | Status: SHIPPED | OUTPATIENT
Start: 2022-09-04 | End: 2022-09-06

## 2022-09-04 RX ORDER — HYDROCODONE BITARTRATE AND ACETAMINOPHEN 5; 325 MG/1; MG/1
1 TABLET ORAL EVERY 4 HOURS PRN
Qty: 15 TABLET | Refills: 0 | Status: SHIPPED | OUTPATIENT
Start: 2022-09-04 | End: 2022-09-04 | Stop reason: SDUPTHER

## 2022-09-04 RX ADMIN — LIDOCAINE 5% 1 PATCH: 700 PATCH TOPICAL at 10:48

## 2022-09-04 RX ADMIN — KETOROLAC TROMETHAMINE 30 MG: 30 INJECTION, SOLUTION INTRAMUSCULAR; INTRAVENOUS at 10:48

## 2022-09-04 RX ADMIN — HYDROCODONE BITARTRATE AND ACETAMINOPHEN 1 TABLET: 5; 325 TABLET ORAL at 10:48

## 2022-09-04 NOTE — ED PROVIDER NOTES
History  Chief Complaint   Patient presents with    Back Pain     Pt has chronic back pain for a month, pt pain has been increasing over the month  Pt has lower left pain going down leg  Pt has tried muscle relaxer, pain medicine and prednisone pack     Patient is a 57 y/o M with h/o DM, HTN, hyperlipidemia that presents to the ED with low back pain  He states he had right low back pain for a couple months, was given muscle relaxants and steroids and it improved  He states now his pain is on left side and radiates down his left leg  The pain has been worsening over the past 2 days  Leaning forward makes the pain better  In the morning his pain is at its worst   He has been following with his PCP, had xray which showed moderate arthritis  He has an MRI scheduled at the end of this month and made an appointment with pain management after his MRI  Patient denies fevers, chills  No bowel/bladder dysfunction, numbness or weakness  No recent trauma  History provided by:  Patient  Back Pain  Location:  Lumbar spine  Quality:  Aching  Radiates to:  L thigh  Pain severity:  Moderate  Onset quality:  Gradual  Duration:  2 months  Timing:  Constant  Progression:  Worsening  Chronicity:  Chronic  Context: not jumping from heights, not lifting heavy objects, not recent illness, not recent injury and not twisting    Relieved by: leaning forward  Ineffective treatments:  NSAIDs and muscle relaxants  Associated symptoms: leg pain    Associated symptoms: no abdominal pain, no abdominal swelling, no bladder incontinence, no bowel incontinence, no chest pain, no dysuria, no fever, no numbness, no perianal numbness, no tingling and no weakness    Risk factors: obesity        Prior to Admission Medications   Prescriptions Last Dose Informant Patient Reported? Taking?    ALPRAZolam (XANAX) 1 mg tablet More than a month at Unknown time  No No   Sig: Take 1 tablet (1 mg total) by mouth daily at bedtime as needed for anxiety Empagliflozin (Jardiance) 25 MG TABS 2022 at Unknown time  No Yes   Sig: Take 1 tablet (25 mg total) by mouth every morning   Exenatide ER (Bydureon BCise) 2 MG/0 85ML AUIJ 2022 at Unknown time  No Yes   Sig: Inject 2 mg under the skin once a week   Exenatide ER (Bydureon) 2 MG PEN 2022 at Unknown time  No Yes   Sig: INJECT THE CONTENTS OF 1  PEN SUBCUTANEOUSLY WEEKLY  ON SUNDAYS   aspirin (ECOTRIN) 325 mg EC tablet   No No   Sig: Take 1 tablet by mouth 2 (two) times a day for 30 days   atorvastatin (LIPITOR) 40 mg tablet 2022 at Unknown time  No Yes   Sig: TAKE 1 TABLET BY MOUTH  DAILY IN THE EARLY MORNING   diphenhydrAMINE-acetaminophen (TYLENOL PM)  MG TABS 2022 at Unknown time Self Yes Yes   Sig: Take 1 tablet by mouth daily at bedtime as needed for sleep   fenofibrate (TRICOR) 145 mg tablet 2022 at Unknown time  No Yes   Sig: Take 1 tablet (145 mg total) by mouth daily   ibuprofen (MOTRIN) 400 mg tablet Unknown at Unknown time Self Yes No   Sig: Take 600 mg by mouth every 6 (six) hours as needed for mild pain Also takes 400mg in the evening   lisinopril-hydrochlorothiazide (PRINZIDE,ZESTORETIC) 20-12 5 MG per tablet 2022 at Unknown time  No Yes   Sig: TAKE 1 TABLET BY MOUTH  DAILY   metFORMIN (GLUCOPHAGE) 1000 MG tablet 2022 at Unknown time  No Yes   Sig: Take 1 tablet (1,000 mg total) by mouth 2 (two) times a day with meals   methocarbamol (ROBAXIN) 500 mg tablet 2022 at Unknown time  No Yes   Sig: Take 1 tablet (500 mg total) by mouth 4 (four) times a day   oxyCODONE-acetaminophen (PERCOCET) 5-325 mg per tablet 2022 at Unknown time Self No Yes   Si-2 tabs PO q 4-6 hours prn pain   oxyCODONE-acetaminophen (PERCOCET) 5-325 mg per tablet 2022 at Unknown time  No Yes   Sig: Take 1 tablet by mouth every 4 (four) hours as needed for severe pain Max Daily Amount: 6 tablets   pantoprazole (PROTONIX) 40 mg tablet 2022 at Unknown time  No Yes   Sig: Take 1 tablet (40 mg total) by mouth daily before breakfast   predniSONE 20 mg tablet 9/4/2022 at Unknown time  No Yes   Sig: TAKE 1 TABLET BID X 5 DAYS THEN TAKE 1 TABLET QD FOR 5 DAYS      Facility-Administered Medications: None       Past Medical History:   Diagnosis Date    Chews tobacco regularly     Consumes three beers daily     Controlled diabetes mellitus ([facility] [address] )     Esophageal reflux     Hyperlipidemia     Hypertension     Osteoarthritis     LEFT KNEE    Primary localized osteoarthritis of left knee     last assessed: 1/16/2018       Past Surgical History:   Procedure Laterality Date    COLONOSCOPY      MS TOTAL KNEE ARTHROPLASTY Left 1/8/2018    Procedure: ARTHROPLASTY KNEE TOTAL;  Surgeon: Royer Coffman MD;  Location:  MAIN OR;  Service: Orthopedics    REPLACEMENT TOTAL KNEE Left        Family History   Problem Relation Age of Onset    Colon cancer Father     Mental illness Family         mental disorder    Substance Abuse Neg Hx      I have reviewed and agree with the history as documented  E-Cigarette/Vaping     E-Cigarette/Vaping Substances     Social History     Tobacco Use    Smoking status: Former Smoker     Packs/day: 1 00     Years: 11 00     Pack years: 11 00     Types: Cigarettes    Smokeless tobacco: Current User     Types: Chew    Tobacco comment: quit 30 + yrs ago    Substance Use Topics    Alcohol use: Yes     Alcohol/week: 20 0 standard drinks     Types: 20 Cans of beer per week     Comment: daily beer & "gin & tonics" on a weekend ; social alcohol use ( as per allscripts)    Drug use: No       Review of Systems   Constitutional: Negative for chills and fever  Respiratory: Negative for cough and shortness of breath  Cardiovascular: Negative for chest pain, palpitations and leg swelling  Gastrointestinal: Negative for abdominal pain and bowel incontinence  Genitourinary: Negative for bladder incontinence and dysuria  Musculoskeletal: Positive for back pain   Negative for neck pain  Skin: Negative for color change, pallor and rash  Neurological: Negative for dizziness, tingling, speech difficulty, weakness, light-headedness and numbness  Psychiatric/Behavioral: Negative for confusion  All other systems reviewed and are negative  Physical Exam  Physical Exam  Vitals and nursing note reviewed  Constitutional:       General: He is not in acute distress  Appearance: Normal appearance  He is well-developed, well-groomed and overweight  He is not ill-appearing or diaphoretic  HENT:      Head: Normocephalic and atraumatic  Right Ear: External ear normal       Left Ear: External ear normal       Nose: Nose normal    Eyes:      Conjunctiva/sclera: Conjunctivae normal       Pupils: Pupils are equal    Cardiovascular:      Rate and Rhythm: Normal rate and regular rhythm  Heart sounds: Normal heart sounds  Pulmonary:      Effort: Pulmonary effort is normal       Breath sounds: Normal breath sounds  No wheezing, rhonchi or rales  Abdominal:      General: Abdomen is flat  Bowel sounds are normal       Palpations: Abdomen is soft  Tenderness: There is no abdominal tenderness  Musculoskeletal:      Cervical back: Normal and normal range of motion  No spinous process tenderness or muscular tenderness  Thoracic back: Normal       Lumbar back: Normal  Negative right straight leg raise test and negative left straight leg raise test    Skin:     General: Skin is warm and dry  Coloration: Skin is not pale  Findings: No rash  Neurological:      Mental Status: He is alert and oriented to person, place, and time  Sensory: Sensation is intact  No sensory deficit  Motor: Motor function is intact  No weakness, tremor or abnormal muscle tone  Gait: Gait is intact  Deep Tendon Reflexes:      Reflex Scores:       Patellar reflexes are 1+ on the right side and 1+ on the left side    Psychiatric:         Mood and Affect: Mood normal  Speech: Speech normal          Behavior: Behavior is cooperative  Vital Signs  ED Triage Vitals   Temperature Pulse Respirations Blood Pressure SpO2   09/04/22 1012 09/04/22 1012 09/04/22 1012 09/04/22 1013 09/04/22 1012   (!) 97 4 °F (36 3 °C) 94 18 (!) 191/105 98 %      Temp src Heart Rate Source Patient Position - Orthostatic VS BP Location FiO2 (%)   -- -- -- -- --             Pain Score       09/04/22 1048       10 - Worst Possible Pain           Vitals:    09/04/22 1012 09/04/22 1013 09/04/22 1100   BP:  (!) 191/105 (!) 184/87   Pulse: 94  85         Visual Acuity      ED Medications  Medications   HYDROcodone-acetaminophen (NORCO) 5-325 mg per tablet 1 tablet (1 tablet Oral Given 9/4/22 1048)   ketorolac (TORADOL) injection 30 mg (30 mg Intramuscular Given 9/4/22 1048)       Diagnostic Studies  Results Reviewed     None                 No orders to display              Procedures  Procedures         ED Course                                             MDM  Number of Diagnoses or Management Options  Acute exacerbation of chronic low back pain: established and worsening  Diagnosis management comments: Patient with h/o chronic back pain, worsening, no recent trauma  He has MRI scheduled and f/u with Pain management  No new symptoms, no bowel/bladder dysfunction, numbness or weakness  No need for emergent MRI  Patient had xrays which showed arthritis  Will give patient vicodin to help with pain and advised him to call pain management for further treatment       Patient Progress  Patient progress: stable      Disposition  Final diagnoses:   Acute exacerbation of chronic low back pain     Time reflects when diagnosis was documented in both MDM as applicable and the Disposition within this note     Time User Action Codes Description Comment    9/4/2022 10:26 AM Nati Marsh Add [M54 50,  G89 29] Acute exacerbation of chronic low back pain       ED Disposition     ED Disposition   Discharge Condition   Stable    Date/Time   Sun Sep 4, 2022 11:09 AM    Comment   Angel Felipe discharge to home/self care                 Follow-up Information     Follow up With Specialties Details Why Contact Jamey Denny DO Pain Medicine Schedule an appointment as soon as possible for a visit  For recheck [address]  [address] [place] [address] Drive [address] [phone]            Discharge Medication List as of 9/4/2022 11:11 AM      START taking these medications    Details   HYDROcodone-acetaminophen (Norco) 5-325 mg per tablet Take 1 tablet by mouth every 4 (four) hours as needed for pain Max Daily Amount: 6 tablets, Starting Sun 9/4/2022, Normal         CONTINUE these medications which have NOT CHANGED    Details   atorvastatin (LIPITOR) 40 mg tablet TAKE 1 TABLET BY MOUTH  DAILY IN THE EARLY MORNING, Normal      diphenhydrAMINE-acetaminophen (TYLENOL PM)  MG TABS Take 1 tablet by mouth daily at bedtime as needed for sleep, Historical Med      Empagliflozin (Jardiance) 25 MG TABS Take 1 tablet (25 mg total) by mouth every morning, Starting Wed 8/18/2021, Normal      Exenatide ER (Bydureon BCise) 2 MG/0 85ML AUIJ Inject 2 mg under the skin once a week, Starting Wed 8/18/2021, Normal      Exenatide ER (Bydureon) 2 MG PEN INJECT THE CONTENTS OF 1  PEN SUBCUTANEOUSLY WEEKLY  ON SUNDAYS, Normal      fenofibrate (TRICOR) 145 mg tablet Take 1 tablet (145 mg total) by mouth daily, Starting Wed 8/18/2021, Normal      lisinopril-hydrochlorothiazide (PRINZIDE,ZESTORETIC) 20-12 5 MG per tablet TAKE 1 TABLET BY MOUTH  DAILY, Starting Wed 7/13/2022, Normal      metFORMIN (GLUCOPHAGE) 1000 MG tablet Take 1 tablet (1,000 mg total) by mouth 2 (two) times a day with meals, Starting Wed 8/18/2021, Normal      methocarbamol (ROBAXIN) 500 mg tablet Take 1 tablet (500 mg total) by mouth 4 (four) times a day, Starting u 8/18/2022, Normal      !! oxyCODONE-acetaminophen (PERCOCET) 5-325 mg per tablet 1-2 tabs PO q 4-6 hours prn pain, Print      !! oxyCODONE-acetaminophen (PERCOCET) 5-325 mg per tablet Take 1 tablet by mouth every 4 (four) hours as needed for severe pain Max Daily Amount: 6 tablets, Starting Mon 6/27/2022, Normal      pantoprazole (PROTONIX) 40 mg tablet Take 1 tablet (40 mg total) by mouth daily before breakfast, Starting Wed 8/18/2021, Normal      predniSONE 20 mg tablet TAKE 1 TABLET BID X 5 DAYS THEN TAKE 1 TABLET QD FOR 5 DAYS, Normal      ALPRAZolam (XANAX) 1 mg tablet Take 1 tablet (1 mg total) by mouth daily at bedtime as needed for anxiety, Starting Mon 4/12/2021, Normal      aspirin (ECOTRIN) 325 mg EC tablet Take 1 tablet by mouth 2 (two) times a day for 30 days, Starting Wed 1/10/2018, Until Fri 2/9/2018, Print      ibuprofen (MOTRIN) 400 mg tablet Take 600 mg by mouth every 6 (six) hours as needed for mild pain Also takes 400mg in the evening, Historical Med       !! - Potential duplicate medications found  Please discuss with provider  No discharge procedures on file      PDMP Review       Value Time User    PDMP Reviewed  Yes 9/4/2022 11:09 AM Mary Perez PA-C          ED Provider  Electronically Signed by           Mary Perez PA-C  09/04/22 [id number]

## 2022-09-04 NOTE — DISCHARGE INSTRUCTIONS
Follow up with pain management  Call central scheduling to get an MRI rescheduled  Return to ER if bowel/bladder dysfunction, numbness, weakness

## 2022-09-06 ENCOUNTER — TELEMEDICINE (OUTPATIENT)
Dept: FAMILY MEDICINE CLINIC | Facility: CLINIC | Age: 62
End: 2022-09-06
Payer: COMMERCIAL

## 2022-09-06 VITALS — HEIGHT: 71 IN | BODY MASS INDEX: 34.44 KG/M2 | WEIGHT: 246 LBS

## 2022-09-06 DIAGNOSIS — M51.26 HNP (HERNIATED NUCLEUS PULPOSUS), LUMBAR: ICD-10-CM

## 2022-09-06 DIAGNOSIS — R52 PAIN: Primary | ICD-10-CM

## 2022-09-06 PROCEDURE — 99213 OFFICE O/P EST LOW 20 MIN: CPT | Performed by: FAMILY MEDICINE

## 2022-09-06 RX ORDER — PREDNISONE 20 MG/1
TABLET ORAL
Qty: 15 TABLET | Refills: 0 | Status: SHIPPED | OUTPATIENT
Start: 2022-09-06

## 2022-09-06 RX ORDER — OXYCODONE AND ACETAMINOPHEN 10; 325 MG/1; MG/1
1 TABLET ORAL EVERY 4 HOURS PRN
Qty: 60 TABLET | Refills: 0 | Status: SHIPPED | OUTPATIENT
Start: 2022-09-06 | End: 2022-09-30 | Stop reason: SDUPTHER

## 2022-09-06 NOTE — PROGRESS NOTES
Virtual Regular Visit    Verification of patient location:    Patient is located in the following state in which I hold an active license [place]      Assessment/Plan:    Problem List Items Addressed This Visit    None     Visit Diagnoses     Pain    -  Primary    Relevant Medications    predniSONE 20 mg tablet    HNP (herniated nucleus pulposus), lumbar        Relevant Medications    oxyCODONE-acetaminophen (Percocet)  mg per tablet               Reason for visit is   Chief Complaint   Patient presents with    Follow-up     FOLLOW UP ER 9/5/22- BACK PAIN WORSE, C/O URINARY INCONTINENCE    Virtual Regular Visit        Encounter provider Marli Martinez MD    Provider located at [address] [place] [id number]      Recent Visits  No visits were found meeting these conditions  Showing recent visits within past 7 days and meeting all other requirements  Today's Visits  Date Type Provider Dept   09/06/22 Telemedicine Marli Martinez MD [place] [id number] [facility],[address] today's visits and meeting all other requirements  Future Appointments  No visits were found meeting these conditions  Showing future appointments within next 150 days and meeting all other requirements       The patient was identified by name and date of birth  Kacy Marley was informed that this is a telemedicine visit and that the visit is being conducted through [address] [place] [address] and patient was informed that this is a secure, HIPAA-compliant platform  He agrees to proceed     My office door was closed  No one else was in the room  He acknowledged consent and understanding of privacy and security of the video platform  The patient has agreed to participate and understands they can discontinue the visit at any time  Patient is aware this is a billable service       Subjective  Kacy Marley is a 58 y o  male severe lumbar pain/sciatica---pt immobilized w/ pain--ER report rev         HPI     Past Medical History:   Diagnosis Date    Chews tobacco regularly     Consumes three beers daily     Controlled diabetes mellitus ([address] )     Esophageal reflux     Hyperlipidemia     Hypertension     Osteoarthritis     LEFT KNEE    Primary localized osteoarthritis of left knee     last assessed: 1/16/2018       Past Surgical History:   Procedure Laterality Date    COLONOSCOPY      [place] TOTAL KNEE ARTHROPLASTY Left 1/8/2018    Procedure: ARTHROPLASTY KNEE TOTAL;  Surgeon: Chris Vergara MD;  Location:  MAIN OR;  Service: Orthopedics    REPLACEMENT TOTAL KNEE Left        Current Outpatient Medications   Medication Sig Dispense Refill    oxyCODONE-acetaminophen (Percocet)  mg per tablet Take 1 tablet by mouth every 4 (four) hours as needed for moderate pain Max Daily Amount: 6 tablets 60 tablet 0    predniSONE 20 mg tablet TAKE 1 TABLET BID X 5 DAYS THEN TAKE 1 TABLET QD FOR 5 DAYS 15 tablet 0    ALPRAZolam (XANAX) 1 mg tablet Take 1 tablet (1 mg total) by mouth daily at bedtime as needed for anxiety 30 tablet 0    aspirin (ECOTRIN) 325 mg EC tablet Take 1 tablet by mouth 2 (two) times a day for 30 days 60 tablet 0    atorvastatin (LIPITOR) 40 mg tablet TAKE 1 TABLET BY MOUTH  DAILY IN THE EARLY MORNING 90 tablet 3    diphenhydrAMINE-acetaminophen (TYLENOL PM)  MG TABS Take 1 tablet by mouth daily at bedtime as needed for sleep      Empagliflozin (Jardiance) 25 MG TABS Take 1 tablet (25 mg total) by mouth every morning 90 tablet 3    Exenatide ER (Bydureon BCise) 2 MG/0 85ML AUIJ Inject 2 mg under the skin once a week 10 2 mL 3    Exenatide ER (Bydureon) 2 MG PEN INJECT THE CONTENTS OF 1  PEN SUBCUTANEOUSLY WEEKLY  ON SUNDAYS 12 each 3    fenofibrate (TRICOR) 145 mg tablet Take 1 tablet (145 mg total) by mouth daily 90 tablet 3    ibuprofen (MOTRIN) 400 mg tablet Take 600 mg by mouth every 6 (six) hours as needed for mild pain Also takes 400mg in the evening      lisinopril-hydrochlorothiazide (PRINZIDE,ZESTORETIC) 20-12 5 MG per tablet TAKE 1 TABLET BY MOUTH  DAILY 90 tablet 3    metFORMIN (GLUCOPHAGE) 1000 MG tablet Take 1 tablet (1,000 mg total) by mouth 2 (two) times a day with meals 180 tablet 3    methocarbamol (ROBAXIN) 500 mg tablet Take 1 tablet (500 mg total) by mouth 4 (four) times a day 60 tablet 5    pantoprazole (PROTONIX) 40 mg tablet Take 1 tablet (40 mg total) by mouth daily before breakfast 90 tablet 3    predniSONE 20 mg tablet TAKE 1 TABLET BID X 5 DAYS THEN TAKE 1 TABLET QD FOR 5 DAYS 15 tablet 0     No current facility-administered medications for this visit          No Known Allergies    Review of Systems    Video Exam    Vitals:    09/06/22 0912   Weight: 112 kg (246 lb)   Height: 5' 11" (1 803 m)       Physical Exam     I spent 15 minutes directly with the patient during this visit

## 2022-09-07 ENCOUNTER — TELEPHONE (OUTPATIENT)
Dept: FAMILY MEDICINE CLINIC | Facility: CLINIC | Age: 62
End: 2022-09-07

## 2022-09-07 NOTE — TELEPHONE ENCOUNTER
TW states this is medically necessary for pt-neurologically and for pain   Pt w/ incont/severe pain---needs to R/O cauda equina syndr

## 2022-09-07 NOTE — TELEPHONE ENCOUNTER
Patient called in because we ordered an MRI for him to get and once he called central scheduling the location in [address] [place] [address] couldn't get him in anytime sooner so he reached out to a facility in [place] that can see him tomorrow @1pm but they're telling him that he can only be seen in [address] [place] [address] and not [place] due to insurance purposes  Patient would like to know what we can do on our end for him to be seen in [place] because he can no longer take the pain and continue to take so many opioids daily

## 2022-09-08 ENCOUNTER — HOSPITAL ENCOUNTER (OUTPATIENT)
Dept: MRI IMAGING | Facility: HOSPITAL | Age: 62
End: 2022-09-08
Payer: COMMERCIAL

## 2022-09-08 DIAGNOSIS — M54.42 ACUTE BILATERAL LOW BACK PAIN WITH BILATERAL SCIATICA: ICD-10-CM

## 2022-09-08 DIAGNOSIS — M54.41 ACUTE BILATERAL LOW BACK PAIN WITH BILATERAL SCIATICA: ICD-10-CM

## 2022-09-08 PROCEDURE — 72148 MRI LUMBAR SPINE W/O DYE: CPT

## 2022-09-08 PROCEDURE — G1004 CDSM NDSC: HCPCS

## 2022-09-09 DIAGNOSIS — M48.061 SPINAL STENOSIS OF LUMBAR REGION, UNSPECIFIED WHETHER NEUROGENIC CLAUDICATION PRESENT: Primary | ICD-10-CM

## 2022-09-12 ENCOUNTER — CONSULT (OUTPATIENT)
Dept: PAIN MEDICINE | Facility: CLINIC | Age: 62
End: 2022-09-12
Payer: COMMERCIAL

## 2022-09-12 VITALS
WEIGHT: 238 LBS | SYSTOLIC BLOOD PRESSURE: 148 MMHG | TEMPERATURE: 97.4 F | HEIGHT: 71 IN | BODY MASS INDEX: 33.32 KG/M2 | DIASTOLIC BLOOD PRESSURE: 88 MMHG | HEART RATE: 112 BPM

## 2022-09-12 DIAGNOSIS — M54.16 LUMBAR RADICULOPATHY: ICD-10-CM

## 2022-09-12 DIAGNOSIS — G89.29 CHRONIC LEFT-SIDED LOW BACK PAIN WITHOUT SCIATICA: Primary | ICD-10-CM

## 2022-09-12 DIAGNOSIS — M51.26 HERNIATED NUCLEUS PULPOSUS, L3-4 LEFT: ICD-10-CM

## 2022-09-12 DIAGNOSIS — M48.062 SPINAL STENOSIS OF LUMBAR REGION WITH NEUROGENIC CLAUDICATION: ICD-10-CM

## 2022-09-12 DIAGNOSIS — M54.50 CHRONIC LEFT-SIDED LOW BACK PAIN WITHOUT SCIATICA: Primary | ICD-10-CM

## 2022-09-12 PROCEDURE — 99204 OFFICE O/P NEW MOD 45 MIN: CPT | Performed by: NURSE PRACTITIONER

## 2022-09-12 RX ORDER — GABAPENTIN 400 MG/1
CAPSULE ORAL
Qty: 90 CAPSULE | Refills: 1 | Status: SHIPPED | OUTPATIENT
Start: 2022-09-12 | End: 2022-10-27 | Stop reason: SDUPTHER

## 2022-09-12 NOTE — PROGRESS NOTES
Assessment:  1  Chronic left-sided low back pain without sciatica    2  Lumbar radiculopathy    3  Herniated nucleus pulposus, L3-4 left    4  Spinal stenosis of lumbar region with neurogenic claudication        Plan:  While the patient was in the office today, I did have a thorough conversation with the patient regarding his current treatment plan options  I did explain to the patient that at this point he should finish out the prednisone taper as prescribed by his primary care provider and we will also proceed with a left L3 and L4 transforaminal epidural steroid injection to try to address the neuropathic and radicular component of his pain symptoms and see if that provides at least moderate stable relief for now  The patient was agreeable and verbalized an understanding  Complete risks and benefits including bleeding, infection, tissue reaction, nerve injury and allergic reaction were discussed  The approach was demonstrated using models and literature was provided  Verbal and written consent was obtained  I also thoroughly encouraged the patient to proceed with his neuro surgery consultation for evaluation as it is quite possible he may need surgical intervention in order to provide relief of his pain symptoms with his current symptoms and exam findings as well as his most recent MRI findings  The patient was agreeable and verbalized an understanding  In the meantime, to try to address the underlying neuropathic component I feel he would benefit from a neuron membrane stabilizer such as gabapentin  I discussed with the patient the type of medication it is, how it works, and that it requires a titration process that is specific to each individual  I reviewed with the patient that it may take 3-4 weeks for the medication's effects to be noticed and that it should never be abruptly stopped   Possible side effects include but are not limited to; vertigo, lethargy, nausea, and edema of the extremities  Advised the patient to call our office if they experience any side effects  The patient verbalized an understanding  The patient can continue the p r n  Percocet as prescribed by his primary care provider  The patient was agreeable and verbalized an understanding  The patient is to schedule a follow-up office visit at least 2-3 weeks after his injection and at that point time we will re-evaluate his pain symptoms and discuss his treatment plan options  The patient was agreeable and verbalized an understanding  The above plan of care was reviewed and agreed upon by Dr Robert Rico  History of Present Illness: The patient is a 58 y o  male who presents for consultation in regards to Hand Pain, Back Pain, and Leg Pain  Symptoms have been present for for the past 5 months, although the patient does report he has dealt with chronic low back pain for several years and has followed up with chiropractic treatment and therapy at times which has been helpful  Symptoms began without any precipitating injury or trauma  The patient reports that especially over the last 5-6 weeks he has worsening left-sided low back and left lower extremity anterior thigh and radicular pain symptoms  The patient did follow-up with his primary care provider who has him in the middle of his 3rd prednisone taper, which is somewhat helpful as he does feel that the pain is not quite as bad, but is still interfering with his sleep  The patient did follow-up with his primary care provider who also ordered him an MRI of the lumbosacral spine which does show a disc herniation more towards the left at the L3-L4 level with severe bilateral foraminal stenosis and extruded disc fragment most likely compressing the left L3 nerve root  The patient has also been referred to neuro surgery and reports that they did call him and are going to be calling him back with a consultation date        Pain is reported to be 10 on the numeric rating scale  Symptoms are felt constantly and worst in the no typical pattern  Symptoms are characterized as burning, shooting and sharp  Symptoms are associated with left leg weakness  Aggravating factors include lying down, standing, walking, exercise and bowel movements  Relieving factors include bending and sitting  No change in symptoms with sitting and relaxation  Treatments that have been helpful include chiropractic manipulation and heat/ice  TENS unit have provided no relief  Medications to relieve symptoms include a 20 mg prednisone taper and Percocet 10/325, 1 tablet every 4 hours as needed for pain, both of which are being prescribed by his primary care provider  Again, the patient feels that the prednisone has at least made the pain somewhat tolerable although he is still severely uncomfortable and is constantly surgeon for positions to be able to get comfortable  The patient reports that the relief he is experiencing with his current medication regimen is very minimal       Review of Systems:    Review of Systems   Constitutional: Positive for unexpected weight change  Negative for fever  HENT: Negative for trouble swallowing  Eyes: Negative for visual disturbance  Respiratory: Negative for shortness of breath and wheezing  Cardiovascular: Negative for chest pain and palpitations  Gastrointestinal: Negative for constipation, diarrhea, nausea and vomiting  Endocrine: Positive for polydipsia and polyuria  Negative for cold intolerance and heat intolerance  Genitourinary: Negative for difficulty urinating and frequency  Musculoskeletal: Positive for arthralgias and myalgias  Negative for gait problem and joint swelling  Skin: Negative for rash  Neurological: Negative for dizziness, seizures, syncope, weakness and headaches  Hematological: Does not bruise/bleed easily  Psychiatric/Behavioral: Negative for dysphoric mood     All other systems reviewed and are negative  Past Medical History:   Diagnosis Date    Chews tobacco regularly     Consumes three beers daily     Controlled diabetes mellitus ([address] )     Diabetes ([facility] [address]     Esophageal reflux     GERD (gastroesophageal reflux disease)     Hyperlipidemia     Hypertension     Osteoarthritis     LEFT KNEE    Primary localized osteoarthritis of left knee     last assessed: 1/16/2018       Past Surgical History:   Procedure Laterality Date    COLONOSCOPY      [place] T[place] AR[place]ROPLASTY Left 1/8/2018    Procedure: ARTHROPLASTY KNEE TOTAL;  Surgeon: Dorothy Brown MD;  Location:  MAIN OR;  Service: Orthopedics    REPLACEMENT TOTAL KNEE Left        Family History   Problem Relation Age of Onset    Colon cancer Father     Mental illness Family         mental disorder    Substance Abuse Neg Hx        Social History     Occupational History    Not on file   Tobacco Use    Smoking status: Former Smoker     Packs/day: 1 00     Years: 11 00     Pack years: 11 00     Types: Cigarettes    Smokeless tobacco: Current User     Types: Chew    Tobacco comment: quit 30 + yrs ago    Vaping Use    Vaping Use: Never used   Substance and Sexual Activity    Alcohol use:  Yes     Alcohol/week: 20 0 standard drinks     Types: 20 Cans of beer per week     Comment: daily beer & "gin & tonics" on a weekend ; social alcohol use ( as per allscripts)    Drug use: No    Sexual activity: Not on file         Current Outpatient Medications:     atorvastatin (LIPITOR) 40 mg tablet, TAKE 1 TABLET BY MOUTH  DAILY IN THE EARLY MORNING, Disp: 90 tablet, Rfl: 3    diphenhydrAMINE-acetaminophen (TYLENOL PM)  MG TABS, Take 1 tablet by mouth daily at bedtime as needed for sleep, Disp: , Rfl:     Empagliflozin (Jardiance) 25 MG TABS, Take 1 tablet (25 mg total) by mouth every morning, Disp: 90 tablet, Rfl: 3    Exenatide ER (Bydureon) 2 MG PEN, INJECT THE CONTENTS OF 1  PEN SUBCUTANEOUSLY WEEKLY  ON SUNDAYS, Disp: 12 each, Rfl: 3    fenofibrate (TRICOR) 145 mg tablet, Take 1 tablet (145 mg total) by mouth daily, Disp: 90 tablet, Rfl: 3    gabapentin (NEURONTIN) 400 mg capsule, Take 1 PO HS x 1 week, then 2 PO HS x 1 week, then 1 in AM and 2 PO HS , Disp: 90 capsule, Rfl: 1    ibuprofen (MOTRIN) 400 mg tablet, Take 600 mg by mouth every 6 (six) hours as needed for mild pain Also takes 400mg in the evening, Disp: , Rfl:     lisinopril-hydrochlorothiazide (PRINZIDE,ZESTORETIC) 20-12 5 MG per tablet, TAKE 1 TABLET BY MOUTH  DAILY, Disp: 90 tablet, Rfl: 3    metFORMIN (GLUCOPHAGE) 1000 MG tablet, Take 1 tablet (1,000 mg total) by mouth 2 (two) times a day with meals, Disp: 180 tablet, Rfl: 3    methocarbamol (ROBAXIN) 500 mg tablet, Take 1 tablet (500 mg total) by mouth 4 (four) times a day, Disp: 60 tablet, Rfl: 5    oxyCODONE-acetaminophen (Percocet)  mg per tablet, Take 1 tablet by mouth every 4 (four) hours as needed for moderate pain Max Daily Amount: 6 tablets, Disp: 60 tablet, Rfl: 0    pantoprazole (PROTONIX) 40 mg tablet, Take 1 tablet (40 mg total) by mouth daily before breakfast, Disp: 90 tablet, Rfl: 3    predniSONE 20 mg tablet, TAKE 1 TABLET BID X 5 DAYS THEN TAKE 1 TABLET QD FOR 5 DAYS, Disp: 15 tablet, Rfl: 0    ALPRAZolam (XANAX) 1 mg tablet, Take 1 tablet (1 mg total) by mouth daily at bedtime as needed for anxiety (Patient not taking: Reported on 9/12/2022), Disp: 30 tablet, Rfl: 0    aspirin (ECOTRIN) 325 mg EC tablet, Take 1 tablet by mouth 2 (two) times a day for 30 days (Patient not taking: Reported on 9/12/2022), Disp: 60 tablet, Rfl: 0    Exenatide ER (Bydureon BCi) 2 MG/0 85ML AUIJ, Inject 2 mg under the skin once a week, Disp: 10 2 mL, Rfl: 3    predniSONE 20 mg tablet, TAKE 1 TABLET BID X 5 DAYS THEN TAKE 1 TABLET QD FOR 5 DAYS, Disp: 15 tablet, Rfl: 0    No Known Allergies    Physical Exam:    /88 (BP Location: Left arm, Patient Position: Sitting, Cuff Size: Standard) Pulse (!) 112   Temp (!) 97 4 °F (36 3 °C)   Ht 5' 11" (1 803 m)   Wt 108 kg (238 lb)   BMI 33 19 kg/m²     Constitutional: normal, well developed, well nourished, alert, in no distress and non-toxic and no overt pain behavior  and overweight  Eyes: anicteric  HEENT: grossly intact  Neck: supple, symmetric, trachea midline and no masses   Pulmonary:even and unlabored  Cardiovascular:No edema or pitting edema present  Skin:Normal without rashes or lesions and well hydrated  Psychiatric:Mood and affect appropriate  Neurologic:Cranial Nerves II-XII grossly intact  Musculoskeletal:The patient's gait is slightly antalgic, limping at times, but steady without the use of any assistive devices  Lumbar Spine Exam    Appearance:  Normal lordosis  Palpation/Tenderness:  left lumbar paraspinal tenderness  left sacroiliac joint tenderness  Sensory:  dimished light touch sensation, location: In a left L3 and L4 dermatome distribution  Range of Motion:  Flexion:   Moderately limited  with pain  Extension:  Minimally limited  with pain  Rotation - Left:  Minimally limited  with pain  Rotation - Right:  Minimally limited  with pain  Motor Strength:  Left hip flexion:  4/5  Left hip extension:  4/5  Right hip flexion:  5/5  Right hip extension:  5/5  Left knee flexion:  4/5  Left knee extension:  4/5  Right knee flexion:  5/5  Right knee extension:  5/5  Left foot dorsiflexion:  4/5  Left foot plantar flexion:  4/5  Right foot dorsiflexion:  5/5  Right foot plantar flexion:  5/5  Reflexes:  Left Patellar:  absent   Right Patellar:  1+   Left Achilles:  absent   Right Achilles:  absent   Special Tests:  Left Straight Leg Test:  positive  Right Straight Leg Test:  negative  Left Lionel's Maneuver:  positive  Right Lionel's Maneuver:  negative  Left Gaenslen's Test:  positive      Imaging    MRI LUMBAR SPINE WITHOUT CONTRAST 9/8/2022 [facility]     INDICATION: M54 42: Lumbago with sciatica, left side  M54 41: Lumbago with sciatica, right side      COMPARISON:  None      TECHNIQUE:  Sagittal T1, sagittal T2, sagittal inversion recovery, axial T1 and axial T2, coronal T2     IMAGE QUALITY:  Image quality degraded by patient motion artifact      FINDINGS:     VERTEBRAL BODIES:  There are 5 lumbar type vertebral bodies  Slight leftward scoliosis apex L4  Normal marrow signal is identified within the visualized bony structures  No discrete marrow lesion      SACRUM:  Normal signal within the sacrum  No evidence of insufficiency or stress fracture      DISTAL CORD AND CONUS:  Normal size and signal within the distal cord and conus      PARASPINAL SOFT TISSUES:  Paraspinal soft tissues are unremarkable      LOWER THORACIC DISC SPACES:  Normal disc height and signal   No disc herniation, canal stenosis or foraminal narrowing      LUMBAR DISC SPACES:     L1-L2:  Normal      L2-L3:  Mild annular bulge with marginal osteophytes result in mild central stenosis  Foramina patent      L3-L4:  Broad-based diffuse annular bulge with marginal osteophytes and facet hypertrophy combined result in severe central stenosis and severe bilateral lateral recess stenosis  Left paramedian superiorly extruded disc fragment extends 1 5 cm cephalad   to the native disc margin further contributing to lateral recess stenosis on the left and likely impinging the descending left L3 nerve root  Moderate bilateral foraminal narrowing noted  There is compression of the thecal sac        L4-L5:  Moderate to severe facet hypertrophy identified with diffuse annular bulging and marginal osteophytes which result in mild bilateral foraminal narrowing and mild to moderate central stenosis      L5-S1:  Moderate facet hypertrophy  Degenerative disc osteophyte complex with marginal osteophytes which result in minimal left foraminal narrowing    Central canal is patent      IMPRESSION:     Diffuse annular bulge at L3-4 identified with a left paramedian superiorly extruded/sequestered disc fragment extending 15 mm cephalad to the native disc margin  There is severe lateral recess stenosis likely impinging the descending left L3 nerve root  Annular bulging at the disc space resulting in severe central and bilateral lateral recess stenosis  Nerve roots are stretched and redundant above this level secondary to the degree of spinal stenosis      Spondylotic degenerative changes are seen at remaining levels resulting in mild to moderate central stenosis at L4-5 and no greater than mild central or foraminal narrowing elsewhere      XRAY LUMBAR SPINE 7/30/2022 [facility]     INDICATION:   R52: Pain, unspecified      COMPARISON:  None     VIEWS:  XR SPINE LUMBAR MINIMUM 4 VIEWS NON INJURY        FINDINGS:     There are 5 non rib bearing lumbar vertebral bodies       There is no evidence of acute fracture or destructive osseous lesion      Mild levoscoliosis  Trace retrolisthesis of L2-L3      Disc space narrowing from L2-L3 through L5-S1  Multilevel ventral osteophytosis    Multilevel facet arthrosis      The pedicles appear intact      Soft tissues are unremarkable      IMPRESSION:     No acute osseous abnormality        Degenerative changes as described

## 2022-09-12 NOTE — PATIENT INSTRUCTIONS
Gabapentin (By mouth)   Gabapentin (fe-a-PEN-tin)  Treats seizures and pain caused by shingles  Brand Name(s): FusePaq Fanatrex, Neurontin   There may be other brand names for this medicine  When This Medicine Should Not Be Used: This medicine is not right for everyone  Do not use it if you had an allergic reaction to gabapentin  How to Use This Medicine:   Capsule, Liquid, Tablet  Take your medicine as directed  Your dose may need to be changed several times to find what works best for you  If you have epilepsy, do not allow more than 12 hours to pass between doses  Capsule: Swallow the capsule whole with plenty of water  Do not open, crush, or chew it  Gralise® tablet: Swallow the tablet whole   Do not crush, break, or chew it  Neurontin® tablet: If you break a tablet into 2 pieces, use the second half as your next dose  Do not use the half-tablet if the whole tablet has been cut or broken after 28 days  Oral liquid: Measure the oral liquid medicine with a marked measuring spoon, oral syringe, or medicine cup  This medicine should come with a Medication Guide  Ask your pharmacist for a copy if you do not have one  Missed dose: Take a dose as soon as you remember  If it is almost time for your next dose, wait until then and take a regular dose  Do not take extra medicine to make up for a missed dose  Store the medicine in a closed container at room temperature, away from heat, moisture, and direct light  Store the Neurontin® oral liquid in the refrigerator  Do not freeze  Drugs and Foods to Avoid:   Ask your doctor or pharmacist before using any other medicine, including over-the-counter medicines, vitamins, and herbal products  Some medicines can affect how gabapentin works  Tell your doctor if you also using hydrocodone or morphine  If you take an antacid, wait at least 2 hours before you take gabapentin  Do not drink alcohol while you are using this medicine    Tell your doctor if you use anything else that makes you sleepy  Some examples are allergy medicine, narcotic pain medicine, and alcohol  Tell your doctor if you are also using lorazepam, oxycodone, or zolpidem  Warnings While Using This Medicine:   Tell your doctor if you are pregnant or breastfeeding, or if you have kidney problems (including patients receiving dialysis) or lung problems  Tell your doctor if you have a history of depression or mental health problems  This medicine may cause the following problems:  Drug reaction with eosinophilia and systemic symptoms (DRESS) or multiorgan hypersensitivity, which may damage the liver, kidney, blood, heart, or muscles  Changes in mood or behavior, including suicidal thoughts or behavior  Respiratory depression (serious breathing problem that can be life-threatening), when used with narcotic pain medicines  Do not stop using this medicine suddenly  Your doctor will need to slowly decrease your dose before you stop it completely  This medicine may make you dizzy or drowsy  Do not drive or do anything else that could be dangerous until you know how this medicine affects you  Tell any doctor or dentist who treats you that you are using this medicine  This medicine may affect certain medical test results  Your doctor will check your progress and the effects of this medicine at regular visits  Keep all appointments  Keep all medicine out of the reach of children  Never share your medicine with anyone  Possible Side Effects While Using This Medicine:   Call your doctor right away if you notice any of these side effects:   Allergic reaction: Itching or hives, swelling in your face or hands, swelling or tingling in your mouth or throat, chest tightness, trouble breathing  Behavior problems, aggression, restlessness, trouble concentrating, moodiness (especially in children)  Blistering, peeling, red skin rash  Blue lips, fingernails, or skin, chest pain, fast heartbeat, trouble breathing  Change in how much or how often you urinate, bloody or cloudy urine  Dark urine or pale stools, nausea, vomiting, loss of appetite, stomach pain, yellow skin or eyes  Fever, chills, cough, sore throat, body aches  Problems with coordination, shakiness, unsteadiness, unusual eye movement  Rapid weight gain, swelling in your hands, ankles, or feet  Rash, swollen or tender glands in the neck, armpit, or groin  Unusual moods or behaviors, thoughts of hurting yourself, feeling depressed  If you notice these less serious side effects, talk with your doctor:   Dizziness, drowsiness, sleepiness, tiredness  If you notice other side effects that you think are caused by this medicine, tell your doctor  Call your doctor for medical advice about side effects  You may report side effects to FDA at 8-807-FDA-3995    © Copyright [employer] [facility] 2022 Information is for End User's use only and may not be sold, redistributed or otherwise used for commercial purposes  The above information is an  only  It is not intended as medical advice for individual conditions or treatments  Talk to your doctor, nurse or pharmacist before following any medical regimen to see if it is safe and effective for you

## 2022-09-15 ENCOUNTER — HOSPITAL ENCOUNTER (OUTPATIENT)
Dept: RADIOLOGY | Facility: HOSPITAL | Age: 62
Discharge: HOME | End: 2022-09-15
Attending: NEUROLOGICAL SURGERY
Payer: COMMERCIAL

## 2022-09-15 ENCOUNTER — CONSULT (OUTPATIENT)
Dept: NEUROSURGERY | Facility: CLINIC | Age: 62
End: 2022-09-15
Payer: COMMERCIAL

## 2022-09-15 ENCOUNTER — HOSPITAL ENCOUNTER (OUTPATIENT)
Dept: RADIOLOGY | Facility: CLINIC | Age: 62
Discharge: HOME | End: 2022-09-15
Payer: COMMERCIAL

## 2022-09-15 VITALS
HEIGHT: 71 IN | TEMPERATURE: 98.7 F | WEIGHT: 238 LBS | HEART RATE: 103 BPM | RESPIRATION RATE: 14 BRPM | DIASTOLIC BLOOD PRESSURE: 78 MMHG | SYSTOLIC BLOOD PRESSURE: 142 MMHG | BODY MASS INDEX: 33.32 KG/M2

## 2022-09-15 VITALS
HEART RATE: 87 BPM | DIASTOLIC BLOOD PRESSURE: 80 MMHG | SYSTOLIC BLOOD PRESSURE: 123 MMHG | TEMPERATURE: 97.2 F | RESPIRATION RATE: 20 BRPM | OXYGEN SATURATION: 90 %

## 2022-09-15 DIAGNOSIS — M54.16 LUMBAR RADICULOPATHY: ICD-10-CM

## 2022-09-15 DIAGNOSIS — G89.29 CHRONIC LEFT-SIDED LOW BACK PAIN WITHOUT SCIATICA: ICD-10-CM

## 2022-09-15 DIAGNOSIS — M51.26 HERNIATED NUCLEUS PULPOSUS, L3-4 LEFT: ICD-10-CM

## 2022-09-15 DIAGNOSIS — M48.062 SPINAL STENOSIS OF LUMBAR REGION WITH NEUROGENIC CLAUDICATION: ICD-10-CM

## 2022-09-15 DIAGNOSIS — M54.50 CHRONIC LEFT-SIDED LOW BACK PAIN WITHOUT SCIATICA: ICD-10-CM

## 2022-09-15 DIAGNOSIS — M48.061 SPINAL STENOSIS OF LUMBAR REGION, UNSPECIFIED WHETHER NEUROGENIC CLAUDICATION PRESENT: ICD-10-CM

## 2022-09-15 PROCEDURE — 72114 X-RAY EXAM L-S SPINE BENDING: CPT

## 2022-09-15 PROCEDURE — 3078F DIAST BP <80 MM HG: CPT | Performed by: NEUROLOGICAL SURGERY

## 2022-09-15 PROCEDURE — 64483 NJX AA&/STRD TFRM EPI L/S 1: CPT | Performed by: ANESTHESIOLOGY

## 2022-09-15 PROCEDURE — 3077F SYST BP >= 140 MM HG: CPT | Performed by: NEUROLOGICAL SURGERY

## 2022-09-15 PROCEDURE — 99204 OFFICE O/P NEW MOD 45 MIN: CPT | Performed by: NEUROLOGICAL SURGERY

## 2022-09-15 PROCEDURE — 64484 NJX AA&/STRD TFRM EPI L/S EA: CPT | Performed by: ANESTHESIOLOGY

## 2022-09-15 RX ORDER — PAPAVERINE HCL 150 MG
15 CAPSULE, EXTENDED RELEASE ORAL ONCE
Status: COMPLETED | OUTPATIENT
Start: 2022-09-15 | End: 2022-09-15

## 2022-09-15 RX ADMIN — DEXAMETHASONE SODIUM PHOSPHATE 15 MG: 10 INJECTION, SOLUTION INTRAMUSCULAR; INTRAVENOUS at 13:44

## 2022-09-15 RX ADMIN — IOHEXOL 1 ML: 300 INJECTION, SOLUTION INTRAVENOUS at 13:44

## 2022-09-15 NOTE — H&P
History of Present Illness: The patient is a 58 y o  male who presents with complaints of low back and leg pain      Patient Active Problem List   Diagnosis    Hyperlipidemia    Hypertension    Type 2 diabetes mellitus without complication, without long-term current use of insulin ([address] )    Esophageal reflux    Aftercare following left knee joint replacement surgery    It band syndrome, left    Continuous opioid dependence (HCC)    Herniated nucleus pulposus, L3-4 left    Lumbar radiculopathy    Spinal stenosis of lumbar region with neurogenic claudication    Chronic left-sided low back pain without sciatica       Past Medical History:   Diagnosis Date    Chews tobacco regularly     Consumes three beers daily     Controlled diabetes mellitus ([address] )     Diabetes ([address] )     Esophageal reflux     GERD (gastroesophageal reflux disease)     Hyperlipidemia     Hypertension     Osteoarthritis     LEFT KNEE    Primary localized osteoarthritis of left knee     last assessed: 1/16/2018       Past Surgical History:   Procedure Laterality Date    COLONOSCOPY      CO TOTAL KNEE ARTHROPLASTY Left 1/8/2018    Procedure: ARTHROPLASTY KNEE TOTAL;  Surgeon: Jonny Melendez MD;  Location: [facility] OR;  Service: Orthopedics    REPLACEMENT TOTAL KNEE Left          Current Outpatient Medications:     ALPRAZolam (XANAX) 1 mg tablet, Take 1 tablet (1 mg total) by mouth daily at bedtime as needed for anxiety (Patient not taking: No sig reported), Disp: 30 tablet, Rfl: 0    aspirin (ECOTRIN) 325 mg EC tablet, Take 1 tablet by mouth 2 (two) times a day for 30 days (Patient not taking: No sig reported), Disp: 60 tablet, Rfl: 0    atorvastatin (LIPITOR) 40 mg tablet, TAKE 1 TABLET BY MOUTH  DAILY IN THE EARLY MORNING, Disp: 90 tablet, Rfl: 3    diphenhydrAMINE-acetaminophen (TYLENOL PM)  MG TABS, Take 1 tablet by mouth daily at bedtime as needed for sleep, Disp: , Rfl:     Empagliflozin (Jardiance) 25 MG TABS, Take 1 tablet (25 mg total) by mouth every morning, Disp: 90 tablet, Rfl: 3    Exenatide ER (Bydureon BCise) 2 MG/0 85ML AUIJ, Inject 2 mg under the skin once a week, Disp: 10 2 mL, Rfl: 3    Exenatide ER (Bydureon) 2 MG PEN, INJECT THE CONTENTS OF 1  PEN SUBCUTANEOUSLY WEEKLY  ON SUNDAYS (Patient not taking: Reported on 9/15/2022), Disp: 12 each, Rfl: 3    fenofibrate (TRICOR) 145 mg tablet, Take 1 tablet (145 mg total) by mouth daily, Disp: 90 tablet, Rfl: 3    gabapentin (NEURONTIN) 400 mg capsule, Take 1 PO HS x 1 week, then 2 PO HS x 1 week, then 1 in AM and 2 PO HS , Disp: 90 capsule, Rfl: 1    ibuprofen (MOTRIN) 400 mg tablet, Take 600 mg by mouth every 6 (six) hours as needed for mild pain Also takes 400mg in the evening, Disp: , Rfl:     lisinopril-hydrochlorothiazide (PRINZIDE,ZESTORETIC) 20-12 5 MG per tablet, TAKE 1 TABLET BY MOUTH  DAILY, Disp: 90 tablet, Rfl: 3    metFORMIN (GLUCOPHAGE) 1000 MG tablet, Take 1 tablet (1,000 mg total) by mouth 2 (two) times a day with meals, Disp: 180 tablet, Rfl: 3    methocarbamol (ROBAXIN) 500 mg tablet, Take 1 tablet (500 mg total) by mouth 4 (four) times a day, Disp: 60 tablet, Rfl: 5    oxyCODONE-acetaminophen (Percocet)  mg per tablet, Take 1 tablet by mouth every 4 (four) hours as needed for moderate pain Max Daily Amount: 6 tablets, Disp: 60 tablet, Rfl: 0    pantoprazole (PROTONIX) 40 mg tablet, Take 1 tablet (40 mg total) by mouth daily before breakfast, Disp: 90 tablet, Rfl: 3    predniSONE 20 mg tablet, TAKE 1 TABLET BID X 5 DAYS THEN TAKE 1 TABLET QD FOR 5 DAYS, Disp: 15 tablet, Rfl: 0    predniSONE 20 mg tablet, TAKE 1 TABLET BID X 5 DAYS THEN TAKE 1 TABLET QD FOR 5 DAYS (Patient not taking: Reported on 9/15/2022), Disp: 15 tablet, Rfl: 0    Current Facility-Administered Medications:     dexamethasone (PF) (DECADRON) injection 15 mg, 15 mg, Epidural, Once, Elmer Fountain DO    iohexol (OMNIPAQUE) 300 mg/mL injection 50 mL, 50 mL, Epidural, Once, Gabby Pleasure, DO    No Known Allergies    Physical Exam:   General: Awake, Alert, Oriented x 3, Mood and affect appropriate  Respiratory: Respirations even and unlabored  Cardiovascular: Peripheral pulses intact; no edema  Musculoskeletal Exam:  Decreased range of motion lumbar spine    ASA Score: II         Assessment:  Lumbar foraminal stenosis, lumbar radiculitis    Plan: Left L3 & L4 TFESI

## 2022-09-15 NOTE — DISCHARGE INSTRUCTIONS
Epidural Steroid Injection   WHAT YOU NEED TO KNOW:   An epidural steroid injection (SULTANA) is a procedure to inject steroid medicine into the epidural space  The epidural space is between your spinal cord and vertebrae  Steroids reduce inflammation and fluid buildup in your spine that may be causing pain  You may be given pain medicine along with the steroids  ACTIVITY  Do not drive or operate machinery today  No strenuous activity today - bending, lifting, etc   You may resume normal activites starting tomorrow - start slowly and as tolerated  You may shower today, but no tub baths or hot tubs  You may have numbness for several hours from the local anesthetic  Please use caution and common sense, especially with weight-bearing activities  CARE OF THE INJECTION SITE  If you have soreness or pain, apply ice to the area today (20 minutes on/20 minutes off)  Starting tomorrow, you may use warm, moist heat or ice if needed  You may have an increase or change in your discomfort for 36-48 hours after your treatment  Apply ice and continue with any pain medication you have been prescribed  Notify the [facility] if you have any of the following: redness, drainage, swelling, headache, stiff neck or fever above 100°F     SPECIAL INSTRUCTIONS  Our office will contact you in approximately 7 days for a progress report  MEDICATIONS  Continue to take all routine medications  Our office may have instructed you to hold some medications  As no general anesthesia was used in today's procedure, you should not experience any side effects related to anesthesia  If you are diabetic, the steroids used in today's injection may temporarily increase your blood sugar levels after the first few days after your injection  Please keep a close eye on your sugars and alert the doctor who manages your diabetes if your sugars are significantly high from your baseline or you are symptomatic       If you have a problem specifically related to your procedure, please call our office at [phone]  Problems not related to your procedure should be directed to your primary care physician

## 2022-09-15 NOTE — PROGRESS NOTES
Neurosurgery Office Note  Natacha Donaldson 58 y o  male MRN: [id number]      Assessment/Plan        Problem List Items Addressed This Visit         Visit Diagnoses     Spinal stenosis of lumbar region, unspecified whether neurogenic claudication present        Relevant Orders    XR spine lumbar complete w bending minimum 6 views            Discussion:  Patient is a 70-year-old male with h/o chewing tobacco, chronic EtOH use, DM, HLD, HTN, GERD, OA who p/w chronic LBP for years (works in construction) and ~6 months of left leg pain mostly in groin but also intermittently to anterior and lateral thigh stopping around left knee (s/p surgical repair, "it didn't heal right") distribution described as "it just hurts everywhere in my thigh" a/w intermittent paresthesias  Never had right leg symptoms  No persistent numbness, weakness, gait imbalance, bowel/bladder incontinence, trauma, prior spinal surgery  Evaluated for the first time by Pain Medicine on 9/12 and scheduled for left L3/4 TFESI by Dr Tere Danielson today (initially on 9/23 but expedited)  Recently started on Neurontin on 9/12, titrating up weekly as instructed  Tried PO steroids (initially with significant relief) and Robaxin (only mild relief)  Taking  mg daily  MRI on 9/8/22 showed mild scoliosis, chronic degenerative changes most prominent at L3/4 with disc herniation and bilateral (left > right) lateral recess stenosis, milder stenosis at L4/5, grade I L5/S1 spondylolisthesis  Neurologic exam intact except mild L thigh paresthesias  I showed him the MRI and explained with a spinal model the findings  I also explained that his primary complaint of left groin pain does not specifically correlate with MRI findings  At this time, I recommend initial conservative therapies per Pain Medicine  He declined PT referral  I will obtain baseline XR flexion/extension to r/o dynamic instability   Return to clinic in 3 months to reassess symptoms  All questions and concerns were addressed during this visit  CHIEF COMPLAINT    Chief Complaint   Patient presents with    Consult     Back pain        HISTORY    History of Present Illness     58y o  year old male     HPI    See Discussion    REVIEW OF SYSTEMS    Review of Systems   Gastrointestinal: Positive for constipation  Genitourinary: Positive for frequency (possibly due to meds)  Musculoskeletal: Positive for back pain (LBP L>R -Wraps around left groin down left leg down (mainly knee) to feet ) and gait problem (h/o knee replacement )  Present @ ED on 9/4/22 for back pain     no previous Spine Sx    LBP L>R w/ N/T on Left thumb & left leg with (L) leg pain and difficult walk x 6 months     meds: Prednisone, oxycodone, methocarbamol & advil     No recent PT     consult PM 9/12/22      Neurological: Positive for weakness (left leg (muscle soreness) ) and numbness (Left thumb & Left Leg (thighs))  Hematological:        Neg AC    All other systems reviewed and are negative          Meds/Allergies     Current Outpatient Medications   Medication Sig Dispense Refill    ALPRAZolam (XANAX) 1 mg tablet Take 1 tablet (1 mg total) by mouth daily at bedtime as needed for anxiety (Patient not taking: Reported on 9/12/2022) 30 tablet 0    aspirin (ECOTRIN) 325 mg EC tablet Take 1 tablet by mouth 2 (two) times a day for 30 days (Patient not taking: Reported on 9/12/2022) 60 tablet 0    atorvastatin (LIPITOR) 40 mg tablet TAKE 1 TABLET BY MOUTH  DAILY IN THE EARLY MORNING 90 tablet 3    diphenhydrAMINE-acetaminophen (TYLENOL PM)  MG TABS Take 1 tablet by mouth daily at bedtime as needed for sleep      Empagliflozin (Jardiance) 25 MG TABS Take 1 tablet (25 mg total) by mouth every morning 90 tablet 3    Exenatide ER (Bydureon BCise) 2 MG/0 85ML AUIJ Inject 2 mg under the skin once a week 10 2 mL 3    Exenatide ER (Bydureon) 2 MG PEN INJECT THE CONTENTS OF 1  PEN SUBCUTANEOUSLY WEEKLY  ON SUNDAYS 12 each 3    fenofibrate (TRICOR) 145 mg tablet Take 1 tablet (145 mg total) by mouth daily 90 tablet 3    gabapentin (NEURONTIN) 400 mg capsule Take 1 PO HS x 1 week, then 2 PO HS x 1 week, then 1 in AM and 2 PO HS  90 capsule 1    ibuprofen (MOTRIN) 400 mg tablet Take 600 mg by mouth every 6 (six) hours as needed for mild pain Also takes 400mg in the evening      lisinopril-hydrochlorothiazide (PRINZIDE,ZESTORETIC) 20-12 5 MG per tablet TAKE 1 TABLET BY MOUTH  DAILY 90 tablet 3    metFORMIN (GLUCOPHAGE) 1000 MG tablet Take 1 tablet (1,000 mg total) by mouth 2 (two) times a day with meals 180 tablet 3    methocarbamol (ROBAXIN) 500 mg tablet Take 1 tablet (500 mg total) by mouth 4 (four) times a day 60 tablet 5    oxyCODONE-acetaminophen (Percocet)  mg per tablet Take 1 tablet by mouth every 4 (four) hours as needed for moderate pain Max Daily Amount: 6 tablets 60 tablet 0    pantoprazole (PROTONIX) 40 mg tablet Take 1 tablet (40 mg total) by mouth daily before breakfast 90 tablet 3    predniSONE 20 mg tablet TAKE 1 TABLET BID X 5 DAYS THEN TAKE 1 TABLET QD FOR 5 DAYS 15 tablet 0    predniSONE 20 mg tablet TAKE 1 TABLET BID X 5 DAYS THEN TAKE 1 TABLET QD FOR 5 DAYS 15 tablet 0     No current facility-administered medications for this visit         No Known Allergies    PAST HISTORY    Past Medical History:   Diagnosis Date    Chews tobacco regularly     Consumes three beers daily     [facility] diabetes mellitus [facility] [address] [facility]     Diabetes ([facility] [address] [facility]     Esophageal reflux     GERD (gastroesophageal reflux disease)     Hyperlipidemia     Hypertension     Osteoarthritis     LEFT KNEE    Primary localized osteoarthritis of left knee     last assessed: 1/16/2018       Past Surgical History:   Procedure Laterality Date    COLONOSCOPY      MA TOTAL KNEE ARTHROPLASTY Left 1/8/2018    Procedure: ARTHROPLASTY KNEE TOTAL;  Surgeon: Brigitte Ramirez MD;  Location:  MAIN OR;  Service: Orthopedics  REPLACEMENT TOTAL KNEE Left        Social History     Tobacco Use    Smoking status: Former Smoker     Packs/day: 1 00     Years: 11 00     Pack years: 11 00     Types: Cigarettes    Smokeless tobacco: Current User     Types: Chew    Tobacco comment: quit 30 + yrs ago    Vaping Use    Vaping Use: Never used   Substance Use Topics    Alcohol use: Yes     Alcohol/week: 20 0 standard drinks     Types: 20 Cans of beer per week     Comment: daily beer & "gin & tonics" on a weekend ; social alcohol use ( as per allscripts)    Drug use: No       Family History   Problem Relation Age of Onset    Colon cancer Father     Mental illness Family         mental disorder    Substance Abuse Neg Hx          The following portions of the patient's history were reviewed in this encounter and updated as appropriate: Past medical, surgical, family, and social history, as well as medications, allergies, and review of systems  EXAM    Vitals:Blood pressure 142/78, pulse 103, temperature 98 7 °F (37 1 °C), temperature source Temporal, resp  rate 14, height 5' 11" (1 803 m), weight 108 kg (238 lb)  ,There is no height or weight on file to calculate BMI  Physical Exam  Vitals and nursing note reviewed  Constitutional:       Appearance: Normal appearance  He is normal weight  HENT:      Head: Normocephalic and atraumatic  Eyes:      Extraocular Movements: Extraocular movements intact and EOM normal       Pupils: Pupils are equal, round, and reactive to light  Cardiovascular:      Rate and Rhythm: Normal rate and regular rhythm  Pulses: Normal pulses  Heart sounds: Normal heart sounds  Pulmonary:      Effort: Pulmonary effort is normal       Breath sounds: Normal breath sounds  Abdominal:      General: Abdomen is flat  Palpations: Abdomen is soft  Musculoskeletal:         General: Normal range of motion  Cervical back: Normal range of motion     Neurological:      General: No focal deficit present  Mental Status: He is alert and oriented to person, place, and time  Mental status is at baseline  GCS: GCS eye subscore is 4  GCS verbal subscore is 5  GCS motor subscore is 6  Cranial Nerves: Cranial nerves are intact  Sensory: Sensation is intact  Motor: Motor function is intact  Coordination: Coordination is intact  Gait: Gait is intact  Deep Tendon Reflexes: Strength normal and reflexes are normal and symmetric  Psychiatric:         Mood and Affect: Mood normal          Speech: Speech normal          Behavior: Behavior normal          Neurologic Exam     Mental Status   Oriented to person, place, and time  Attention: normal    Speech: speech is normal   Level of consciousness: alert    Cranial Nerves     CN II   Visual fields full to confrontation  Visual acuity: normal  Right visual field deficit: none  Left visual field deficit: none     CN III, IV, VI   Pupils are equal, round, and reactive to light  Extraocular motions are normal    CN III: no CN III palsy  CN VI: no CN VI palsy  Nystagmus: none   Diplopia: none  Ophthalmoparesis: none  Upgaze: normal  Downgaze: normal  Conjugate gaze: present    CN V   Facial sensation intact  Right facial sensation deficit: none  Left facial sensation deficit: none    CN VII   Facial expression full, symmetric  Right facial weakness: none  Left facial weakness: none    CN VIII   CN VIII normal      CN IX, X   CN IX normal    CN X normal    Palate: symmetric    CN XI   CN XI normal    Right sternocleidomastoid strength: normal  Left sternocleidomastoid strength: normal  Right trapezius strength: normal  Left trapezius strength: normal    CN XII   CN XII normal    Tongue deviation: none    Motor Exam   Muscle bulk: normal  Overall muscle tone: normal  Right arm pronator drift: absent  Left arm pronator drift: absent    Strength   Strength 5/5 throughout       Sensory Exam   Light touch normal      Gait, Coordination, and Reflexes     Gait  Gait: normal    Reflexes   Reflexes 2+ except as noted  MEDICAL DECISION MAKING    Imaging Studies:     MRI lumbar spine wo contrast    Result Date: 9/8/2022  Narrative: MRI LUMBAR SPINE WITHOUT CONTRAST INDICATION: M54 42: Lumbago with sciatica, left side M54 41: Lumbago with sciatica, right side  COMPARISON:  None  TECHNIQUE:  Sagittal T1, sagittal T2, sagittal inversion recovery, axial T1 and axial T2, coronal T2   IMAGE QUALITY:  Image quality degraded by patient motion artifact  FINDINGS: VERTEBRAL BODIES:  There are 5 lumbar type vertebral bodies  Slight leftward scoliosis apex L4  Normal marrow signal is identified within the visualized bony structures  No discrete marrow lesion  SACRUM:  Normal signal within the sacrum  No evidence of insufficiency or stress fracture  DISTAL CORD AND CONUS:  Normal size and signal within the distal cord and conus  PARASPINAL SOFT TISSUES:  Paraspinal soft tissues are unremarkable  LOWER THORACIC DISC SPACES:  Normal disc height and signal   No disc herniation, canal stenosis or foraminal narrowing  LUMBAR DISC SPACES: L1-L2:  Normal  L2-L3:  Mild annular bulge with marginal osteophytes result in mild central stenosis  Foramina patent  L3-L4:  Broad-based diffuse annular bulge with marginal osteophytes and facet hypertrophy combined result in severe central stenosis and severe bilateral lateral recess stenosis  Left paramedian superiorly extruded disc fragment extends 1 5 cm cephalad to the native disc margin further contributing to lateral recess stenosis on the left and likely impinging the descending left L3 nerve root  Moderate bilateral foraminal narrowing noted  There is compression of the thecal sac  L4-L5:  Moderate to severe facet hypertrophy identified with diffuse annular bulging and marginal osteophytes which result in mild bilateral foraminal narrowing and mild to moderate central stenosis   L5-S1:  Moderate facet hypertrophy  Degenerative disc osteophyte complex with marginal osteophytes which result in minimal left foraminal narrowing  Central canal is patent  Impression: Diffuse annular bulge at L3-4 identified with a left paramedian superiorly extruded/sequestered disc fragment extending 15 mm cephalad to the native disc margin  There is severe lateral recess stenosis likely impinging the descending left L3 nerve root  Annular bulging at the disc space resulting in severe central and bilateral lateral recess stenosis  Nerve roots are stretched and redundant above this level secondary to the degree of spinal stenosis  Spondylotic degenerative changes are seen at remaining levels resulting in mild to moderate central stenosis at L4-5 and no greater than mild central or foraminal narrowing elsewhere  Workstation performed: [id number]       I have personally reviewed pertinent films in [facility]    Neurosurgeon

## 2022-09-19 DIAGNOSIS — I10 ESSENTIAL HYPERTENSION: ICD-10-CM

## 2022-09-19 DIAGNOSIS — Z79.4 TYPE 2 DIABETES MELLITUS WITHOUT COMPLICATION, WITH LONG-TERM CURRENT USE OF INSULIN (HCC): ICD-10-CM

## 2022-09-19 DIAGNOSIS — E11.9 TYPE 2 DIABETES MELLITUS WITHOUT COMPLICATION, WITH LONG-TERM CURRENT USE OF INSULIN (HCC): ICD-10-CM

## 2022-09-19 DIAGNOSIS — K21.00 GASTROESOPHAGEAL REFLUX DISEASE WITH ESOPHAGITIS WITHOUT HEMORRHAGE: ICD-10-CM

## 2022-09-19 DIAGNOSIS — E78.5 HYPERLIPIDEMIA, UNSPECIFIED HYPERLIPIDEMIA TYPE: ICD-10-CM

## 2022-09-20 RX ORDER — LISINOPRIL AND HYDROCHLOROTHIAZIDE 20; 12.5 MG/1; MG/1
1 TABLET ORAL DAILY
Qty: 90 TABLET | Refills: 3 | Status: SHIPPED | OUTPATIENT
Start: 2022-09-20

## 2022-09-20 RX ORDER — FENOFIBRATE 145 MG/1
145 TABLET, COATED ORAL DAILY
Qty: 90 TABLET | Refills: 3 | Status: SHIPPED | OUTPATIENT
Start: 2022-09-20

## 2022-09-20 RX ORDER — EXENATIDE 2 MG/.85ML
2 INJECTION, SUSPENSION, EXTENDED RELEASE SUBCUTANEOUS WEEKLY
Qty: 10.2 ML | Refills: 3 | Status: SHIPPED | OUTPATIENT
Start: 2022-09-20

## 2022-09-20 RX ORDER — PANTOPRAZOLE SODIUM 40 MG/1
40 TABLET, DELAYED RELEASE ORAL
Qty: 90 TABLET | Refills: 3 | Status: SHIPPED | OUTPATIENT
Start: 2022-09-20

## 2022-09-20 RX ORDER — ATORVASTATIN CALCIUM 40 MG/1
40 TABLET, FILM COATED ORAL
Qty: 90 TABLET | Refills: 3 | Status: SHIPPED | OUTPATIENT
Start: 2022-09-20

## 2022-09-22 ENCOUNTER — TELEPHONE (OUTPATIENT)
Dept: PAIN MEDICINE | Facility: CLINIC | Age: 62
End: 2022-09-22

## 2022-09-30 DIAGNOSIS — M51.26 HNP (HERNIATED NUCLEUS PULPOSUS), LUMBAR: ICD-10-CM

## 2022-09-30 RX ORDER — OXYCODONE AND ACETAMINOPHEN 10; 325 MG/1; MG/1
1 TABLET ORAL EVERY 4 HOURS PRN
Qty: 60 TABLET | Refills: 0 | Status: SHIPPED | OUTPATIENT
Start: 2022-09-30

## 2022-09-30 NOTE — TELEPHONE ENCOUNTER
Med refill request- Patient states she has been taking 2 a day one in the morning and one at night and deals with pain during the day      [phone]    Last refill per pdmp 09/06/2022

## 2022-10-07 ENCOUNTER — TELEPHONE (OUTPATIENT)
Dept: FAMILY MEDICINE CLINIC | Facility: CLINIC | Age: 62
End: 2022-10-07

## 2022-10-07 NOTE — LETTER
October 10, 2022     Patient: Angel Felipe  YOB: 1960        To Whom it May Concern:    Rafaela Langley is under my professional care  Gerson Sade may return to work on 10/17/2022 with no restrictions  If you have any questions or concerns, please don't hesitate to call           Sincerely,          Jackelyn Beckham MD        CC: No Recipients

## 2022-10-07 NOTE — TELEPHONE ENCOUNTER
Patient called into the office because he would like a return to work letter after his herniated disc injury  Patient would like to return back to work 10/17 with no restrictions  Pt aware Reyes Martinez is out until 10/10, and we'll call once approved and written

## 2022-10-27 ENCOUNTER — TELEMEDICINE (OUTPATIENT)
Dept: PAIN MEDICINE | Facility: CLINIC | Age: 62
End: 2022-10-27
Payer: COMMERCIAL

## 2022-10-27 DIAGNOSIS — M54.16 LUMBAR RADICULOPATHY: ICD-10-CM

## 2022-10-27 DIAGNOSIS — M51.26 HERNIATED NUCLEUS PULPOSUS, L3-4 LEFT: ICD-10-CM

## 2022-10-27 DIAGNOSIS — G89.29 CHRONIC LEFT-SIDED LOW BACK PAIN WITHOUT SCIATICA: Primary | ICD-10-CM

## 2022-10-27 DIAGNOSIS — M54.50 CHRONIC LEFT-SIDED LOW BACK PAIN WITHOUT SCIATICA: ICD-10-CM

## 2022-10-27 DIAGNOSIS — G89.29 CHRONIC LEFT-SIDED LOW BACK PAIN WITHOUT SCIATICA: ICD-10-CM

## 2022-10-27 DIAGNOSIS — M48.062 SPINAL STENOSIS OF LUMBAR REGION WITH NEUROGENIC CLAUDICATION: ICD-10-CM

## 2022-10-27 DIAGNOSIS — M54.50 CHRONIC LEFT-SIDED LOW BACK PAIN WITHOUT SCIATICA: Primary | ICD-10-CM

## 2022-10-27 PROCEDURE — 99442 PR PHYS/QHP TELEPHONE EVALUATION 11-20 MIN: CPT | Performed by: NURSE PRACTITIONER

## 2022-10-27 RX ORDER — GABAPENTIN 400 MG/1
CAPSULE ORAL
Qty: 60 CAPSULE | Refills: 2 | Status: SHIPPED | OUTPATIENT
Start: 2022-10-27 | End: 2022-10-27 | Stop reason: SDUPTHER

## 2022-10-27 RX ORDER — GABAPENTIN 400 MG/1
CAPSULE ORAL
Qty: 180 CAPSULE | Refills: 0 | Status: SHIPPED | OUTPATIENT
Start: 2022-10-27 | End: 2023-02-22 | Stop reason: SDUPTHER

## 2022-10-27 NOTE — PROGRESS NOTES
Virtual Brief Visit    Patient is located in the following state in which I hold an active license [place]      Assessment/Plan:  1  I did have a thorough conversation with the patient regarding his treatment plan options and at this point as per his wishes since he is noting moderate to significant relief, we will hold off any repeat injections for his left-sided pain  However, I did discuss with the patient that since still has moderate to significant stenosis on the right side at the L3-L4 level and he is noting right-sided pain, 1 option would be to proceed with a right L3 and L4 transforaminal epidural steroid injection with Dr Melva Moya at this time as well  However, for now, the patient is going to hold off any repeat injections and see how he does over the next few weeks as he is only back to work for his 2nd week at this point and wants to see how things go, especially since his pain is definitely tolerable  I did explain to the patient that if between now and his next office visit pain symptoms would change and he would like to proceed with a repeat injection, he should call our office and we will proceed from there as appropriate  The patient was agreeable and verbalized an understanding  2  I encouraged the patient to follow up with neuro surgery as scheduled in December for re-evaluation  The patient was agreeable and verbalized an understanding  3  With regards to the gabapentin, I explained to the patient that it is of my opinion and expertise that for now since he is tolerating the gabapentin 400 mg b i d , I think it is in his best interest for the next 3 months to just stay on the gabapentin as prescribed and see how he does  The patient was agreeable and verbalized an understanding  I did send a 90 day supply to his mail order as requested  4  The patient is scheduled for a follow-up office visit in 12 weeks on January 24, 2023 to arrive at 6:45 a m  for an 7:00 a m  office visit    The patient was agreeable and verbalized an understanding  Problem List Items Addressed This Visit        Nervous and Auditory    Lumbar radiculopathy       Musculoskeletal and Integument    Herniated nucleus pulposus, L3-4 left       Other    Spinal stenosis of lumbar region with neurogenic claudication    Chronic left-sided low back pain without sciatica - Primary          Recent Visits  No visits were found meeting these conditions  Showing recent visits within past 7 days and meeting all other requirements  Today's Visits  Date Type Provider Dept   10/27/22 Telemedicine CHRISTA Rodriguez  [facility]   Showing today's visits and meeting all other requirements  Future Appointments  No visits were found meeting these conditions  Showing future appointments within next 150 days and meeting all other requirements     It was my intent to perform this visit via video technology but the patient was not able to do a video connection so the visit was completed via audio telephone only  The patient is a pleasant 80-year-old male who is currently being treated for his chronic low back pain and lower extremity radicular symptoms secondary to a herniated L3-L4 disc to the left with moderate to significant stenosis and radiculopathy  Unfortunately the patient had mixed up his appointment times and had arrived yesterday for his office visit and we offered to proceed with a virtual visit today during his lunch hour so he would not have to miss anymore work as he is just getting back to work and it is difficult for him to take time off  The patient is status post a left L3 and L4 transforaminal epidural steroid injection on September 15, 2022 with Dr En Lorenz  The patient reports that although it took almost 3 weeks for the injection to start working, he is noting greater than 50% improvement in his left-sided low back and left lower extremity radicular symptoms    He does report that he continues with the right-sided pain and symptoms, however, that has been an ongoing issue and even though the right side is a little bit worse than it was is still very manageable  The patient reports that he is also taking the gabapentin as prescribed, however, could only tolerate 400 mg b i d  as when he would take the t i d  dosing it made him very tired and difficult to get up for work in the morning  The patient reports that currently he is noting at least moderate to significant overall relief of his pain symptoms as a result of the injection and his current medication regimen, without any significant side effects or issues  The patient has also proceeded with a surgery consultation and at this point it has been recommended that he do his best to continue conservative treatment and see how he does  The patient is scheduled for a follow-up office visit in December with Neurosurgery for re-evaluation  Presently he rates his pain as a 4 to 5/10 as again when he came for his initial consultation he was a 10/10      Visit Time    Visit Start Time: 5548  Visit Stop Time: 0063  Total Visit Duration: 21 minutes

## 2022-10-27 NOTE — PATIENT INSTRUCTIONS
Assessment/Plan:  1  I did have a thorough conversation with the patient regarding his treatment plan options and at this point as per his wishes since he is noting moderate to significant relief, we will hold off any repeat injections for his left-sided pain  However, I did discuss with the patient that since still has moderate to significant stenosis on the right side at the L3-L4 level and he is noting right-sided pain, 1 option would be to proceed with a right L3 and L4 transforaminal epidural steroid injection with Dr Angela Orantes at this time as well  However, for now, the patient is going to hold off any repeat injections and see how he does over the next few weeks as he is only back to work for his 2nd week at this point and wants to see how things go, especially since his pain is definitely tolerable  I did explain to the patient that if between now and his next office visit pain symptoms would change and he would like to proceed with a repeat injection, he should call our office and we will proceed from there as appropriate  The patient was agreeable and verbalized an understanding  2  I encouraged the patient to follow up with neuro surgery as scheduled in December for re-evaluation  The patient was agreeable and verbalized an understanding  3  With regards to the gabapentin, I explained to the patient that it is of my opinion and expertise that for now since he is tolerating the gabapentin 400 mg b i d , I think it is in his best interest for the next 3 months to just stay on the gabapentin as prescribed and see how he does  The patient was agreeable and verbalized an understanding  I did send a 90 day supply to his mail order as requested  4  The patient is scheduled for a follow-up office visit in 12 weeks on January 24, 2023 to arrive at 6:45 a m  for an 7:00 a m  office visit  The patient was agreeable and verbalized an understanding

## 2022-11-30 NOTE — TELEPHONE ENCOUNTER
Pt reports he has slight improvement week 2 post inj  Pain level 5-6/10  
Pt reports no improvement post inj   Pain level 8/10  Pt aware I will call next week for an update    Left L3 & L4 TFESI 9/15 , 10/27 F/u   
Detail Level: Detailed
Gentle Skin Care Counseling: I recommended use a gentle skin cleanser when washing the skin. I also recommended application of a moisturizer daily. Products with fragrances, preservatives and dyes should be avoided.

## 2023-01-13 ENCOUNTER — TELEPHONE (OUTPATIENT)
Dept: NEUROSURGERY | Facility: CLINIC | Age: 63
End: 2023-01-13

## 2023-01-13 NOTE — TELEPHONE ENCOUNTER
S/w patient to move up his appt to the afternoon w/ Dr Kostas Multaniksara Patient will c/b to r/s due to work schedule

## 2023-01-19 ENCOUNTER — TELEPHONE (OUTPATIENT)
Dept: PAIN MEDICINE | Facility: CLINIC | Age: 63
End: 2023-01-19

## 2023-02-10 ENCOUNTER — OFFICE VISIT (OUTPATIENT)
Dept: NEUROSURGERY | Facility: CLINIC | Age: 63
End: 2023-02-10

## 2023-02-10 VITALS
HEIGHT: 71 IN | RESPIRATION RATE: 18 BRPM | TEMPERATURE: 98.3 F | HEART RATE: 106 BPM | SYSTOLIC BLOOD PRESSURE: 134 MMHG | WEIGHT: 238 LBS | DIASTOLIC BLOOD PRESSURE: 98 MMHG | BODY MASS INDEX: 33.32 KG/M2 | OXYGEN SATURATION: 99 %

## 2023-02-10 DIAGNOSIS — M48.061 LUMBAR STENOSIS: Primary | ICD-10-CM

## 2023-02-10 NOTE — PROGRESS NOTES
Neurosurgery Office Note  Ryan Hayden 58 y o  male MRN: [id number]      Assessment/Plan        Problem List Items Addressed This Visit       Visit Diagnoses     Lumbar stenosis    -  Primary            Discussion:  Patient was previously evaluated on 9/15/22  He is a 77-year-old male with h/o chewing tobacco, chronic EtOH use, DM, HLD, HTN, GERD, OA who p/w chronic LBP for years (works in construction) and ~6 months of left leg pain mostly in groin but also intermittently to anterior and medial thigh stopping around left knee (s/p surgical repair, "it didn't heal right" with baseline numbness around left knee) a/w intermittent paresthesias  Never had significant right leg symptoms  No persistent numbness, weakness, gait imbalance, bowel/bladder incontinence, trauma, prior spinal surgery  Taking  mg daily  Started Neurontin on 9/12/22, now on 400 mg BID (tolerating without significant side effects)  Tried PO steroids (initially with significant relief) and Robaxin (only mild relief)  Robaxin has been helpful for the low back pain  Evaluated for the first time by Pain Medicine on 9/12/22 s/p left L3/4 TFESI by Dr Guillaume Barnhart on 9/15/22 with significant sustained relief (>75%)  Today, he reports "nothing is really bothering me right now"  MRI on 9/8/22 showed mild scoliosis, chronic degenerative changes most prominent at L3/4 with disc herniation and bilateral (left > right) lateral recess stenosis, milder stenosis at L4/5, grade I L5/S1 spondylolisthesis  XR flexion/extension on 9/15/22 showed no evidence of dynamic instability  Neurologic exam remains stable, only remarkable for mild L thigh paresthesias around the knee s/p surgery ("it's always been like that since surgery")  Again, I explained that his primary complaint of left groin pain does not specifically correlate with MRI findings  I recommend continuing conservative management per Pain Medicine       Follow up as needed, if/after symptoms worsen or fail to improve with conservative therapies  All questions and concerns were addressed during this visit  CHIEF COMPLAINT    No chief complaint on file  HISTORY    History of Present Illness     58y o  year old male     HPI    See Discussion    REVIEW OF SYSTEMS    Review of Systems   Constitutional: Negative  HENT: Negative  Eyes: Negative  Respiratory: Negative  Cardiovascular: Negative  Gastrointestinal: Negative  Endocrine: Negative  Genitourinary: Positive for frequency (possibly due to meds)  Musculoskeletal: Positive for back pain (LBP L>R -Wraps around left groin down left anterior leg down and stops at the knee    Patient states that these symptoms have sibsided and he rates his pain a 3/10 today ) and gait problem (h/o knee replacement )  Negative for myalgias  Present @ ED on 9/4/22 for back pain   SULTANA 9/2022 provided good relief (75% relief)  PM telemedicine ov 10/27/22     Patient also stated that he is getting the same symptoms on the right side of his back and will be f/u w/ pain man for injections  Meds Advil -PRN, No Previous Spine Sx,   No recent PT        Skin: Negative  Allergic/Immunologic: Negative  Neurological: Positive for weakness (left leg (muscle soreness) )  Negative for numbness  Hematological: Negative  Neg AC    Psychiatric/Behavioral: Negative  Negative for sleep disturbance  All other systems reviewed and are negative          Meds/Allergies     Current Outpatient Medications   Medication Sig Dispense Refill   • ALPRAZolam (XANAX) 1 mg tablet Take 1 tablet (1 mg total) by mouth daily at bedtime as needed for anxiety (Patient not taking: No sig reported) 30 tablet 0   • aspirin (ECOTRIN) 325 mg EC tablet Take 1 tablet by mouth 2 (two) times a day for 30 days (Patient not taking: No sig reported) 60 tablet 0   • atorvastatin (LIPITOR) 40 mg tablet Take 1 tablet (40 mg total) by mouth daily in the early morning 90 tablet 3   • diphenhydrAMINE-acetaminophen (TYLENOL PM)  MG TABS Take 1 tablet by mouth daily at bedtime as needed for sleep     • Empagliflozin (Jardiance) 25 MG TABS Take 1 tablet (25 mg total) by mouth every morning 90 tablet 3   • Exenatide ER (Bydureon BCise) 2 MG/0 85ML AUIJ Inject 2 mg under the skin once a week 10 2 mL 3   • Exenatide ER (Bydureon) 2 MG PEN INJECT THE CONTENTS OF 1  PEN SUBCUTANEOUSLY WEEKLY  ON SUNDAYS (Patient not taking: Reported on 9/15/2022) 12 each 3   • fenofibrate (TRICOR) 145 mg tablet Take 1 tablet (145 mg total) by mouth daily 90 tablet 3   • gabapentin (NEURONTIN) 400 mg capsule Take 1 PO BID  180 capsule 0   • ibuprofen (MOTRIN) 400 mg tablet Take 600 mg by mouth every 6 (six) hours as needed for mild pain Also takes 400mg in the evening     • lisinopril-hydrochlorothiazide (PRINZIDE,ZESTORETIC) 20-12 5 MG per tablet Take 1 tablet by mouth daily 90 tablet 3   • metFORMIN (GLUCOPHAGE) 1000 MG tablet Take 1 tablet (1,000 mg total) by mouth 2 (two) times a day with meals 180 tablet 3   • methocarbamol (ROBAXIN) 500 mg tablet Take 1 tablet (500 mg total) by mouth 4 (four) times a day 60 tablet 5   • oxyCODONE-acetaminophen (Percocet)  mg per tablet Take 1 tablet by mouth every 4 (four) hours as needed for moderate pain Max Daily Amount: 6 tablets 60 tablet 0   • pantoprazole (PROTONIX) 40 mg tablet Take 1 tablet (40 mg total) by mouth daily before breakfast 90 tablet 3   • predniSONE 20 mg tablet TAKE 1 TABLET BID X 5 DAYS THEN TAKE 1 TABLET QD FOR 5 DAYS 15 tablet 0   • predniSONE 20 mg tablet TAKE 1 TABLET BID X 5 DAYS THEN TAKE 1 TABLET QD FOR 5 DAYS (Patient not taking: Reported on 9/15/2022) 15 tablet 0     No current facility-administered medications for this visit         No Known Allergies    PAST HISTORY    Past Medical History:   Diagnosis Date   • Chews tobacco regularly    • Consumes three beers daily    • Controlled diabetes mellitus ([facility] )    • Diabetes ([facility] )    • Esophageal reflux    • GERD (gastroesophageal reflux disease)    • Hyperlipidemia    • Hypertension    • Osteoarthritis     LEFT KNEE   • Primary localized osteoarthritis of left knee     last assessed: 1/16/2018       Past Surgical History:   Procedure Laterality Date   • COLONOSCOPY     • [place] TOTAL KNEE ARTHROPLASTY Left 1/8/2018    Procedure: ARTHROPLASTY KNEE TOTAL;  Surgeon: Ana Lilia Mackenzie MD;  Location:  MAIN OR;  Service: Orthopedics   • REPLACEMENT TOTAL KNEE Left        Social History     Tobacco Use   • Smoking status: Former     Packs/day: 1 00     Years: 11 00     Pack years: 11 00     Types: Cigarettes   • Smokeless tobacco: Current     Types: Chew   • Tobacco comments:     quit 30 + yrs ago    Vaping Use   • Vaping Use: Never used   Substance Use Topics   • Alcohol use: Yes     Alcohol/week: 20 0 standard drinks     Types: 20 Cans of beer per week     Comment: daily beer & "gin & tonics" on a weekend ; social alcohol use ( as per allscripts)   • Drug use: No       Family History   Problem Relation Age of Onset   • Colon cancer Father    • Mental illness Family         mental disorder   • Substance Abuse Neg Hx          The following portions of the patient's history were reviewed in this encounter and updated as appropriate: Past medical, surgical, family, and social history, as well as medications, allergies, and review of systems  EXAM    Vitals:Blood pressure 134/98, pulse (!) 106, temperature 98 3 °F (36 8 °C), temperature source Temporal, resp  rate 18, height 5' 11" (1 803 m), weight 108 kg (238 lb), SpO2 99 %  ,There is no height or weight on file to calculate BMI  Physical Exam  Vitals and nursing note reviewed  Constitutional:       Appearance: Normal appearance  He is normal weight  HENT:      Head: Normocephalic and atraumatic     Eyes:      Extraocular Movements: Extraocular movements intact and EOM normal       Pupils: Pupils are equal, round, and reactive to light  Cardiovascular:      Rate and Rhythm: Normal rate and regular rhythm  Pulses: Normal pulses  Heart sounds: Normal heart sounds  Pulmonary:      Effort: Pulmonary effort is normal       Breath sounds: Normal breath sounds  Abdominal:      General: Abdomen is flat  Palpations: Abdomen is soft  Musculoskeletal:         General: Normal range of motion  Cervical back: Normal range of motion  Neurological:      General: No focal deficit present  Mental Status: He is alert and oriented to person, place, and time  Mental status is at baseline  GCS: GCS eye subscore is 4  GCS verbal subscore is 5  GCS motor subscore is 6  Sensory: Sensation is intact  Motor: Motor strength is normal  Motor function is intact  Coordination: Coordination is intact  Gait: Gait is intact  Deep Tendon Reflexes: Reflexes are normal and symmetric  Psychiatric:         Mood and Affect: Mood normal          Speech: Speech normal          Behavior: Behavior normal          Neurologic Exam     Mental Status   Oriented to person, place, and time  Attention: normal    Speech: speech is normal   Level of consciousness: alert    Cranial Nerves     CN II   Visual fields full to confrontation  Visual acuity: normal  Right visual field deficit: none  Left visual field deficit: none     CN III, IV, VI   Pupils are equal, round, and reactive to light  Extraocular motions are normal    CN III: no CN III palsy  CN VI: no CN VI palsy  Nystagmus: none   Diplopia: none  Ophthalmoparesis: none  Upgaze: normal  Downgaze: normal  Conjugate gaze: present    CN V   Facial sensation intact  Right facial sensation deficit: none  Left facial sensation deficit: none    CN VII   Facial expression full, symmetric     Right facial weakness: none  Left facial weakness: none    CN VIII   CN VIII normal      CN IX, X   CN IX normal    CN X normal    Palate: symmetric    CN XI   CN XI normal    Right sternocleidomastoid strength: normal  Left sternocleidomastoid strength: normal  Right trapezius strength: normal  Left trapezius strength: normal    CN XII   CN XII normal    Tongue deviation: none    Motor Exam   Muscle bulk: normal  Overall muscle tone: normal  Right arm pronator drift: absent  Left arm pronator drift: absent    Strength   Strength 5/5 throughout  Sensory Exam   Light touch normal      Gait, Coordination, and Reflexes     Gait  Gait: normal    Reflexes   Reflexes 2+ except as noted  MEDICAL DECISION MAKING    Imaging Studies:     No results found  I have personally reviewed pertinent films in [facility]    Neurosurgeon

## 2023-02-22 ENCOUNTER — TELEPHONE (OUTPATIENT)
Dept: PAIN MEDICINE | Facility: CLINIC | Age: 63
End: 2023-02-22

## 2023-02-22 DIAGNOSIS — M54.16 LUMBAR RADICULOPATHY: ICD-10-CM

## 2023-02-22 DIAGNOSIS — M48.062 SPINAL STENOSIS OF LUMBAR REGION WITH NEUROGENIC CLAUDICATION: ICD-10-CM

## 2023-02-22 DIAGNOSIS — G89.29 CHRONIC LEFT-SIDED LOW BACK PAIN WITHOUT SCIATICA: ICD-10-CM

## 2023-02-22 DIAGNOSIS — M51.26 HERNIATED NUCLEUS PULPOSUS, L3-4 LEFT: ICD-10-CM

## 2023-02-22 DIAGNOSIS — M54.50 CHRONIC LEFT-SIDED LOW BACK PAIN WITHOUT SCIATICA: ICD-10-CM

## 2023-02-22 RX ORDER — GABAPENTIN 400 MG/1
CAPSULE ORAL
Qty: 180 CAPSULE | Refills: 0 | Status: SHIPPED | OUTPATIENT
Start: 2023-02-22

## 2023-02-22 NOTE — TELEPHONE ENCOUNTER
Pt contacted Call Center requested refill of their medication  Medication Name: Gabapentin      Dosage of Med: 400 mg       Frequency of Med: 1 PO BID      Remaining Medication: 1 wk supply      Pharmacy and Location: [facility] on file        Pt  Preferred Callback Phone Number: [phone]      Thank you

## 2023-02-22 NOTE — TELEPHONE ENCOUNTER
I sent another 90 day supply to the mail order on file  He should continue the Gabapentin and we will f/u as scheduled next month  Thank you

## 2023-02-22 NOTE — TELEPHONE ENCOUNTER
S/w pt, confirmed gabapentin 400 mg bid w/ + improvement, no se's  Pt stated that he does have improvement of his initial pain symptoms but now he has pain on the other side  Pt stated that he feels the gabapentin must be helping and he has been advised by neurosurgery to continue this medication, but he doesn't know what it does  Advised pt, the writer would recommend a fu ov to discuss his new pain and gabapentin  Pt verbalized agreement and scheduled fu on 3/29 at 0645 to accommodate his work schedule  Advised pt, anticipate this rx will be sent to the pharmacy later today  The writer will cb if there is anything different or additional  Pt verbalized understanding and appreciation

## 2023-03-01 ENCOUNTER — TELEPHONE (OUTPATIENT)
Dept: FAMILY MEDICINE CLINIC | Facility: CLINIC | Age: 63
End: 2023-03-01

## 2023-03-01 NOTE — TELEPHONE ENCOUNTER
Patient is getting his tooth extracted and he would like antibiotics to take beforehand so he doesn't get an infection

## 2023-03-01 NOTE — TELEPHONE ENCOUNTER
Caller: pt  Call back#: [phone]  Doctor: ZAHIRA    Reason for call: pt is calling in stating that [address] home delivery never received his script for gabapentin  I made pt aware that the script was sent to [facility] instead   Pt said that he is running out so he will pick the script up at [facility]  Please send another script over to [facility] home delivery thank you     Armand Chao [address], [address]

## 2023-03-01 NOTE — TELEPHONE ENCOUNTER
S/w pt, stated that he has 8 - 10 pills of gabapentin on hand, is taking 1 pill / day to ensure he does not run out  Pt stated that he will fill 1 week supply at [facility]  Pt stated that he will  the rx tomorrow and call the office to advise  Advised pt, this office will sent a 90 days supply to [facility] [address] once the pt has picked up his rx at [facility]  Pt verbalized understanding and appreciation  Will await pt's cb

## 2023-03-02 DIAGNOSIS — K08.89 TOOTH ACHE: Primary | ICD-10-CM

## 2023-03-02 RX ORDER — AMOXICILLIN 500 MG/1
CAPSULE ORAL
Qty: 30 CAPSULE | Refills: 0 | Status: SHIPPED | OUTPATIENT
Start: 2023-03-02 | End: 2023-03-03

## 2023-03-03 NOTE — TELEPHONE ENCOUNTER
S/w pt, following up to gabapentin rx  Questioned if the pt has picked up his rx at [facility] and does he want this office to send a rx to [facility]  Pt stated that he did not realize how cheap gabapentin was - he will continue with the rx and refills sent to [facility] and proceed with his ov at the end of the month  Advised pt to cb if there is any question or change in the plan as discussed  Pt verbalized understanding and appreciation

## 2023-03-29 ENCOUNTER — OFFICE VISIT (OUTPATIENT)
Dept: PAIN MEDICINE | Facility: CLINIC | Age: 63
End: 2023-03-29

## 2023-03-29 VITALS
TEMPERATURE: 98.2 F | HEART RATE: 95 BPM | BODY MASS INDEX: 33.19 KG/M2 | DIASTOLIC BLOOD PRESSURE: 78 MMHG | SYSTOLIC BLOOD PRESSURE: 138 MMHG | HEIGHT: 71 IN

## 2023-03-29 DIAGNOSIS — M47.816 LUMBAR SPONDYLOSIS: ICD-10-CM

## 2023-03-29 DIAGNOSIS — G89.29 CHRONIC BILATERAL LOW BACK PAIN WITHOUT SCIATICA: Primary | ICD-10-CM

## 2023-03-29 DIAGNOSIS — M48.062 SPINAL STENOSIS OF LUMBAR REGION WITH NEUROGENIC CLAUDICATION: ICD-10-CM

## 2023-03-29 DIAGNOSIS — M54.16 LUMBAR RADICULOPATHY: ICD-10-CM

## 2023-03-29 DIAGNOSIS — M54.50 CHRONIC BILATERAL LOW BACK PAIN WITHOUT SCIATICA: Primary | ICD-10-CM

## 2023-03-29 DIAGNOSIS — M79.18 MYOFASCIAL PAIN SYNDROME: ICD-10-CM

## 2023-03-29 DIAGNOSIS — M51.26 HERNIATED NUCLEUS PULPOSUS, L3-4 LEFT: ICD-10-CM

## 2023-03-29 RX ORDER — GABAPENTIN 400 MG/1
CAPSULE ORAL
Qty: 180 CAPSULE | Refills: 1 | Status: SHIPPED | OUTPATIENT
Start: 2023-03-29

## 2023-03-29 NOTE — PROGRESS NOTES
Assessment:  1  Chronic bilateral low back pain without sciatica    2  Lumbar radiculopathy    3  Myofascial pain syndrome    4  Herniated nucleus pulposus, L3-4 left    5  Lumbar spondylosis    6  Spinal stenosis of lumbar region with neurogenic claudication        Plan:  While the patient was in the office today, I discussed with the patient that with regards to his chronic right-sided pain and symptoms, it is of my opinion that it is significantly myofascial in nature and most likely related to underlying lumbar spondylosis and the fact that it is worse in the morning and gets better once he gets up and moving around specially he does stretching, only further confirms my suspicion that it is myofascial in nature  I did discuss with the patient that I feel he may benefit from trigger point injections and did offer to perform trigger point injections today  However, after discussing what is all involved trigger point injections, for now, because the patient did not feel his pain warranted injections or procedures he is going to hold off, but he understands that if his symptoms would worsen or he would like to proceed with the trigger point injections in the future, he should call our office and we will proceed from there as appropriate  The patient was agreeable and verbalized an understanding  With regards to the right-sided low back pain I encouraged the patient to utilize 20 minutes of low heat daily with simple stretching after the low heat and that if he can make this part of his routine that I was confident it would definitely be helpful  I also encouraged the patient to try to take the methocarbamol at least 1 pill every night as may be that would help him sleep better and wake up with not as much spasms or tightness and make it even more manageable overall  The patient was agreeable and verbalized an understanding      I did review with the patient that the gabapentin is a neuropathic medication that we are using to try to keep everything calm and since he feels it has been helpful, without side effects, I think it is in his best interest to try to stay on the gabapentin for the next 3 to 6 months and then we can look at start titrating down to see if he still needs to be on it  However, if he decides he would like to come off of the gabapentin before his next office visit, he should call our office and we can give him instructions on how to do so  The patient was agreeable and verbalized an understanding  The patient will follow-up in 6 months for medication prescription refill and reevaluation  The patient was advised to contact the office should their symptoms worsen in the interim  The patient was agreeable and verbalized an understanding  History of Present Illness: The patient is a 61 y o  male last seen on 10/27/2022 who presents for a follow up office visit in regards to chronic low back pain with radiculopathy secondary to herniated lumbar disc, spondylosis, stenosis and myofascial pain  The patient currently reports that since his last office visit as he was last seen for a procedure which was a left L3 and L4 transforaminal epidural steroid injection on September 15, 2020 with Dr Glenn Clement, he reports that the left-sided low back pain and radicular symptoms remain significantly improved and stable as a result of the injection  The patient presents today for evaluation of his chronic right-sided low back pain which she describes as a chronic dull aching pain more lateral to his spine and does not travel down his leg and is more in his flank    The patient reports that he has had this pain for years and was wondering if maybe an injection or procedure could be helpful as he denies any radicular symptoms and reports that the pain is tolerable and is typically worse first thing in the morning and once he gets up and moving around it deftly seems to get better and more tolerable during the day and when he has a bad day he takes the methocarbamol which definitely seems to help  The patient also continues with the gabapentin as prescribed and had questions as to why he is still taking the medication, what it is for, and how would he come off of it if he wanted to in the future  Current pain medications includes: Gabapentin 400 mg twice daily and methocarbamol 500 mg as needed for pain and spasms  The patient reports that this regimen is providing 50% pain relief  The patient is reporting no side effects from this pain medication regimen  I have personally reviewed and/or updated the patient's past medical history, past surgical history, family history, social history, current medications, allergies, and vital signs today  Review of Systems:    Review of Systems   Respiratory: Negative for shortness of breath  Cardiovascular: Negative for chest pain  Gastrointestinal: Negative for constipation, diarrhea, nausea and vomiting  Musculoskeletal: Positive for gait problem  Negative for arthralgias, joint swelling and myalgias  Skin: Negative for rash  Neurological: Positive for weakness  Negative for dizziness and seizures  All other systems reviewed and are negative          Past Medical History:   Diagnosis Date   • Chews tobacco regularly    • Consumes three beers daily    • Controlled diabetes mellitus ([address] )    • Diabetes ([address] )    • Esophageal reflux    • GERD (gastroesophageal reflux disease)    • Hyperlipidemia    • Hypertension    • Osteoarthritis     LEFT KNEE   • Primary localized osteoarthritis of left knee     last assessed: 1/16/2018       Past Surgical History:   Procedure Laterality Date   • COLONOSCOPY     • [place] ARTHRP KNE CONDYLE&PLATU MEDIAL&LAT COMPARTMENTS Left 1/8/2018    Procedure: ARTHROPLASTY KNEE TOTAL;  Surgeon: Laurita Salomon MD;  Location: [facility] OR;  Service: Orthopedics   • REPLACEMENT TOTAL KNEE Left        Family History   Problem Relation Age of Onset   • "Colon cancer Father    • Mental illness Family         mental disorder   • Substance Abuse Neg Hx        Social History     Occupational History   • Not on file   Tobacco Use   • Smoking status: Former     Packs/day: 1 00     Years: 11 00     Pack years: 11 00     Types: Cigarettes   • Smokeless tobacco: Current     Types: Chew   • Tobacco comments:     quit 30 + yrs ago    Vaping Use   • Vaping Use: Never used   Substance and Sexual Activity   • Alcohol use:  Yes     Alcohol/week: 20 0 standard drinks     Types: 20 Cans of beer per week     Comment: daily beer & \"gin & tonics\" on a weekend ; social alcohol use ( as per allscripts)   • Drug use: No   • Sexual activity: Not on file         Current Outpatient Medications:   •  atorvastatin (LIPITOR) 40 mg tablet, Take 1 tablet (40 mg total) by mouth daily in the early morning, Disp: 90 tablet, Rfl: 3  •  diphenhydrAMINE-acetaminophen (TYLENOL PM)  MG TABS, Take 1 tablet by mouth daily at bedtime as needed for sleep, Disp: , Rfl:   •  Exenatide ER (Bydureon BCise) 2 MG/0 85ML AUIJ, Inject 2 mg under the skin once a week, Disp: 10 2 mL, Rfl: 3  •  Exenatide ER (Bydureon) 2 MG PEN, INJECT THE CONTENTS OF 1  PEN SUBCUTANEOUSLY WEEKLY  ON SUNDAYS, Disp: 12 each, Rfl: 3  •  fenofibrate (TRICOR) 145 mg tablet, Take 1 tablet (145 mg total) by mouth daily, Disp: 90 tablet, Rfl: 3  •  gabapentin (NEURONTIN) 400 mg capsule, Take 1 PO BID , Disp: 180 capsule, Rfl: 1  •  ibuprofen (MOTRIN) 400 mg tablet, Take 600 mg by mouth every 6 (six) hours as needed for mild pain Also takes 400mg in the evening, Disp: , Rfl:   •  lisinopril-hydrochlorothiazide (PRINZIDE,ZESTORETIC) 20-12 5 MG per tablet, Take 1 tablet by mouth daily, Disp: 90 tablet, Rfl: 3  •  metFORMIN (GLUCOPHAGE) 1000 MG tablet, Take 1 tablet (1,000 mg total) by mouth 2 (two) times a day with meals, Disp: 180 tablet, Rfl: 3  •  methocarbamol (ROBAXIN) 500 mg tablet, Take 1 tablet (500 mg total) by mouth 4 (four) " "times a day, Disp: 60 tablet, Rfl: 5  •  pantoprazole (PROTONIX) 40 mg tablet, Take 1 tablet (40 mg total) by mouth daily before breakfast, Disp: 90 tablet, Rfl: 3  •  ALPRAZolam (XANAX) 1 mg tablet, Take 1 tablet (1 mg total) by mouth daily at bedtime as needed for anxiety (Patient not taking: Reported on 9/12/2022), Disp: 30 tablet, Rfl: 0  •  aspirin (ECOTRIN) 325 mg EC tablet, Take 1 tablet by mouth 2 (two) times a day for 30 days, Disp: 60 tablet, Rfl: 0  •  Empagliflozin (Jardiance) 25 MG TABS, Take 1 tablet (25 mg total) by mouth every morning (Patient not taking: Reported on 3/29/2023), Disp: 90 tablet, Rfl: 3    No Known Allergies    Physical Exam:    /78 (BP Location: Left arm, Patient Position: Sitting, Cuff Size: Large)   Pulse 95   Temp 98 2 °F (36 8 °C)   Ht 5' 11\" (1 803 m)   BMI 33 19 kg/m²     Constitutional:normal, well developed, well nourished, alert, in no distress and non-toxic and no overt pain behavior  and overweight  Eyes:anicteric  HEENT:grossly intact  Neck:supple, symmetric, trachea midline and no masses   Pulmonary:even and unlabored  Cardiovascular:No edema or pitting edema present  Skin:Normal without rashes or lesions and well hydrated  Psychiatric:Mood and affect appropriate  Neurologic:Cranial Nerves II-XII grossly intact  Musculoskeletal:The patient's gait is slightly antalgic, but steady without the use of any assistive device  Mild to moderate tenderness noted upon exam of the right lumbosacral spine and paravertebral musculature with trigger points noted upon exam   No tenderness noted over the right SI joint or facet joints  Imaging  No orders to display         No orders of the defined types were placed in this encounter      "

## 2023-07-17 DIAGNOSIS — R52 PAIN: ICD-10-CM

## 2023-07-17 RX ORDER — METHOCARBAMOL 500 MG/1
500 TABLET, FILM COATED ORAL 4 TIMES DAILY
Qty: 60 TABLET | Refills: 5 | Status: SHIPPED | OUTPATIENT
Start: 2023-07-17

## 2023-09-05 DIAGNOSIS — E11.9 TYPE 2 DIABETES MELLITUS WITHOUT COMPLICATION, WITH LONG-TERM CURRENT USE OF INSULIN (HCC): ICD-10-CM

## 2023-09-05 DIAGNOSIS — Z79.4 TYPE 2 DIABETES MELLITUS WITHOUT COMPLICATION, WITH LONG-TERM CURRENT USE OF INSULIN (HCC): ICD-10-CM

## 2023-09-05 DIAGNOSIS — K21.00 GASTROESOPHAGEAL REFLUX DISEASE WITH ESOPHAGITIS WITHOUT HEMORRHAGE: ICD-10-CM

## 2023-09-06 RX ORDER — PANTOPRAZOLE SODIUM 40 MG/1
TABLET, DELAYED RELEASE ORAL
Qty: 90 TABLET | Refills: 0 | Status: SHIPPED | OUTPATIENT
Start: 2023-09-06

## 2023-09-18 ENCOUNTER — VBI (OUTPATIENT)
Dept: ADMINISTRATIVE | Facility: OTHER | Age: 63
End: 2023-09-18

## 2023-09-18 NOTE — TELEPHONE ENCOUNTER
09/18/23 1:11 PM    The patient was called and a message was left to call the provider's office. Please review Cologuard completion with the patient. Thank you.   Viktor Riley  [facility]

## 2023-09-19 NOTE — ANESTHESIA PROCEDURE NOTES
Peripheral Block    Patient location during procedure: pre-op  Start time: 1/8/2018 7:13 AM  End time: 1/8/2018 7:20 AM  Staffing  Anesthesiologist: Betty Bullock  Performed: anesthesiologist   Preanesthetic Checklist  Completed: patient identified, site marked, surgical consent, pre-op evaluation, timeout performed, IV checked, risks and benefits discussed and monitors and equipment checked  Peripheral Block  Patient position: supine  Prep: Betadine  Patient monitoring: heart rate, continuous pulse ox and frequent blood pressure checks  Block type: adductor canal block  Laterality: left  Injection technique: single-shot  Procedures: ultrasound guided  ultrasound permanent image saved    Local infiltration: ropivacaine  Infiltration strength: 0 5 %  Dose: 35 mL  Needle  Needle type: Stimuplex   Needle gauge: 22 G  Needle length: 10 cm  Needle localization: anatomical landmarks and ultrasound guidance  Test dose: negative  Assessment  Injection assessment: incremental injection, local visualized surrounding nerve on ultrasound, negative aspiration for heme and no paresthesia on injection  Heart rate change: no  Slow fractionated injection: yes  Post-procedure:  site cleaned  patient tolerated the procedure well with no immediate complications
19-Sep-2023 05:29

## 2023-10-16 ENCOUNTER — APPOINTMENT (OUTPATIENT)
Dept: RADIOLOGY | Facility: HOSPITAL | Age: 63
DRG: 282 | End: 2023-10-16
Payer: COMMERCIAL

## 2023-10-16 ENCOUNTER — HOSPITAL ENCOUNTER (INPATIENT)
Facility: HOSPITAL | Age: 63
LOS: 1 days | DRG: 282 | End: 2023-10-17
Attending: EMERGENCY MEDICINE | Admitting: STUDENT IN AN ORGANIZED HEALTH CARE EDUCATION/TRAINING PROGRAM
Payer: COMMERCIAL

## 2023-10-16 DIAGNOSIS — R07.9 CHEST PAIN: Primary | ICD-10-CM

## 2023-10-16 DIAGNOSIS — I21.4 NSTEMI (NON-ST ELEVATED MYOCARDIAL INFARCTION) (HCC): ICD-10-CM

## 2023-10-16 PROBLEM — F10.10 ALCOHOL ABUSE: Status: ACTIVE | Noted: 2023-10-16

## 2023-10-16 PROBLEM — Z72.0 TOBACCO ABUSE: Status: ACTIVE | Noted: 2023-10-16

## 2023-10-16 LAB
2HR DELTA HS TROPONIN: 38 NG/L
4HR DELTA HS TROPONIN: 25 NG/L
ALBUMIN SERPL BCP-MCNC: 4.5 G/DL (ref 3.5–5)
ALP SERPL-CCNC: 62 U/L (ref 34–104)
ALT SERPL W P-5'-P-CCNC: 23 U/L (ref 7–52)
ANION GAP SERPL CALCULATED.3IONS-SCNC: 9 MMOL/L
APTT PPP: 26 SECONDS (ref 23–37)
AST SERPL W P-5'-P-CCNC: 19 U/L (ref 13–39)
BASOPHILS # BLD AUTO: 0.11 THOUSANDS/ÂΜL (ref 0–0.1)
BASOPHILS NFR BLD AUTO: 2 % (ref 0–1)
BILIRUB SERPL-MCNC: 0.44 MG/DL (ref 0.2–1)
BUN SERPL-MCNC: 15 MG/DL (ref 5–25)
CALCIUM SERPL-MCNC: 9.7 MG/DL (ref 8.4–10.2)
CARDIAC TROPONIN I PNL SERPL HS: 123 NG/L
CARDIAC TROPONIN I PNL SERPL HS: 136 NG/L
CARDIAC TROPONIN I PNL SERPL HS: 98 NG/L
CHLORIDE SERPL-SCNC: 107 MMOL/L (ref 96–108)
CO2 SERPL-SCNC: 22 MMOL/L (ref 21–32)
CREAT SERPL-MCNC: 1.02 MG/DL (ref 0.6–1.3)
EOSINOPHIL # BLD AUTO: 0.15 THOUSAND/ÂΜL (ref 0–0.61)
EOSINOPHIL NFR BLD AUTO: 2 % (ref 0–6)
ERYTHROCYTE [DISTWIDTH] IN BLOOD BY AUTOMATED COUNT: 12.5 % (ref 11.6–15.1)
GFR SERPL CREATININE-BSD FRML MDRD: 77 ML/MIN/1.73SQ M
GLUCOSE SERPL-MCNC: 224 MG/DL (ref 65–140)
HCT VFR BLD AUTO: 48.6 % (ref 36.5–49.3)
HGB BLD-MCNC: 15.9 G/DL (ref 12–17)
IMM GRANULOCYTES # BLD AUTO: 0.05 THOUSAND/UL (ref 0–0.2)
IMM GRANULOCYTES NFR BLD AUTO: 1 % (ref 0–2)
INR PPP: 0.91 (ref 0.84–1.19)
LYMPHOCYTES # BLD AUTO: 2.05 THOUSANDS/ÂΜL (ref 0.6–4.47)
LYMPHOCYTES NFR BLD AUTO: 27 % (ref 14–44)
MCH RBC QN AUTO: 30.1 PG (ref 26.8–34.3)
MCHC RBC AUTO-ENTMCNC: 32.7 G/DL (ref 31.4–37.4)
MCV RBC AUTO: 92 FL (ref 82–98)
MONOCYTES # BLD AUTO: 0.78 THOUSAND/ÂΜL (ref 0.17–1.22)
MONOCYTES NFR BLD AUTO: 10 % (ref 4–12)
NEUTROPHILS # BLD AUTO: 4.4 THOUSANDS/ÂΜL (ref 1.85–7.62)
NEUTS SEG NFR BLD AUTO: 58 % (ref 43–75)
NRBC BLD AUTO-RTO: 0 /100 WBCS
PLATELET # BLD AUTO: 215 THOUSANDS/UL (ref 149–390)
PMV BLD AUTO: 11.6 FL (ref 8.9–12.7)
POTASSIUM SERPL-SCNC: 4 MMOL/L (ref 3.5–5.3)
PROT SERPL-MCNC: 7.2 G/DL (ref 6.4–8.4)
PROTHROMBIN TIME: 12.6 SECONDS (ref 11.6–14.5)
RBC # BLD AUTO: 5.28 MILLION/UL (ref 3.88–5.62)
SODIUM SERPL-SCNC: 138 MMOL/L (ref 135–147)
WBC # BLD AUTO: 7.54 THOUSAND/UL (ref 4.31–10.16)

## 2023-10-16 PROCEDURE — 84484 ASSAY OF TROPONIN QUANT: CPT | Performed by: EMERGENCY MEDICINE

## 2023-10-16 PROCEDURE — 96374 THER/PROPH/DIAG INJ IV PUSH: CPT

## 2023-10-16 PROCEDURE — 85610 PROTHROMBIN TIME: CPT | Performed by: EMERGENCY MEDICINE

## 2023-10-16 PROCEDURE — 99291 CRITICAL CARE FIRST HOUR: CPT | Performed by: EMERGENCY MEDICINE

## 2023-10-16 PROCEDURE — 83036 HEMOGLOBIN GLYCOSYLATED A1C: CPT | Performed by: PHYSICIAN ASSISTANT

## 2023-10-16 PROCEDURE — 96375 TX/PRO/DX INJ NEW DRUG ADDON: CPT

## 2023-10-16 PROCEDURE — 93005 ELECTROCARDIOGRAM TRACING: CPT

## 2023-10-16 PROCEDURE — 71046 X-RAY EXAM CHEST 2 VIEWS: CPT

## 2023-10-16 PROCEDURE — 80053 COMPREHEN METABOLIC PANEL: CPT | Performed by: EMERGENCY MEDICINE

## 2023-10-16 PROCEDURE — 85730 THROMBOPLASTIN TIME PARTIAL: CPT | Performed by: EMERGENCY MEDICINE

## 2023-10-16 PROCEDURE — 85025 COMPLETE CBC W/AUTO DIFF WBC: CPT | Performed by: EMERGENCY MEDICINE

## 2023-10-16 PROCEDURE — 36415 COLL VENOUS BLD VENIPUNCTURE: CPT

## 2023-10-16 PROCEDURE — 99285 EMERGENCY DEPT VISIT HI MDM: CPT

## 2023-10-16 PROCEDURE — 99223 1ST HOSP IP/OBS HIGH 75: CPT | Performed by: PHYSICIAN ASSISTANT

## 2023-10-16 RX ORDER — PANTOPRAZOLE SODIUM 40 MG/1
40 TABLET, DELAYED RELEASE ORAL
Status: DISCONTINUED | OUTPATIENT
Start: 2023-10-17 | End: 2023-10-17 | Stop reason: HOSPADM

## 2023-10-16 RX ORDER — LANOLIN ALCOHOL/MO/W.PET/CERES
100 CREAM (GRAM) TOPICAL DAILY
Status: DISCONTINUED | OUTPATIENT
Start: 2023-10-17 | End: 2023-10-17 | Stop reason: HOSPADM

## 2023-10-16 RX ORDER — NITROGLYCERIN 0.4 MG/1
0.4 TABLET SUBLINGUAL
Status: DISCONTINUED | OUTPATIENT
Start: 2023-10-16 | End: 2023-10-17 | Stop reason: HOSPADM

## 2023-10-16 RX ORDER — FENOFIBRATE 145 MG/1
145 TABLET, COATED ORAL DAILY
Status: DISCONTINUED | OUTPATIENT
Start: 2023-10-17 | End: 2023-10-17 | Stop reason: HOSPADM

## 2023-10-16 RX ORDER — INSULIN LISPRO 100 [IU]/ML
1-6 INJECTION, SOLUTION INTRAVENOUS; SUBCUTANEOUS
Status: DISCONTINUED | OUTPATIENT
Start: 2023-10-16 | End: 2023-10-17 | Stop reason: HOSPADM

## 2023-10-16 RX ORDER — HEPARIN SODIUM 10000 [USP'U]/100ML
3-20 INJECTION, SOLUTION INTRAVENOUS
Status: DISCONTINUED | OUTPATIENT
Start: 2023-10-16 | End: 2023-10-17 | Stop reason: HOSPADM

## 2023-10-16 RX ORDER — GABAPENTIN 400 MG/1
400 CAPSULE ORAL 2 TIMES DAILY
Status: DISCONTINUED | OUTPATIENT
Start: 2023-10-16 | End: 2023-10-17 | Stop reason: HOSPADM

## 2023-10-16 RX ORDER — LISINOPRIL 20 MG/1
20 TABLET ORAL DAILY
Status: DISCONTINUED | OUTPATIENT
Start: 2023-10-17 | End: 2023-10-17 | Stop reason: HOSPADM

## 2023-10-16 RX ORDER — ATORVASTATIN CALCIUM 40 MG/1
40 TABLET, FILM COATED ORAL
Status: DISCONTINUED | OUTPATIENT
Start: 2023-10-16 | End: 2023-10-17

## 2023-10-16 RX ORDER — NICOTINE 21 MG/24HR
1 PATCH, TRANSDERMAL 24 HOURS TRANSDERMAL DAILY PRN
Status: DISCONTINUED | OUTPATIENT
Start: 2023-10-16 | End: 2023-10-17 | Stop reason: HOSPADM

## 2023-10-16 RX ORDER — ASPIRIN 81 MG/1
324 TABLET, CHEWABLE ORAL ONCE
Status: COMPLETED | OUTPATIENT
Start: 2023-10-16 | End: 2023-10-16

## 2023-10-16 RX ORDER — HEPARIN SODIUM 1000 [USP'U]/ML
4000 INJECTION, SOLUTION INTRAVENOUS; SUBCUTANEOUS ONCE
Status: COMPLETED | OUTPATIENT
Start: 2023-10-16 | End: 2023-10-16

## 2023-10-16 RX ORDER — FOLIC ACID 1 MG/1
1 TABLET ORAL DAILY
Status: DISCONTINUED | OUTPATIENT
Start: 2023-10-17 | End: 2023-10-17 | Stop reason: HOSPADM

## 2023-10-16 RX ORDER — HEPARIN SODIUM 1000 [USP'U]/ML
4000 INJECTION, SOLUTION INTRAVENOUS; SUBCUTANEOUS EVERY 6 HOURS PRN
Status: DISCONTINUED | OUTPATIENT
Start: 2023-10-16 | End: 2023-10-17 | Stop reason: HOSPADM

## 2023-10-16 RX ORDER — INSULIN LISPRO 100 [IU]/ML
1-6 INJECTION, SOLUTION INTRAVENOUS; SUBCUTANEOUS
Status: DISCONTINUED | OUTPATIENT
Start: 2023-10-17 | End: 2023-10-17 | Stop reason: HOSPADM

## 2023-10-16 RX ORDER — ACETAMINOPHEN 325 MG/1
650 TABLET ORAL EVERY 6 HOURS PRN
Status: DISCONTINUED | OUTPATIENT
Start: 2023-10-16 | End: 2023-10-17 | Stop reason: HOSPADM

## 2023-10-16 RX ORDER — HEPARIN SODIUM 1000 [USP'U]/ML
2000 INJECTION, SOLUTION INTRAVENOUS; SUBCUTANEOUS EVERY 6 HOURS PRN
Status: DISCONTINUED | OUTPATIENT
Start: 2023-10-16 | End: 2023-10-17 | Stop reason: HOSPADM

## 2023-10-16 RX ORDER — METHOCARBAMOL 500 MG/1
500 TABLET, FILM COATED ORAL EVERY 6 HOURS PRN
Status: DISCONTINUED | OUTPATIENT
Start: 2023-10-16 | End: 2023-10-17 | Stop reason: HOSPADM

## 2023-10-16 RX ADMIN — ATORVASTATIN CALCIUM 40 MG: 40 TABLET, FILM COATED ORAL at 19:59

## 2023-10-16 RX ADMIN — HEPARIN SODIUM 11.1 UNITS/KG/HR: 10000 INJECTION, SOLUTION INTRAVENOUS at 17:56

## 2023-10-16 RX ADMIN — GABAPENTIN 400 MG: 400 CAPSULE ORAL at 19:59

## 2023-10-16 RX ADMIN — ASPIRIN 81 MG CHEWABLE TABLET 324 MG: 81 TABLET CHEWABLE at 17:42

## 2023-10-16 RX ADMIN — HEPARIN SODIUM 4000 UNITS: 1000 INJECTION INTRAVENOUS; SUBCUTANEOUS at 17:58

## 2023-10-16 NOTE — ASSESSMENT & PLAN NOTE
Patient reports daily alcohol use, during the week approximately 2 beers daily and on weekends approximately 4 gin and tonics  Denies issues with alcohol withdrawal previously  Monitor via CIWA protocol  Thiamine, folic acid, multivitamin

## 2023-10-16 NOTE — ASSESSMENT & PLAN NOTE
Lab Results   Component Value Date    HGBA1C 7.6 (A) 06/27/2022       No results for input(s): "POCGLU" in the last 72 hours.     Blood Sugar Average: Last 72 hrs:  Hold oral agents while inpatient  Update hemoglobin A1c  SSI plus Accu-Chek  Diabetic diet

## 2023-10-16 NOTE — ASSESSMENT & PLAN NOTE
Presented to the emergency department due to intermittent chest pain, most recently occurred yesterday while doing yard work  CXR: No acute cardiopulmonary disease, follow-up radiology read  EKG without ischemic changes  Initial troponin 98 -follow-up 2-hr, 4-hr  ED attending discussed with cardiology -given that patient is currently chest pain-free, okay to remain at [place]  S/p ASA, started on heparin gtt  Continue statin  Monitor on telemetry  JACOBO score of 2 - will order inpatient stress test  Cardiology consult

## 2023-10-16 NOTE — ED PROVIDER NOTES
History  Chief Complaint   Patient presents with    Chest Pain     Pt states he has had this tightness intermittently over the summer, but that it started again yesterday while doing yard work. Pt denies dizziness, lightheadedness, n/v with these symptoms. 69-year-old male with a history of hypertension, hyperlipidemia and diabetes presents for evaluation of chest pressure that he last felt in this morning. Patient states he initially felt chest pressure yesterday while he was moving kayaks around which resolved spontaneously. Patient denies any known history of prior ACS although he did have intermittent chest pain episodes throughout the last few months. Denies associated shortness of breath, diaphoresis. Currently symptom-free. Prior to Admission Medications   Prescriptions Last Dose Informant Patient Reported? Taking?    ALPRAZolam (XANAX) 1 mg tablet   No No   Sig: Take 1 tablet (1 mg total) by mouth daily at bedtime as needed for anxiety   Patient not taking: Reported on 9/12/2022   Empagliflozin (Jardiance) 25 MG TABS   No No   Sig: Take 1 tablet (25 mg total) by mouth every morning   Patient not taking: Reported on 3/29/2023   Exenatide ER (Bydureon BCise) 2 MG/0.85ML AUIJ   No No   Sig: Inject 2 mg under the skin once a week   Exenatide ER (Bydureon) 2 MG PEN   No No   Sig: INJECT THE CONTENTS OF 1  PEN SUBCUTANEOUSLY WEEKLY  ON SUNDAYS   aspirin (ECOTRIN) 325 mg EC tablet   No No   Sig: Take 1 tablet by mouth 2 (two) times a day for 30 days   atorvastatin (LIPITOR) 40 mg tablet   No No   Sig: Take 1 tablet (40 mg total) by mouth daily in the early morning   diphenhydrAMINE-acetaminophen (TYLENOL PM)  MG TABS  Self Yes No   Sig: Take 1 tablet by mouth daily at bedtime as needed for sleep   fenofibrate (TRICOR) 145 mg tablet   No No   Sig: Take 1 tablet (145 mg total) by mouth daily   gabapentin (NEURONTIN) 400 mg capsule   No No   Sig: Take 1 PO BID.   ibuprofen (MOTRIN) 400 mg tablet Self Yes No   Sig: Take 600 mg by mouth every 6 (six) hours as needed for mild pain Also takes 400mg in the evening   lisinopril-hydrochlorothiazide (PRINZIDE,ZESTORETIC) 20-12.5 MG per tablet   No No   Sig: Take 1 tablet by mouth daily   metFORMIN (GLUCOPHAGE) 1000 MG tablet   No No   Sig: TAKE 1 TABLET BY MOUTH TWICE DAILY WITH MEALS   methocarbamol (ROBAXIN) 500 mg tablet   No No   Sig: Take 1 tablet (500 mg total) by mouth 4 (four) times a day   pantoprazole (PROTONIX) 40 mg tablet   No No   Sig: TAKE 1 TABLET BY MOUTH ONCE DAILY BEFORE BREAKFAST      Facility-Administered Medications: None       Past Medical History:   Diagnosis Date    Chews tobacco regularly     Consumes three beers daily     Controlled diabetes mellitus (HCC)     Diabetes ([address])     Esophageal reflux     GERD (gastroesophageal reflux disease)     Hyperlipidemia     Hypertension     Osteoarthritis     LEFT KNEE    Primary localized osteoarthritis of left knee     last assessed: 1/16/2018       Past Surgical History:   Procedure Laterality Date    COLONOSCOPY      [facility] ARTHRP KNE CONDYLE&PLATU MEDIAL&LAT COMPARTMENTS Left 1/8/2018    Procedure: ARTHROPLASTY KNEE TOTAL;  Surgeon: Tana Garcia MD;  Location: [facility] OR;  Service: Orthopedics    REPLACEMENT TOTAL KNEE Left        Family History   Problem Relation Age of Onset    Colon cancer Father     Mental illness Family         mental disorder    Substance Abuse Neg Hx      I have reviewed and agree with the history as documented. E-Cigarette/Vaping    E-Cigarette Use Never User      E-Cigarette/Vaping Substances     Social History     Tobacco Use    Smoking status: Former     Packs/day: 1.00     Years: 11.00     Total pack years: 11.00     Types: Cigarettes    Smokeless tobacco: Current     Types: Chew    Tobacco comments:     quit 30 + yrs ago    Vaping Use    Vaping Use: Never used   Substance Use Topics    Alcohol use:  Yes     Alcohol/week: 20.0 standard drinks of alcohol     Types: 20 Cans of beer per week     Comment: daily beer & "gin & tonics" on a weekend ; social alcohol use ( as per allscripts)    Drug use: No       Review of Systems   Respiratory:  Positive for chest tightness. Physical Exam  Physical Exam  Vitals and nursing note reviewed. Constitutional:       General: He is not in acute distress. Appearance: He is well-developed. HENT:      Head: Normocephalic and atraumatic. Right Ear: External ear normal.      Left Ear: External ear normal.      Nose: Nose normal.   Eyes:      General: No scleral icterus. Pulmonary:      Effort: Pulmonary effort is normal. No respiratory distress. Abdominal:      General: There is no distension. Palpations: Abdomen is soft. Musculoskeletal:         General: No deformity. Normal range of motion. Cervical back: Normal range of motion and neck supple. Skin:     General: Skin is warm. Findings: No rash. Neurological:      General: No focal deficit present. Mental Status: He is alert.       Gait: Gait normal.   Psychiatric:         Mood and Affect: Mood normal.         Vital Signs  ED Triage Vitals [10/16/23 1608]   Temperature Pulse Respirations Blood Pressure SpO2   98.4 °F (36.9 °C) 76 16 137/96 97 %      Temp Source Heart Rate Source Patient Position - Orthostatic VS BP Location FiO2 (%)   Temporal Monitor Sitting Left arm --      Pain Score       No Pain           Vitals:    10/16/23 1608 10/16/23 1815   BP: 137/96 144/76   Pulse: 76 78   Patient Position - Orthostatic VS: Sitting          Visual Acuity      ED Medications  Medications   heparin (porcine) 25,000 units in 0.45% NaCl 250 mL infusion (premix) (11.1 Units/kg/hr × 90 kg (Order-Specific) Intravenous New Bag 10/16/23 2125)   heparin (porcine) injection 4,000 Units (has no administration in time range)   heparin (porcine) injection 2,000 Units (has no administration in time range)   aspirin (ECOTRIN LOW STRENGTH) EC tablet 81 mg (has no administration in time range)   atorvastatin (LIPITOR) tablet 40 mg (has no administration in time range)   fenofibrate (TRICOR) tablet 145 mg (has no administration in time range)   gabapentin (NEURONTIN) capsule 400 mg (has no administration in time range)   methocarbamol (ROBAXIN) tablet 500 mg (has no administration in time range)   pantoprazole (PROTONIX) EC tablet 40 mg (has no administration in time range)   acetaminophen (TYLENOL) tablet 650 mg (has no administration in time range)   nicotine (NICODERM CQ) 21 mg/24 hr TD 24 hr patch 1 patch (has no administration in time range)   metoprolol tartrate (LOPRESSOR) tablet 25 mg (has no administration in time range)   nitroglycerin (NITROSTAT) SL tablet 0.4 mg (has no administration in time range)   thiamine tablet 100 mg (has no administration in time range)   folic acid (FOLVITE) tablet 1 mg (has no administration in time range)   multivitamin-minerals (CENTRUM) tablet 1 tablet (has no administration in time range)   insulin lispro (HumaLOG) 100 units/mL subcutaneous injection 1-6 Units (has no administration in time range)   insulin lispro (HumaLOG) 100 units/mL subcutaneous injection 1-6 Units (has no administration in time range)   aspirin chewable tablet 324 mg (324 mg Oral Given 10/16/23 1742)   heparin (porcine) injection 4,000 Units (4,000 Units Intravenous Given 10/16/23 1758)       Diagnostic Studies  Results Reviewed       Procedure Component Value Units Date/Time    Hemoglobin A1C w/ EAG Estimation [[id number]]     Lab Status: No result Specimen: Blood     HS Troponin I 2hr [[id number]]  (Abnormal) Collected: 10/16/23 1819    Lab Status: Final result Specimen: Blood from Arm, Right Updated: 10/16/23 1850     hs TnI 2hr 136 ng/L      Delta 2hr hsTnI 38 ng/L     HS Troponin I 4hr [[id number]]     Lab Status: No result Specimen: Blood     APTT six (6) hours after Heparin bolus/drip initiation or dosing change [[id number]]  (Normal) Collected: 10/16/23 1751 Lab Status: Final result Specimen: Blood from Arm, Left Updated: 10/16/23 1814     PTT 26 seconds     Protime-INR [[id number]]  (Normal) Collected: 10/16/23 1751    Lab Status: Final result Specimen: Blood from Arm, Left Updated: 10/16/23 1814     Protime 12.6 seconds      INR 0.91    HS Troponin 0hr (reflex protocol) [[id number]]  (Abnormal) Collected: 10/16/23 1616    Lab Status: Final result Specimen: Blood from Arm, Left Updated: 10/16/23 1647     hs TnI 0hr 98 ng/L     Comprehensive metabolic panel [[id number]]  (Abnormal) Collected: 10/16/23 1616    Lab Status: Final result Specimen: Blood from Arm, Right Updated: 10/16/23 1638     Sodium 138 mmol/L      Potassium 4.0 mmol/L      Chloride 107 mmol/L      CO2 22 mmol/L      ANION GAP 9 mmol/L      BUN 15 mg/dL      Creatinine 1.02 mg/dL      Glucose 224 mg/dL      Calcium 9.7 mg/dL      AST 19 U/L      ALT 23 U/L      Alkaline Phosphatase 62 U/L      Total Protein 7.2 g/dL      Albumin 4.5 g/dL      Total Bilirubin 0.44 mg/dL      eGFR 77 ml/min/1.73sq m     Narrative:      [facility] guidelines for Chronic Kid[facility] Disease (CKD):     Stage 1 with normal or high GFR (GFR > 90 mL/min/1.73 square meters)    Stage 2 Mild CKD (GFR = 60-89 mL/min/1.73 square meters)    Stage 3A Moderate CKD (GFR = 45-59 mL/min/1.73 square meters)    Stage 3B Moderate CKD (GFR = 30-44 mL/min/1.73 square meters)    Stage 4 Severe CKD (GFR = 15-29 mL/min/1.73 square meters)    Stage 5 End Stage CKD (GFR <15 mL/min/1.73 square meters)  Note: GFR calculation is accurate only with a steady state creatinine    CBC and differential [[id number]]  (Abnormal) Collected: 10/16/23 1616    Lab Status: Final result Specimen: Blood from Arm, Left Updated: 10/16/23 1625     WBC 7.54 Thousand/uL      RBC 5.28 Million/uL      Hemoglobin 15.9 g/dL      Hematocrit 48.6 %      MCV 92 fL      MCH 30.1 pg      MCHC 32.7 g/dL      RDW 12.5 %      MPV 11.6 fL      Platelets 662 Thousands/uL      nRBC 0 /100 WBCs      Neutrophils Relative 58 %      Immat GRANS % 1 %      Lymphocytes Relative 27 %      Monocytes Relative 10 %      Eosinophils Relative 2 %      Basophils Relative 2 %      Neutrophils Absolute 4.40 Thousands/µL      Immature Grans Absolute 0.05 Thousand/uL      Lymphocytes Absolute 2.05 Thousands/µL      Monocytes Absolute 0.78 Thousand/µL      Eosinophils Absolute 0.15 Thousand/µL      Basophils Absolute 0.11 Thousands/µL                    XR chest pa & lateral    (Results Pending)              Procedures  CriticalCare Time    Date/Time: 10/16/2023 7:09 PM    Performed by: Jarrod White DO  Authorized by:  Jarrod White DO    Critical care provider statement:     Critical care time (minutes):  31    Critical care time was exclusive of:  Separately billable procedures and treating other patients and teaching time    Critical care was necessary to treat or prevent imminent or life-threatening deterioration of the following conditions:  Cardiac failure    Critical care was time spent personally by me on the following activities:  Obtaining history from patient or surrogate, blood draw for specimens, development of treatment plan with patient or surrogate, discussions with consultants, discussions with primary provider, evaluation of patient's response to treatment, examination of patient, ordering and performing treatments and interventions, ordering and review of laboratory studies, ordering and review of radiographic studies, re-evaluation of patient's condition and review of old charts    I assumed direction of critical care for this patient from another provider in my specialty: no             ED Course  ED Course as of 10/16/23 1909   Mon Oct 16, 2023   1734 Procedure Note: EKG  Date/Time: 10/16/23 5:34 PM   Performed by: Jarrod White  Authorized by: Jarrod White  ECG interpreted by me, the ED Provider: yes   The EKG demonstrates:  Rate 73  Rhythm Sinus with 1st degree block  QTc 449  No ST elevations/depressions                                   JACOBO Risk Score      Flowsheet Row Most Recent Value   Age >= 72 0 Filed at: 10/16/2023 1817   Known CAD (stenosis >= 50%) 0 Filed at: 10/16/2023 1817   Recent (<=24 hrs) Service Angina 0 Filed at: 10/16/2023 1817   ST Deviation >= 0.5 mm 0 Filed at: 10/16/2023 1817   3+ CAD Risk Factors (FHx, HTN, HLP, DM, Smoker) 1 Filed at: 10/16/2023 1817   Aspirin Use Past 7 Days 1 Filed at: 10/16/2023 1817   Elevated Cardiac Markers 0 Filed at: 10/16/2023 1817   JACOBO Risk Score (Calculated) 2 Filed at: 10/16/2023 1817                    Medical Decision Making  57-year-old male presenting with chest pressure last felt this morning. Cardiopulmonary evaluation with labs, EKG, chest x-ray. Troponin elevated at 98. EKG without ischemic abnormality. Aspirin and heparin administered. Discussed case with cardiology team.  As patient is currently symptom-free, okay to admit at [facility] and they will evaluate tomorrow morning. Problems Addressed:  NSTEMI (non-ST elevated myocardial infarction) [facility]): acute illness or injury    Amount and/or Complexity of Data Reviewed  Labs: ordered. Risk  OTC drugs. Prescription drug management. Decision regarding hospitalization. Disposition  Final diagnoses:   NSTEMI (non-ST elevated myocardial infarction) ([address])     Time reflects when diagnosis was documented in both MDM as applicable and the Disposition within this note       Time User Action Codes Description Comment    10/16/2023  5:58 PM [facility] [I21.4] NSTEMI (non-ST elevated myocardial infarction) [facility])           ED Disposition       ED Disposition   Admit    Condition   Stable    Date/Time   Mon Oct 16, 2023 9841    Comment   Case was discussed with [facility]LE[facility] and the patient's admission status was agreed to be Admission Status: inpatient status to the service of Dr. Lanny Garcia .                Follow-up Information    None Patient's Medications   Discharge Prescriptions    No medications on file       No discharge procedures on file.     PDMP Review         Value Time User    PDMP Reviewed  Yes 10/16/2023  6:52 PM Queen Adama PA-C            ED Provider  Electronically Signed by             Garrett Love DO  10/16/23 6801

## 2023-10-16 NOTE — ASSESSMENT & PLAN NOTE
Blood pressure stable  Home regimen includes Prinzide  Continue lisinopril but will hold HCTZ for now

## 2023-10-16 NOTE — H&P
[facility]  H&P  Name: Zaida Jean-Baptiste 61 y.o. male I MRN: [id number]  Unit/Bed#: ED 05 I Date of Admission: 10/16/2023   Date of Service: 10/16/2023 I Hospital Day: 0      Assessment/Plan   * Chest pain  Assessment & Plan  Presented to the emergency department due to intermittent chest pain, most recently occurred yesterday while doing yard work  CXR: No acute cardiopulmonary disease, follow-up radiology read  EKG without ischemic changes  Initial troponin 98 -follow-up 2-hr, 4-hr  ED attending discussed with cardiology -given that patient is currently chest pain-free, okay to remain at [facility]  S/p ASA, started on heparin gtt  Continue statin  Monitor on telemetry  JACOBO score of 2 - will order inpatient stress test  Cardiology consult    Alcohol abuse  Assessment & Plan  Patient reports daily alcohol use, during the week approximately 2 beers daily and on weekends approximately 4 gin and tonics  Denies issues with alcohol withdrawal previously  Monitor via CIWA protocol  Thiamine, folic acid, multivitamin    Tobacco abuse  Assessment & Plan  Patient reports using chewing tobacco approximately 1 can/day  Requesting nicotine patch PRN    Esophageal reflux  Assessment & Plan  Continue Protonix    Type 2 diabetes mellitus without complication, without long-term current use of insulin ([address])  Assessment & Plan  Lab Results   Component Value Date    HGBA1C 7.6 (A) 06/27/2022       No results for input(s): "POCGLU" in the last 72 hours.     Blood Sugar Average: Last 72 hrs:  Hold oral agents while inpatient  Update hemoglobin A1c  SSI plus Accu-Chek  Diabetic diet      Hypertension  Assessment & Plan  Blood pressure stable  Home regimen includes Prinzide  Continue lisinopril but will hold HCTZ for now    Hyperlipidemia  Assessment & Plan  Continue statin, Tricor       VTE Pharmacologic Prophylaxis: VTE Score: 3 Moderate Risk (Score 3-4) - Pharmacological DVT Prophylaxis Ordered: heparin drip.  Code Status: Level 1 - Full Code   Discussion with family: Patient declined call to . Anticipated Length of Stay: Patient will be admitted on an inpatient basis with an anticipated length of stay of greater than 2 midnights secondary to chest pain. Total Time Spent on Date of Encounter in care of patient: 75 mins. This time was spent on one or more of the following: performing physical exam; counseling and coordination of care; obtaining or reviewing history; documenting in the medical record; reviewing/ordering tests, medications or procedures; communicating with other healthcare professionals and discussing with patient's family/caregivers. Chief Complaint: Chest pain    History of Present Illness:  Jaylon Stokes is a 61 y.o. male with a PMH of GERD, chronic back pain, hypertension, type 2 diabetes who presents with intermittent chest pain. Patient presented to the emergency department due to intermittent chest pain for the last several months. Patient reports episodes have become more frequent, particularly occurring with exertion and resolving with rest.  Described as substernal aching/discomfort. Denies palpitations, shortness of breath, nausea/vomiting, abdominal pain, diaphoresis. Denies any recent illness. No family history of cardiac issues. Does have history of stress test several years ago which was reported as normal.  Does drink alcohol daily, no history of withdrawal symptoms. Daily chewing tobacco use, approximately 1 can/day. Review of Systems:  Review of Systems   Constitutional:  Negative for chills, fatigue, fever and unexpected weight change. HENT:  Negative for congestion, sore throat and trouble swallowing. Eyes:  Negative for photophobia, pain and visual disturbance. Respiratory:  Negative for cough, shortness of breath and wheezing. Cardiovascular:  Positive for chest pain. Negative for palpitations and leg swelling.    Gastrointestinal: Negative for abdominal pain, constipation, diarrhea, nausea and vomiting. Endocrine: Negative for polyuria. Genitourinary:  Negative for difficulty urinating, dysuria, flank pain, hematuria and urgency. Musculoskeletal:  Negative for back pain, myalgias, neck pain and neck stiffness. Skin:  Negative for pallor and rash. Neurological:  Negative for dizziness, tremors, syncope, speech difficulty, weakness, light-headedness and headaches. Hematological:  Does not bruise/bleed easily. Psychiatric/Behavioral:  Negative for agitation and confusion. Past Medical and Surgical History:   Past Medical History:   Diagnosis Date    Chews tobacco regularly     Consumes three beers daily     Controlled diabetes mellitus ([address])     Diabetes ([address])     Esophageal reflux     GERD (gastroesophageal reflux disease)     Hyperlipidemia     Hypertension     Osteoarthritis     LEFT KNEE    Primary localized osteoarthritis of left knee     last assessed: 1/16/2018       Past Surgical History:   Procedure Laterality Date    COLONOSCOPY      NH ARTHRP KNE CONDYLE&PLATU MEDIAL&LAT COMPARTMENTS Left 1/8/2018    Procedure: ARTHROPLASTY KNEE TOTAL;  Surgeon: Dia Conner MD;  Location: [facility];  Service: Orthopedics    REPLACEMENT TOTAL KNEE Left        Meds/Allergies:  Prior to Admission medications    Medication Sig Start Date End Date Taking?  Authorizing Provider   ALPRAZolam Ulysses Apley) 1 mg tablet Take 1 tablet (1 mg total) by mouth daily at bedtime as needed for anxiety  Patient not taking: Reported on 9/12/2022 4/12/21   Yara Rosenbaum MD   aspirin (ECOTRIN) 325 mg EC tablet Take 1 tablet by mouth 2 (two) times a day for 30 days 1/10/18 2/9/18  Dia Conner MD   atorvastatin (LIPITOR) 40 mg tablet Take 1 tablet (40 mg total) by mouth daily in the early morning 9/20/22   Yara Rosenbaum MD   diphenhydrAMINE-acetaminophen (TYLENOL PM)  MG TABS Take 1 tablet by mouth daily at bedtime as needed for sleep    Historical Provider, MD   Empagliflozin (Jardiance) 25 MG TABS Take 1 tablet (25 mg total) by mouth every morning  Patient not taking: Reported on 3/29/2023 9/20/22   Agustin La MD   Exenatide ER (Bydureon BCise) 2 MG/0.85ML AUIJ Inject 2 mg under the skin once a week 9/20/22   Agustin La MD   Exenatide ER (Bydureon) 2 MG PEN INJECT THE CONTENTS OF 1  PEN SUBCUTANEOUSLY WEEKLY  ON SUNDAYS 8/10/21   Agustin La MD   fenofibrate (TRICOR) 145 mg tablet Take 1 tablet (145 mg total) by mouth daily 9/20/22   Agustin La MD   gabapentin (NEURONTIN) 400 mg capsule Take 1 PO BID. 3/29/23   CHRISTA Cornell   ibuprofen (MOTRIN) 400 mg tablet Take 600 mg by mouth every 6 (six) hours as needed for mild pain Also takes 400mg in the evening    Historical Provider, MD   lisinopril-hydrochlorothiazide (PRINZIDE,ZESTORETIC) 20-12.5 MG per tablet Take 1 tablet by mouth daily 9/20/22   Agustin La MD   metFORMIN (GLUCOPHAGE) 1000 MG tablet TAKE 1 TABLET BY MOUTH TWICE DAILY WITH MEALS 9/6/23   Agustin La MD   methocarbamol (ROBAXIN) 500 mg tablet Take 1 tablet (500 mg total) by mouth 4 (four) times a day 7/17/23   Agustin La MD   pantoprazole (PROTONIX) 40 mg tablet TAKE 1 TABLET BY MOUTH ONCE DAILY BEFORE BREAKFAST 9/6/23   Agustin La MD     I have reviewed home medications with patient personally.     Allergies: No Known Allergies    Social History:  Marital Status: /Civil Union   Occupation:   Patient Pre-hospital Living Situation: Home  Patient Pre-hospital Level of Mobility: walks  Patient Pre-hospital Diet Restrictions:   Substance Use History:   Social History     Substance and Sexual Activity   Alcohol Use Yes    Alcohol/week: 20.0 standard drinks of alcohol    Types: 20 Cans of beer per week    Comment: daily beer & "gin & tonics" on a weekend ; social alcohol use ( as per allscripts)     Social History     Tobacco Use   Smoking Status Former    Packs/day: 1.00    Years: 11.00    Total pack years: 11.00    Types: Cigarettes   Smokeless Tobacco Current    Types: Chew   Tobacco Comments    quit 30 + yrs ago      Social History     Substance and Sexual Activity   Drug Use No       Family History:  Family History   Problem Relation Age of Onset    Colon cancer Father     Mental illness Family         mental disorder    Substance Abuse Neg Hx        Physical Exam:     Vitals:   Blood Pressure: 144/76 (10/16/23 1815)  Pulse: 78 (10/16/23 1815)  Temperature: 98.4 °F (36.9 °C) (10/16/23 1608)  Temp Source: Temporal (10/16/23 1608)  Respirations: 18 (10/16/23 1815)  SpO2: 95 % (10/16/23 1815)    Physical Exam  Vitals and nursing note reviewed. Constitutional:       General: He is not in acute distress. Appearance: He is well-developed. Comments: No acute distress   HENT:      Head: Normocephalic and atraumatic. Eyes:      General: No scleral icterus. Extraocular Movements: Extraocular movements intact. Conjunctiva/sclera: Conjunctivae normal.   Cardiovascular:      Rate and Rhythm: Normal rate and regular rhythm. Heart sounds: No murmur heard. Pulmonary:      Effort: Pulmonary effort is normal. No respiratory distress. Breath sounds: Normal breath sounds. No wheezing, rhonchi or rales. Abdominal:      General: Bowel sounds are normal.      Palpations: Abdomen is soft. Tenderness: There is no abdominal tenderness. There is no guarding or rebound. Musculoskeletal:         General: No swelling. Cervical back: Normal range of motion. Comments: Able to move upper/lower ext bilaterally, no edema   Skin:     General: Skin is warm and dry. Capillary Refill: Capillary refill takes less than 2 seconds. Neurological:      Mental Status: He is alert and oriented to person, place, and time.    Psychiatric:         Mood and Affect: Mood normal.         Speech: Speech normal.         Behavior: Behavior normal.          Additional Data:     Lab Results:  Results from last 7 days   Lab Units 10/16/23  1616   WBC Thousand/uL 7.54   HEMOGLOBIN g/dL 15.9   HEMATOCRIT % 48.6   PLATELETS Thousands/uL 215   NEUTROS PCT % 58   LYMPHS PCT % 27   MONOS PCT % 10   EOS PCT % 2     Results from last 7 days   Lab Units 10/16/23  1616   SODIUM mmol/L 138   POTASSIUM mmol/L 4.0   CHLORIDE mmol/L 107   CO2 mmol/L 22   BUN mg/dL 15   CREATININE mg/dL 1.02   ANION GAP mmol/L 9   CALCIUM mg/dL 9.7   ALBUMIN g/dL 4.5   TOTAL BILIRUBIN mg/dL 0.44   ALK PHOS U/L 62   ALT U/L 23   AST U/L 19   GLUCOSE RANDOM mg/dL 224*     Results from last 7 days   Lab Units 10/16/23  1751   INR  0.91                   Lines/Drains:  Invasive Devices       Peripheral Intravenous Line  Duration             Peripheral IV 10/16/23 Dorsal (posterior); Left Forearm <1 day    Peripheral IV 10/16/23 Dorsal (posterior); Right Forearm <1 day                        Imaging: Reviewed radiology reports from this admission including: chest xray  XR chest pa & lateral    (Results Pending)       EKG and Other Studies Reviewed on Admission:   EKG: NSR. HR 63. With 1st degree AV block    ** Please Note: This note has been constructed using a voice recognition system.  **

## 2023-10-16 NOTE — Clinical Note
Case was discussed with CELESTE and the patient's admission status was agreed to be Admission Status: inpatient status to the service of Dr. Lacie Steven .

## 2023-10-17 ENCOUNTER — HOSPITAL ENCOUNTER (INPATIENT)
Facility: HOSPITAL | Age: 63
LOS: 2 days | Discharge: HOME | DRG: 282 | End: 2023-10-19
Attending: HOSPITALIST | Admitting: HOSPITALIST
Payer: COMMERCIAL

## 2023-10-17 ENCOUNTER — APPOINTMENT (INPATIENT)
Dept: NON INVASIVE DIAGNOSTICS | Facility: HOSPITAL | Age: 63
DRG: 282 | End: 2023-10-17
Payer: COMMERCIAL

## 2023-10-17 VITALS
RESPIRATION RATE: 21 BRPM | HEIGHT: 71 IN | WEIGHT: 238 LBS | SYSTOLIC BLOOD PRESSURE: 193 MMHG | OXYGEN SATURATION: 93 % | DIASTOLIC BLOOD PRESSURE: 84 MMHG | HEART RATE: 66 BPM | BODY MASS INDEX: 33.32 KG/M2 | TEMPERATURE: 98 F

## 2023-10-17 DIAGNOSIS — R07.9 CHEST PAIN, UNSPECIFIED TYPE: Primary | ICD-10-CM

## 2023-10-17 DIAGNOSIS — I25.10 TRIPLE VESSEL CORONARY ARTERY DISEASE: ICD-10-CM

## 2023-10-17 LAB
ALBUMIN SERPL BCP-MCNC: 4.1 G/DL (ref 3.5–5)
ALP SERPL-CCNC: 51 U/L (ref 34–104)
ALT SERPL W P-5'-P-CCNC: 23 U/L (ref 7–52)
ANION GAP SERPL CALCULATED.3IONS-SCNC: 6 MMOL/L
AORTIC ROOT: 3.7 CM
AORTIC VALVE MEAN VELOCITY: 14.6 M/S
APTT PPP: 38 SECONDS (ref 23–37)
APTT PPP: 39 SECONDS (ref 23–37)
APTT PPP: 47 SECONDS (ref 23–37)
APTT PPP: 74 SECONDS (ref 23–37)
AST SERPL W P-5'-P-CCNC: 18 U/L (ref 13–39)
AV AREA BY CONTINUOUS VTI: 1.7 CM2
AV AREA PEAK VELOCITY: 1.5 CM2
AV LVOT MEAN GRADIENT: 2 MMHG
AV LVOT PEAK GRADIENT: 4 MMHG
AV MEAN GRADIENT: 10 MMHG
AV PEAK GRADIENT: 20 MMHG
AV VALVE AREA: 1.74 CM2
AV VELOCITY RATIO: 0.44
BASOPHILS # BLD AUTO: 0.09 THOUSANDS/ÂΜL (ref 0–0.1)
BASOPHILS NFR BLD AUTO: 1 % (ref 0–1)
BILIRUB SERPL-MCNC: 0.47 MG/DL (ref 0.2–1)
BUN SERPL-MCNC: 14 MG/DL (ref 5–25)
CALCIUM SERPL-MCNC: 9.4 MG/DL (ref 8.4–10.2)
CHLORIDE SERPL-SCNC: 108 MMOL/L (ref 96–108)
CHOLEST SERPL-MCNC: 223 MG/DL
CO2 SERPL-SCNC: 25 MMOL/L (ref 21–32)
CREAT SERPL-MCNC: 0.94 MG/DL (ref 0.6–1.3)
DOP CALC AO PEAK VEL: 2.23 M/S
DOP CALC AO VTI: 41.96 CM
DOP CALC LVOT AREA: 3.46 CM2
DOP CALC LVOT CARDIAC INDEX: 2.35 L/MIN/M2
DOP CALC LVOT CARDIAC OUTPUT: 5.34 L/MIN
DOP CALC LVOT DIAMETER: 2.1 CM
DOP CALC LVOT PEAK VEL VTI: 21.06 CM
DOP CALC LVOT PEAK VEL: 0.99 M/S
DOP CALC LVOT STROKE INDEX: 33.5 ML/M2
DOP CALC LVOT STROKE VOLUME: 72.91
DOP CALC MV VTI: 23.96 CM
E WAVE DECELERATION TIME: 181 MS
E/A RATIO: 1.4
EOSINOPHIL # BLD AUTO: 0.22 THOUSAND/ÂΜL (ref 0–0.61)
EOSINOPHIL NFR BLD AUTO: 3 % (ref 0–6)
ERYTHROCYTE [DISTWIDTH] IN BLOOD BY AUTOMATED COUNT: 12.8 % (ref 11.6–15.1)
EST. AVERAGE GLUCOSE BLD GHB EST-MCNC: 183 MG/DL
FRACTIONAL SHORTENING: 45 (ref 28–44)
GFR SERPL CREATININE-BSD FRML MDRD: 85 ML/MIN/1.73SQ M
GLUCOSE SERPL-MCNC: 186 MG/DL (ref 65–140)
GLUCOSE SERPL-MCNC: 197 MG/DL (ref 65–140)
GLUCOSE SERPL-MCNC: 225 MG/DL (ref 65–140)
GLUCOSE SERPL-MCNC: 232 MG/DL (ref 65–140)
GLUCOSE SERPL-MCNC: 244 MG/DL (ref 65–140)
GLUCOSE SERPL-MCNC: 284 MG/DL (ref 65–140)
HBA1C MFR BLD: 8 %
HCT VFR BLD AUTO: 46 % (ref 36.5–49.3)
HDLC SERPL-MCNC: 37 MG/DL
HGB BLD-MCNC: 15.1 G/DL (ref 12–17)
IMM GRANULOCYTES # BLD AUTO: 0.06 THOUSAND/UL (ref 0–0.2)
IMM GRANULOCYTES NFR BLD AUTO: 1 % (ref 0–2)
INTERVENTRICULAR SEPTUM IN DIASTOLE (PARASTERNAL SHORT AXIS VIEW): 1.1 CM
INTERVENTRICULAR SEPTUM: 1.1 CM (ref 0.6–1.1)
LAAS-AP2: 19.6 CM2
LAAS-AP4: 17.9 CM2
LDLC SERPL DIRECT ASSAY-MCNC: 130 MG/DL (ref 0–100)
LEFT ATRIUM SIZE: 4.5 CM
LEFT ATRIUM VOLUME (MOD BIPLANE): 51 ML
LEFT ATRIUM VOLUME INDEX (MOD BIPLANE): 22.5 ML/M2
LEFT INTERNAL DIMENSION IN SYSTOLE: 2.6 CM (ref 2.1–4)
LEFT VENTRICULAR INTERNAL DIMENSION IN DIASTOLE: 4.7 CM (ref 3.5–6)
LEFT VENTRICULAR POSTERIOR WALL IN END DIASTOLE: 0.9 CM
LEFT VENTRICULAR STROKE VOLUME: 79 ML
LVSV (TEICH): 79 ML
LYMPHOCYTES # BLD AUTO: 2.11 THOUSANDS/ÂΜL (ref 0.6–4.47)
LYMPHOCYTES NFR BLD AUTO: 33 % (ref 14–44)
MAGNESIUM SERPL-MCNC: 1.8 MG/DL (ref 1.9–2.7)
MCH RBC QN AUTO: 30.2 PG (ref 26.8–34.3)
MCHC RBC AUTO-ENTMCNC: 32.8 G/DL (ref 31.4–37.4)
MCV RBC AUTO: 92 FL (ref 82–98)
MONOCYTES # BLD AUTO: 0.65 THOUSAND/ÂΜL (ref 0.17–1.22)
MONOCYTES NFR BLD AUTO: 10 % (ref 4–12)
MV E'TISSUE VEL-LAT: 15 CM/S
MV E'TISSUE VEL-SEP: 8 CM/S
MV MEAN GRADIENT: 1 MMHG
MV PEAK A VEL: 0.67 M/S
MV PEAK E VEL: 94 CM/S
MV PEAK GRADIENT: 4 MMHG
MV STENOSIS PRESSURE HALF TIME: 53 MS
MV VALVE AREA BY CONTINUITY EQUATION: 3.04 CM2
MV VALVE AREA P 1/2 METHOD: 4.15
NEUTROPHILS # BLD AUTO: 3.31 THOUSANDS/ÂΜL (ref 1.85–7.62)
NEUTS SEG NFR BLD AUTO: 52 % (ref 43–75)
NRBC BLD AUTO-RTO: 0 /100 WBCS
PLATELET # BLD AUTO: 194 THOUSANDS/UL (ref 149–390)
PMV BLD AUTO: 11.4 FL (ref 8.9–12.7)
POTASSIUM SERPL-SCNC: 3.9 MMOL/L (ref 3.5–5.3)
PROT SERPL-MCNC: 6.6 G/DL (ref 6.4–8.4)
RA PRESSURE ESTIMATED: 15 MMHG
RBC # BLD AUTO: 5 MILLION/UL (ref 3.88–5.62)
RIGHT ATRIAL 2D VOLUME: 40 ML
RIGHT ATRIUM AREA SYSTOLE A4C: 15.5 CM2
RIGHT VENTRICLE ID DIMENSION: 4 CM
RV PSP: 38 MMHG
SL CV LEFT ATRIUM LENGTH A2C: 5.4 CM
SL CV LV EF: 60
SL CV PED ECHO LEFT VENTRICLE DIASTOLIC VOLUME (MOD BIPLANE) 2D: 104 ML
SL CV PED ECHO LEFT VENTRICLE SYSTOLIC VOLUME (MOD BIPLANE) 2D: 24 ML
SODIUM SERPL-SCNC: 139 MMOL/L (ref 135–147)
TR MAX PG: 23 MMHG
TR PEAK VELOCITY: 2.4 M/S
TRICUSPID ANNULAR PLANE SYSTOLIC EXCURSION: 2.9 CM
TRICUSPID VALVE PEAK REGURGITATION VELOCITY: 2.41 M/S
TRIGL SERPL-MCNC: 409 MG/DL
WBC # BLD AUTO: 6.44 THOUSAND/UL (ref 4.31–10.16)

## 2023-10-17 PROCEDURE — 3052F HG A1C>EQUAL 8.0%<EQUAL 9.0%: CPT | Performed by: THORACIC SURGERY (CARDIOTHORACIC VASCULAR SURGERY)

## 2023-10-17 PROCEDURE — 36415 COLL VENOUS BLD VENIPUNCTURE: CPT | Performed by: EMERGENCY MEDICINE

## 2023-10-17 PROCEDURE — 82948 REAGENT STRIP/BLOOD GLUCOSE: CPT

## 2023-10-17 PROCEDURE — 93306 TTE W/DOPPLER COMPLETE: CPT

## 2023-10-17 PROCEDURE — 85730 THROMBOPLASTIN TIME PARTIAL: CPT | Performed by: HOSPITALIST

## 2023-10-17 PROCEDURE — 83721 ASSAY OF BLOOD LIPOPROTEIN: CPT | Performed by: PHYSICIAN ASSISTANT

## 2023-10-17 PROCEDURE — 93306 TTE W/DOPPLER COMPLETE: CPT | Performed by: INTERNAL MEDICINE

## 2023-10-17 PROCEDURE — 85730 THROMBOPLASTIN TIME PARTIAL: CPT | Performed by: EMERGENCY MEDICINE

## 2023-10-17 PROCEDURE — 80053 COMPREHEN METABOLIC PANEL: CPT | Performed by: PHYSICIAN ASSISTANT

## 2023-10-17 PROCEDURE — 99239 HOSP IP/OBS DSCHRG MGMT >30: CPT | Performed by: INTERNAL MEDICINE

## 2023-10-17 PROCEDURE — 80061 LIPID PANEL: CPT | Performed by: PHYSICIAN ASSISTANT

## 2023-10-17 PROCEDURE — 99223 1ST HOSP IP/OBS HIGH 75: CPT | Performed by: HOSPITALIST

## 2023-10-17 PROCEDURE — 83735 ASSAY OF MAGNESIUM: CPT | Performed by: PHYSICIAN ASSISTANT

## 2023-10-17 PROCEDURE — 99223 1ST HOSP IP/OBS HIGH 75: CPT | Performed by: INTERNAL MEDICINE

## 2023-10-17 PROCEDURE — 90686 IIV4 VACC NO PRSV 0.5 ML IM: CPT | Performed by: HOSPITALIST

## 2023-10-17 PROCEDURE — 90471 IMMUNIZATION ADMIN: CPT | Performed by: HOSPITALIST

## 2023-10-17 PROCEDURE — 85025 COMPLETE CBC W/AUTO DIFF WBC: CPT | Performed by: PHYSICIAN ASSISTANT

## 2023-10-17 PROCEDURE — 85730 THROMBOPLASTIN TIME PARTIAL: CPT | Performed by: INTERNAL MEDICINE

## 2023-10-17 RX ORDER — INSULIN LISPRO 100 [IU]/ML
1-6 INJECTION, SOLUTION INTRAVENOUS; SUBCUTANEOUS
Status: CANCELLED | OUTPATIENT
Start: 2023-10-17

## 2023-10-17 RX ORDER — METHOCARBAMOL 500 MG/1
500 TABLET, FILM COATED ORAL EVERY 6 HOURS PRN
Status: CANCELLED | OUTPATIENT
Start: 2023-10-17

## 2023-10-17 RX ORDER — ATORVASTATIN CALCIUM 40 MG/1
80 TABLET, FILM COATED ORAL
Status: DISCONTINUED | OUTPATIENT
Start: 2023-10-17 | End: 2023-10-19 | Stop reason: HOSPADM

## 2023-10-17 RX ORDER — ACETAMINOPHEN 325 MG/1
650 TABLET ORAL EVERY 6 HOURS PRN
Status: DISCONTINUED | OUTPATIENT
Start: 2023-10-17 | End: 2023-10-19 | Stop reason: HOSPADM

## 2023-10-17 RX ORDER — ATORVASTATIN CALCIUM 40 MG/1
80 TABLET, FILM COATED ORAL
Status: DISCONTINUED | OUTPATIENT
Start: 2023-10-17 | End: 2023-10-17 | Stop reason: HOSPADM

## 2023-10-17 RX ORDER — FOLIC ACID 1 MG/1
1 TABLET ORAL DAILY
Status: DISCONTINUED | OUTPATIENT
Start: 2023-10-18 | End: 2023-10-19 | Stop reason: HOSPADM

## 2023-10-17 RX ORDER — NITROGLYCERIN 0.4 MG/1
0.4 TABLET SUBLINGUAL
Status: CANCELLED | OUTPATIENT
Start: 2023-10-17

## 2023-10-17 RX ORDER — HEPARIN SODIUM 1000 [USP'U]/ML
4000 INJECTION, SOLUTION INTRAVENOUS; SUBCUTANEOUS EVERY 6 HOURS PRN
Status: DISCONTINUED | OUTPATIENT
Start: 2023-10-17 | End: 2023-10-18

## 2023-10-17 RX ORDER — INSULIN LISPRO 100 [IU]/ML
1-6 INJECTION, SOLUTION INTRAVENOUS; SUBCUTANEOUS
Status: DISCONTINUED | OUTPATIENT
Start: 2023-10-17 | End: 2023-10-19 | Stop reason: HOSPADM

## 2023-10-17 RX ORDER — FOLIC ACID 1 MG/1
1 TABLET ORAL DAILY
Status: CANCELLED | OUTPATIENT
Start: 2023-10-18

## 2023-10-17 RX ORDER — ATORVASTATIN CALCIUM 40 MG/1
80 TABLET, FILM COATED ORAL
Status: CANCELLED | OUTPATIENT
Start: 2023-10-17

## 2023-10-17 RX ORDER — HEPARIN SODIUM 10000 [USP'U]/100ML
3-20 INJECTION, SOLUTION INTRAVENOUS
Status: DISCONTINUED | OUTPATIENT
Start: 2023-10-17 | End: 2023-10-18

## 2023-10-17 RX ORDER — FENOFIBRATE 145 MG/1
145 TABLET, COATED ORAL DAILY
Status: CANCELLED | OUTPATIENT
Start: 2023-10-18

## 2023-10-17 RX ORDER — LANOLIN ALCOHOL/MO/W.PET/CERES
100 CREAM (GRAM) TOPICAL DAILY
Status: CANCELLED | OUTPATIENT
Start: 2023-10-18

## 2023-10-17 RX ORDER — LANOLIN ALCOHOL/MO/W.PET/CERES
100 CREAM (GRAM) TOPICAL DAILY
Status: DISCONTINUED | OUTPATIENT
Start: 2023-10-18 | End: 2023-10-19 | Stop reason: HOSPADM

## 2023-10-17 RX ORDER — HEPARIN SODIUM 1000 [USP'U]/ML
2000 INJECTION, SOLUTION INTRAVENOUS; SUBCUTANEOUS EVERY 6 HOURS PRN
Status: DISCONTINUED | OUTPATIENT
Start: 2023-10-17 | End: 2023-10-17 | Stop reason: SDUPTHER

## 2023-10-17 RX ORDER — LISINOPRIL 20 MG/1
20 TABLET ORAL DAILY
Status: DISCONTINUED | OUTPATIENT
Start: 2023-10-18 | End: 2023-10-18

## 2023-10-17 RX ORDER — HEPARIN SODIUM 1000 [USP'U]/ML
4000 INJECTION, SOLUTION INTRAVENOUS; SUBCUTANEOUS EVERY 6 HOURS PRN
Status: CANCELLED | OUTPATIENT
Start: 2023-10-17

## 2023-10-17 RX ORDER — PANTOPRAZOLE SODIUM 40 MG/1
40 TABLET, DELAYED RELEASE ORAL
Status: DISCONTINUED | OUTPATIENT
Start: 2023-10-18 | End: 2023-10-19 | Stop reason: HOSPADM

## 2023-10-17 RX ORDER — HEPARIN SODIUM 10000 [USP'U]/100ML
3-20 INJECTION, SOLUTION INTRAVENOUS
Status: DISCONTINUED | OUTPATIENT
Start: 2023-10-17 | End: 2023-10-17 | Stop reason: SDUPTHER

## 2023-10-17 RX ORDER — PANTOPRAZOLE SODIUM 40 MG/1
40 TABLET, DELAYED RELEASE ORAL
Status: CANCELLED | OUTPATIENT
Start: 2023-10-18

## 2023-10-17 RX ORDER — NICOTINE 21 MG/24HR
1 PATCH, TRANSDERMAL 24 HOURS TRANSDERMAL DAILY PRN
Status: DISCONTINUED | OUTPATIENT
Start: 2023-10-17 | End: 2023-10-19 | Stop reason: HOSPADM

## 2023-10-17 RX ORDER — GABAPENTIN 400 MG/1
400 CAPSULE ORAL 2 TIMES DAILY
Status: CANCELLED | OUTPATIENT
Start: 2023-10-17

## 2023-10-17 RX ORDER — LISINOPRIL 20 MG/1
20 TABLET ORAL DAILY
Status: CANCELLED | OUTPATIENT
Start: 2023-10-18

## 2023-10-17 RX ORDER — GABAPENTIN 400 MG/1
400 CAPSULE ORAL 2 TIMES DAILY
Status: DISCONTINUED | OUTPATIENT
Start: 2023-10-17 | End: 2023-10-19 | Stop reason: HOSPADM

## 2023-10-17 RX ORDER — HEPARIN SODIUM 1000 [USP'U]/ML
4000 INJECTION, SOLUTION INTRAVENOUS; SUBCUTANEOUS ONCE
Status: DISCONTINUED | OUTPATIENT
Start: 2023-10-17 | End: 2023-10-17 | Stop reason: SDUPTHER

## 2023-10-17 RX ORDER — FENOFIBRATE 145 MG/1
145 TABLET, COATED ORAL DAILY
Status: DISCONTINUED | OUTPATIENT
Start: 2023-10-18 | End: 2023-10-19 | Stop reason: HOSPADM

## 2023-10-17 RX ORDER — NITROGLYCERIN 0.4 MG/1
0.4 TABLET SUBLINGUAL
Status: DISCONTINUED | OUTPATIENT
Start: 2023-10-17 | End: 2023-10-19 | Stop reason: HOSPADM

## 2023-10-17 RX ORDER — HEPARIN SODIUM 1000 [USP'U]/ML
4000 INJECTION, SOLUTION INTRAVENOUS; SUBCUTANEOUS ONCE
Status: CANCELLED | OUTPATIENT
Start: 2023-10-17 | End: 2023-10-17

## 2023-10-17 RX ORDER — INSULIN GLARGINE 100 [IU]/ML
6 INJECTION, SOLUTION SUBCUTANEOUS
Status: DISCONTINUED | OUTPATIENT
Start: 2023-10-17 | End: 2023-10-17

## 2023-10-17 RX ORDER — HEPARIN SODIUM 1000 [USP'U]/ML
2000 INJECTION, SOLUTION INTRAVENOUS; SUBCUTANEOUS EVERY 6 HOURS PRN
Status: CANCELLED | OUTPATIENT
Start: 2023-10-17

## 2023-10-17 RX ORDER — HEPARIN SODIUM 1000 [USP'U]/ML
2000 INJECTION, SOLUTION INTRAVENOUS; SUBCUTANEOUS EVERY 6 HOURS PRN
Status: DISCONTINUED | OUTPATIENT
Start: 2023-10-17 | End: 2023-10-18

## 2023-10-17 RX ORDER — INSULIN GLARGINE 100 [IU]/ML
10 INJECTION, SOLUTION SUBCUTANEOUS
Status: DISCONTINUED | OUTPATIENT
Start: 2023-10-17 | End: 2023-10-17

## 2023-10-17 RX ORDER — HEPARIN SODIUM 1000 [USP'U]/ML
4000 INJECTION, SOLUTION INTRAVENOUS; SUBCUTANEOUS EVERY 6 HOURS PRN
Status: DISCONTINUED | OUTPATIENT
Start: 2023-10-17 | End: 2023-10-17 | Stop reason: SDUPTHER

## 2023-10-17 RX ORDER — ACETAMINOPHEN 325 MG/1
650 TABLET ORAL EVERY 6 HOURS PRN
Status: CANCELLED | OUTPATIENT
Start: 2023-10-17

## 2023-10-17 RX ORDER — METHOCARBAMOL 500 MG/1
500 TABLET, FILM COATED ORAL EVERY 6 HOURS PRN
Status: DISCONTINUED | OUTPATIENT
Start: 2023-10-17 | End: 2023-10-19 | Stop reason: HOSPADM

## 2023-10-17 RX ORDER — HEPARIN SODIUM 10000 [USP'U]/100ML
3-20 INJECTION, SOLUTION INTRAVENOUS
Status: CANCELLED | OUTPATIENT
Start: 2023-10-17

## 2023-10-17 RX ORDER — NICOTINE 21 MG/24HR
1 PATCH, TRANSDERMAL 24 HOURS TRANSDERMAL DAILY PRN
Status: CANCELLED | OUTPATIENT
Start: 2023-10-17

## 2023-10-17 RX ADMIN — INSULIN LISPRO 4 UNITS: 100 INJECTION, SOLUTION INTRAVENOUS; SUBCUTANEOUS at 21:49

## 2023-10-17 RX ADMIN — HEPARIN SODIUM 4000 UNITS: 1000 INJECTION INTRAVENOUS; SUBCUTANEOUS at 13:19

## 2023-10-17 RX ADMIN — METHOCARBAMOL 500 MG: 500 TABLET ORAL at 07:20

## 2023-10-17 RX ADMIN — INSULIN LISPRO 1 UNITS: 100 INJECTION, SOLUTION INTRAVENOUS; SUBCUTANEOUS at 08:41

## 2023-10-17 RX ADMIN — INFLUENZA VIRUS VACCINE 0.5 ML: 15; 15; 15; 15 SUSPENSION INTRAMUSCULAR at 20:19

## 2023-10-17 RX ADMIN — ATORVASTATIN CALCIUM 80 MG: 40 TABLET, FILM COATED ORAL at 16:09

## 2023-10-17 RX ADMIN — LISINOPRIL 20 MG: 20 TABLET ORAL at 09:31

## 2023-10-17 RX ADMIN — MULTIPLE VITAMINS W/ MINERALS TAB 1 TABLET: TAB ORAL at 08:17

## 2023-10-17 RX ADMIN — HEPARIN SODIUM 2000 UNITS: 1000 INJECTION INTRAVENOUS; SUBCUTANEOUS at 21:09

## 2023-10-17 RX ADMIN — HEPARIN SODIUM 15.1 UNITS/KG/HR: 10000 INJECTION, SOLUTION INTRAVENOUS at 16:08

## 2023-10-17 RX ADMIN — INSULIN LISPRO 2 UNITS: 100 INJECTION, SOLUTION INTRAVENOUS; SUBCUTANEOUS at 00:20

## 2023-10-17 RX ADMIN — ASPIRIN 81 MG: 81 TABLET, COATED ORAL at 08:17

## 2023-10-17 RX ADMIN — GABAPENTIN 400 MG: 400 CAPSULE ORAL at 09:31

## 2023-10-17 RX ADMIN — Medication 100 MG: at 08:17

## 2023-10-17 RX ADMIN — FOLIC ACID 1 MG: 1 TABLET ORAL at 08:17

## 2023-10-17 RX ADMIN — GABAPENTIN 400 MG: 400 CAPSULE ORAL at 17:30

## 2023-10-17 RX ADMIN — INSULIN LISPRO 2 UNITS: 100 INJECTION, SOLUTION INTRAVENOUS; SUBCUTANEOUS at 12:34

## 2023-10-17 RX ADMIN — ACETAMINOPHEN 650 MG: 325 TABLET, FILM COATED ORAL at 17:32

## 2023-10-17 RX ADMIN — METHOCARBAMOL TABLETS 500 MG: 500 TABLET, COATED ORAL at 17:32

## 2023-10-17 RX ADMIN — FENOFIBRATE 145 MG: 145 TABLET ORAL at 09:31

## 2023-10-17 RX ADMIN — PERFLUTREN 0.8 ML/MIN: 6.52 INJECTION, SUSPENSION INTRAVENOUS at 10:43

## 2023-10-17 RX ADMIN — INSULIN LISPRO 3 UNITS: 100 INJECTION, SOLUTION INTRAVENOUS; SUBCUTANEOUS at 17:30

## 2023-10-17 RX ADMIN — PANTOPRAZOLE SODIUM 40 MG: 40 TABLET, DELAYED RELEASE ORAL at 07:19

## 2023-10-17 NOTE — EMTALA/ACUTE CARE TRANSFER
[facility] EMERGENCY DEPARTMENT  [address] [place] [address]  Dept: [phone]      EMTALA TRANSFER CONSENT    NAME Kristina LARA 1960                              MRN [id number]    I have been informed of my rights regarding examination, treatment, and transfer   by Dr. Jaylan Crook MD    Benefits:  Patient needs transfer for cardiac catheterization    Risks:  Risks related to procedure including stroke, heart attack and possible death      Consent for Transfer:  I acknowledge that my medical condition has been evaluated and explained to me by the emergency department physician or other qualified medical person and/or my attending physician, who has recommended that I be transferred to the service of    at  . The above potential benefits of such transfer, the potential risks associated with such transfer, and the probable risks of not being transferred have been explained to me, and I fully understand them. The doctor has explained that, in my case, the benefits of transfer outweigh the risks. I agree to be transferred. I authorize the performance of emergency medical procedures and treatments upon me in both transit and upon arrival at the receiving facility. Additionally, I authorize the release of any and all medical records to the receiving facility and request they be transported with me, if possible. I understand that the safest mode of transportation during a medical emergency is an ambulance and that the Hospital advocates the use of this mode of transport. Risks of traveling to the receiving facility by car, including absence of medical control, life sustaining equipment, such as oxygen, and medical personnel has been explained to me and I fully understand them. (JOHN CORRECT BOX BELOW)  [ X ]  I consent to the stated transfer and to be transported by ambulance/helicopter.   [  ]  I consent to the stated transfer, but refuse transportation by ambulance and accept full responsibility for my transportation by car. I understand the risks of non-ambulance transfers and I exonerate the Hospital and its staff from any deterioration in my condition that results from this refusal.    X___________________________________________    DATE  10/17/23  TIME________  Signature of patient or legally responsible individual signing on patient behalf           RELATIONSHIP TO PATIENT_________________________          Provider Certification    NAME Jordi Hatfield                                         1960                              MRN [id number]    A medical screening exam was performed on the above named patient. Based on the examination:    Condition Necessitating Transfer The primary encounter diagnosis was Chest pain. A diagnosis of NSTEMI (non-ST elevated myocardial infarction) [facility]) was also pertinent to this visit. Patient Condition:  Patient with chest pain and elevated troponins    Reason for Transfer:  Needs transfer for cardiac catheterization    Transfer Requirements: Facility   transfer to [address] available and qualified personnel available for treatment as acknowledged by    Agreed to accept transfer and to provide appropriate medical treatment as acknowledged by        Dr Susanna Crystal  Appropriate medical records of the examination and treatment of the patient are provided at the time of transfer   [address] _______  Transfer will be performed by qualified personnel from    and appropriate transfer equipment as required, including the use of necessary and appropriate life support measures. Provider Certification: I have examined the patient and explained the following risks and benefits of being transferred/refusing transfer to the patient/family:   Patient is to be transferred for cardiac catheterization.   Explained risk-benefit of the procedure to the patient and all questions addressed to the best of my ability. Patient understanding and agreeable to transfer. To be transferred to [address] [place] [address] under Dr. Nic De León      Based on these reasonable risks and benefits to the patient and/or the unborn child(christian), and based upon the information available at the time of the patient’s examination, I certify that the medical benefits reasonably to be expected from the provision of appropriate medical treatments at another medical facility outweigh the increasing risks, if any, to the individual’s medical condition, and in the case of labor to the unborn child, from effecting the transfer.     X______Hannah Caldwell______________________________________ DATE 10/17/23        TIME____120pm___      ORIGINAL - SEND TO MEDICAL RECORDS   COPY - SEND WITH PATIENT DURING TRANSFER

## 2023-10-17 NOTE — ASSESSMENT & PLAN NOTE
Lab Results   Component Value Date    HGBA1C 8.0 (H) 10/16/2023       Recent Labs     10/17/23  0015 10/17/23  0731 10/17/23  1232   POCGLU 225* 186* 244*       Blood Sugar Average: Last 72 hrs:  Hold oral agents while inpatient  SSI plus Accu-Chek  Diabetic diet

## 2023-10-17 NOTE — ASSESSMENT & PLAN NOTE
Presented to the emergency department due to intermittent chest pain, most recently occurred yesterday while doing yard work  CXR: No acute cardiopulmonary disease, follow-up radiology read  EKG without ischemic changes  Initial troponin 98 -->136-->123  JACOBO score of 2 -   Cardiology consult appreciated  Cont heparin gtt. Planned for cardiac catheterization. Continue with telemetry monitoring. Continue with ASA and statin therapy.

## 2023-10-17 NOTE — PLAN OF CARE
Problem: PAIN - ADULT  Goal: Verbalizes/displays adequate comfort level or baseline comfort level  Description: Interventions:  - Encourage patient to monitor pain and request assistance  - Assess pain using appropriate pain scale  - Administer analgesics based on type and severity of pain and evaluate response  - Implement non-pharmacological measures as appropriate and evaluate response  - Consider cultural and social influences on pain and pain management  - Notify physician/advanced practitioner if interventions unsuccessful or patient reports new pain  Outcome: Progressing     Problem: DISCHARGE PLANNING  Goal: Discharge to home or other facility with appropriate resources  Description: INTERVENTIONS:  - Identify barriers to discharge w/patient and caregiver  - Arrange for needed discharge resources and transportation as appropriate  - Identify discharge learning needs (meds, wound care, etc.)  - Arrange for interpretive services to assist at discharge as needed  - Refer to Case Management Department for coordinating discharge planning if the patient needs post-hospital services based on physician/advanced practitioner order or complex needs related to functional status, cognitive ability, or social support system  Outcome: Progressing     Problem: Knowledge Deficit  Goal: Patient/family/caregiver demonstrates understanding of disease process, treatment plan, medications, and discharge instructions  Description: Complete learning assessment and assess knowledge base.   Interventions:  - Provide teaching at level of understanding  - Provide teaching via preferred learning methods  Outcome: Progressing     Problem: CARDIOVASCULAR - ADULT  Goal: Maintains optimal cardiac output and hemodynamic stability  Description: INTERVENTIONS:  - Monitor I/O, vital signs and rhythm  - Monitor for S/S and trends of decreased cardiac output  - Administer and titrate ordered vasoactive medications to optimize hemodynamic stability  - Assess quality of pulses, skin color and temperature  - Assess for signs of decreased coronary artery perfusion  - Instruct patient to report change in severity of symptoms  Outcome: Progressing     Problem: METABOLIC, FLUID AND ELECTROLYTES - ADULT  Goal: Glucose maintained within target range  Description: INTERVENTIONS:  - Monitor Blood Glucose as ordered  - Assess for signs and symptoms of hyperglycemia and hypoglycemia  - Administer ordered medications to maintain glucose within target range  - Assess nutritional intake and initiate nutrition service referral as needed  Outcome: Progressing

## 2023-10-17 NOTE — UTILIZATION REVIEW
Initial Clinical Review    Admission: Date/Time/Statement:   Admission Orders (From admission, onward)       Ordered        10/16/23 1758  INPATIENT ADMISSION  Once                          Orders Placed This Encounter   Procedures    INPATIENT ADMISSION     Standing Status:   Standing     Number of Occurrences:   1     Order Specific Question:   Level of Care     Answer:   Med Surg [16]     Order Specific Question:   Estimated length of stay     Answer:   More than 2 Midnights     Order Specific Question:   Certification     Answer:   I certify that inpatient services are medically necessary for this patient for a duration of greater than two midnights. See H&P and MD Progress Notes for additional information about the patient's course of treatment. ED Arrival Information       Expected   -    Arrival   10/16/2023 16:00    Acuity   Urgent              Means of arrival   Walk-In    Escorted by   Self    Service   Hospitalist    Admission type   Emergency              Arrival complaint   chest tightness             Chief Complaint   Patient presents with    Chest Pain     Pt states he has had this tightness intermittently over the summer, but that it started again yesterday while doing yard work. Pt denies dizziness, lightheadedness, n/v with these symptoms. Initial Presentation: 61 y.o. male from home to ED admitted inpatient due to Chest pain/alcohol abuse. PMH of GERD, chronic back pain, hypertension, type 2 diabetes. Drinks 2 beers daily and 4 gin and tonic on weekend. Tobacco abuse. Presented due to chest pressure starting morning of arrival, had episode day prior to arrival after moving [place] that resolved spontaneously. Admits to intermittent chest pain last few months. Exam non focal. JACOBO 2. Hs Tnl 98> 136/38>123/25. Ecg 1st degree block. In the ED given asa, Heparin bolus and gtt. Plan is telemetry. Trend troponin. Continue statin, asa, Consult Cardiology.   CIWA, thiamine, folic acid and MVI. Nicotine patch. Start SSI. Continue lisinopril and hold HCTZ. Date: 10/17/23  Day 2:  chest pain resolved. Telemetry sinus bradycardia. Rate 50s. Triglycerides 409. HDL 37. . Glucose 197. Mg 1.8.  continue IV heparin. Echo. Transfer to facility that is cath capable. 10/17/23 per Cardiology - patient with chest pain, ecg non specific ST and T wave change and concern is Angina. Plan is check echo. Continue IV heparin. Continue asa. Hold beta blocker as is bradycardic. Continue lisinopril. Recommend Cardiac Cath.      ED Triage Vitals [10/16/23 1608]   Temperature Pulse Respirations Blood Pressure SpO2   98.4 °F (36.9 °C) 76 16 137/96 97 %      Temp Source Heart Rate Source Patient Position - Orthostatic VS BP Location FiO2 (%)   Temporal Monitor Sitting Left arm --      Pain Score       No Pain          Wt Readings from Last 1 Encounters:   10/17/23 108 kg (238 lb)     Additional Vital Signs:   Date/Time Temp Pulse Resp BP MAP (mmHg) SpO2 O2 Device Patient Position - Orthostatic VS   10/17/23 1015 -- 66 16 -- -- 92 % -- --   10/17/23 0945 -- 68 18 -- -- 95 % -- --   10/17/23 0930 -- 84 33 Abnormal  -- -- 96 % -- --   10/17/23 0921 -- 78 -- 193/84 Abnormal  -- -- -- --   10/17/23 0915 98 °F (36.7 °C) 81 20 187/82 Abnormal  118 95 % -- --   10/17/23 0815 -- 61 20 136/74 98 94 % -- --   10/17/23 0745 -- 61 22 193/84 Abnormal  120 96 % -- --   10/17/23 0700 -- 63 8 Abnormal  134/68 93 95 % -- --   10/17/23 0500 -- 63 18 140/78 103 93 % None (Room air) Lying   10/17/23 0400 -- 64 18 116/63 82 98 % None (Room air) Lying   10/17/23 0200 -- 70 12 110/68 84 96 % None (Room air) Lying   10/17/23 0000 -- 71 15 135/63 91 94 % None (Room air) Lying   10/16/23 2100 -- 66 19 143/73 101 97 % None (Room air) Lying   10/16/23 2000 -- 77 18 141/80 101 94 % None (Room air) Sitting     CIWA not documented    Pertinent Labs/Diagnostic Test Results:   XR chest pa & lateral   Final Result by Juanhebert Alston DO (10/17 5991)      No acute cardiopulmonary disease. Resident: Monico Villafana, the attending radiologist, have reviewed the images and agree with the final report above. Workstation performed: [id number]             Procedure Note: EKG  Date/Time: 10/16/23 5:34 PM   ECG interpreted by  the ED Provider: yes   The EKG demonstrates:  Rate 73  Rhythm Sinus with 1st degree block  QTc 449  No ST elevations/depressions     10/17/23 echo  Left Ventricle: Left ventricular cavity size is normal. Wall thickness is mildly increased. The left ventricular ejection fraction is 60%. Systolic function is normal. Wall motion is normal. Diastolic function is normal.    Right Ventricle: Right ventricular cavity size is normal. Systolic function is normal.    Aortic Valve: There is mild stenosis. The aortic valve mean gradient is 10 mmHg.   No prior studies for comparison    Results from last 7 days   Lab Units 10/17/23  0709 10/16/23  1616   WBC Thousand/uL 6.44 7.54   HEMOGLOBIN g/dL 15.1 15.9   HEMATOCRIT % 46.0 48.6   PLATELETS Thousands/uL 194 215   NEUTROS ABS Thousands/µL 3.31 4.40     Results from last 7 days   Lab Units 10/17/23  0709 10/16/23  1616   SODIUM mmol/L 139 138   POTASSIUM mmol/L 3.9 4.0   CHLORIDE mmol/L 108 107   CO2 mmol/L 25 22   ANION GAP mmol/L 6 9   BUN mg/dL 14 15   CREATININE mg/dL 0.94 1.02   EGFR ml/min/1.73sq m 85 77   CALCIUM mg/dL 9.4 9.7   MAGNESIUM mg/dL 1.8*  --      Results from last 7 days   Lab Units 10/17/23  0709 10/16/23  1616   AST U/L 18 19   ALT U/L 23 23   ALK PHOS U/L 51 62   TOTAL PROTEIN g/dL 6.6 7.2   ALBUMIN g/dL 4.1 4.5   TOTAL BILIRUBIN mg/dL 0.47 0.44     Results from last 7 days   Lab Units 10/17/23  0731 10/17/23  0015   POC GLUCOSE mg/dl 186* 225*     Results from last 7 days   Lab Units 10/17/23  0709 10/16/23  1616   GLUCOSE RANDOM mg/dL 197* 224*     Results from last 7 days   Lab Units 10/16/23  1616   HEMOGLOBIN A1C % 8.0*   EAG mg/dl 183     Results from last 7 days   Lab Units 10/16/23  2020 10/16/23  1819 10/16/23  1616   HS TNI 0HR ng/L  --   --  98*   HS TNI 2HR ng/L  --  136*  --    HSTNI D2 ng/L  --  38*  --    HS TNI 4HR ng/L 123*  --   --    HSTNI D4 ng/L 25*  --   --      Results from last 7 days   Lab Units 10/17/23  0709 10/17/23  0012 10/16/23  1751   PROTIME seconds  --   --  12.6   INR   --   --  0.91   PTT seconds 38* 74* 26     ED Treatment:   Medication Administration from 10/16/2023 1600 to 10/17/2023 1112         Date/Time Order Dose Route Action Comments     10/16/2023 1742 EDT aspirin chewable tablet 324 mg 324 mg Oral Given --     10/16/2023 1758 EDT heparin (porcine) injection 4,000 Units 4,000 Units Intravenous Given --     10/17/2023 0755 EDT heparin (porcine) 25,000 units in 0.45% NaCl 250 mL infusion (premix) 11.1 Units/kg/hr Intravenous Rate/Dose Change no change     10/16/2023 1756 EDT heparin (porcine) 25,000 units in 0.45% NaCl 250 mL infusion (premix) 11.1 Units/kg/hr Intravenous New Bag --     10/17/2023 0817 EDT aspirin (ECOTRIN LOW STRENGTH) EC tablet 81 mg 81 mg Oral Given --     10/16/2023 1959 EDT atorvastatin (LIPITOR) tablet 40 mg 40 mg Oral Given --     10/17/2023 0931 EDT fenofibrate (TRICOR) tablet 145 mg 145 mg Oral Given --     10/17/2023 0931 EDT gabapentin (NEURONTIN) capsule 400 mg 400 mg Oral Given --     10/16/2023 1959 EDT gabapentin (NEURONTIN) capsule 400 mg 400 mg Oral Given --     10/17/2023 0720 EDT methocarbamol (ROBAXIN) tablet 500 mg 500 mg Oral Given --     10/17/2023 0719 EDT pantoprazole (PROTONIX) EC tablet 40 mg 40 mg Oral Given --     10/17/2023 0817 EDT thiamine tablet 100 mg 100 mg Oral Given --     10/01/4861 5176 EDT folic acid (FOLVITE) tablet 1 mg 1 mg Oral Given --     10/17/2023 0817 EDT multivitamin-minerals (CENTRUM) tablet 1 tablet 1 tablet Oral Given --     10/17/2023 0841 EDT insulin lispro (HumaLOG) 100 units/mL subcutaneous injection 1-6 Units 1 Units Subcutaneous Given --     10/17/2023 0020 EDT insulin lispro (HumaLOG) 100 units/mL subcutaneous injection 1-6 Units 2 Units Subcutaneous Given --     10/17/2023 0931 EDT lisinopril (ZESTRIL) tablet 20 mg 20 mg Oral Given --          Past Medical History:   Diagnosis Date    Chews tobacco regularly     Consumes three beers daily     Controlled diabetes mellitus (HCC)     Diabetes ([address])     Esophageal reflux     GERD (gastroesophageal reflux disease)     Hyperlipidemia     Hypertension     Osteoarthritis     LEFT KNEE    Primary localized osteoarthritis of left knee     last assessed: 1/16/2018     Present on Admission:   Esophageal reflux   Hyperlipidemia   Hypertension   Type 2 diabetes mellitus without complication, without long-term current use of insulin (HCC)      Admitting Diagnosis: Chest pain [R07.9]  Age/Sex: 61 y.o. male  Admission Orders:  10/16/23 1758 inpatient   Scheduled Medications:  aspirin, 81 mg, Oral, Daily  atorvastatin, 80 mg, Oral, Daily With Dinner  fenofibrate, 145 mg, Oral, Daily  folic acid, 1 mg, Oral, Daily  gabapentin, 400 mg, Oral, BID  insulin lispro, 1-6 Units, Subcutaneous, TID AC  insulin lispro, 1-6 Units, Subcutaneous, HS  lisinopril, 20 mg, Oral, Daily  multivitamin-minerals, 1 tablet, Oral, Daily  pantoprazole, 40 mg, Oral, Early Morning  thiamine, 100 mg, Oral, Daily    Continuous IV Infusions:  heparin (porcine), 3-20 Units/kg/hr (Order-Specific), Intravenous, Titrated    PRN Meds:  acetaminophen, 650 mg, Oral, Q6H PRN  heparin (porcine), 2,000 Units, Intravenous, Q6H PRN  heparin (porcine), 4,000 Units, Intravenous, Q6H PRN  methocarbamol, 500 mg, Oral, Q6H PRN x 1 10/17/23   nicotine, 1 patch, Transdermal, Daily PRN  nitroglycerin, 0.4 mg, Sublingual, Q5 Min PRN    CIWA  Continuous pulse oximetry  Telemetry     IP CONSULT TO CARDIOLOGY    Network Utilization Review Department  ATTENTION: Please call with any questions or concerns to [phone] and carefully listen to the prompts so that you are directed to the right person. All voicemails are confidential.   For Discharge needs, contact Care Management DC Support Team at [phone] opt. [phone]  Send all requests for admission clinical reviews, approved or denied determinations and any other requests to dedicated fax number below belonging to the campus where the patient is receiving treatment.  List of dedicated fax numbers for the Facilities:  [place] DENIALS (Administrative/Medical Necessity) [phone]   DISCHARGE SUPPORT TEAM (NETWORK) [id number] (Maternity/NICU/Pediatrics) [phone]   [address] [place] [address] Drive [address] [place] [address] [address] [phone]   [address] [place] [address] [address] [facility] [address] [facility] [address] [address] [facility] [address] [facility] [address] [phone]

## 2023-10-17 NOTE — DISCHARGE SUMMARY
[facility]  Discharge- Joslyn Beltrán 1960, 61 y.o. male MRN: [id number]  Unit/Bed#: ED 05 Encounter: [id number]  Primary Care Provider: Carlita Cardoza MD   Date and time admitted to hospital: 10/16/2023  5:31 PM    * Chest pain with elevated troponin  Assessment & Plan  Presented to the emergency department due to intermittent chest pain, most recently occurred yesterday while doing yard work  CXR: No acute cardiopulmonary disease, follow-up radiology read  EKG without ischemic changes  Initial troponin 98 -->136-->123  JACOBO score of 2 -   Cardiology consult appreciated  Symptoms concerning for unstable angina. Patient started on heparin gtt. He will need an ischemic evaluation via cardiac catheterization. Discussed with cardiology. He will be transferred to [address] [place] [address] for cardiac catheterization. Continue with telemetry monitoring. Continue with aspirin and statin therapy. Currently chest pain-free.     Alcohol abuse  Assessment & Plan  Patient reports daily alcohol use, during the week approximately 2 beers daily and on weekends approximately 4 gin and tonics  Denies issues with alcohol withdrawal previously  Monitor via CIWA protocol  Thiamine, folic acid, multivitamin  Refused any inpatient alcohol rehab    Tobacco abuse  Assessment & Plan  Patient reports using chewing tobacco approximately 1 can/day  Requesting nicotine patch PRN    Esophageal reflux  Assessment & Plan  Continue Protonix    Type 2 diabetes mellitus without complication, without long-term current use of insulin [facility])  Assessment & Plan  Lab Results   Component Value Date    HGBA1C 8.0 (H) 10/16/2023       Recent Labs     10/17/23  0015 10/17/23  0731   POCGLU 225* 186*       Blood Sugar Average: Last 72 hrs:  Hold oral agents while inpatient  Update hemoglobin A1c  SSI plus Accu-Chek  Diabetic diet  We will start low-dose Lantus tonight      Hypertension  Assessment & Plan  Blood pressure stable  Home regimen includes Prinzide  Continue lisinopril but will hold HCTZ for now  Not on beta-blocker secondary to sinus bradycardia in the 50s seen on telemetry monitor. Hyperlipidemia  Assessment & Plan  Continue statin, Tricor        Medical Problems       Resolved Problems  Date Reviewed: 10/16/2023   None       Discharging Physician / Practitioner: Nicole Young MD  PCP: Yessy Mak MD  Admission Date:   Admission Orders (From admission, onward)       Ordered        10/16/23 [address] [place] [address]                          Discharge Date: 10/17/23    Consultations During Hospital Stay:  Cardiology    Procedures Performed:   Echocardiogram    Significant Findings / Test Results:   Chest x-ray with no cardiopulmonary disease    Incidental Findings:   None      Test Results Pending at Discharge (will require follow up): Being transferred to [facility] for cardiac catheterization     Reason for Admission: Chest pain    Hospital Course:   Abi Bird is a 61 y.o. male patient who originally presented to the hospital on 10/16/2023 due to chest pain while mowing his lawn. EKG on admission was negative for any signs of ischemia however patient did have uptrending high-sensitivity troponins. Seen by cardiology and concern for ACS. Being transferred to [facility] for ischemic evaluation via cardiac catheterization. Patient will be maintained on telemetry, heparin GTT. Started on aspirin and high intensity statin therapy. Not on beta-blocker secondary to bradycardia. Maintained on ACE inhibitor. Stable for discharge to [address] [place] [address] for cardiac catheterization today. Please see above list of diagnoses and related plan for additional information. Condition at Discharge: stable    Discharge Day Visit / Exam:   Subjective: Seen and examined. Denies any chest pain. Denies any shortness of breath.   Vitals: Blood Pressure: (!) 193/84 (10/17/23 3784)  Pulse: 59 (10/17/23 1215)  Temperature: 98 °F (36.7 °C) (10/17/23 0915)  Temp Source: Oral (10/17/23 0915)  Respirations: 21 (10/17/23 1215)  Height: 5' 11" (180.3 cm) (10/17/23 0921)  Weight - Scale: 108 kg (238 lb) (10/17/23 0921)  SpO2: 96 % (10/17/23 1215)  Exam:   Physical Exam  Constitutional:       General: He is not in acute distress. Appearance: He is obese. HENT:      Head: Normocephalic. Nose: Nose normal.      Mouth/Throat:      Mouth: Mucous membranes are moist.   Eyes:      Extraocular Movements: Extraocular movements intact. Pupils: Pupils are equal, round, and reactive to light. Cardiovascular:      Rate and Rhythm: Normal rate and regular rhythm. Pulses: Normal pulses. Heart sounds: No murmur heard. Pulmonary:      Effort: Pulmonary effort is normal.      Breath sounds: Normal breath sounds. Abdominal:      General: Abdomen is flat. Bowel sounds are normal.      Palpations: Abdomen is soft. Musculoskeletal:      Cervical back: Neck supple. Right lower leg: No edema. Left lower leg: No edema. Skin:     General: Skin is warm. Neurological:      General: No focal deficit present. Mental Status: He is alert and oriented to person, place, and time. Mental status is at baseline. Discussion with Family: Updated  (wife) via phone. Discharge instructions/Information to patient and family:   See after visit summary for information provided to patient and family. Provisions for Follow-Up Care:  See after visit summary for information related to follow-up care and any pertinent home health orders. Disposition:   [address] Transfer to [facility]    Planned Readmission: no     Discharge Statement:  I spent 35 minutes discharging the patient. This time was spent on the day of discharge. I had direct contact with the patient on the day of discharge.  Greater than 50% of the total time was spent examining patient, answering all patient questions, arranging and discussing plan of care with patient as well as directly providing post-discharge instructions. Additional time then spent on discharge activities. Discharge Medications:  See after visit summary for reconciled discharge medications provided to patient and/or family.       **Please Note: This note may have been constructed using a voice recognition system**

## 2023-10-17 NOTE — Clinical Note
The left coronary artery was injected and visualized. Multiple views of the injected vessel were taken. Island Pedicle Flap With Canthal Suspension Text: The defect edges were debeveled with a #15 scalpel blade.  Given the location of the defect, shape of the defect and the proximity to free margins an island pedicle advancement flap was deemed most appropriate.  Using a sterile surgical marker, an appropriate advancement flap was drawn incorporating the defect, outlining the appropriate donor tissue and placing the expected incisions within the relaxed skin tension lines where possible. The area thus outlined was incised deep to adipose tissue with a #15 scalpel blade.  The skin margins were undermined to an appropriate distance in all directions around the primary defect and laterally outward around the island pedicle utilizing iris scissors.  There was minimal undermining beneath the pedicle flap. A suspension suture was placed in the canthal tendon to prevent tension and prevent ectropion.

## 2023-10-17 NOTE — ED NOTES
ECHO tech at bedside, am meds given awaiting information on patients transfer     Rohit Hayden RN  10/17/23 0001

## 2023-10-17 NOTE — ASSESSMENT & PLAN NOTE
Patient reports daily alcohol use, during the week approximately 2 beers daily and on weekends approximately 4 gin and tonics  Denies issues with alcohol withdrawal previously  Monitor via CIWA protocol  Thiamine, folic acid, multivitamin  Refused any inpatient alcohol rehab

## 2023-10-17 NOTE — ASSESSMENT & PLAN NOTE
Lab Results   Component Value Date    HGBA1C 8.0 (H) 10/16/2023       Recent Labs     10/17/23  0015 10/17/23  0731   POCGLU 225* 186*       Blood Sugar Average: Last 72 hrs:  Hold oral agents while inpatient  Update hemoglobin A1c  SSI plus Accu-Chek  Diabetic diet  We will start low-dose Lantus tonight

## 2023-10-17 NOTE — ASSESSMENT & PLAN NOTE
Blood pressure stable  Home regimen includes Prinzide  Continue lisinopril but will hold HCTZ for now  Not on beta-blocker secondary to sinus bradycardia in the 50s seen on telemetry monitor.

## 2023-10-17 NOTE — H&P
[facility]  H&P  Name: Obed Rowe 61 y.o. male I MRN: [id number]  Unit/Bed#: S -Santino I Date of Admission: 10/17/2023   Date of Service: 10/17/2023 I Hospital Day: 0      Assessment/Plan   * Chest pain with elevated troponin  Assessment & Plan  Presented to the emergency department due to intermittent chest pain, most recently occurred yesterday while doing yard work  CXR: No acute cardiopulmonary disease, follow-up radiology read  EKG without ischemic changes  Initial troponin 98 -->136-->123  JACOBO score of 2 -   Cardiology consult appreciated  Cont heparin gtt. Planned for cardiac catheterization. Continue with telemetry monitoring. Continue with ASA and statin therapy. Tobacco abuse  Assessment & Plan  NRT offered     Alcohol abuse  Assessment & Plan  Patient reports daily alcohol use, during the week approximately 2 beers daily and on weekends approximately 4 gin and tonics  Denies issues with alcohol withdrawal previously  Monitor via CIWA protocol  Thiamine, folic acid, multivitamin  Refused any inpatient alcohol rehab    Esophageal reflux  Assessment & Plan  Cont PPI     Type 2 diabetes mellitus without complication, without long-term current use of insulin [facility])  Assessment & Plan  Lab Results   Component Value Date    HGBA1C 8.0 (H) 10/16/2023       Recent Labs     10/17/23  0015 10/17/23  0731 10/17/23  1232   POCGLU 225* 186* 244*       Blood Sugar Average: Last 72 hrs:  Hold oral agents while inpatient  SSI plus Accu-Chek  Diabetic diet  Lantus 6 units            VTE Pharmacologic Prophylaxis:   Moderate Risk (Score 3-4) - Pharmacological DVT Prophylaxis Ordered: heparin drip. Code Status: Level 1 - Full Code   Discussion with family: Patient declined call to . Anticipated Length of Stay: Patient will be admitted on an inpatient basis with an anticipated length of stay of greater than 2 midnights secondary to chest pain.     Total Time Spent on Date of Encounter in care of patient: 70 mins. This time was spent on one or more of the following: performing physical exam; counseling and coordination of care; obtaining or reviewing history; documenting in the medical record; reviewing/ordering tests, medications or procedures; communicating with other healthcare professionals and discussing with patient's family/caregivers. Chief Complaint:  chest pain     History of Present Illness:  Miladys Dillon is a 61 y.o. male with a PMH of hypertension, type 2 diabetes who presents with chest pain that occurred while mowing his lawn. Has had similar but less severe symptoms with activity in the last few months. EKG was negative for ischemia on admission however troponin was elevated. Subsequent troponins did trend upward. Cardiology was consulted and recommended transfer to [address] [place] [address] for cardiac catheterization. Patient has been started on heparin drip and monitored on telemetry. Patient has no current complaints. Review of Systems:  Review of Systems   Constitutional: Negative. Negative for chills and fatigue. HENT: Negative. Cardiovascular:  Positive for chest pain. Negative for palpitations. Gastrointestinal: Negative. Genitourinary: Negative. Musculoskeletal: Negative. Skin: Negative. All other systems reviewed and are negative.       Past Medical and Surgical History:   Past Medical History:   Diagnosis Date    Chews tobacco regularly     Consumes three beers daily     Controlled diabetes mellitus ([address])     Diabetes ([address])     Esophageal reflux     GERD (gastroesophageal reflux disease)     Hyperlipidemia     Hypertension     Osteoarthritis     LEFT KNEE    Primary localized osteoarthritis of left knee     last assessed: 1/16/2018       Past Surgical History:   Procedure Laterality Date    COLONOSCOPY      [facility] ARTHRP KNE CONDYLE&PLATU MEDIAL&LAT COMPARTMENTS Left 1/8/2018    Procedure: ARTHROPLASTY KNEE TOTAL;  Surgeon: Med Camejo MD;  Location: [facility] OR;  Service: Orthopedics    REPLACEMENT TOTAL KNEE Left        Meds/Allergies:  Prior to Admission medications    Medication Sig Start Date End Date Taking?  Authorizing Provider   ALPRAZolam Didi Quill) 1 mg tablet Take 1 tablet (1 mg total) by mouth daily at bedtime as needed for anxiety  Patient not taking: Reported on 9/12/2022 4/12/21   Debbora Leyden, MD   aspirin (ECOTRIN) 325 mg EC tablet Take 1 tablet by mouth 2 (two) times a day for 30 days 1/10/18 2/9/18  Med Camejo MD   atorvastatin (LIPITOR) 40 mg tablet Take 1 tablet (40 mg total) by mouth daily in the early morning 9/20/22   Debbora Leyden, MD   diphenhydrAMINE-acetaminophen (TYLENOL PM)  MG TABS Take 1 tablet by mouth daily at bedtime as needed for sleep    Historical Provider, MD   Empagliflozin (Jardiance) 25 MG TABS Take 1 tablet (25 mg total) by mouth every morning  Patient not taking: Reported on 3/29/2023 9/20/22   Debbora Leyden, MD   Exenatide ER (Bydureon BCise) 2 MG/0.85ML AUIJ Inject 2 mg under the skin once a week 9/20/22   Debbora Leyden, MD   Exenatide ER (Bydureon) 2 MG PEN INJECT THE CONTENTS OF 1  PEN SUBCUTANEOUSLY WEEKLY  ON SUNDAYS 8/10/21   Debbora Leyden, MD   fenofibrate (TRICOR) 145 mg tablet Take 1 tablet (145 mg total) by mouth daily 9/20/22   Debbora Leyden, MD   gabapentin (NEURONTIN) 400 mg capsule Take 1 PO BID. 3/29/23   CHRISTA Torres   ibuprofen (MOTRIN) 400 mg tablet Take 600 mg by mouth every 6 (six) hours as needed for mild pain Also takes 400mg in the evening    Historical Provider, MD   lisinopril-hydrochlorothiazide (PRINZIDE,ZESTORETIC) 20-12.5 MG per tablet Take 1 tablet by mouth daily 9/20/22   Debbora Leyden, MD   metFORMIN (GLUCOPHAGE) 1000 MG tablet TAKE 1 TABLET BY MOUTH TWICE DAILY WITH MEALS 9/6/23   Debbora Leyden, MD   methocarbamol (ROBAXIN) 500 mg tablet Take 1 tablet (500 mg total) by mouth 4 (four) times a day 7/17/23   Debbora Leyden, MD   pantoprazole (PROTONIX) 40 mg tablet TAKE 1 TABLET BY MOUTH ONCE DAILY BEFORE BREAKFAST 9/6/23   Apollo Sandoval MD     I have reviewed home medications using recent Epic encounter. Allergies: No Known Allergies    Social History:  Marital Status: /Civil Union   Occupation:   Patient Pre-hospital Living Situation: Home  Patient Pre-hospital Level of Mobility: walks  Patient Pre-hospital Diet Restrictions:   Substance Use History:   Social History     Substance and Sexual Activity   Alcohol Use Yes    Alcohol/week: 20.0 standard drinks of alcohol    Types: 20 Cans of beer per week    Comment: daily beer & "gin & tonics" on a weekend ; social alcohol use ( as per allscripts)     Social History     Tobacco Use   Smoking Status Former    Packs/day: 1.00    Years: 11.00    Total pack years: 11.00    Types: Cigarettes   Smokeless Tobacco Current    Types: Chew   Tobacco Comments    quit 30 + yrs ago      Social History     Substance and Sexual Activity   Drug Use No       Family History:  Family History   Problem Relation Age of Onset    Colon cancer Father     Mental illness Family         mental disorder    Substance Abuse Neg Hx        Physical Exam:     Vitals:   Blood Pressure: 135/68 (10/17/23 1542)  Pulse: 65 (10/17/23 1542)  Temperature: 97.5 °F (36.4 °C) (10/17/23 1542)  Temp Source: Oral (10/17/23 1542)  Respirations: 18 (10/17/23 1542)  SpO2: 95 % (10/17/23 1542)    Physical Exam  Vitals and nursing note reviewed. Constitutional:       General: He is not in acute distress. Appearance: Normal appearance. He is not toxic-appearing or diaphoretic. HENT:      Head: Normocephalic and atraumatic. Cardiovascular:      Rate and Rhythm: Normal rate and regular rhythm. Pulmonary:      Effort: Pulmonary effort is normal. No respiratory distress. Breath sounds: Normal breath sounds. No rales. Chest:      Chest wall: No tenderness. Abdominal:      General: Abdomen is flat. There is no distension. Palpations: Abdomen is soft. Tenderness: There is no abdominal tenderness. There is no guarding or rebound. Skin:     General: Skin is warm and dry. Neurological:      General: No focal deficit present. Mental Status: He is alert and oriented to person, place, and time. Additional Data:     Lab Results:  Results from last 7 days   Lab Units 10/17/23  0709   WBC Thousand/uL 6.44   HEMOGLOBIN g/dL 15.1   HEMATOCRIT % 46.0   PLATELETS Thousands/uL 194   NEUTROS PCT % 52   LYMPHS PCT % 33   MONOS PCT % 10   EOS PCT % 3     Results from last 7 days   Lab Units 10/17/23  0709   SODIUM mmol/L 139   POTASSIUM mmol/L 3.9   CHLORIDE mmol/L 108   CO2 mmol/L 25   BUN mg/dL 14   CREATININE mg/dL 0.94   ANION GAP mmol/L 6   CALCIUM mg/dL 9.4   ALBUMIN g/dL 4.1   TOTAL BILIRUBIN mg/dL 0.47   ALK PHOS U/L 51   ALT U/L 23   AST U/L 18   GLUCOSE RANDOM mg/dL 197*     Results from last 7 days   Lab Units 10/16/23  1751   INR  0.91     Results from last 7 days   Lab Units 10/17/23  1232 10/17/23  0731 10/17/23  0015   POC GLUCOSE mg/dl 244* 186* 225*     Results from last 7 days   Lab Units 10/16/23  1616   HEMOGLOBIN A1C % 8.0*           Lines/Drains:  Invasive Devices       Peripheral Intravenous Line  Duration             Peripheral IV 10/16/23 Dorsal (posterior); Left Forearm <1 day    Peripheral IV 10/16/23 Dorsal (posterior); Right Forearm <1 day                        Imaging: No pertinent imaging reviewed. No orders to display       EKG and Other Studies Reviewed on Admission:   EKG: NSR. HR 82.    ** Please Note: This note has been constructed using a voice recognition system.  **

## 2023-10-17 NOTE — CONSULTS
Consult - Cardiology   Dea Castillo 61 y.o. male MRN: [id number]  Unit/Bed#: ED 05 Encounter: [id number]        Reason For Consult:  Chest pain  Outpatient Cardiologist: None prior               ASSESSMENT:  Chest pain, elevated troponin 98 --> 136 --> 123  EKG's showing sinus rhythm with nonspecific ST and T wave changes, no obvious ischemia, no ST elevation  Chest x-ray is clear  Symptoms are concerning for angina in light of his elevated troponin he was started on empiric IV heparin 10/16/2023. He will need an ischemic evaluation and echocardiogram. He has multiple risk factors for coronary disease including hypertension, diabetes, dyslipidemia, and tobacco use. Type 2 diabetes, hemoglobin A1c 8.0 on 10/16/2023  Hypertension  Dyslipidemia, not well controlled  10/17/2023 lipids: Cholesterol 223, triglycerides 409, HDL 37, direct   Pre-hospital Rx: Lipitor 40  Tobacco use, chewing tobacco 1 can/day  GERD  Chronic back pain  Alcohol use, no hx of withdrawal   2 drink daily on weekdays, 4 on weekends     PLAN/ DISCUSSION:     Check echocardiogram  Continue IV heparin  Aspirin 324 followed by 81 daily  Increase Lipitor to 80  Hold off on starting BB due to sinus bradycardia in the 50s  Continue lisinopril  Discussed case with attending in light of patient's typical anginal symptoms, risk factors for CAD, and now positive troponins we will treat empirically for acute coronary syndrome. Continue IV heparin as above. Plan for cardiac catheterization this admission. Currently stable on room air, no dye allergies, creatinine 0.9    History Of Present Illness:  Nikko Georges is a very pleasant 60-year-old gentleman without any prior care from a cardiologist.  He has no problems with his heart that he knows of. He has no strong family history of heart disease. He does have a primary care physician who manages his high blood pressure as well as his dyslipidemia and type 2 diabetes.   He tells me that he only goes to see his doctor "if something is falling off" however does take medications on a regular basis and is compliant with his diabetes meds. 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 He does not smoke but does chew tobacco.  He does not use street drugs. He drinks alcohol recreationally. He has never had any alcohol withdrawal.    This summer he noticed some intermittent chest pressure. This would occur with exertion. He noticed that actually on vacation when he was carrying supplies and pulling a wagon on the beach. It was not particularly bothersome and went away once he sat down and relax so he did not pay much attention to it. It also occurred when he was mowing the lawn with his push mower over the summer but he is not sure if it was musculoskeletal and again resolved with resting so he did not pay attention. This weekend he was moving [employer] with a friend. While moving the [employer] he noticed a return of his chest discomfort. He describes this as a tightness. It is in the center of his chest and radiates to the right. It is not particularly severe and went away once he stopped working however he was concerned so he called his PCP office and made an appointment for Tuesday, 10/17/2023. In the interim he felt that something was wrong and wanted to be evaluated sooner so he came to the ER. He was pain-free since he has been here. His troponins are mildly elevated. His EKGs have no changes. We are asked see him in consultation for chest pain and elevated troponin.     Past Medical History:        Past Medical History:   Diagnosis Date    Chews tobacco regularly     Consumes three beers daily     Controlled diabetes mellitus ([address])     Diabetes ([address])     Esophageal reflux     GERD (gastroesophageal reflux disease)     Hyperlipidemia     Hypertension     Osteoarthritis     LEFT KNEE    Primary localized osteoarthritis of left knee     last assessed: 1/16/2018      Past Surgical History:   Procedure Laterality Date    COLONOSCOPY      OR ARTHRP KNE CONDYLE&PLATU MEDIAL&LAT COMPARTMENTS Left 1/8/2018    Procedure: ARTHROPLASTY KNEE TOTAL;  Surgeon: Sari Sierra MD;  Location:  MAIN OR;  Service: Orthopedics    REPLACEMENT TOTAL KNEE Left         Allergy:        No Known Allergies    Medications:       Prior to Admission medications    Medication Sig Start Date End Date Taking?  Authorizing Provider   ALPRAZolam Jc Mireya) 1 mg tablet Take 1 tablet (1 mg total) by mouth daily at bedtime as needed for anxiety  Patient not taking: Reported on 9/12/2022 4/12/21   Dionicio Quinones MD   aspirin (ECOTRIN) 325 mg EC tablet Take 1 tablet by mouth 2 (two) times a day for 30 days 1/10/18 2/9/18  Sari Sierra MD   atorvastatin (LIPITOR) 40 mg tablet Take 1 tablet (40 mg total) by mouth daily in the early morning 9/20/22   Dionicio Quinones MD   diphenhydrAMINE-acetaminophen (TYLENOL PM)  MG TABS Take 1 tablet by mouth daily at bedtime as needed for sleep    Historical Provider, MD   Empagliflozin (Jardiance) 25 MG TABS Take 1 tablet (25 mg total) by mouth every morning  Patient not taking: Reported on 3/29/2023 9/20/22   Dionicio Quinones MD   Exenatide ER (Bydureon BCise) 2 MG/0.85ML AUIJ Inject 2 mg under the skin once a week 9/20/22   Dionicio Quinones MD   Exenatide ER (Bydureon) 2 MG PEN INJECT THE CONTENTS OF 1  PEN SUBCUTANEOUSLY WEEKLY  ON SUNDAYS 8/10/21   Dionicio Quinones MD   fenofibrate (TRICOR) 145 mg tablet Take 1 tablet (145 mg total) by mouth daily 9/20/22   Dionicio Quinones MD   gabapentin (NEURONTIN) 400 mg capsule Take 1 PO BID. 3/29/23   CHRISTA Mcelroy   ibuprofen (MOTRIN) 400 mg tablet Take 600 mg by mouth every 6 (six) hours as needed for mild pain Also takes 400mg in the evening    Historical Provider, MD   lisinopril-hydrochlorothiazide (PRINZIDE,ZESTORETIC) 20-12.5 MG per tablet Take 1 tablet by mouth daily 9/20/22   Dionicio Quinones MD   metFORMIN (GLUCOPHAGE) 1000 MG tablet TAKE 1 TABLET BY MOUTH TWICE DAILY WITH MEALS 9/6/23   Apollo Sandoval MD   methocarbamol (ROBAXIN) 500 mg tablet Take 1 tablet (500 mg total) by mouth 4 (four) times a day 7/17/23   Apollo Sandoval MD   pantoprazole (PROTONIX) 40 mg tablet TAKE 1 TABLET BY MOUTH ONCE DAILY BEFORE BREAKFAST 9/6/23   Apollo Sandoval MD       Family History:     Family History   Problem Relation Age of Onset    Colon cancer Father     Mental illness Family         mental disorder    Substance Abuse Neg Hx         Social History:       Social History     Socioeconomic History    Marital status: /Civil Union     Spouse name: None    Number of children: None    Years of education: None    Highest education level: None   Occupational History    None   Tobacco Use    Smoking status: Former     Packs/day: 1.00     Years: 11.00     Total pack years: 11.00     Types: Cigarettes    Smokeless tobacco: Current     Types: Chew    Tobacco comments:     quit 30 + yrs ago    Vaping Use    Vaping Use: Never used   Substance and Sexual Activity    Alcohol use: Yes     Alcohol/week: 20.0 standard drinks of alcohol     Types: 20 Cans of beer per week     Comment: daily beer & "gin & tonics" on a weekend ; social alcohol use ( as per allscripts)    Drug use: No    Sexual activity: None   Other Topics Concern    None   Social History Narrative    None     Social Determinants of Health     Financial Resource Strain: Not on file   Food Insecurity: Not on file   Transportation Needs: Not on file   Physical Activity: Not on file   Stress: Not on file   Social Connections: Not on file   Intimate Partner Violence: Not on file   Housing Stability: Not on file       ROS:  14 point ROS negative except as outlined above  Remainder review of systems is negative    Exam:  General:  alert, oriented and in no distress, cooperative  Head: Normocephalic, atraumatic. Eyes:  EOMI. Pupils - equal, round, reactive to accomodation. No icterus. Normal Conjunctiva. Oropharynx: moist and normal-appearing mucosa  Neck: supple, symmetrical, trachea midline and no JVD  Heart:  RRR, No: murmer, rub or gallop, S1 & S2 normal   Respiratory effort / Chest Inspection: unlabored  Lungs:  normal air entry, lungs clear to auscultation and no rales, rhonchi or wheezing   Abdomen: flat, normal findings: bowel sounds normal and soft, non-tender  Lower Limbs:  no pitting edema  Pulses[de-identified]  RLE - DP: present 2+                 LLE - DP: present 2+  Musculoskeletal: ROM grossly normal        DATA:      ECG:                     Telemetry: bradycardic. HR 50s          Echocardiogram:           Ischemic Testing:         Weights: Wt Readings from Last 3 Encounters:   02/10/23 108 kg (238 lb)   09/15/22 108 kg (238 lb)   09/12/22 108 kg (238 lb)   , There is no height or weight on file to calculate BMI.          Lab Studies:           Results from last 7 days   Lab Units 10/17/23  0709   TRIGLYCERIDES mg/dL 409*   HDL mg/dL 37*     Results from last 7 days   Lab Units 10/17/23  0709 10/16/23  1616   WBC Thousand/uL 6.44 7.54   HEMOGLOBIN g/dL 15.1 15.9   HEMATOCRIT % 46.0 48.6   PLATELETS Thousands/uL 194 215   ,   Results from last 7 days   Lab Units 10/17/23  0709 10/16/23  1616   POTASSIUM mmol/L 3.9 4.0   CHLORIDE mmol/L 108 107   CO2 mmol/L 25 22   BUN mg/dL 14 15   CREATININE mg/dL 0.94 1.02   CALCIUM mg/dL 9.4 9.7   ALK PHOS U/L 51 62   ALT U/L 23 23   AST U/L 18 19

## 2023-10-17 NOTE — ASSESSMENT & PLAN NOTE
Presented to the emergency department due to intermittent chest pain, most recently occurred yesterday while doing yard work  CXR: No acute cardiopulmonary disease, follow-up radiology read  EKG without ischemic changes  Initial troponin 98 -->136-->123  JACOBO score of 2 -   Cardiology consult appreciated  Symptoms concerning for unstable angina. Patient started on heparin gtt. He will need an ischemic evaluation via cardiac catheterization. Discussed with cardiology. He will be transferred to [address] [place] [address] for cardiac catheterization. Continue with telemetry monitoring. Continue with aspirin and statin therapy. Currently chest pain-free.

## 2023-10-18 LAB
APTT PPP: 27 SECONDS (ref 23–37)
APTT PPP: 76 SECONDS (ref 23–37)
ERYTHROCYTE [DISTWIDTH] IN BLOOD BY AUTOMATED COUNT: 12.8 % (ref 11.6–15.1)
GLUCOSE SERPL-MCNC: 158 MG/DL (ref 65–140)
GLUCOSE SERPL-MCNC: 161 MG/DL (ref 65–140)
GLUCOSE SERPL-MCNC: 182 MG/DL (ref 65–140)
GLUCOSE SERPL-MCNC: 235 MG/DL (ref 65–140)
HCT VFR BLD AUTO: 48.2 % (ref 36.5–49.3)
HGB BLD-MCNC: 16.3 G/DL (ref 12–17)
INR PPP: 0.9 (ref 0.84–1.19)
MCH RBC QN AUTO: 31.2 PG (ref 26.8–34.3)
MCHC RBC AUTO-ENTMCNC: 33.8 G/DL (ref 31.4–37.4)
MCV RBC AUTO: 92 FL (ref 82–98)
PLATELET # BLD AUTO: 198 THOUSANDS/UL (ref 149–390)
PMV BLD AUTO: 11.6 FL (ref 8.9–12.7)
PROTHROMBIN TIME: 12.7 SECONDS (ref 11.6–14.5)
RBC # BLD AUTO: 5.23 MILLION/UL (ref 3.88–5.62)
WBC # BLD AUTO: 6.58 THOUSAND/UL (ref 4.31–10.16)

## 2023-10-18 PROCEDURE — 85730 THROMBOPLASTIN TIME PARTIAL: CPT | Performed by: PHYSICIAN ASSISTANT

## 2023-10-18 PROCEDURE — 93571 IV DOP VEL&/PRESS C FLO 1ST: CPT | Performed by: INTERNAL MEDICINE

## 2023-10-18 PROCEDURE — B2111ZZ FLUOROSCOPY OF MULTIPLE CORONARY ARTERIES USING LOW OSMOLAR CONTRAST: ICD-10-PCS | Performed by: INTERNAL MEDICINE

## 2023-10-18 PROCEDURE — C1769 GUIDE WIRE: HCPCS | Performed by: INTERNAL MEDICINE

## 2023-10-18 PROCEDURE — 99152 MOD SED SAME PHYS/QHP 5/>YRS: CPT | Performed by: INTERNAL MEDICINE

## 2023-10-18 PROCEDURE — 85027 COMPLETE CBC AUTOMATED: CPT | Performed by: NURSE PRACTITIONER

## 2023-10-18 PROCEDURE — 82948 REAGENT STRIP/BLOOD GLUCOSE: CPT

## 2023-10-18 PROCEDURE — C1894 INTRO/SHEATH, NON-LASER: HCPCS | Performed by: INTERNAL MEDICINE

## 2023-10-18 PROCEDURE — 99153 MOD SED SAME PHYS/QHP EA: CPT | Performed by: INTERNAL MEDICINE

## 2023-10-18 PROCEDURE — C1887 CATHETER, GUIDING: HCPCS | Performed by: INTERNAL MEDICINE

## 2023-10-18 PROCEDURE — 93454 CORONARY ARTERY ANGIO S&I: CPT | Performed by: INTERNAL MEDICINE

## 2023-10-18 PROCEDURE — 85730 THROMBOPLASTIN TIME PARTIAL: CPT | Performed by: HOSPITALIST

## 2023-10-18 PROCEDURE — 85610 PROTHROMBIN TIME: CPT | Performed by: NURSE PRACTITIONER

## 2023-10-18 PROCEDURE — 99232 SBSQ HOSP IP/OBS MODERATE 35: CPT | Performed by: INTERNAL MEDICINE

## 2023-10-18 PROCEDURE — 99232 SBSQ HOSP IP/OBS MODERATE 35: CPT | Performed by: PHYSICIAN ASSISTANT

## 2023-10-18 PROCEDURE — 85347 COAGULATION TIME ACTIVATED: CPT

## 2023-10-18 RX ORDER — MIDAZOLAM HYDROCHLORIDE 2 MG/2ML
INJECTION, SOLUTION INTRAMUSCULAR; INTRAVENOUS CODE/TRAUMA/SEDATION MEDICATION
Status: DISCONTINUED | OUTPATIENT
Start: 2023-10-18 | End: 2023-10-18 | Stop reason: HOSPADM

## 2023-10-18 RX ORDER — VERAPAMIL HYDROCHLORIDE 2.5 MG/ML
INJECTION, SOLUTION INTRAVENOUS CODE/TRAUMA/SEDATION MEDICATION
Status: DISCONTINUED | OUTPATIENT
Start: 2023-10-18 | End: 2023-10-18 | Stop reason: HOSPADM

## 2023-10-18 RX ORDER — HEPARIN SODIUM 10000 [USP'U]/100ML
3-20 INJECTION, SOLUTION INTRAVENOUS
Status: DISCONTINUED | OUTPATIENT
Start: 2023-10-18 | End: 2023-10-19

## 2023-10-18 RX ORDER — HEPARIN SODIUM 1000 [USP'U]/ML
4000 INJECTION, SOLUTION INTRAVENOUS; SUBCUTANEOUS EVERY 6 HOURS PRN
Status: DISCONTINUED | OUTPATIENT
Start: 2023-10-18 | End: 2023-10-19

## 2023-10-18 RX ORDER — SODIUM CHLORIDE 9 MG/ML
125 INJECTION, SOLUTION INTRAVENOUS CONTINUOUS
Status: DISCONTINUED | OUTPATIENT
Start: 2023-10-18 | End: 2023-10-18

## 2023-10-18 RX ORDER — SODIUM CHLORIDE 9 MG/ML
100 INJECTION, SOLUTION INTRAVENOUS CONTINUOUS
Status: DISPENSED | OUTPATIENT
Start: 2023-10-18 | End: 2023-10-18

## 2023-10-18 RX ORDER — NITROGLYCERIN 20 MG/100ML
INJECTION INTRAVENOUS CODE/TRAUMA/SEDATION MEDICATION
Status: DISCONTINUED | OUTPATIENT
Start: 2023-10-18 | End: 2023-10-18 | Stop reason: HOSPADM

## 2023-10-18 RX ORDER — HEPARIN SODIUM 1000 [USP'U]/ML
INJECTION, SOLUTION INTRAVENOUS; SUBCUTANEOUS CODE/TRAUMA/SEDATION MEDICATION
Status: DISCONTINUED | OUTPATIENT
Start: 2023-10-18 | End: 2023-10-18 | Stop reason: HOSPADM

## 2023-10-18 RX ORDER — FENTANYL CITRATE 50 UG/ML
INJECTION, SOLUTION INTRAMUSCULAR; INTRAVENOUS CODE/TRAUMA/SEDATION MEDICATION
Status: DISCONTINUED | OUTPATIENT
Start: 2023-10-18 | End: 2023-10-18 | Stop reason: HOSPADM

## 2023-10-18 RX ORDER — HEPARIN SODIUM 1000 [USP'U]/ML
2000 INJECTION, SOLUTION INTRAVENOUS; SUBCUTANEOUS EVERY 6 HOURS PRN
Status: DISCONTINUED | OUTPATIENT
Start: 2023-10-18 | End: 2023-10-19

## 2023-10-18 RX ORDER — LIDOCAINE HYDROCHLORIDE 10 MG/ML
INJECTION, SOLUTION EPIDURAL; INFILTRATION; INTRACAUDAL; PERINEURAL CODE/TRAUMA/SEDATION MEDICATION
Status: DISCONTINUED | OUTPATIENT
Start: 2023-10-18 | End: 2023-10-18 | Stop reason: HOSPADM

## 2023-10-18 RX ORDER — MAGNESIUM SULFATE HEPTAHYDRATE 40 MG/ML
2 INJECTION, SOLUTION INTRAVENOUS ONCE
Status: COMPLETED | OUTPATIENT
Start: 2023-10-18 | End: 2023-10-19

## 2023-10-18 RX ADMIN — ATORVASTATIN CALCIUM 80 MG: 40 TABLET, FILM COATED ORAL at 17:01

## 2023-10-18 RX ADMIN — Medication 100 MG: at 08:31

## 2023-10-18 RX ADMIN — MULTIPLE VITAMINS W/ MINERALS TAB 1 TABLET: TAB ORAL at 08:31

## 2023-10-18 RX ADMIN — GABAPENTIN 400 MG: 400 CAPSULE ORAL at 17:05

## 2023-10-18 RX ADMIN — FENOFIBRATE 145 MG: 145 TABLET ORAL at 08:31

## 2023-10-18 RX ADMIN — MAGNESIUM SULFATE HEPTAHYDRATE 2 G: 40 INJECTION, SOLUTION INTRAVENOUS at 12:32

## 2023-10-18 RX ADMIN — FOLIC ACID 1 MG: 1 TABLET ORAL at 08:32

## 2023-10-18 RX ADMIN — LISINOPRIL 20 MG: 20 TABLET ORAL at 08:34

## 2023-10-18 RX ADMIN — ASPIRIN 81 MG: 81 TABLET, COATED ORAL at 08:32

## 2023-10-18 RX ADMIN — SODIUM CHLORIDE 100 ML/HR: 0.9 INJECTION, SOLUTION INTRAVENOUS at 11:15

## 2023-10-18 RX ADMIN — INSULIN LISPRO 3 UNITS: 100 INJECTION, SOLUTION INTRAVENOUS; SUBCUTANEOUS at 22:02

## 2023-10-18 RX ADMIN — GABAPENTIN 400 MG: 400 CAPSULE ORAL at 08:31

## 2023-10-18 RX ADMIN — HEPARIN SODIUM 17.1 UNITS/KG/HR: 10000 INJECTION, SOLUTION INTRAVENOUS at 08:34

## 2023-10-18 RX ADMIN — INSULIN LISPRO 1 UNITS: 100 INJECTION, SOLUTION INTRAVENOUS; SUBCUTANEOUS at 17:01

## 2023-10-18 RX ADMIN — INSULIN LISPRO 1 UNITS: 100 INJECTION, SOLUTION INTRAVENOUS; SUBCUTANEOUS at 12:33

## 2023-10-18 RX ADMIN — HEPARIN SODIUM 11.1 UNITS/KG/HR: 10000 INJECTION, SOLUTION INTRAVENOUS at 19:30

## 2023-10-18 RX ADMIN — SODIUM CHLORIDE 125 ML/HR: 0.9 INJECTION, SOLUTION INTRAVENOUS at 09:21

## 2023-10-18 NOTE — CASE MANAGEMENT
Case Management Progress Note    Patient name Alyson Funes  Location S /S -01 MRN [id number]  : 1960 Date 10/18/2023       LOS (days): 1  Geometric Mean LOS (GMLOS) (days):   Days to [facility]:        OBJECTIVE:        Current admission status: Inpatient  Preferred Pharmacy:   [address], [place] - [address]  [address] [facility] [address]  Phone: [phone] Fax: [address], [address]  [address]  Mail Order pharmacy  [place] [address]  Phone: [phone] Fax: [phone]    Primary Care Provider: Campbell Solomon MD    Primary Insurance: BLUE CROSS  Secondary Insurance:     PROGRESS NOTE:    Weekly Care Management Length of Stay Review     Current LOS: 1 Days    Most Recent Labs:     Lab Results   Component Value Date/Time    WBC 6.58 10/18/2023 04:11 PM    HGB 16.3 10/18/2023 04:11 PM    HCT 48.2 10/18/2023 04:11 PM     10/18/2023 04:11 PM    INR 0.90 10/18/2023 04:11 PM       Most Recent Vitals:   Vitals:    10/18/23 1522   BP: 139/80   Pulse: 73   Resp: 16   Temp: 98 °F (36.7 °C)   SpO2: 94%        Identified Barriers to Discharge/Discharge Goals/Care Management Interventions: CATH today, triple vessel disease    Intended Discharge Disposition: transfer to higher level of care    Expected Discharge Date:

## 2023-10-18 NOTE — ASSESSMENT & PLAN NOTE
Presented to the emergency department due to intermittent chest pain, most recently occurred yesterday while doing yard work  CXR: No acute cardiopulmonary disease   EKG without ischemic changes  Troponin mildly elevated  JACOBO score of 2   ECHO with preserved EF and normal wall motion   Catheterization with MVD in pLAD, pLCX, and pRCA  Cardiology consult appreciated  Continue Heparin GTT   Transfer to [facility] for CTS eval   Continue with telemetry monitoring.     Continue to maximize medical therapy

## 2023-10-18 NOTE — PROGRESS NOTES
[address] [facility]  Progress Note  Name: Abi Bird I  MRN: [id number]  Unit/Bed#: S MS 329Chitra I Date of Admission: 10/17/2023   Date of Service: 10/18/2023 I Hospital Day: 1    Assessment/Plan   * Chest pain with elevated troponin  Assessment & Plan  Presented to the emergency department due to intermittent chest pain, most recently occurred yesterday while doing yard work  CXR: No acute cardiopulmonary disease   EKG without ischemic changes  Troponin mildly elevated  JACOBO score of 2   ECHO with preserved EF and normal wall motion   Catheterization with MVD in pLAD, pLCX, and pRCA  Cardiology consult appreciated  Continue Heparin GTT   Transfer to [facility] for CTS eval   Continue with telemetry monitoring. Continue to maximize medical therapy      Type 2 diabetes mellitus without complication, without long-term current use of insulin [facility])  Assessment & Plan  Lab Results   Component Value Date    HGBA1C 8.0 (H) 10/16/2023       Recent Labs     10/17/23  1635 10/17/23  2138 10/18/23  0743 10/18/23  1145   POCGLU 232* 284* 182* 158*         Blood Sugar Average: Last 72 hrs:  (P) 214  Hold oral agents while inpatient - Jardiance, Metformin   SSI plus Accu-Chek  Diabetic diet        Tobacco abuse  Assessment & Plan  NRT offered   Given MVD, cessation absolutely indicated and needed    Alcohol abuse  Assessment & Plan  Patient reports daily alcohol use, during the week approximately 2 beers daily and on weekends approximately 4 gin and tonics  Denies issues with alcohol withdrawal previously  Monitor via CIWA protocol  Thiamine, folic acid, multivitamin  Refused any inpatient alcohol rehab    Esophageal reflux  Assessment & Plan  Continue PPI              VTE Pharmacologic Prophylaxis: VTE Score: 4 Moderate Risk (Score 3-4) - Pharmacological DVT Prophylaxis Ordered: heparin drip. Patient Centered Rounds: I performed bedside rounds with nursing staff today.   Discussions with Specialists or Other Care Team Provider: Discussed with Cardiology, CATIA, RN, CM    Education and Discussions with Family / Patient: Updated  (wife) via phone. Total Time Spent on Date of Encounter in care of patient: 35 mins. This time was spent on one or more of the following: performing physical exam; counseling and coordination of care; obtaining or reviewing history; documenting in the medical record; reviewing/ordering tests, medications or procedures; communicating with other healthcare professionals and discussing with patient's family/caregivers. Current Length of Stay: 1 day(s)  Current Patient Status: Inpatient   Certification Statement: The patient will continue to require additional inpatient hospital stay due to pending transfer to [facility]  Discharge Plan: Anticipate discharge in 48 hrs to [facility]    Code Status: Level 1 - Full Code    Subjective:   Patient upset about the news from his catheterization. Denies symptoms currently as far as chest pain, shortness of breath, or right wrist pain. Objective:     Vitals:   Temp (24hrs), Av.8 °F (36.6 °C), Min:97.3 °F (36.3 °C), Max:98.7 °F (37.1 °C)    Temp:  [97.3 °F (36.3 °C)-98.7 °F (37.1 °C)] 97.6 °F (36.4 °C)  HR:  [58-72] 66  Resp:  [18-24] 20  BP: (109-143)/(66-84) 109/66  SpO2:  [93 %-97 %] 97 %  Body mass index is 33.19 kg/m². Input and Output Summary (last 24 hours): Intake/Output Summary (Last 24 hours) at 10/18/2023 1153  Last data filed at 10/17/2023 1801  Gross per 24 hour   Intake 450 ml   Output --   Net 450 ml       Physical Exam:   Physical Exam  Constitutional:       General: He is not in acute distress. Appearance: Normal appearance. He is normal weight. He is not ill-appearing or diaphoretic. HENT:      Head: Normocephalic and atraumatic. Mouth/Throat:      Mouth: Mucous membranes are moist.   Eyes:      General: No scleral icterus. Pupils: Pupils are equal, round, and reactive to light.    Cardiovascular:      Rate and Rhythm: Normal rate and regular rhythm. Pulses: Normal pulses. Heart sounds: Normal heart sounds, S1 normal and S2 normal. No murmur heard. No systolic murmur is present. No diastolic murmur is present. No gallop. No S3 or S4 sounds. Pulmonary:      Effort: Pulmonary effort is normal. No accessory muscle usage or respiratory distress. Breath sounds: Normal breath sounds. No stridor. No decreased breath sounds, wheezing, rhonchi or rales. Chest:      Chest wall: No tenderness. Abdominal:      General: Bowel sounds are normal. There is no distension. Palpations: Abdomen is soft. Tenderness: There is no abdominal tenderness. There is no guarding. Musculoskeletal:      Right lower leg: No edema. Left lower leg: No edema. Skin:     General: Skin is warm and dry. Coloration: Skin is not jaundiced. Neurological:      General: No focal deficit present. Mental Status: He is alert. Mental status is at baseline. Motor: No tremor or seizure activity. Psychiatric:         Behavior: Behavior is cooperative.           Additional Data:     Labs:  Results from last 7 days   Lab Units 10/17/23  0709   WBC Thousand/uL 6.44   HEMOGLOBIN g/dL 15.1   HEMATOCRIT % 46.0   PLATELETS Thousands/uL 194   NEUTROS PCT % 52   LYMPHS PCT % 33   MONOS PCT % 10   EOS PCT % 3     Results from last 7 days   Lab Units 10/17/23  0709   SODIUM mmol/L 139   POTASSIUM mmol/L 3.9   CHLORIDE mmol/L 108   CO2 mmol/L 25   BUN mg/dL 14   CREATININE mg/dL 0.94   ANION GAP mmol/L 6   CALCIUM mg/dL 9.4   ALBUMIN g/dL 4.1   TOTAL BILIRUBIN mg/dL 0.47   ALK PHOS U/L 51   ALT U/L 23   AST U/L 18   GLUCOSE RANDOM mg/dL 197*     Results from last 7 days   Lab Units 10/16/23  1751   INR  0.91     Results from last 7 days   Lab Units 10/18/23  1145 10/18/23  0743 10/17/23  2138 10/17/23  1635 10/17/23  1232 10/17/23  0731 10/17/23  0015   POC GLUCOSE mg/dl 158* 182* 284* 232* 244* 186* 225* Results from last 7 days   Lab Units 10/16/23  1616   HEMOGLOBIN A1C % 8.0*           Lines/Drains:  Invasive Devices       Peripheral Intravenous Line  Duration             Peripheral IV 10/16/23 Dorsal (posterior); Left Forearm 1 day    Peripheral IV 10/16/23 Dorsal (posterior); Right Forearm 1 day                      Telemetry:  Telemetry Orders (From admission, onward)               24 Hour Telemetry Monitoring  Continuous x 24 Hours (Telem)        Expiring   Question:  Reason for 24 Hour Telemetry  Answer:  PCI/EP study (including pacer and ICD implementation), Cardiac surgery, MI, abnormal cardiac cath, and chest pain- rule out MI                     Telemetry Reviewed: Normal Sinus Rhythm  Indication for Continued Telemetry Use: Acute MI/Unstable Angina/Rule out ACS             Imaging: Reviewed radiology reports from this admission including: ECHO    Recent Cultures (last 7 days):         Last 24 Hours Medication List:   Current Facility-Administered Medications   Medication Dose Route Frequency Provider Last Rate    acetaminophen  650 mg Oral Q6H PRN Humberto Nicolasro Teri, PA-C      aspirin  81 mg Oral Daily Humberto Williamson, PA-C      atorvastatin  80 mg Oral Daily With Vinsula ro Teri, PA-C      fenofibrate  145 mg Oral Daily Humberto Williamson, PA-C      folic acid  1 mg Oral Daily Humberto Williamson, PA-C      gabapentin  400 mg Oral BID Nickola Countess, PA-C      heparin (porcine)  3-20 Units/kg/hr (Order-Specific) Intravenous Titrated Nickola Countess, PA-C 17.1 Units/kg/hr (10/18/23 0834)    heparin (porcine)  2,000 Units Intravenous Q6H PRN Nickola Countess, PA-C      heparin (porcine)  4,000 Units Intravenous Q6H PRN Nickola Countess, PA-C      insulin lispro  1-6 Units Subcutaneous TID AC Humberto [place] Dusky, PA-C      insulin lispro  1-6 Units Subcutaneous HS Humberto Williamson, PA-C      lisinopril  20 mg Oral Daily Humberto Williamson, PA-C      magnesium sulfate 2 g Intravenous Once Phylicia Vargas PA-C      methocarbamol  500 mg Oral Q6H PRN Nasir Frame Laury Roman PA-C      multivitamin-minerals  1 tablet Oral Daily Humberto Roman PA-C      nicotine  1 patch Transdermal Daily PRN Phylicia Vargas PA-C      nitroglycerin  0.4 mg Sublingual Q5 Min PRN Humberto Roman PA-C      pantoprazole  40 mg Oral Early Morning Phylicia Vargas PA-C      sodium chloride  125 mL/hr Intravenous Continuous Phylicia Vargas PA-C 125 mL/hr (10/18/23 0921)    sodium chloride  100 mL/hr Intravenous Continuous Emmie Salvador DO      thiamine  100 mg Oral Daily Humberto Roman PA-C          Today, Patient Was Seen By: Phylicia Vargas PA-C    **Please Note: This note may have been constructed using a voice recognition system. **

## 2023-10-18 NOTE — UTILIZATION REVIEW
Initial Clinical Review    Admission: Date/Time/Statement:   Admission Orders (From admission, onward)       Ordered        10/17/23 1538  Inpatient Admission  Once                          Orders Placed This Encounter   Procedures    Inpatient Admission     Standing Status:   Standing     Number of Occurrences:   1     Order Specific Question:   Level of Care     Answer:   Med Surg [16]     Order Specific Question:   Estimated length of stay     Answer:   More than 2 Midnights     Order Specific Question:   Certification     Answer:   I certify that inpatient services are medically necessary for this patient for a duration of greater than two midnights. See H&P and MD Progress Notes for additional information about the patient's course of treatment. ED Arrival Information       Patient not seen in ED                       No chief complaint on file. Initial Presentation: 61 y.o. male received as trasnfer from [address] [place] [address] with a PMH of hypertension, type 2 diabetes who presents with chest pain that occurred while mowing his lawn. Has had similar but less severe symptoms with activity in the last few months. EKG was negative for ischemia on admission however troponin was elevated. Subsequent troponins did trend upward. Cardiology was consulted and recommended transfer to [facility] for cardiac catheterization. Patient has been started on heparin drip and monitored on telemetry. Plan: Inpatient admission for evaluation and treatment of chest pain with elevated troponin, tobacco abuse, alcohol abuse, esophageal reflux, DM: Cardiology consult, heparin drip, cardiac cath planned, telemetry, continue ASA and statin, CIWA protocol, thiamine, folic acid and MVI, diabetic diet, Lantus 6 units, SSI. Cardiology consult: check Echo, heparin drip,  mg followed by 81 mg, increase Lipitor to 80 mg, hold off on starting BB, continue lisinopril, plan for cardiac cath.      Date: 10/18   Day 2: Cardiology: continue IV heparin drip, start IV fluids, continue ASA and atorvastatin, continue folic acid and thiamine. Internal medicine: ECHO with preserved EF and normal wall motion. Catheterization with MVD in pLAD, pLCX, and pRCA. Continue heparin drip. Transfer to [facility] for CTS eval. Continue telemetry. Continue Jardiance and metformin, SSI. Continue PPI. ED Triage Vitals   Temperature Pulse Respirations Blood Pressure SpO2   10/17/23 1542 10/17/23 1542 10/17/23 1542 10/17/23 1542 10/17/23 1542   97.5 °F (36.4 °C) 65 18 135/68 95 %      Temp Source Heart Rate Source Patient Position - Orthostatic VS BP Location FiO2 (%)   10/17/23 1542 -- 10/17/23 1542 10/17/23 1542 --   Oral  Sitting Left arm       Pain Score       10/17/23 1544       No Pain          Wt Readings from Last 1 Encounters:   10/17/23 108 kg (238 lb)     Additional Vital Signs:     Date/Time Temp Pulse Resp BP MAP (mmHg) SpO2 Calculated FIO2 (%) - Nasal Cannula Nasal Cannula O2 Flow Rate (L/min) O2 Device   10/18/23 11:36:43 -- -- -- 109/66 80 -- -- -- --   10/18/23 1115 -- 66 20 139/84 102 -- -- -- --   10/18/23 1100 -- 63 -- 132/83 96 -- -- -- --   10/18/23 10:43:50 -- -- -- 129/78 95 -- -- -- --   10/18/23 09:43:50 -- -- -- -- -- 97 % 28 2 L/min Nasal cannula   10/18/23 07:45:13 97.6 °F (36.4 °C) -- -- 143/80 101 -- -- -- --   10/18/23 03:29:52 -- -- 18 122/82 95 -- -- -- --   10/18/23 0130 98.7 °F (37.1 °C) 72 18 133/83 93 94 % -- -- --   10/17/23 2300 -- -- 18 -- -- -- -- -- --   10/17/23 21:37:25 97.3 °F (36.3 °C) Abnormal  64 -- 130/70 90 -- -- -- --   10/17/23 1544 -- -- -- -- -- 95 % -- -- None (Room air)     Pertinent Labs/Diagnostic Test Results:     10/17 Echo: Interpretation Summary      Left Ventricle: Left ventricular cavity size is normal. Wall thickness is mildly increased. The left ventricular ejection fraction is 60%.  Systolic function is normal. Wall motion is normal. Diastolic function is normal.    Right Ventricle: Right ventricular cavity size is normal. Systolic function is normal.    Aortic Valve: There is mild stenosis. The aortic valve mean gradient is 10 mmHg. Results from last 7 days   Lab Units 10/17/23  0709 10/16/23  1616   WBC Thousand/uL 6.44 7.54   HEMOGLOBIN g/dL 15.1 15.9   HEMATOCRIT % 46.0 48.6   PLATELETS Thousands/uL 194 215   NEUTROS ABS Thousands/µL 3.31 4.40         Results from last 7 days   Lab Units 10/17/23  0709 10/16/23  1616   SODIUM mmol/L 139 138   POTASSIUM mmol/L 3.9 4.0   CHLORIDE mmol/L 108 107   CO2 mmol/L 25 22   ANION GAP mmol/L 6 9   BUN mg/dL 14 15   CREATININE mg/dL 0.94 1.02   EGFR ml/min/1.73sq m 85 77   CALCIUM mg/dL 9.4 9.7   MAGNESIUM mg/dL 1.8*  --      Results from last 7 days   Lab Units 10/17/23  0709 10/16/23  1616   AST U/L 18 19   ALT U/L 23 23   ALK PHOS U/L 51 62   TOTAL PROTEIN g/dL 6.6 7.2   ALBUMIN g/dL 4.1 4.5   TOTAL BILIRUBIN mg/dL 0.47 0.44     Results from last 7 days   Lab Units 10/18/23  0743 10/17/23  2138 10/17/23  1635 10/17/23  1232 10/17/23  0731 10/17/23  0015   POC GLUCOSE mg/dl 182* 284* 232* 244* 186* 225*     Results from last 7 days   Lab Units 10/17/23  0709 10/16/23  1616   GLUCOSE RANDOM mg/dL 197* 224*         Results from last 7 days   Lab Units 10/16/23  1616   HEMOGLOBIN A1C % 8.0*   EAG mg/dl 183           Results from last 7 days   Lab Units 10/16/23  2020 10/16/23  1819 10/16/23  1616   HS TNI 0HR ng/L  --   --  98*   HS TNI 2HR ng/L  --  136*  --    HSTNI D2 ng/L  --  38*  --    HS TNI 4HR ng/L 123*  --   --    HSTNI D4 ng/L 25*  --   --          Results from last 7 days   Lab Units 10/18/23  0323 10/17/23  2025 10/17/23  1230 10/17/23  0012 10/16/23  1751   PROTIME seconds  --   --   --   --  12.6   INR   --   --   --   --  0.91   PTT seconds 76* 47* 39*   < > 26    < > = values in this interval not displayed.              ED Treatment:   Medication Administration - No Administrations Displayed (No Start Event Found) None          Past Medical History:   Diagnosis Date    Chews tobacco regularly     Consumes three beers daily     Controlled diabetes mellitus ([address])     Diabetes ([address])     Esophageal reflux     GERD (gastroesophageal reflux disease)     Hyperlipidemia     Hypertension     Osteoarthritis     LEFT KNEE    Primary localized osteoarthritis of left knee     last assessed: 1/16/2018     Present on Admission:   Type 2 diabetes mellitus without complication, without long-term current use of insulin ([facility])   Tobacco abuse   Chest pain with elevated troponin   Alcohol abuse   Esophageal reflux      Admitting Diagnosis: Chest pain [R07.9]  Age/Sex: 61 y.o. male  Admission Orders:  Scheduled Medications:  aspirin, 81 mg, Oral, Daily  atorvastatin, 80 mg, Oral, Daily With Dinner  fenofibrate, 145 mg, Oral, Daily  folic acid, 1 mg, Oral, Daily  gabapentin, 400 mg, Oral, BID  insulin lispro, 1-6 Units, Subcutaneous, TID AC  insulin lispro, 1-6 Units, Subcutaneous, HS  lisinopril, 20 mg, Oral, Daily  magnesium sulfate, 2 g, Intravenous, Once  multivitamin-minerals, 1 tablet, Oral, Daily  pantoprazole, 40 mg, Oral, Early Morning  thiamine, 100 mg, Oral, Daily      Continuous IV Infusions:  heparin (porcine), 3-20 Units/kg/hr (Order-Specific), Intravenous, Titrated  sodium chloride, 125 mL/hr, Intravenous, Continuous  sodium chloride, 100 mL/hr, Intravenous, Continuous      PRN Meds:  acetaminophen, 650 mg, Oral, Q6H PRN  heparin (porcine), 2,000 Units, Intravenous, Q6H PRN  heparin (porcine), 4,000 Units, Intravenous, Q6H PRN  methocarbamol, 500 mg, Oral, Q6H PRN  nicotine, 1 patch, Transdermal, Daily PRN  nitroglycerin, 0.4 mg, Sublingual, Q5 Min PRN        None    Network Utilization Review Department  ATTENTION: Please call with any questions or concerns to [phone] and carefully listen to the prompts so that you are directed to the right person.  All voicemails are confidential.   For Discharge needs, contact Care Management DC Support Team at [phone] opt. 2  Send all requests for admission clinical reviews, approved or denied determinations and any other requests to dedicated fax number below belonging to the campus where the patient is receiving treatment.  List of dedicated fax numbers for the Facilities:  [place] DENIALS (Administrative/Medical Necessity) [phone]   DISCHARGE SUPPORT TEAM (NETWORK) [id number] [facility] (Maternity/NICU/Pediatrics) [phone]   [address] [place] [address] Drive [address] [facility] 1000 [facility] [phone]   [facility] [address] [place] [address] [address] [address] [facility] [address] [phone]

## 2023-10-18 NOTE — UTILIZATION REVIEW
NOTIFICATION OF ADMISSION DISCHARGE   This is a Notification of Discharge from [address] Please be advised that this patient has been discharge from our facility. Below you will find the admission and discharge date and time including the patient’s disposition. UTILIZATION REVIEW CONTACT:  Rocky Garcia  Utilization   Network Utilization Review Department  Phone: [phone] carefully listen to the prompts. All voicemails are confidential.  Email: [email or web address]     ADMISSION INFORMATION  PRESENTATION DATE: 10/16/2023  5:31 PM  OBERVATION ADMISSION DATE:   INPATIENT ADMISSION DATE: 10/16/23  5:58 PM   DISCHARGE DATE: 10/17/2023  2:20 PM   DISPOSITION:[facility] Utilization Review Department  ATTENTION: Please call with any questions or concerns to [phone] and carefully listen to the prompts so that you are directed to the right person. All voicemails are confidential.   For Discharge needs, contact Care Management DC Support Team at [phone] opt. 2  Send all requests for admission clinical reviews, approved or denied determinations and any other requests to dedicated fax number below belonging to the campus where the patient is receiving treatment.  List of dedicated fax numbers for the Facilities:  [place] DENIALS (Administrative/Medical Necessity) [phone]   DISCHARGE SUPPORT TEAM (Network) [phone] [facility] (Maternity/NICU/Pediatrics) [phone]   [address] [facility] [address] [facility] [place] [address] [address]  [facility] [phone]

## 2023-10-18 NOTE — ASSESSMENT & PLAN NOTE
Lab Results   Component Value Date    HGBA1C 8.0 (H) 10/16/2023       Recent Labs     10/17/23  1635 10/17/23  2138 10/18/23  0743 10/18/23  1145   POCGLU 232* 284* 182* 158*         Blood Sugar Average: Last 72 hrs:  (P) 214  Hold oral agents while inpatient - Jardiance, Metformin   SSI plus Accu-Chek  Diabetic diet

## 2023-10-18 NOTE — PROGRESS NOTES
General Cardiology   Progress Note -  Team One   Serg Salcido 61 y.o. male MRN: [id number]    Unit/Bed#: S -01 Encounter: [id number]    Assessment/ Plan:    Chest pain with elevated troponin: Concern for NSTEMI; progressive anginal symptoms over the past few weeks to months. Troponins mildly elevated on presentation, 98 --> 136 --> 123  He has remained chest pain-free. Transferred here for cardiac catheterization. Reviewed procedure with patient today and he is agreeable to proceed, formal consent to be obtained by performing physician. Continue IV heparin, start IV fluids. Continue aspirin 81 mg daily, atorvastatin. He cannot tolerate beta-blocker at this time due to bradycardia. Blood pressure controlled with lisinopril  Echo done yesterday showing normal EF and normal wall motion. Further plan pending results of catheterization    HTN: Average 149/78, continue lisinopril; may need to increase dose or add additional agent  HLD: On atorvastatin 80 mg daily (increased from home dose )and Tricor 145 mg daily. Total cholesterol 223, triglycerides 409, and unable to calculate LDL due to elevated triglycerides  DM Type 2: A1c 8.0; defer management to primary team  Tobacco use: Chews tobacco, cessation advised  GERD: Active symptoms  Alcohol use: Drinks beer daily, no apparent withdrawal symptoms, getting folic acid and thiamine. Subjective: Patient seen for follow-up status post transfer from [facility] [email or web address]. Seen there for chest pain and mild troponin elevation. No prior history of CAD, concern for progressive anginal symptoms over the past few months. He was transferred here for cardiac catheterization. He is feeling well today. Has remained pain-free since his admission. Review of Systems   Constitutional: Negative for decreased appetite and fever. Cardiovascular:  Negative for chest pain, dyspnea on exertion, leg swelling, orthopnea and palpitations.    Respiratory: Negative for cough and shortness of breath. Gastrointestinal:  Negative for nausea and vomiting. Genitourinary:  Negative for dysuria. Neurological:  Negative for dizziness and light-headedness. Psychiatric/Behavioral:  Negative for altered mental status. All other systems reviewed and are negative. Objective:   Vitals: Blood pressure 143/80, pulse 72, temperature 97.6 °F (36.4 °C), resp. rate 18, height 5' 11" (1.803 m), SpO2 94 %. ,     Body mass index is 33.19 kg/m². ,     Systolic (64DNR), HJR:518 , Min:122 , NOH:039     Diastolic (67ZGF), FCZ:80, Min:68, Max:84      Intake/Output Summary (Last 24 hours) at 10/18/2023 0834  Last data filed at 10/17/2023 1801  Gross per 24 hour   Intake 450 ml   Output --   Net 450 ml     Weight (last 2 days)       None          Telemetry Review: No significant arrhythmias seen on telemetry review. Sinus rhythm/sinus bradycardia, no significant events    Physical Exam  Vitals reviewed. Constitutional:       General: He is not in acute distress. Appearance: Normal appearance. Comments: Sitting up in chair in NAD alert pleasant and cooperative with exam   HENT:      Head: Normocephalic. Mouth/Throat:      Mouth: Mucous membranes are moist.   Cardiovascular:      Rate and Rhythm: Normal rate and regular rhythm. Heart sounds: S1 normal and S2 normal. No murmur heard. Pulmonary:      Effort: Pulmonary effort is normal.      Breath sounds: Normal breath sounds. No wheezing or rales. Abdominal:      Palpations: Abdomen is soft. Musculoskeletal:      Right lower leg: No edema. Left lower leg: No edema. Skin:     General: Skin is warm. Neurological:      Mental Status: He is alert and oriented to person, place, and time.    Psychiatric:         Mood and Affect: Mood normal.       LABORATORY RESULTS      CBC with diff:   Results from last 7 days   Lab Units 10/17/23  0709 10/16/23  1616   WBC Thousand/uL 6.44 7.54   HEMOGLOBIN g/dL 15.1 15.9 HEMATOCRIT % 46.0 48.6   MCV fL 92 92   PLATELETS Thousands/uL 194 215   RBC Million/uL 5.00 5.28   MCH pg 30.2 30.1   MCHC g/dL 32.8 32.7   RDW % 12.8 12.5   MPV fL 11.4 11.6   NRBC AUTO /100 WBCs 0 0     CMP:  Results from last 7 days   Lab Units 10/17/23  0709 10/16/23  1616   POTASSIUM mmol/L 3.9 4.0   CHLORIDE mmol/L 108 107   CO2 mmol/L 25 22   BUN mg/dL 14 15   CREATININE mg/dL 0.94 1.02   CALCIUM mg/dL 9.4 9.7   AST U/L 18 19   ALT U/L 23 23   ALK PHOS U/L 51 62   EGFR ml/min/1.73sq m 85 77     BMP:  Results from last 7 days   Lab Units 10/17/23  0709 10/16/23  1616   POTASSIUM mmol/L 3.9 4.0   CHLORIDE mmol/L 108 107   CO2 mmol/L 25 22   BUN mg/dL 14 15   CREATININE mg/dL 0.94 1.02   CALCIUM mg/dL 9.4 9.7     Results from last 7 days   Lab Units 10/17/23  0709   MAGNESIUM mg/dL 1.8*     Results from last 7 days   Lab Units 10/16/23  1616   HEMOGLOBIN A1C % 8.0*     Results from last 7 days   Lab Units 10/16/23  1751   INR  0.91     Lipid Profile:   Lab Results   Component Value Date    CHOL 193 12/16/2016    CHOL 193 05/06/2016    CHOL 180 10/23/2015     Lab Results   Component Value Date    HDL 37 (L) 10/17/2023    HDL 33 (L) 11/19/2021    HDL 32 (L) 10/16/2020     Lab Results   Component Value Date    LDLCALC  10/17/2023      Comment:      Calculated LDL invalid, triglycerides >400 mg/dl    LDLCALC 130 (H) 11/19/2021    LDLCALC 96 10/16/2020     Lab Results   Component Value Date    TRIG 409 (H) 10/17/2023    TRIG 343 (H) 11/19/2021    TRIG 483 (H) 10/16/2020     Meds/Allergies   all current active meds have been reviewed  Medications Prior to Admission   Medication    atorvastatin (LIPITOR) 40 mg tablet    diphenhydrAMINE-acetaminophen (TYLENOL PM)  MG TABS    Empagliflozin (Jardiance) 25 MG TABS    Exenatide ER (Bydureon BCise) 2 MG/0.85ML AUIJ    Exenatide ER (Bydureon) 2 MG PEN    fenofibrate (TRICOR) 145 mg tablet    gabapentin (NEURONTIN) 400 mg capsule    ibuprofen (MOTRIN) 400 mg tablet lisinopril-hydrochlorothiazide (PRINZIDE,ZESTORETIC) 20-12.5 MG per tablet    metFORMIN (GLUCOPHAGE) 1000 MG tablet    methocarbamol (ROBAXIN) 500 mg tablet    pantoprazole (PROTONIX) 40 mg tablet    ALPRAZolam (XANAX) 1 mg tablet    aspirin (ECOTRIN) 325 mg EC tablet     heparin (porcine), 3-20 Units/kg/hr (Order-Specific), Last Rate: 17.1 Units/kg/hr (10/18/23 0407)      Assessment:  Principal Problem:    Chest pain with elevated troponin  Active Problems:    Type 2 diabetes mellitus without complication, without long-term current use of insulin (HCC)    Esophageal reflux    Tobacco abuse    Alcohol abuse    Counseling / Coordination of Care  Total floor / unit time spent today 20 minutes. Greater than 50% of total time was spent with the patient and / or family counseling and / or coordination of care. ** Please Note: Dragon 360 Dictation voice to text software may have been used in the creation of this document.  **

## 2023-10-19 ENCOUNTER — APPOINTMENT (INPATIENT)
Dept: VASCULAR ULTRASOUND | Facility: HOSPITAL | Age: 63
DRG: 282 | End: 2023-10-19
Payer: COMMERCIAL

## 2023-10-19 ENCOUNTER — APPOINTMENT (INPATIENT)
Dept: CT IMAGING | Facility: HOSPITAL | Age: 63
DRG: 282 | End: 2023-10-19
Payer: COMMERCIAL

## 2023-10-19 VITALS
HEART RATE: 90 BPM | HEIGHT: 71 IN | DIASTOLIC BLOOD PRESSURE: 79 MMHG | SYSTOLIC BLOOD PRESSURE: 120 MMHG | BODY MASS INDEX: 33.19 KG/M2 | TEMPERATURE: 97.2 F | RESPIRATION RATE: 20 BRPM | OXYGEN SATURATION: 96 %

## 2023-10-19 PROBLEM — I25.10 TRIPLE VESSEL CORONARY ARTERY DISEASE: Status: ACTIVE | Noted: 2023-10-16

## 2023-10-19 LAB
ABO GROUP BLD: NORMAL
ANION GAP SERPL CALCULATED.3IONS-SCNC: 5 MMOL/L
APTT PPP: 35 SECONDS (ref 23–37)
APTT PPP: 57 SECONDS (ref 23–37)
ATRIAL RATE: 63 BPM
ATRIAL RATE: 73 BPM
ATRIAL RATE: 82 BPM
BASOPHILS # BLD AUTO: 0.07 THOUSANDS/ÂΜL (ref 0–0.1)
BASOPHILS NFR BLD AUTO: 1 % (ref 0–1)
BLD GP AB SCN SERPL QL: NEGATIVE
BUN SERPL-MCNC: 16 MG/DL (ref 5–25)
CALCIUM SERPL-MCNC: 9.1 MG/DL (ref 8.4–10.2)
CHLORIDE SERPL-SCNC: 107 MMOL/L (ref 96–108)
CO2 SERPL-SCNC: 25 MMOL/L (ref 21–32)
CREAT SERPL-MCNC: 0.93 MG/DL (ref 0.6–1.3)
EOSINOPHIL # BLD AUTO: 0.18 THOUSAND/ÂΜL (ref 0–0.61)
EOSINOPHIL NFR BLD AUTO: 3 % (ref 0–6)
ERYTHROCYTE [DISTWIDTH] IN BLOOD BY AUTOMATED COUNT: 12.8 % (ref 11.6–15.1)
GFR SERPL CREATININE-BSD FRML MDRD: 87 ML/MIN/1.73SQ M
GLUCOSE SERPL-MCNC: 147 MG/DL (ref 65–140)
GLUCOSE SERPL-MCNC: 150 MG/DL (ref 65–140)
GLUCOSE SERPL-MCNC: 264 MG/DL (ref 65–140)
HCT VFR BLD AUTO: 46.3 % (ref 36.5–49.3)
HGB BLD-MCNC: 15 G/DL (ref 12–17)
IMM GRANULOCYTES # BLD AUTO: 0.06 THOUSAND/UL (ref 0–0.2)
IMM GRANULOCYTES NFR BLD AUTO: 1 % (ref 0–2)
KCT BLD-ACNC: 286 SEC (ref 89–137)
LYMPHOCYTES # BLD AUTO: 1.39 THOUSANDS/ÂΜL (ref 0.6–4.47)
LYMPHOCYTES NFR BLD AUTO: 25 % (ref 14–44)
MAGNESIUM SERPL-MCNC: 1.9 MG/DL (ref 1.9–2.7)
MCH RBC QN AUTO: 30.3 PG (ref 26.8–34.3)
MCHC RBC AUTO-ENTMCNC: 32.4 G/DL (ref 31.4–37.4)
MCV RBC AUTO: 94 FL (ref 82–98)
MONOCYTES # BLD AUTO: 0.46 THOUSAND/ÂΜL (ref 0.17–1.22)
MONOCYTES NFR BLD AUTO: 8 % (ref 4–12)
NEUTROPHILS # BLD AUTO: 3.36 THOUSANDS/ÂΜL (ref 1.85–7.62)
NEUTS SEG NFR BLD AUTO: 62 % (ref 43–75)
NRBC BLD AUTO-RTO: 0 /100 WBCS
P AXIS: 36 DEGREES
P AXIS: 44 DEGREES
P AXIS: 49 DEGREES
PLATELET # BLD AUTO: 162 THOUSANDS/UL (ref 149–390)
PMV BLD AUTO: 11.8 FL (ref 8.9–12.7)
POTASSIUM SERPL-SCNC: 3.8 MMOL/L (ref 3.5–5.3)
PR INTERVAL: 246 MS
PR INTERVAL: 250 MS
PR INTERVAL: 262 MS
QRS AXIS: -10 DEGREES
QRS AXIS: 15 DEGREES
QRS AXIS: 6 DEGREES
QRSD INTERVAL: 96 MS
QRSD INTERVAL: 96 MS
QRSD INTERVAL: 98 MS
QT INTERVAL: 406 MS
QT INTERVAL: 408 MS
QT INTERVAL: 412 MS
QTC INTERVAL: 421 MS
QTC INTERVAL: 449 MS
QTC INTERVAL: 474 MS
RBC # BLD AUTO: 4.95 MILLION/UL (ref 3.88–5.62)
RH BLD: POSITIVE
SODIUM SERPL-SCNC: 137 MMOL/L (ref 135–147)
SPECIMEN EXPIRATION DATE: NORMAL
SPECIMEN SOURCE: ABNORMAL
T WAVE AXIS: -11 DEGREES
T WAVE AXIS: -22 DEGREES
T WAVE AXIS: -24 DEGREES
VENTRICULAR RATE: 63 BPM
VENTRICULAR RATE: 73 BPM
VENTRICULAR RATE: 82 BPM
WBC # BLD AUTO: 5.52 THOUSAND/UL (ref 4.31–10.16)

## 2023-10-19 PROCEDURE — NC001 PR NO CHARGE: Performed by: PHYSICIAN ASSISTANT

## 2023-10-19 PROCEDURE — 87081 CULTURE SCREEN ONLY: CPT | Performed by: PHYSICIAN ASSISTANT

## 2023-10-19 PROCEDURE — 93971 EXTREMITY STUDY: CPT

## 2023-10-19 PROCEDURE — G1004 CDSM NDSC: HCPCS

## 2023-10-19 PROCEDURE — 86850 RBC ANTIBODY SCREEN: CPT | Performed by: PHYSICIAN ASSISTANT

## 2023-10-19 PROCEDURE — 83735 ASSAY OF MAGNESIUM: CPT | Performed by: PHYSICIAN ASSISTANT

## 2023-10-19 PROCEDURE — 86900 BLOOD TYPING SEROLOGIC ABO: CPT | Performed by: PHYSICIAN ASSISTANT

## 2023-10-19 PROCEDURE — 80048 BASIC METABOLIC PNL TOTAL CA: CPT | Performed by: PHYSICIAN ASSISTANT

## 2023-10-19 PROCEDURE — 93880 EXTRACRANIAL BILAT STUDY: CPT | Performed by: SURGERY

## 2023-10-19 PROCEDURE — 85730 THROMBOPLASTIN TIME PARTIAL: CPT | Performed by: GENERAL PRACTICE

## 2023-10-19 PROCEDURE — 85025 COMPLETE CBC W/AUTO DIFF WBC: CPT | Performed by: PHYSICIAN ASSISTANT

## 2023-10-19 PROCEDURE — 99239 HOSP IP/OBS DSCHRG MGMT >30: CPT | Performed by: PHYSICIAN ASSISTANT

## 2023-10-19 PROCEDURE — 93971 EXTREMITY STUDY: CPT | Performed by: SURGERY

## 2023-10-19 PROCEDURE — 99232 SBSQ HOSP IP/OBS MODERATE 35: CPT | Performed by: INTERNAL MEDICINE

## 2023-10-19 PROCEDURE — 71250 CT THORAX DX C-: CPT

## 2023-10-19 PROCEDURE — 82948 REAGENT STRIP/BLOOD GLUCOSE: CPT

## 2023-10-19 PROCEDURE — 93880 EXTRACRANIAL BILAT STUDY: CPT

## 2023-10-19 PROCEDURE — 93010 ELECTROCARDIOGRAM REPORT: CPT | Performed by: INTERNAL MEDICINE

## 2023-10-19 PROCEDURE — 86901 BLOOD TYPING SEROLOGIC RH(D): CPT | Performed by: PHYSICIAN ASSISTANT

## 2023-10-19 RX ORDER — ATORVASTATIN CALCIUM 80 MG/1
80 TABLET, FILM COATED ORAL
Qty: 30 TABLET | Refills: 0 | Status: SHIPPED | OUTPATIENT
Start: 2023-10-19 | End: 2023-10-25 | Stop reason: SDUPTHER

## 2023-10-19 RX ORDER — CHLORHEXIDINE GLUCONATE ORAL RINSE 1.2 MG/ML
15 SOLUTION DENTAL EVERY 12 HOURS SCHEDULED
Status: DISCONTINUED | OUTPATIENT
Start: 2023-10-19 | End: 2023-10-19 | Stop reason: HOSPADM

## 2023-10-19 RX ADMIN — PANTOPRAZOLE SODIUM 40 MG: 40 TABLET, DELAYED RELEASE ORAL at 04:51

## 2023-10-19 RX ADMIN — FENOFIBRATE 145 MG: 145 TABLET ORAL at 09:13

## 2023-10-19 RX ADMIN — CHLORHEXIDINE GLUCONATE 15 ML: 1.2 SOLUTION ORAL at 12:53

## 2023-10-19 RX ADMIN — INSULIN LISPRO 3 UNITS: 100 INJECTION, SOLUTION INTRAVENOUS; SUBCUTANEOUS at 12:53

## 2023-10-19 RX ADMIN — HEPARIN SODIUM 4000 UNITS: 1000 INJECTION INTRAVENOUS; SUBCUTANEOUS at 02:31

## 2023-10-19 RX ADMIN — MULTIPLE VITAMINS W/ MINERALS TAB 1 TABLET: TAB ORAL at 09:13

## 2023-10-19 RX ADMIN — ASPIRIN 81 MG: 81 TABLET, COATED ORAL at 09:13

## 2023-10-19 RX ADMIN — Medication 100 MG: at 09:13

## 2023-10-19 RX ADMIN — FOLIC ACID 1 MG: 1 TABLET ORAL at 09:13

## 2023-10-19 RX ADMIN — METOPROLOL TARTRATE 25 MG: 25 TABLET, FILM COATED ORAL at 12:53

## 2023-10-19 RX ADMIN — GABAPENTIN 400 MG: 400 CAPSULE ORAL at 09:13

## 2023-10-19 NOTE — PROGRESS NOTES
[facility]  Progress Note  Name: Jeny Vasques I  MRN: [id number]  Unit/Bed#: S -01 I Date of Admission: 10/17/2023   Date of Service: 10/19/2023 I Hospital Day: 2    Assessment/Plan   * Triple vessel coronary artery disease/chest pain/elevated troponin  Assessment & Plan  Presented to the emergency department due to intermittent chest pain, most recently occurred yesterday while doing yard work  CXR: No acute cardiopulmonary disease   EKG without ischemic changes  Troponin mildly elevated  JACOBO score of 2   ECHO with preserved EF and normal wall motion   Catheterization with MVD in pLAD, pLCX, and pRCA  Cardiology consult appreciated  Continue Heparin GTT   Defer duration to Cardiology   Transfer to [facility] for CTS eval   Continue with telemetry monitoring.     Continue to maximize medical therapy  Intensify statin to 80 mg QD  Continue Lisinopril, consider adding additional agent  Increase Metformin to BID dosing at discharge      Type 2 diabetes mellitus without complication, without long-term current use of insulin [facility])  Assessment & Plan  Lab Results   Component Value Date    HGBA1C 8.0 (H) 10/16/2023       Recent Labs     10/18/23  1145 10/18/23  1521 10/18/23  2021 10/19/23  0740   POCGLU 158* 161* 235* 150*         Blood Sugar Average: Last 72 hrs:  (P) 200.6567928975656981  Repeat A1c increased to 8.0  Hold oral agents while inpatient - Jardiance, Metformin, Bydureon   BG acceptable here  SSI plus Accu-Chek  Diabetic diet  Would increase Metformin to BID dosing at discharge vs consider adding another oral agent         Tobacco abuse  Assessment & Plan  Chews tobacco  NRT offered   Given MVD, cessation absolutely indicated and needed    Alcohol abuse  Assessment & Plan  Patient reports daily alcohol use, during the week approximately 2 beers daily and on weekends approximately 4 gin and tonics  Denies issues with alcohol withdrawal previously  Monitor via CIWA protocol  Thiamine, folic acid, multivitamin  Refused any inpatient alcohol rehab    Esophageal reflux  Assessment & Plan  Continue PPI              VTE Pharmacologic Prophylaxis: VTE Score: 4 Moderate Risk (Score 3-4) - Pharmacological DVT Prophylaxis Ordered: heparin drip. Patient Centered Rounds: I performed bedside rounds with nursing staff today. Discussions with Specialists or Other Care Team Provider: Discussed with RNELLIS    Education and Discussions with Family / Patient: Updated  (wife) via phone. Total Time Spent on Date of Encounter in care of patient: 35 mins. This time was spent on one or more of the following: performing physical exam; counseling and coordination of care; obtaining or reviewing history; documenting in the medical record; reviewing/ordering tests, medications or procedures; communicating with other healthcare professionals and discussing with patient's family/caregivers. Current Length of Stay: 2 day(s)  Current Patient Status: Inpatient   Certification Statement: The patient will continue to require additional inpatient hospital stay due to pending transfer  Discharge Plan: Anticipate discharge in 24-48 hrs to [facility]    Code Status: Level 1 - Full Code    Subjective:   Patient reports no symptoms today. Denies chest pain or shortness of breath. Awaiting transfer at this time. Objective:     Vitals:   Temp (24hrs), Av.8 °F (36.6 °C), Min:97.2 °F (36.2 °C), Max:98.3 °F (36.8 °C)    Temp:  [97.2 °F (36.2 °C)-98.3 °F (36.8 °C)] 97.2 °F (36.2 °C)  HR:  [63-90] 90  Resp:  [16-20] 20  BP: (109-146)/(59-84) 120/79  SpO2:  [94 %-97 %] 96 %  Body mass index is 33.19 kg/m². Input and Output Summary (last 24 hours):      Intake/Output Summary (Last 24 hours) at 10/19/2023 0969  Last data filed at 10/18/2023 1701  Gross per 24 hour   Intake 400 ml   Output --   Net 400 ml       Physical Exam:   Physical Exam  Constitutional:       General: He is not in acute distress. Appearance: Normal appearance. He is normal weight. He is not ill-appearing or diaphoretic. HENT:      Head: Normocephalic and atraumatic. Mouth/Throat:      Mouth: Mucous membranes are moist.   Eyes:      General: No scleral icterus. Pupils: Pupils are equal, round, and reactive to light. Cardiovascular:      Rate and Rhythm: Normal rate and regular rhythm. Pulses: Normal pulses. Heart sounds: Normal heart sounds, S1 normal and S2 normal. No murmur heard. No systolic murmur is present. No diastolic murmur is present. No gallop. No S3 or S4 sounds. Pulmonary:      Effort: Pulmonary effort is normal. No accessory muscle usage or respiratory distress. Breath sounds: Normal breath sounds. No stridor. No decreased breath sounds, wheezing, rhonchi or rales. Chest:      Chest wall: No tenderness. Abdominal:      General: Bowel sounds are normal. There is no distension. Palpations: Abdomen is soft. Tenderness: There is no abdominal tenderness. There is no guarding. Musculoskeletal:      Right lower leg: No edema. Left lower leg: No edema. Skin:     General: Skin is warm and dry. Coloration: Skin is not jaundiced. Neurological:      General: No focal deficit present. Mental Status: He is alert. Mental status is at baseline. Motor: No tremor or seizure activity. Psychiatric:         Behavior: Behavior is cooperative.           Additional Data:     Labs:  Results from last 7 days   Lab Units 10/19/23  0444   WBC Thousand/uL 5.52   HEMOGLOBIN g/dL 15.0   HEMATOCRIT % 46.3   PLATELETS Thousands/uL 162   NEUTROS PCT % 62   LYMPHS PCT % 25   MONOS PCT % 8   EOS PCT % 3     Results from last 7 days   Lab Units 10/19/23  0444 10/17/23  0709   SODIUM mmol/L 137 139   POTASSIUM mmol/L 3.8 3.9   CHLORIDE mmol/L 107 108   CO2 mmol/L 25 25   BUN mg/dL 16 14   CREATININE mg/dL 0.93 0.94   ANION GAP mmol/L 5 6   CALCIUM mg/dL 9.1 9.4 ALBUMIN g/dL  --  4.1   TOTAL BILIRUBIN mg/dL  --  0.47   ALK PHOS U/L  --  51   ALT U/L  --  23   AST U/L  --  18   GLUCOSE RANDOM mg/dL 147* 197*     Results from last 7 days   Lab Units 10/18/23  1611   INR  0.90     Results from last 7 days   Lab Units 10/19/23  0740 10/18/23  2021 10/18/23  1521 10/18/23  1145 10/18/23  0743 10/17/23  2138 10/17/23  1635 10/17/23  1232 10/17/23  0731 10/17/23  0015   POC GLUCOSE mg/dl 150* 235* 161* 158* 182* 284* 232* 244* 186* 225*     Results from last 7 days   Lab Units 10/16/23  1616   HEMOGLOBIN A1C % 8.0*           Lines/Drains:  Invasive Devices       Peripheral Intravenous Line  Duration             Peripheral IV 10/16/23 Dorsal (posterior); Left Forearm 2 days    Peripheral IV 10/16/23 Dorsal (posterior); Right Forearm 2 days                      Telemetry:  Telemetry Orders (From admission, onward)               24 Hour Telemetry Monitoring  Continuous x 24 Hours (Telem)        Question:  Reason for 24 Hour Telemetry  Answer:  PCI/EP study (including pacer and ICD implementation), Cardiac surgery, MI, abnormal cardiac cath, and chest pain- rule out MI                     Telemetry Reviewed: Normal Sinus Rhythm  Indication for Continued Telemetry Use: Acute MI/Unstable Angina/Rule out ACS             Imaging: No pertinent imaging reviewed.     Recent Cultures (last 7 days):         Last 24 Hours Medication List:   Current Facility-Administered Medications   Medication Dose Route Frequency Provider Last Rate    acetaminophen  650 mg Oral Q6H PRN Humberto Aragon PA-C      aspirin  81 mg Oral Daily Humberto Aragon PA-C      atorvastatin  80 mg Oral Daily With Junaid Aragon PA-C      fenofibrate  145 mg Oral Daily Humberto Aragon PA-C      folic acid  1 mg Oral Daily Humberto Aragon PA-C      gabapentin  400 mg Oral BID Ethan Erickson PA-C      heparin (porcine)  3-20 Units/kg/hr (Order-Specific) Intravenous Titrated Tess Watts DO 15.1 Units/kg/hr (10/19/23 0232)    heparin (porcine)  2,000 Units Intravenous Q6H PRN Codi Gregory, DO      heparin (porcine)  4,000 Units Intravenous Q6H PRN Codi Gregory, DO      insulin lispro  1-6 Units Subcutaneous TID AC Humberto Mitchell PA-C      insulin lispro  1-6 Units Subcutaneous HS Humberto Mitchell PA-C      methocarbamol  500 mg Oral Q6H PRN Humberto Mitchell PA-C      multivitamin-minerals  1 tablet Oral Daily Humberto Mitchell PA-C      nicotine  1 patch Transdermal Daily PRN Hannah Cunningham PA-C      nitroglycerin  0.4 mg Sublingual Q5 Min PRN Humberto Mitchell PA-C      pantoprazole  40 mg Oral Early Morning Humberto Mitchell PA-C      thiamine  100 mg Oral Daily Humberto Mitchell PA-C          Today, Patient Was Seen By: Hannah Cunningham PA-C    **Please Note: This note may have been constructed using a voice recognition system. **

## 2023-10-19 NOTE — DISCHARGE INSTR - AVS FIRST PAGE
1. Please see the post cardiac catheterization/ angioseal closure device instructions and stent booklet. No heavy lifting, greater than 10 lbs. or strenuous  activity for 48 hrs. See the post cardiac catheterization/ angioseal closure device instructions. 2.Remove band aid tomorrow. Shower and wash area- wrist gently with soap and water- beginning tomorrow. Rinse and pat dry. Apply new water seal band aid. Repeat this process for 5 days. No powders, creams lotions or antibiotic ointments  for 5 days. No tub baths, hot tubs or swimming for 5 days.      3.No driving for 3 days

## 2023-10-19 NOTE — ASSESSMENT & PLAN NOTE
Lab Results   Component Value Date    HGBA1C 8.0 (H) 10/16/2023       Recent Labs     10/18/23  1521 10/18/23  2021 10/19/23  0740 10/19/23  1115   POCGLU 161* 235* 150* 264*         Blood Sugar Average: Last 72 hrs:  (P) 208.25  Repeat A1c increased to 8.0  Hold oral agents while inpatient - Jardiance, Metformin, Bydureon   BG acceptable here  SSI plus Accu-Chek  Diabetic diet  He is already on Metformin BID, needs to be more compliant with Bydureon  I advised setting an alarm with his smart phone

## 2023-10-19 NOTE — DISCHARGE SUMMARY
[address]  Discharge- Alberta Gravely 1960, 61 y.o. male MRN: [id number]  Unit/Bed#: S -Santino Encounter: [id number]  Primary Care Provider: Libia Bryant MD   Date and time admitted to hospital: 10/17/2023  3:15 PM    * Triple vessel coronary artery disease/chest pain/elevated troponin  Assessment & Plan  Presented to the emergency department due to intermittent chest pain, most recently occurred yesterday while doing yard work  CXR: No acute cardiopulmonary disease   EKG without ischemic changes  Troponin mildly elevated  JACOBO score of 2   ECHO with preserved EF and normal wall motion   Catheterization with MVD in pLAD, pLCX, and pRCA  Stable for discharge   Cardiology consult appreciated  Outpatient CTS eval   Continue to maximize medical therapy  Intensify statin to 80 mg QD  Continue Lisinopril  Add Metoprolol  Encouraged patient to be more compliant with Bydureon       Type 2 diabetes mellitus without complication, without long-term current use of insulin [facility])  Assessment & Plan  Lab Results   Component Value Date    HGBA1C 8.0 (H) 10/16/2023       Recent Labs     10/18/23  1521 10/18/23  2021 10/19/23  0740 10/19/23  1115   POCGLU 161* 235* 150* 264*         Blood Sugar Average: Last 72 hrs:  (P) 208.25  Repeat A1c increased to 8.0  Hold oral agents while inpatient - Jardiance, Metformin, Bydureon   BG acceptable here  SSI plus Accu-Chek  Diabetic diet  He is already on Metformin BID, needs to be more compliant with Bydureon  I advised setting an alarm with his smart phone          Tobacco abuse  Assessment & Plan  Chews tobacco  NRT offered   Given MVD, cessation absolutely indicated and needed    Alcohol abuse  Assessment & Plan  Patient reports daily alcohol use, during the week approximately 2 beers daily and on weekends approximately 4 gin and tonics  Denies issues with alcohol withdrawal previously  Monitor via CIWA protocol  Thiamine, folic acid, multivitamin  Refused any inpatient alcohol rehab  Advised cessation to improve glycemic control     Esophageal reflux  Assessment & Plan  Continue PPI       Medical Problems       Resolved Problems  Date Reviewed: 10/19/2023   None       Discharging Physician / Practitioner: Nghia Bynum PA-C  PCP: Libia Bryant MD  Admission Date:   Admission Orders (From admission, onward)       Ordered        10/17/23 1538  Inpatient Admission  Once                          Discharge Date: 10/19/23    Consultations During Hospital Stay:  Cardiology - Dr. Ruslan Salinas    Procedures Performed:   ECHO  Cardiac Catheterization   Carotid Duplex  LL Vein Mapping Bypass Graft  CT Chest     Significant Findings / Test Results:   ECHO: EF = 39%, systolic function normal, wall motion normal, diastolic function normal.   Cardiac Catheterization: MVD: iFR positive 0.79, pLCX, pRCA  Carotid Duplex: Pending   LL Vein Mapping Bypass Graft: Pending  CT Chest: Pending     Incidental Findings:   None     Test Results Pending at Discharge (will require follow up):   Carotid Duplex  LL Vein Mapping  CT Chest     Outpatient Tests Requested:  CT Surgery eval    Complications:  None    Reason for Admission: Chest Pain, Mild NSTEMI    Hospital Course:   Nidia Schmidt is a 61 y.o. male patient who originally presented to the hospital on 10/17/2023 due to chest pain. Initially presented to [facility] he was found to have a mild troponin elevation. He was seen in consult by Cardiology there who recommended transfer to [facility] for catheterization. He was maintained on heparin drip and medical management for CAD/NSTEMI. ECHO was done and showed preserved EF and wall motion. Catheterization unfortunately showed 3v CAD. Initially was planned for transfer to [facility] for CTS eval this was going to be delayed. Given medical stability and ECHO findings he will be discharged with outpatient CTS evaluation.  Vein mapping and work up done prior to discharge and is pending. He was discussed that should symptoms return he should go straight to [facility] since CTS is located there. His A1c is presently uncontrolled. He was advised better compliance with Bydureon and cessation of alcohol. Medical management was uptitrated. Please see above list of diagnoses and related plan for additional information. Condition at Discharge: stable    Discharge Day Visit / Exam:   * Please refer to separate progress note for these details *    Discussion with Family: Updated  (wife) via phone. Discharge instructions/Information to patient and family:   See after visit summary for information provided to patient and family. Provisions for Follow-Up Care:  See after visit summary for information related to follow-up care and any pertinent home health orders. Disposition:   Home    Planned Readmission: Yes, for eventual CABG     Discharge Statement:  I spent 45 minutes discharging the patient. This time was spent on the day of discharge. I had direct contact with the patient on the day of discharge. Greater than 50% of the total time was spent examining patient, answering all patient questions, arranging and discussing plan of care with patient as well as directly providing post-discharge instructions. Additional time then spent on discharge activities. Discharge Medications:  See after visit summary for reconciled discharge medications provided to patient and/or family.       **Please Note: This note may have been constructed using a voice recognition system**

## 2023-10-19 NOTE — PROGRESS NOTES
General Cardiology Progress Note   Alberta Gravely 61 y.o. male MRN: [id number]  Unit/Bed#: S -01 Encounter: [id number]      Assessment:  Principal Problem:    Triple vessel coronary artery disease/chest pain/elevated troponin  Active Problems:    Type 2 diabetes mellitus without complication, without long-term current use of insulin (HCC)    Esophageal reflux    Tobacco abuse    Alcohol abuse      Impression:    Very mild NSTEMI with troponin 198  Multivessel coronary artery disease  Stable exertional angina x3 months  Normal EF by echo  Hyperlipidemia -on high intensity statin therapy  Type 2 diabetes-A1c 8  Chewing tobacco -absolute cessation recommended    I discussed the transfer process versus home and outpatient evaluation with CT surgery. Although he did have a troponin elevation it was minimal, EF is preserved, and he has never had unstable rest symptoms. All of his symptoms have been in the context of exertion. After discussion with him and his wife, and with strict instructions on when to call 911 or return to [facility] directly especially if he develops rest symptoms, he and his wife are agreeable to discharge home with an outpatient plan for surgical evaluation. Plan:    Metoprolol 25 mg twice daily  Sublingual nitro as needed  Aspirin 81 mg  Atorvastatin 80 mg      Subjective:   Patient seen and examined. No chest pain    Review of Systems   Constitutional: Negative for diaphoresis, fever, malaise/fatigue and night sweats. Cardiovascular:  Negative for chest pain, dyspnea on exertion, leg swelling, orthopnea, palpitations and syncope. Respiratory:  Negative for cough, shortness of breath, sputum production and wheezing. Skin:  Negative for itching and rash. Gastrointestinal:  Negative for abdominal pain, change in bowel habit and diarrhea. Neurological:  Negative for dizziness, focal weakness, light-headedness and weakness.        Objective   Vitals: Blood pressure 120/79, pulse 90, temperature (!) 97.2 °F (36.2 °C), resp. rate 20, height 5' 11" (1.803 m), SpO2 96 %. , Body mass index is 33.19 kg/m²., I/O last 3 completed shifts: In: 400 [P.O.:400]  Out: -   I/O this shift:  In: 280 [P.O.:280]  Out: -   Wt Readings from Last 3 Encounters:   10/17/23 108 kg (238 lb)   02/10/23 108 kg (238 lb)   09/15/22 108 kg (238 lb)       Intake/Output Summary (Last 24 hours) at 10/19/2023 1100  Last data filed at 10/19/2023 0900  Gross per 24 hour   Intake 680 ml   Output --   Net 680 ml     I/O last 3 completed shifts: In: 400 [P.O.:400]  Out: -     No significant arrhythmias seen on telemetry review. Physical Exam  Constitutional:       General: He is not in acute distress. Appearance: He is not diaphoretic. HENT:      Head: Normocephalic and atraumatic. Neck:      Vascular: No JVD. Cardiovascular:      Rate and Rhythm: Normal rate and regular rhythm. Heart sounds: Normal heart sounds. No murmur heard. Pulmonary:      Effort: Pulmonary effort is normal. No respiratory distress. Breath sounds: Normal breath sounds. No wheezing or rales. Abdominal:      General: Bowel sounds are normal. There is no distension. Palpations: Abdomen is soft. Tenderness: There is no abdominal tenderness. Musculoskeletal:      Cervical back: Normal range of motion and neck supple. Right lower leg: No edema. Left lower leg: No edema. Skin:     General: Skin is warm and dry. Neurological:      Mental Status: He is alert and oriented to person, place, and time.          Meds/Allergies   No Known Allergies    Current Facility-Administered Medications:     acetaminophen (TYLENOL) tablet 650 mg, 650 mg, Oral, Q6H PRN, Beauty Deandre, PA-C, 650 mg at 10/17/23 1732    aspirin (ECOTRIN LOW STRENGTH) EC tablet 81 mg, 81 mg, Oral, Daily, Beauty Deandre, PA-C, 81 mg at 10/19/23 0913    atorvastatin (LIPITOR) tablet 80 mg, 80 mg, Oral, Daily With Marie Dam Celeste Davis PA-C, 80 mg at 10/18/23 1701    chlorhexidine (PERIDEX) 0.12 % oral rinse 15 mL, 15 mL, Mouth/Throat, Q12H [facility], Coby Hinton PA-C    fenofibrate (TRICOR) tablet 145 mg, 145 mg, Oral, Daily, Nghia Bynum PA-C, 145 mg at 83/08/48 5663    folic acid (FOLVITE) tablet 1 mg, 1 mg, Oral, Daily, Humberto Davis PA-C, 1 mg at 10/19/23 0913    gabapentin (NEURONTIN) capsule 400 mg, 400 mg, Oral, BID, Humberto Davis PA-C, 400 mg at 10/19/23 0913    insulin lispro (HumaLOG) 100 units/mL subcutaneous injection 1-6 Units, 1-6 Units, Subcutaneous, TID AC, 1 Units at 10/18/23 1701 **AND** Fingerstick Glucose (POCT), , , TID AC, Humberto Davis PA-C    insulin lispro (HumaLOG) 100 units/mL subcutaneous injection 1-6 Units, 1-6 Units, Subcutaneous, HS, Humberto Davis PA-C, 3 Units at 10/18/23 2202    methocarbamol (ROBAXIN) tablet 500 mg, 500 mg, Oral, Q6H PRN, Nghia Bynum PA-C, 500 mg at 10/17/23 1732    metoprolol tartrate (LOPRESSOR) tablet 25 mg, 25 mg, Oral, Q12H [facility], Carmina Beckman MD    mupirocin (BACTROBAN) 2 % nasal ointment 1 Application, 1 Application, Nasal, A85U [facility], Coby Hinton PA-C    nicotine (NICODERM CQ) 21 mg/24 hr TD 24 hr patch 1 patch, 1 patch, Transdermal, Daily PRN, Nghia Bynum PA-C    nitroglycerin (NITROSTAT) SL tablet 0.4 mg, 0.4 mg, Sublingual, Q5 Min PRN, Humberto Davis PA-C    pantoprazole (PROTONIX) EC tablet 40 mg, 40 mg, Oral, Early Morning, RENETTA Louis-C, 40 mg at 10/19/23 0451    thiamine tablet 100 mg, 100 mg, Oral, Daily, RENETTA Canchola-C, 100 mg at 10/19/23 0913    Laboratory Results:        CBC with diff:   Results from last 7 days   Lab Units 10/19/23  0444 10/18/23  1611 10/17/23  0709 10/16/23  1616   WBC Thousand/uL 5.52 6.58 6.44 7.54   HEMOGLOBIN g/dL 15.0 16.3 15.1 15.9   HEMATOCRIT % 46.3 48.2 46.0 48.6   MCV fL 94 92 92 92   PLATELETS Thousands/uL 162 198 194 215   RBC Million/uL 4.95 5.23 5. 00 5.28   MCH pg 30.3 31.2 30.2 30.1   MCHC g/dL 32.4 33.8 32.8 32.7   RDW % 12.8 12.8 12.8 12.5   MPV fL 11.8 11.6 11.4 11.6   NRBC AUTO /100 WBCs 0  --  0 0       CMP:  Results from last 7 days   Lab Units 10/19/23  0444 10/17/23  0709 10/16/23  1616   SODIUM mmol/L 137 139 138   POTASSIUM mmol/L 3.8 3.9 4.0   CHLORIDE mmol/L 107 108 107   CO2 mmol/L 25 25 22   BUN mg/dL 16 14 15   CREATININE mg/dL 0.93 0.94 1.02   CALCIUM mg/dL 9.1 9.4 9.7   AST U/L  --  18 19   ALT U/L  --  23 23   ALK PHOS U/L  --  51 62   EGFR ml/min/1.73sq m 87 85 77       BMP:  Results from last 7 days   Lab Units 10/19/23  0444 10/17/23  0709 10/16/23  1616   SODIUM mmol/L 137 139 138   POTASSIUM mmol/L 3.8 3.9 4.0   CHLORIDE mmol/L 107 108 107   CO2 mmol/L 25 25 22   BUN mg/dL 16 14 15   CREATININE mg/dL 0.93 0.94 1.02   CALCIUM mg/dL 9.1 9.4 9.7       NT-proBNP: No results for input(s): "NTBNP" in the last 72 hours. Magnesium:   Results from last 7 days   Lab Units 10/19/23  0444 10/17/23  0709   MAGNESIUM mg/dL 1.9 1.8*       Coags:   Results from last 7 days   Lab Units 10/19/23  0908 10/19/23  0134 10/18/23  1611 10/18/23  0323 10/17/23  2025 10/17/23  1230 10/17/23  0709 10/17/23  0012 10/16/23  1751   PTT seconds 57* 35 27 76* 47* 39* 38*   < > 26   INR   --   --  0.90  --   --   --   --   --  0.91    < > = values in this interval not displayed.        TSH:        Hemoglobin A1C )  Results from last 7 days   Lab Units 10/16/23  1616   HEMOGLOBIN A1C % 8.0*       Lipid Profile:   Lab Results   Component Value Date    CHOL 193 12/16/2016    CHOL 193 05/06/2016    CHOL 180 10/23/2015     Lab Results   Component Value Date    HDL 37 (L) 10/17/2023    HDL 33 (L) 11/19/2021    HDL 32 (L) 10/16/2020     Lab Results   Component Value Date    LDLCALC  10/17/2023      Comment:      Calculated LDL invalid, triglycerides >400 mg/dl    LDLCALC 130 (H) 11/19/2021    LDLCALC 96 10/16/2020     Lab Results   Component Value Date    LDLDIRECT 130 (H) 10/17/2023    LDLDIRECT 68 06/14/2019    LDLDIRECT 89 08/03/2018     Lab Results   Component Value Date    TRIG 409 (H) 10/17/2023    TRIG 343 (H) 11/19/2021    TRIG 483 (H) 10/16/2020       Cardiac testing:   EKG personally reviewed by Blanca Lozano MD.     Cath: Personally reviewed-LAD, circumflex, RCA disease noted  ECHO: Personally reviewed-EF normal  Stress TEST:  Other:        Blanca Lozano MD    Portions of the record may have been created with voice recognition software. Occasional wrong word or "sound a like" substitutions may have occurred due to the inherent limitations of voice recognition software. Read the chart carefully and recognize, using context, where substitutions have occurred.

## 2023-10-19 NOTE — ASSESSMENT & PLAN NOTE
Presented to the emergency department due to intermittent chest pain, most recently occurred yesterday while doing yard work  CXR: No acute cardiopulmonary disease   EKG without ischemic changes  Troponin mildly elevated  JACOBO score of 2   ECHO with preserved EF and normal wall motion   Catheterization with MVD in pLAD, pLCX, and pRCA  Cardiology consult appreciated  Continue Heparin GTT   Defer duration to Cardiology   Transfer to [facility] for CTS eval   Continue with telemetry monitoring.     Continue to maximize medical therapy  Intensify statin to 80 mg QD  Continue Lisinopril, consider adding additional agent  Increase Metformin to BID dosing at discharge

## 2023-10-19 NOTE — ASSESSMENT & PLAN NOTE
Presented to the emergency department due to intermittent chest pain, most recently occurred yesterday while doing yard work  CXR: No acute cardiopulmonary disease   EKG without ischemic changes  Troponin mildly elevated  JACOBO score of 2   ECHO with preserved EF and normal wall motion   Catheterization with MVD in pLAD, pLCX, and pRCA  Stable for discharge   Cardiology consult appreciated  Outpatient CTS eval   Continue to maximize medical therapy  Intensify statin to 80 mg QD  Continue Lisinopril  Add Metoprolol  Encouraged patient to be more compliant with Bydureon

## 2023-10-19 NOTE — ASSESSMENT & PLAN NOTE
Lab Results   Component Value Date    HGBA1C 8.0 (H) 10/16/2023       Recent Labs     10/18/23  1145 10/18/23  1521 10/18/23  2021 10/19/23  0740   POCGLU 158* 161* 235* 150*         Blood Sugar Average: Last 72 hrs:  (P) [id number]  Repeat A1c increased to 8.0  Hold oral agents while inpatient - Jardiance, Metformin, Bydureon   BG acceptable here  SSI plus Accu-Chek  Diabetic diet  Would increase Metformin to BID dosing at discharge vs consider adding another oral agent

## 2023-10-19 NOTE — ASSESSMENT & PLAN NOTE
Patient reports daily alcohol use, during the week approximately 2 beers daily and on weekends approximately 4 gin and tonics  Denies issues with alcohol withdrawal previously  Monitor via CIWA protocol  Thiamine, folic acid, multivitamin  Refused any inpatient alcohol rehab  Advised cessation to improve glycemic control

## 2023-10-20 LAB — MRSA NOSE QL CULT: NORMAL

## 2023-10-23 ENCOUNTER — TRANSITIONAL CARE MANAGEMENT (OUTPATIENT)
Dept: FAMILY MEDICINE CLINIC | Facility: CLINIC | Age: 63
End: 2023-10-23

## 2023-10-25 ENCOUNTER — OFFICE VISIT (OUTPATIENT)
Dept: FAMILY MEDICINE CLINIC | Facility: CLINIC | Age: 63
End: 2023-10-25
Payer: COMMERCIAL

## 2023-10-25 VITALS
BODY MASS INDEX: 33.94 KG/M2 | HEIGHT: 71 IN | HEART RATE: 72 BPM | OXYGEN SATURATION: 99 % | SYSTOLIC BLOOD PRESSURE: 138 MMHG | DIASTOLIC BLOOD PRESSURE: 84 MMHG | TEMPERATURE: 97.1 F | WEIGHT: 242.4 LBS

## 2023-10-25 DIAGNOSIS — M51.26 HERNIATED NUCLEUS PULPOSUS, L3-4 LEFT: ICD-10-CM

## 2023-10-25 DIAGNOSIS — E11.9 TYPE 2 DIABETES MELLITUS WITHOUT COMPLICATION, WITHOUT LONG-TERM CURRENT USE OF INSULIN (HCC): ICD-10-CM

## 2023-10-25 DIAGNOSIS — E11.9 TYPE 2 DIABETES MELLITUS WITHOUT COMPLICATION, WITH LONG-TERM CURRENT USE OF INSULIN (HCC): ICD-10-CM

## 2023-10-25 DIAGNOSIS — I25.10 TRIPLE VESSEL CORONARY ARTERY DISEASE: Primary | ICD-10-CM

## 2023-10-25 DIAGNOSIS — M54.16 LUMBAR RADICULOPATHY: ICD-10-CM

## 2023-10-25 DIAGNOSIS — M48.062 SPINAL STENOSIS OF LUMBAR REGION WITH NEUROGENIC CLAUDICATION: ICD-10-CM

## 2023-10-25 DIAGNOSIS — E78.5 HYPERLIPIDEMIA, UNSPECIFIED HYPERLIPIDEMIA TYPE: ICD-10-CM

## 2023-10-25 DIAGNOSIS — I10 ESSENTIAL HYPERTENSION: ICD-10-CM

## 2023-10-25 DIAGNOSIS — M54.50 CHRONIC BILATERAL LOW BACK PAIN WITHOUT SCIATICA: ICD-10-CM

## 2023-10-25 DIAGNOSIS — Z76.89 ENCOUNTER FOR SUPPORT AND COORDINATION OF TRANSITION OF CARE: ICD-10-CM

## 2023-10-25 DIAGNOSIS — K21.00 GASTROESOPHAGEAL REFLUX DISEASE WITH ESOPHAGITIS WITHOUT HEMORRHAGE: ICD-10-CM

## 2023-10-25 DIAGNOSIS — G89.29 CHRONIC BILATERAL LOW BACK PAIN WITHOUT SCIATICA: ICD-10-CM

## 2023-10-25 DIAGNOSIS — Z79.4 TYPE 2 DIABETES MELLITUS WITHOUT COMPLICATION, WITH LONG-TERM CURRENT USE OF INSULIN (HCC): ICD-10-CM

## 2023-10-25 PROCEDURE — 99495 TRANSJ CARE MGMT MOD F2F 14D: CPT | Performed by: FAMILY MEDICINE

## 2023-10-25 RX ORDER — EXENATIDE 2 MG/.65ML
INJECTION, SUSPENSION, EXTENDED RELEASE SUBCUTANEOUS
Qty: 12 EACH | Refills: 3 | Status: SHIPPED | OUTPATIENT
Start: 2023-10-25

## 2023-10-25 RX ORDER — PANTOPRAZOLE SODIUM 40 MG/1
40 TABLET, DELAYED RELEASE ORAL DAILY
Qty: 90 TABLET | Refills: 3 | Status: ON HOLD | OUTPATIENT
Start: 2023-10-25 | End: 2023-11-04 | Stop reason: SDUPTHER

## 2023-10-25 RX ORDER — FENOFIBRATE 145 MG/1
145 TABLET, COATED ORAL DAILY
Qty: 90 TABLET | Refills: 3 | Status: ON HOLD | OUTPATIENT
Start: 2023-10-25 | End: 2023-11-04 | Stop reason: SDUPTHER

## 2023-10-25 RX ORDER — GABAPENTIN 400 MG/1
CAPSULE ORAL
Qty: 180 CAPSULE | Refills: 3 | Status: SHIPPED | OUTPATIENT
Start: 2023-10-25

## 2023-10-25 RX ORDER — ATORVASTATIN CALCIUM 80 MG/1
80 TABLET, FILM COATED ORAL
Qty: 90 TABLET | Refills: 3 | Status: ON HOLD | OUTPATIENT
Start: 2023-10-25 | End: 2023-11-04 | Stop reason: SDUPTHER

## 2023-10-25 RX ORDER — LISINOPRIL AND HYDROCHLOROTHIAZIDE 20; 12.5 MG/1; MG/1
1 TABLET ORAL DAILY
Qty: 90 TABLET | Refills: 3 | Status: SHIPPED | OUTPATIENT
Start: 2023-10-25 | End: 2023-11-04

## 2023-10-25 NOTE — PROGRESS NOTES
Assessment/Plan:    Triple vessel coronary artery disease/chest pain/elevated troponin  Presented to ED due to arm pain. Noted to have elevated cardiac enzymes. PCI/angiogram revealed triple vessel disease. No s/s at this time. Encouraged compliance with med regimen. Reviewed energy conservation strategies. CTS appt this coming Friday. Reordered all meds. Encouraged to return to ED with CP, arm pain, SOB, n/v, diaphoresis. Verbalized understanding. Diagnoses and all orders for this visit:    Triple vessel coronary artery disease/chest pain/elevated troponin  -     metoprolol tartrate (LOPRESSOR) 25 mg tablet; Take 1 tablet (25 mg total) by mouth every 12 (twelve) hours  -     atorvastatin (LIPITOR) 80 mg tablet; Take 1 tablet (80 mg total) by mouth daily with dinner    Encounter for support and coordination of transition of care    Type 2 diabetes mellitus without complication, with long-term current use of insulin (HCC)  -     Albumin / creatinine urine ratio; Future  -     Basic metabolic panel; Future  -     Hemoglobin A1C; Future  -     Albumin / creatinine urine ratio  -     Basic metabolic panel  -     Hemoglobin A1C  -     metFORMIN (GLUCOPHAGE) 1000 MG tablet; Take 1 tablet (1,000 mg total) by mouth 2 (two) times a day with meals  -     fenofibrate (TRICOR) 145 mg tablet; Take 1 tablet (145 mg total) by mouth daily  -     Empagliflozin (Jardiance) 25 MG TABS; Take 1 tablet (25 mg total) by mouth every morning    Type 2 diabetes mellitus without complication, without long-term current use of insulin (HCC)  -     Exenatide ER (Bydureon) 2 MG PEN; INJECT THE CONTENTS OF 1  PEN SUBCUTANEOUSLY WEEKLY  ON SUNDAYS    Lumbar radiculopathy  -     gabapentin (NEURONTIN) 400 mg capsule; Take 1 PO BID. Herniated nucleus pulposus, L3-4 left  -     gabapentin (NEURONTIN) 400 mg capsule; Take 1 PO BID.     Spinal stenosis of lumbar region with neurogenic claudication  -     gabapentin (NEURONTIN) 400 mg capsule; Take 1 PO BID. Chronic bilateral low back pain without sciatica  -     gabapentin (NEURONTIN) 400 mg capsule; Take 1 PO BID. Gastroesophageal reflux disease with esophagitis without hemorrhage  -     pantoprazole (PROTONIX) 40 mg tablet; Take 1 tablet (40 mg total) by mouth daily    Essential hypertension  -     lisinopril-hydrochlorothiazide (PRINZIDE,ZESTORETIC) 20-12.5 MG per tablet; Take 1 tablet by mouth daily    Hyperlipidemia, unspecified hyperlipidemia type  -     fenofibrate (TRICOR) 145 mg tablet; Take 1 tablet (145 mg total) by mouth daily          Subjective:   Chief Complaint   Patient presents with    Transition of Care Management        Patient ID: Khang Gomes is a 61 y.o. male. See assessment/plan           The following portions of the patient's history were reviewed and updated as appropriate: allergies, current medications, past family history, past medical history, past social history, past surgical history and problem list.    Review of Systems   Constitutional:  Negative for fatigue, fever and unexpected weight change. HENT:  Negative for congestion, sinus pain and sore throat. Eyes:  Negative for visual disturbance. Respiratory:  Negative for shortness of breath and wheezing. Cardiovascular:  Negative for chest pain and palpitations. Gastrointestinal:  Negative for abdominal pain, nausea and vomiting. Musculoskeletal: Negative. Negative for arthralgias and myalgias. Neurological:  Negative for syncope, weakness and numbness. Psychiatric/Behavioral: Negative. Negative for confusion, dysphoric mood and suicidal ideas. Objective:  Vitals:    10/25/23 1136   BP: 138/84   BP Location: Left arm   Patient Position: Sitting   Cuff Size: Standard   Pulse: 72   Temp: (!) 97.1 °F (36.2 °C)   TempSrc: Tympanic   SpO2: 99%   Weight: 110 kg (242 lb 6.4 oz)   Height: 5' 11" (1.803 m)      Physical Exam  Vitals and nursing note reviewed.    Constitutional: Appearance: He is well-developed. HENT:      Head: Normocephalic. Right Ear: Ear canal normal. Tympanic membrane is not injected. Left Ear: Ear canal normal. Tympanic membrane is not injected. Nose: Nose normal.   Eyes:      General:         Right eye: No discharge. Left eye: No discharge. Conjunctiva/sclera: Conjunctivae normal.      Pupils: Pupils are equal, round, and reactive to light. Neck:      Thyroid: No thyromegaly. Cardiovascular:      Rate and Rhythm: Normal rate and regular rhythm. Pulses: Normal pulses. no weak pulses          Dorsalis pedis pulses are 2+ on the right side and 2+ on the left side. Posterior tibial pulses are 2+ on the right side and 2+ on the left side. Heart sounds: Normal heart sounds. No murmur heard. Pulmonary:      Effort: Pulmonary effort is normal. No respiratory distress. Breath sounds: Normal breath sounds. No wheezing. Abdominal:      General: Bowel sounds are normal. There is no distension. Palpations: Abdomen is soft. Tenderness: There is no abdominal tenderness. Musculoskeletal:         General: Normal range of motion. Cervical back: Normal range of motion and neck supple. Feet:      Right foot:      Skin integrity: No ulcer, skin breakdown, erythema, warmth, callus or dry skin. Left foot:      Skin integrity: No ulcer, skin breakdown, erythema, warmth, callus or dry skin. Lymphadenopathy:      Cervical: No cervical adenopathy. Skin:     General: Skin is warm and dry. Neurological:      Mental Status: He is alert and oriented to person, place, and time. He is not disoriented. Sensory: No sensory deficit. Motor: No weakness. Coordination: Coordination normal.      Gait: Gait normal.      Deep Tendon Reflexes: Reflexes are normal and symmetric. Psychiatric:         Speech: Speech normal.         Behavior: Behavior normal.         Thought Content:  Thought content normal. Judgment: Judgment normal.       Patient's shoes and socks removed. Right Foot/Ankle   Right Foot Inspection  Skin Exam: skin normal and skin intact. No dry skin, no warmth, no callus, no erythema, no maceration, no abnormal color, no pre-ulcer, no ulcer and no callus. Toe Exam: ROM and strength within normal limits. Sensory   Vibration: intact  Proprioception: intact  Monofilament testing: intact    Vascular  Capillary refills: < 3 seconds  The right DP pulse is 2+. The right PT pulse is 2+. Left Foot/Ankle  Left Foot Inspection  Skin Exam: skin normal and skin intact. No dry skin, no warmth, no erythema, no maceration, normal color, no pre-ulcer, no ulcer and no callus. Toe Exam: ROM and strength within normal limits. Sensory   Vibration: intact  Proprioception: intact  Monofilament testing: intact    Vascular  Capillary refills: < 3 seconds  The left DP pulse is 2+. The left PT pulse is 2+.      Assign Risk Category  No deformity present  No loss of protective sensation  No weak pulses  Risk: 0

## 2023-10-25 NOTE — ASSESSMENT & PLAN NOTE
Presented to ED due to arm pain. Noted to have elevated cardiac enzymes. PCI/angiogram revealed triple vessel disease. No s/s at this time. Encouraged compliance with med regimen. Reviewed energy conservation strategies. CTS appt this coming Friday. Reordered all meds. Encouraged to return to ED with CP, arm pain, SOB, n/v, diaphoresis. Verbalized understanding.

## 2023-10-27 ENCOUNTER — APPOINTMENT (OUTPATIENT)
Dept: LAB | Facility: CLINIC | Age: 63
End: 2023-10-27
Payer: COMMERCIAL

## 2023-10-27 ENCOUNTER — LAB REQUISITION (OUTPATIENT)
Dept: LAB | Facility: HOSPITAL | Age: 63
End: 2023-10-27
Payer: COMMERCIAL

## 2023-10-27 ENCOUNTER — TRANSCRIBE ORDERS (OUTPATIENT)
Dept: LAB | Facility: CLINIC | Age: 63
End: 2023-10-27

## 2023-10-27 ENCOUNTER — OFFICE VISIT (OUTPATIENT)
Dept: CARDIAC SURGERY | Facility: CLINIC | Age: 63
End: 2023-10-27
Payer: COMMERCIAL

## 2023-10-27 VITALS
OXYGEN SATURATION: 97 % | HEART RATE: 97 BPM | TEMPERATURE: 97 F | BODY MASS INDEX: 34.01 KG/M2 | HEIGHT: 71 IN | SYSTOLIC BLOOD PRESSURE: 138 MMHG | WEIGHT: 242.9 LBS | DIASTOLIC BLOOD PRESSURE: 78 MMHG

## 2023-10-27 DIAGNOSIS — Z01.810 PRE-OPERATIVE CARDIOVASCULAR EXAMINATION: ICD-10-CM

## 2023-10-27 DIAGNOSIS — I25.10 TRIPLE VESSEL CORONARY ARTERY DISEASE: ICD-10-CM

## 2023-10-27 DIAGNOSIS — I25.119 ATHEROSCLEROTIC HEART DISEASE OF NATIVE CORONARY ARTERY WITH UNSPECIFIED ANGINA PECTORIS (HCC): ICD-10-CM

## 2023-10-27 DIAGNOSIS — I25.118 CORONARY ARTERY DISEASE OF NATIVE ARTERY OF NATIVE HEART WITH STABLE ANGINA PECTORIS (HCC): ICD-10-CM

## 2023-10-27 DIAGNOSIS — I25.119 ATHEROSCLEROSIS OF NATIVE CORONARY ARTERY WITH ANGINA PECTORIS, UNSPECIFIED WHETHER NATIVE OR TRANSPLANTED HEART (HCC): Primary | ICD-10-CM

## 2023-10-27 DIAGNOSIS — I25.10 TRIPLE VESSEL CORONARY ARTERY DISEASE: Primary | ICD-10-CM

## 2023-10-27 DIAGNOSIS — Z01.812 ENCOUNTER FOR PRE-OPERATIVE LABORATORY TESTING: ICD-10-CM

## 2023-10-27 DIAGNOSIS — I25.10 CORONARY ARTERY DISEASE INVOLVING NATIVE CORONARY ARTERY OF NATIVE HEART WITHOUT ANGINA PECTORIS: ICD-10-CM

## 2023-10-27 PROCEDURE — 86900 BLOOD TYPING SEROLOGIC ABO: CPT | Performed by: THORACIC SURGERY (CARDIOTHORACIC VASCULAR SURGERY)

## 2023-10-27 PROCEDURE — 86850 RBC ANTIBODY SCREEN: CPT | Performed by: THORACIC SURGERY (CARDIOTHORACIC VASCULAR SURGERY)

## 2023-10-27 PROCEDURE — 99204 OFFICE O/P NEW MOD 45 MIN: CPT | Performed by: THORACIC SURGERY (CARDIOTHORACIC VASCULAR SURGERY)

## 2023-10-27 PROCEDURE — 86901 BLOOD TYPING SEROLOGIC RH(D): CPT | Performed by: THORACIC SURGERY (CARDIOTHORACIC VASCULAR SURGERY)

## 2023-10-27 PROCEDURE — 36415 COLL VENOUS BLD VENIPUNCTURE: CPT

## 2023-10-27 RX ORDER — CHLORHEXIDINE GLUCONATE ORAL RINSE 1.2 MG/ML
15 SOLUTION DENTAL ONCE
Status: CANCELLED | OUTPATIENT
Start: 2023-10-27 | End: 2023-10-27

## 2023-10-27 RX ORDER — CEFAZOLIN SODIUM 2 G/50ML
2000 SOLUTION INTRAVENOUS ONCE
Status: CANCELLED | OUTPATIENT
Start: 2023-10-27 | End: 2023-10-27

## 2023-10-27 NOTE — PROGRESS NOTES
Consultation - Cardiothoracic Surgery   Kyler Esparza 61 y.o. male MRN: [id number]    Physician Requesting Consult: Lolis Mac MD     Reason for Consult / Principal Problem: Coronary artery disease    History of Present Illness: Kyler Esparza is a 61y.o. year old male with PMHx HTN, HLD, GERD, DM Type 2 (PO meds, A1C 8.0), tobacco abuse, alcohol abuse who presented to [address] [place] [address] on 10/19 for exertional chest pain. He was moving [place] with a friend, and developed midsternal chest discomfort and tightness that radiated to the right and was relieved with rest. He presented to the ED. EKG without acute changes, and troponins mildly elevated. He was transferred to [facility] for cardiac catheterization which demonstrated multivessel CAD. He was referred for cardiac surgery evaluation, and preoperative testing was obtained. He was ultimately discharged home, and has followed up with his PCP. Upon examination today, patient reports that he is still getting dyspnea on exertion and chest pain with walking long distances, heavy lifting and walking up stairs. He has been limiting his activities as a result. He is employed as a , and lives in [place] with his wife. He denies family history of SCD or aneurysms. Of note, his CT chest did reveal an ascending aortic aneurysm of 4.3 cm. He is a former smoker, but does chew about a can of tobacco a day. He is a daily drinker as well - about 2 16 oz beers after work each night as well as 4-5 gin and tonics on Fridays and Saturdays. He denies drug use. He is anxious to have surgery and start feeling better. His preop blood work has been completed with the exception of a type and screen.      Past Medical History:  Past Medical History:   Diagnosis Date    Chews tobacco regularly     Consumes three beers daily     Controlled diabetes mellitus ([address])     Diabetes ([address])     Esophageal reflux     GERD (gastroesophageal reflux disease)     Hyperlipidemia Hypertension     Osteoarthritis     LEFT KNEE    Primary localized osteoarthritis of left knee     last assessed: 1/16/2018         Past Surgical History:   Past Surgical History:   Procedure Laterality Date    CARDIAC CATHETERIZATION Left 10/18/2023    Procedure: Cardiac pci;  Surgeon: Chino Darden DO;  [facility]; Service: Cardiology    CARDIAC CATHETERIZATION N/A 10/18/2023    Procedure: Cardiac Coronary Angiogram;  Surgeon: Chino Darden DO;  [facility]; Service: Cardiology    CARDIAC CATHETERIZATION N/A 10/18/2023    Procedure: Cardiac other - IFR;  Surgeon: Chino Darden DO;  [facility]; Service: Cardiology    COLONOSCOPY      CT ARTHRP KNE CONDYLE&PLATU MEDIAL&LAT COMPARTMENTS Left 1/8/2018    Procedure: ARTHROPLASTY KNEE TOTAL;  Surgeon: Eyal Gurrola MD;  Location:  MAIN OR;  Service: Orthopedics    REPLACEMENT TOTAL KNEE Left          Family History:  Family History   Problem Relation Age of Onset    Colon cancer Father     Mental illness Family         mental disorder    Substance Abuse Neg Hx          Social History:      Social History     Substance and Sexual Activity   Alcohol Use Yes    Alcohol/week: 20.0 standard drinks of alcohol    Types: 20 Cans of beer per week    Comment: daily beer & "gin & tonics" on a weekend ; social alcohol use ( as per allscripts)     Social History     Substance and Sexual Activity   Drug Use No     Social History     Tobacco Use   Smoking Status Former    Packs/day: 1.00    Years: 11.00    Total pack years: 11.00    Types: Cigarettes   Smokeless Tobacco Current    Types: Chew   Tobacco Comments    quit 30 + yrs ago        Home Medications:   Prior to Admission medications    Medication Sig Start Date End Date Taking?  Authorizing Provider   aspirin (ECOTRIN LOW STRENGTH) 81 mg EC tablet Take 1 tablet (81 mg total) by mouth daily Do not start before October 20, 2023. 10/20/23  Yes Humberto Jacque Aragon PA-C   atorvastatin (LIPITOR) 80 mg tablet Take 1 tablet (80 mg total) by mouth daily with dinner 10/25/23  Yes Beth Fournier MD   diphenhydrAMINE-acetaminophen (TYLENOL PM)  MG TABS Take 1 tablet by mouth daily at bedtime as needed for sleep   Yes Historical Provider, MD   Empagliflozin (Jardiance) 25 MG TABS Take 1 tablet (25 mg total) by mouth every morning 10/25/23  Yes Beth Fournier MD   Exenatide ER (Bydureon) 2 MG PEN INJECT THE CONTENTS OF 1  PEN SUBCUTANEOUSLY WEEKLY  ON SUNDAYS 10/25/23  Yes Beth Fournier MD   fenofibrate (TRICOR) 145 mg tablet Take 1 tablet (145 mg total) by mouth daily 10/25/23  Yes Beth Fournier MD   gabapentin (NEURONTIN) 400 mg capsule Take 1 PO BID. 10/25/23  Yes Beth Fournier MD   ibuprofen (MOTRIN) 400 mg tablet Take 600 mg by mouth every 6 (six) hours as needed for mild pain Also takes 400mg in the evening   Yes Historical Provider, MD   lisinopril-hydrochlorothiazide (PRINZIDE,ZESTORETIC) 20-12.5 MG per tablet Take 1 tablet by mouth daily 10/25/23  Yes Beth Fournier MD   metFORMIN (GLUCOPHAGE) 1000 MG tablet Take 1 tablet (1,000 mg total) by mouth 2 (two) times a day with meals 10/25/23  Yes Beth Fournier MD   methocarbamol (ROBAXIN) 500 mg tablet Take 1 tablet (500 mg total) by mouth 4 (four) times a day 7/17/23  Yes Beth Fournier MD   metoprolol tartrate (LOPRESSOR) 25 mg tablet Take 1 tablet (25 mg total) by mouth every 12 (twelve) hours 10/25/23  Yes Beth Fournier MD   pantoprazole (PROTONIX) 40 mg tablet Take 1 tablet (40 mg total) by mouth daily 10/25/23  Yes Beth Fournier MD       Allergies:  No Known Allergies    Review of Systems:     Review of Systems   Constitutional:  Positive for activity change and fatigue. HENT: Negative. Eyes: Negative. Respiratory:  Positive for chest tightness and shortness of breath. Cardiovascular:  Positive for chest pain. Negative for palpitations and leg swelling. Gastrointestinal: Negative. Endocrine: Negative. Genitourinary: Negative. Musculoskeletal: Negative. Skin: Negative. Allergic/Immunologic: Negative. Neurological: Negative. Hematological: Negative. Psychiatric/Behavioral:  The patient is nervous/anxious. Vital Signs:     Vitals:    10/27/23 1009 10/27/23 1014   BP: 139/79 138/78   BP Location: Left arm Right arm   Patient Position: Sitting Sitting   Cuff Size: Large Large   Pulse: 97    Temp: (!) 97 °F (36.1 °C)    TempSrc: Tympanic    SpO2: 97%    Weight: 110 kg (242 lb 14.4 oz)    Height: 5' 11" (1.803 m)        Physical Exam:     Physical Exam  Vitals reviewed. Constitutional:       Appearance: Normal appearance. He is obese. HENT:      Head: Normocephalic and atraumatic. Eyes:      Pupils: Pupils are equal, round, and reactive to light. Cardiovascular:      Rate and Rhythm: Normal rate and regular rhythm. Pulses:           Carotid pulses are 2+ on the right side and 2+ on the left side. Radial pulses are 2+ on the right side and 2+ on the left side. Heart sounds: Normal heart sounds. No murmur heard. Pulmonary:      Effort: Pulmonary effort is normal.      Breath sounds: Normal breath sounds. Abdominal:      General: Bowel sounds are normal.      Palpations: Abdomen is soft. Tenderness: There is no abdominal tenderness. Musculoskeletal:         General: Normal range of motion. Cervical back: Normal range of motion. Skin:     General: Skin is warm and dry. Capillary Refill: Capillary refill takes less than 2 seconds. Neurological:      General: No focal deficit present. Mental Status: He is alert and oriented to person, place, and time. Psychiatric:         Attention and Perception: Attention normal.         Mood and Affect: Mood normal.         Behavior: Behavior normal. Behavior is cooperative.          Lab Results:               Invalid input(s): "LABGLOM"      Lab Results   Component Value Date    HGBA1C 8.0 (H) 10/16/2023     No results found for: "CKTOTAL", "CKMB", "CKMBINDEX", "TROPONINI"    Imaging Studies:     Cardiac Catheterization: 10/18/23  pLAD (iFR positive 0.79), pLCX (unable to advance iFR wire beyond calcified lesion), pRCA       Echocardiogram: 10/17/23  Left Ventricle Left ventricular cavity size is normal. Wall thickness is mildly increased. The left ventricular ejection fraction is 60%. Systolic function is normal.  Wall motion is normal. Diastolic function is normal.   Right Ventricle Right ventricular cavity size is normal. Systolic function is normal.   Left Atrium The atrium is normal in size. Right Atrium The atrium is normal in size. Aortic Valve The aortic valve is trileaflet. The leaflets are mildly thickened. The leaflets are moderately calcified. There is mildly reduced mobility. There is no evidence of regurgitation. There is mild stenosis. The aortic valve mean gradient is 10 mmHg. Mitral Valve There is annular calcification. There is trace regurgitation. There is no evidence of stenosis. Tricuspid Valve There is trace regurgitation. There is no evidence of stenosis. The right ventricular systolic pressure is normal. The estimated right ventricular systolic pressure is 17.08 mmHg. Pulmonic Valve There is no evidence of regurgitation. There is no evidence of stenosis. Ascending Aorta The aortic root is normal in size. IVC/SVC The right atrial pressure is estimated at 15.0 mmHg. The inferior vena cava is dilated. Respirophasic changes were blunted (less than 50% variation). Pericardium There is no pericardial effusion. Vein Mapping:    Carotid Artery Ultrasound:     CT Chest: 10/19/23    IMPRESSION:     4.3 x 3.9 cm fusiform ectasia of ascending thoracic aorta. Coronary artery calcifications. No acute pulmonary pathology. I have personally reviewed pertinent reports.       Assessment:  Patient Active Problem List    Diagnosis Date Noted    Triple vessel coronary artery disease/chest pain/elevated troponin 10/16/2023    Tobacco abuse 10/16/2023    Alcohol abuse 10/16/2023    Myofascial pain syndrome 03/29/2023    Lumbar spondylosis 03/29/2023    Herniated nucleus pulposus, L3-4 left 09/12/2022    Lumbar radiculopathy 09/12/2022    Spinal stenosis of lumbar region with neurogenic claudication 09/12/2022    Chronic left-sided low back pain without sciatica 09/12/2022    Continuous opioid dependence ([address]) 06/27/2022    It band syndrome, left 02/07/2019    Aftercare following left knee joint replacement surgery 02/20/2018    Hyperlipidemia 01/08/2018    Hypertension 01/08/2018    Type 2 diabetes mellitus without complication, without long-term current use of insulin ([address]) 01/08/2018    Esophageal reflux 01/08/2018     Severe coronary artery disease; Ongoing CABG workup    Plan:  Risks and benefits of coronary artery bypass grafting were discussed in detail today with the patient. They understand and wish to proceed with further workup and ultimately surgical intervention. We have ordered routine preoperative laboratory and vascular studies. Pending the results of these tests, they will be scheduled for surgery on  with Dr. Rosa Ferrell M.D. Tigre Guardado was comfortable with our recommendations, and their questions were answered to their satisfaction. Thank you for allowing us to participate in the care of this patient. The patient recently had a screening colonoscopy in 2014. Therefore GI referral is not indicated at this time. He also had a cologuard in 2020.      SIGNATURE: Mary Ellen Whitfield PA-C  DATE: 10/27/23  TIME: 10:22 AM

## 2023-10-27 NOTE — H&P (VIEW-ONLY)
Consultation - Cardiothoracic Surgery   Jordi Hatfield 61 y.o. male MRN: [id number]    Physician Requesting Consult: Steven Sutton MD     Reason for Consult / Principal Problem: Coronary artery disease    History of Present Illness: Jordi Hatfield is a 61y.o. year old male with PMHx HTN, HLD, GERD, DM Type 2 (PO meds, A1C 8.0), tobacco abuse, alcohol abuse who presented to [address] [place] [address] on 10/19 for exertional chest pain. He was moving [place] with a friend, and developed midsternal chest discomfort and tightness that radiated to the right and was relieved with rest. He presented to the ED. EKG without acute changes, and troponins mildly elevated. He was transferred to [facility] for cardiac catheterization which demonstrated multivessel CAD. He was referred for cardiac surgery evaluation, and preoperative testing was obtained. He was ultimately discharged home, and has followed up with his PCP. Upon examination today, patient reports that he is still getting dyspnea on exertion and chest pain with walking long distances, heavy lifting and walking up stairs. He has been limiting his activities as a result. He is employed as a , and lives in [place] with his wife. He denies family history of SCD or aneurysms. Of note, his CT chest did reveal an ascending aortic aneurysm of 4.3 cm. He is a former smoker, but does chew about a can of tobacco a day. He is a daily drinker as well - about 2 16 oz beers after work each night as well as 4-5 gin and tonics on Fridays and Saturdays. He denies drug use. He is anxious to have surgery and start feeling better. His preop blood work has been completed with the exception of a type and screen.      Past Medical History:  Past Medical History:   Diagnosis Date    Chews tobacco regularly     Consumes three beers daily     Controlled diabetes mellitus ([address])     Diabetes ([address])     Esophageal reflux     GERD (gastroesophageal reflux disease)     Hyperlipidemia Hypertension     Osteoarthritis     LEFT KNEE    Primary localized osteoarthritis of left knee     last assessed: 1/16/2018         Past Surgical History:   Past Surgical History:   Procedure Laterality Date    CARDIAC CATHETERIZATION Left 10/18/2023    Procedure: Cardiac pci;  Surgeon: Melvin Almendarez DO;  [facility]; Service: Cardiology    CARDIAC CATHETERIZATION N/A 10/18/2023    Procedure: Cardiac Coronary Angiogram;  Surgeon: Melvin Almendarez DO;  [facility]; Service: Cardiology    CARDIAC CATHETERIZATION N/A 10/18/2023    Procedure: Cardiac other - IFR;  Surgeon: Melvin Almendarez DO;  [facility]; Service: Cardiology    COLONOSCOPY      NM ARTHRP KNE CONDYLE&PLATU MEDIAL&LAT COMPARTMENTS Left 1/8/2018    Procedure: ARTHROPLASTY KNEE TOTAL;  Surgeon: Elliott Duran MD;  Location: [facility];  Service: Orthopedics    REPLACEMENT TOTAL KNEE Left          Family History:  Family History   Problem Relation Age of Onset    Colon cancer Father     Mental illness Family         mental disorder    Substance Abuse Neg Hx          Social History:      Social History     Substance and Sexual Activity   Alcohol Use Yes    Alcohol/week: 20.0 standard drinks of alcohol    Types: 20 Cans of beer per week    Comment: daily beer & "gin & tonics" on a weekend ; social alcohol use ( as per allscripts)     Social History     Substance and Sexual Activity   Drug Use No     Social History     Tobacco Use   Smoking Status Former    Packs/day: 1.00    Years: 11.00    Total pack years: 11.00    Types: Cigarettes   Smokeless Tobacco Current    Types: Chew   Tobacco Comments    quit 30 + yrs ago        Home Medications:   Prior to Admission medications    Medication Sig Start Date End Date Taking?  Authorizing Provider   aspirin (ECOTRIN LOW STRENGTH) 81 mg EC tablet Take 1 tablet (81 mg total) by mouth daily Do not start before October 20, 2023. 10/20/23  Yes Humberto Raul Boyle PA-C   atorvastatin (LIPITOR) 80 mg tablet Take 1 tablet (80 mg total) by mouth daily with dinner 10/25/23  Yes Alicia Hilario MD   diphenhydrAMINE-acetaminophen (TYLENOL PM)  MG TABS Take 1 tablet by mouth daily at bedtime as needed for sleep   Yes Historical Provider, MD   Empagliflozin (Jardiance) 25 MG TABS Take 1 tablet (25 mg total) by mouth every morning 10/25/23  Yes Alicia Hilario MD   Exenatide ER (Bydureon) 2 MG PEN INJECT THE CONTENTS OF 1  PEN SUBCUTANEOUSLY WEEKLY  ON SUNDAYS 10/25/23  Yes Alicia Hilario MD   fenofibrate (TRICOR) 145 mg tablet Take 1 tablet (145 mg total) by mouth daily 10/25/23  Yes Alicia Hilario MD   gabapentin (NEURONTIN) 400 mg capsule Take 1 PO BID. 10/25/23  Yes Alicia Hilario MD   ibuprofen (MOTRIN) 400 mg tablet Take 600 mg by mouth every 6 (six) hours as needed for mild pain Also takes 400mg in the evening   Yes Historical Provider, MD   lisinopril-hydrochlorothiazide (PRINZIDE,ZESTORETIC) 20-12.5 MG per tablet Take 1 tablet by mouth daily 10/25/23  Yes Alicia Hilario MD   metFORMIN (GLUCOPHAGE) 1000 MG tablet Take 1 tablet (1,000 mg total) by mouth 2 (two) times a day with meals 10/25/23  Yes Alicia Hilario MD   methocarbamol (ROBAXIN) 500 mg tablet Take 1 tablet (500 mg total) by mouth 4 (four) times a day 7/17/23  Yes Alicia Hilario MD   metoprolol tartrate (LOPRESSOR) 25 mg tablet Take 1 tablet (25 mg total) by mouth every 12 (twelve) hours 10/25/23  Yes Alicia Hilario MD   pantoprazole (PROTONIX) 40 mg tablet Take 1 tablet (40 mg total) by mouth daily 10/25/23  Yes Alicia Hilario MD       Allergies:  No Known Allergies    Review of Systems:     Review of Systems   Constitutional:  Positive for activity change and fatigue. HENT: Negative. Eyes: Negative. Respiratory:  Positive for chest tightness and shortness of breath. Cardiovascular:  Positive for chest pain. Negative for palpitations and leg swelling. Gastrointestinal: Negative. Endocrine: Negative. Genitourinary: Negative. Musculoskeletal: Negative. Skin: Negative. Allergic/Immunologic: Negative. Neurological: Negative. Hematological: Negative. Psychiatric/Behavioral:  The patient is nervous/anxious. Vital Signs:     Vitals:    10/27/23 1009 10/27/23 1014   BP: 139/79 138/78   BP Location: Left arm Right arm   Patient Position: Sitting Sitting   Cuff Size: Large Large   Pulse: 97    Temp: (!) 97 °F (36.1 °C)    TempSrc: Tympanic    SpO2: 97%    Weight: 110 kg (242 lb 14.4 oz)    Height: 5' 11" (1.803 m)        Physical Exam:     Physical Exam  Vitals reviewed. Constitutional:       Appearance: Normal appearance. He is obese. HENT:      Head: Normocephalic and atraumatic. Eyes:      Pupils: Pupils are equal, round, and reactive to light. Cardiovascular:      Rate and Rhythm: Normal rate and regular rhythm. Pulses:           Carotid pulses are 2+ on the right side and 2+ on the left side. Radial pulses are 2+ on the right side and 2+ on the left side. Heart sounds: Normal heart sounds. No murmur heard. Pulmonary:      Effort: Pulmonary effort is normal.      Breath sounds: Normal breath sounds. Abdominal:      General: Bowel sounds are normal.      Palpations: Abdomen is soft. Tenderness: There is no abdominal tenderness. Musculoskeletal:         General: Normal range of motion. Cervical back: Normal range of motion. Skin:     General: Skin is warm and dry. Capillary Refill: Capillary refill takes less than 2 seconds. Neurological:      General: No focal deficit present. Mental Status: He is alert and oriented to person, place, and time. Psychiatric:         Attention and Perception: Attention normal.         Mood and Affect: Mood normal.         Behavior: Behavior normal. Behavior is cooperative.          Lab Results:               Invalid input(s): "LABGLOM"      Lab Results   Component Value Date    HGBA1C 8.0 (H) 10/16/2023     No results found for: "CKTOTAL", "CKMB", "CKMBINDEX", "TROPONINI"    Imaging Studies:     Cardiac Catheterization: 10/18/23  pLAD (iFR positive 0.79), pLCX (unable to advance iFR wire beyond calcified lesion), pRCA       Echocardiogram: 10/17/23  Left Ventricle Left ventricular cavity size is normal. Wall thickness is mildly increased. The left ventricular ejection fraction is 60%. Systolic function is normal.  Wall motion is normal. Diastolic function is normal.   Right Ventricle Right ventricular cavity size is normal. Systolic function is normal.   Left Atrium The atrium is normal in size. Right Atrium The atrium is normal in size. Aortic Valve The aortic valve is trileaflet. The leaflets are mildly thickened. The leaflets are moderately calcified. There is mildly reduced mobility. There is no evidence of regurgitation. There is mild stenosis. The aortic valve mean gradient is 10 mmHg. Mitral Valve There is annular calcification. There is trace regurgitation. There is no evidence of stenosis. Tricuspid Valve There is trace regurgitation. There is no evidence of stenosis. The right ventricular systolic pressure is normal. The estimated right ventricular systolic pressure is 96.72 mmHg. Pulmonic Valve There is no evidence of regurgitation. There is no evidence of stenosis. Ascending Aorta The aortic root is normal in size. IVC/SVC The right atrial pressure is estimated at 15.0 mmHg. The inferior vena cava is dilated. Respirophasic changes were blunted (less than 50% variation). Pericardium There is no pericardial effusion. Vein Mapping:    Carotid Artery Ultrasound:     CT Chest: 10/19/23    IMPRESSION:     4.3 x 3.9 cm fusiform ectasia of ascending thoracic aorta. Coronary artery calcifications. No acute pulmonary pathology. I have personally reviewed pertinent reports.       Assessment:  Patient Active Problem List    Diagnosis Date Noted    Triple vessel coronary artery disease/chest pain/elevated troponin 10/16/2023    Tobacco abuse 10/16/2023    Alcohol abuse 10/16/2023    Myofascial pain syndrome 03/29/2023    Lumbar spondylosis 03/29/2023    Herniated nucleus pulposus, L3-4 left 09/12/2022    Lumbar radiculopathy 09/12/2022    Spinal stenosis of lumbar region with neurogenic claudication 09/12/2022    Chronic left-sided low back pain without sciatica 09/12/2022    Continuous opioid dependence ([address]) 06/27/2022    It band syndrome, left 02/07/2019    Aftercare following left knee joint replacement surgery 02/20/2018    Hyperlipidemia 01/08/2018    Hypertension 01/08/2018    Type 2 diabetes mellitus without complication, without long-term current use of insulin ([address]) 01/08/2018    Esophageal reflux 01/08/2018     Severe coronary artery disease; Ongoing CABG workup    Plan:  Risks and benefits of coronary artery bypass grafting were discussed in detail today with the patient. They understand and wish to proceed with further workup and ultimately surgical intervention. We have ordered routine preoperative laboratory and vascular studies. Pending the results of these tests, they will be scheduled for surgery on  with Dr. Bri Amaya M.D. Mickey Christianson was comfortable with our recommendations, and their questions were answered to their satisfaction. Thank you for allowing us to participate in the care of this patient. The patient recently had a screening colonoscopy in 2014. Therefore GI referral is not indicated at this time. He also had a cologuard in 2020.      SIGNATURE: Grant Marrero PA-C  DATE: 10/27/23  TIME: 10:22 AM

## 2023-10-27 NOTE — H&P
Preop History and Physical - Cardiothoracic Surgery   Alyson Funes 61 y.o. male MRN: [id number]    Physician Requesting Consult: Marline Lam MD     Reason for Consult / Principal Problem: Coronary artery disease    History of Present Illness: Alyson Funes is a 61y.o. year old male with PMHx HTN, HLD, GERD, DM Type 2 (PO meds, A1C 8.0), tobacco abuse, alcohol abuse who presented to [address] [place] [address] on 10/19 for exertional chest pain. He was moving [place] with a friend, and developed midsternal chest discomfort and tightness that radiated to the right and was relieved with rest. He presented to the ED. EKG without acute changes, and troponins mildly elevated. He was transferred to [facility] for cardiac catheterization which demonstrated multivessel CAD. He was referred for cardiac surgery evaluation, and preoperative testing was obtained. He was ultimately discharged home, and has followed up with his PCP. Upon examination today, patient reports that he is still getting dyspnea on exertion and chest pain with walking long distances, heavy lifting and walking up stairs. He has been limiting his activities as a result. He is employed as a , and lives in [place] with his wife. He denies family history of SCD or aneurysms. Of note, his CT chest did reveal an ascending aortic aneurysm of 4.3 cm. He is a former smoker, but does chew about a can of tobacco a day. He is a daily drinker as well - about 2 16 oz beers after work each night as well as 4-5 gin and tonics on Fridays and Saturdays. He denies drug use. He is anxious to have surgery and start feeling better. His preop blood work has been completed with the exception of a type and screen.      Past Medical History:  Past Medical History:   Diagnosis Date    Chews tobacco regularly     Consumes three beers daily     Controlled diabetes mellitus ([address])     Diabetes ([address])     Esophageal reflux     GERD (gastroesophageal reflux disease) Hyperlipidemia     Hypertension     Osteoarthritis     LEFT KNEE    Primary localized osteoarthritis of left knee     last assessed: 1/16/2018         Past Surgical History:   Past Surgical History:   Procedure Laterality Date    CARDIAC CATHETERIZATION Left 10/18/2023    Procedure: Cardiac pci;  Surgeon: Crys Manning DO;  [facility]; Service: Cardiology    CARDIAC CATHETERIZATION N/A 10/18/2023    Procedure: Cardiac Coronary Angiogram;  Surgeon: Crys Manning DO;  [facility]; Service: Cardiology    CARDIAC CATHETERIZATION N/A 10/18/2023    Procedure: Cardiac other - IFR;  Surgeon: Crys Manning DO;  [facility]; Service: Cardiology    COLONOSCOPY      IN ARTHRP KNE CONDYLE&PLATU MEDIAL&LAT COMPARTMENTS Left 1/8/2018    Procedure: ARTHROPLASTY KNEE TOTAL;  Surgeon: Benigno Villanueva MD;  Location:  MAIN OR;  Service: Orthopedics    REPLACEMENT TOTAL KNEE Left          Family History:  Family History   Problem Relation Age of Onset    Colon cancer Father     Mental illness Family         mental disorder    Substance Abuse Neg Hx          Social History:      Social History     Substance and Sexual Activity   Alcohol Use Yes    Alcohol/week: 20.0 standard drinks of alcohol    Types: 20 Cans of beer per week    Comment: daily beer & "gin & tonics" on a weekend ; social alcohol use ( as per allscripts)     Social History     Substance and Sexual Activity   Drug Use No     Social History     Tobacco Use   Smoking Status Former    Packs/day: 1.00    Years: 11.00    Total pack years: 11.00    Types: Cigarettes   Smokeless Tobacco Current    Types: Chew   Tobacco Comments    quit 30 + yrs ago        Home Medications:   Prior to Admission medications    Medication Sig Start Date End Date Taking?  Authorizing Provider   aspirin (ECOTRIN LOW STRENGTH) 81 mg EC tablet Take 1 tablet (81 mg total) by mouth daily Do not start before October 20, 2023. 10/20/23 Yes Humberto Forde PA-C   atorvastatin (LIPITOR) 80 mg tablet Take 1 tablet (80 mg total) by mouth daily with dinner 10/25/23  Yes Gina Hager MD   diphenhydrAMINE-acetaminophen (TYLENOL PM)  MG TABS Take 1 tablet by mouth daily at bedtime as needed for sleep   Yes Historical Provider, MD   Empagliflozin (Jardiance) 25 MG TABS Take 1 tablet (25 mg total) by mouth every morning 10/25/23  Yes Gina Hager MD   Exenatide ER (Bydureon) 2 MG PEN INJECT THE CONTENTS OF 1  PEN SUBCUTANEOUSLY WEEKLY  ON SUNDAYS 10/25/23  Yes Gina Hager MD   fenofibrate (TRICOR) 145 mg tablet Take 1 tablet (145 mg total) by mouth daily 10/25/23  Yes Gina Hager MD   gabapentin (NEURONTIN) 400 mg capsule Take 1 PO BID. 10/25/23  Yes Gina Hager MD   ibuprofen (MOTRIN) 400 mg tablet Take 600 mg by mouth every 6 (six) hours as needed for mild pain Also takes 400mg in the evening   Yes Historical Provider, MD   lisinopril-hydrochlorothiazide (PRINZIDE,ZESTORETIC) 20-12.5 MG per tablet Take 1 tablet by mouth daily 10/25/23  Yes Gina Hager MD   metFORMIN (GLUCOPHAGE) 1000 MG tablet Take 1 tablet (1,000 mg total) by mouth 2 (two) times a day with meals 10/25/23  Yes Gina Hager MD   methocarbamol (ROBAXIN) 500 mg tablet Take 1 tablet (500 mg total) by mouth 4 (four) times a day 7/17/23  Yes Gina Hager MD   metoprolol tartrate (LOPRESSOR) 25 mg tablet Take 1 tablet (25 mg total) by mouth every 12 (twelve) hours 10/25/23  Yes Gina Hager MD   pantoprazole (PROTONIX) 40 mg tablet Take 1 tablet (40 mg total) by mouth daily 10/25/23  Yes Gina Hager MD       Allergies:  No Known Allergies    Review of Systems:     Review of Systems   Constitutional:  Positive for activity change and fatigue. HENT: Negative. Eyes: Negative. Respiratory:  Positive for chest tightness and shortness of breath. Cardiovascular:  Positive for chest pain. Negative for palpitations and leg swelling.    Gastrointestinal: Negative. Endocrine: Negative. Genitourinary: Negative. Musculoskeletal: Negative. Skin: Negative. Allergic/Immunologic: Negative. Neurological: Negative. Hematological: Negative. Psychiatric/Behavioral:  The patient is nervous/anxious. Vital Signs:     Vitals:    10/27/23 1009 10/27/23 1014   BP: 139/79 138/78   BP Location: Left arm Right arm   Patient Position: Sitting Sitting   Cuff Size: Large Large   Pulse: 97    Temp: (!) 97 °F (36.1 °C)    TempSrc: Tympanic    SpO2: 97%    Weight: 110 kg (242 lb 14.4 oz)    Height: 5' 11" (1.803 m)        Physical Exam:     Physical Exam  Vitals reviewed. Constitutional:       Appearance: Normal appearance. He is obese. HENT:      Head: Normocephalic and atraumatic. Eyes:      Pupils: Pupils are equal, round, and reactive to light. Cardiovascular:      Rate and Rhythm: Normal rate and regular rhythm. Pulses:           Carotid pulses are 2+ on the right side and 2+ on the left side. Radial pulses are 2+ on the right side and 2+ on the left side. Heart sounds: Normal heart sounds. No murmur heard. Pulmonary:      Effort: Pulmonary effort is normal.      Breath sounds: Normal breath sounds. Abdominal:      General: Bowel sounds are normal.      Palpations: Abdomen is soft. Tenderness: There is no abdominal tenderness. Musculoskeletal:         General: Normal range of motion. Cervical back: Normal range of motion. Skin:     General: Skin is warm and dry. Capillary Refill: Capillary refill takes less than 2 seconds. Neurological:      General: No focal deficit present. Mental Status: He is alert and oriented to person, place, and time. Psychiatric:         Attention and Perception: Attention normal.         Mood and Affect: Mood normal.         Behavior: Behavior normal. Behavior is cooperative.          Lab Results:               Invalid input(s): "LABGLOM"      Lab Results   Component Value Date    HGBA1C 8.0 (H) 10/16/2023     No results found for: "CKTOTAL", "CKMB", "CKMBINDEX", "TROPONINI"    Imaging Studies:     Cardiac Catheterization: 10/18/23  pLAD (iFR positive 0.79), pLCX (unable to advance iFR wire beyond calcified lesion), pRCA       Echocardiogram: 10/17/23  Left Ventricle Left ventricular cavity size is normal. Wall thickness is mildly increased. The left ventricular ejection fraction is 60%. Systolic function is normal.  Wall motion is normal. Diastolic function is normal.   Right Ventricle Right ventricular cavity size is normal. Systolic function is normal.   Left Atrium The atrium is normal in size. Right Atrium The atrium is normal in size. Aortic Valve The aortic valve is trileaflet. The leaflets are mildly thickened. The leaflets are moderately calcified. There is mildly reduced mobility. There is no evidence of regurgitation. There is mild stenosis. The aortic valve mean gradient is 10 mmHg. Mitral Valve There is annular calcification. There is trace regurgitation. There is no evidence of stenosis. Tricuspid Valve There is trace regurgitation. There is no evidence of stenosis. The right ventricular systolic pressure is normal. The estimated right ventricular systolic pressure is 48.85 mmHg. Pulmonic Valve There is no evidence of regurgitation. There is no evidence of stenosis. Ascending Aorta The aortic root is normal in size. IVC/SVC The right atrial pressure is estimated at 15.0 mmHg. The inferior vena cava is dilated. Respirophasic changes were blunted (less than 50% variation). Pericardium There is no pericardial effusion. Vein Mapping:    Carotid Artery Ultrasound:     CT Chest: 10/19/23    IMPRESSION:     4.3 x 3.9 cm fusiform ectasia of ascending thoracic aorta. Coronary artery calcifications. No acute pulmonary pathology. I have personally reviewed pertinent reports.       Assessment:  Patient Active Problem List    Diagnosis Date Noted Triple vessel coronary artery disease/chest pain/elevated troponin 10/16/2023    Tobacco abuse 10/16/2023    Alcohol abuse 10/16/2023    Myofascial pain syndrome 03/29/2023    Lumbar spondylosis 03/29/2023    Herniated nucleus pulposus, L3-4 left 09/12/2022    Lumbar radiculopathy 09/12/2022    Spinal stenosis of lumbar region with neurogenic claudication 09/12/2022    Chronic left-sided low back pain without sciatica 09/12/2022    Continuous opioid dependence ([address]) 06/27/2022    It band syndrome, left 02/07/2019    Aftercare following left knee joint replacement surgery 02/20/2018    Hyperlipidemia 01/08/2018    Hypertension 01/08/2018    Type 2 diabetes mellitus without complication, without long-term current use of insulin ([address]) 01/08/2018    Esophageal reflux 01/08/2018     Severe coronary artery disease; Ongoing CABG workup    Plan:  Risks and benefits of coronary artery bypass grafting were discussed in detail today with the patient. They understand and wish to proceed with further workup and ultimately surgical intervention. We have ordered routine preoperative laboratory and vascular studies. Pending the results of these tests, they will be scheduled for surgery on  with Dr. Bubba Topete M.D. Vishal Castillo was comfortable with our recommendations, and their questions were answered to their satisfaction. Thank you for allowing us to participate in the care of this patient. The patient recently had a screening colonoscopy in 2014. Therefore GI referral is not indicated at this time. He also had a cologuard in 2020.      SIGNATURE: Halie Valles PA-C  DATE: 10/27/23  TIME: 10:22 AM

## 2023-10-31 ENCOUNTER — ANESTHESIA EVENT (OUTPATIENT)
Dept: PERIOP | Facility: HOSPITAL | Age: 63
DRG: 235 | End: 2023-10-31
Payer: COMMERCIAL

## 2023-10-31 PROBLEM — I21.A1 TYPE 2 MI (MYOCARDIAL INFARCTION) (HCC): Status: ACTIVE | Noted: 2023-10-31

## 2023-10-31 RX ORDER — PHENYLEPHRINE HCL IN 0.9% NACL 1 MG/10 ML
200-2000 SYRINGE (ML) INTRAVENOUS ONCE
Status: CANCELLED | OUTPATIENT
Start: 2023-11-01

## 2023-11-01 ENCOUNTER — APPOINTMENT (INPATIENT)
Dept: RADIOLOGY | Facility: HOSPITAL | Age: 63
DRG: 235 | End: 2023-11-01
Payer: COMMERCIAL

## 2023-11-01 ENCOUNTER — ANESTHESIA (OUTPATIENT)
Dept: PERIOP | Facility: HOSPITAL | Age: 63
DRG: 235 | End: 2023-11-01
Payer: COMMERCIAL

## 2023-11-01 ENCOUNTER — APPOINTMENT (INPATIENT)
Dept: NON INVASIVE DIAGNOSTICS | Facility: HOSPITAL | Age: 63
DRG: 235 | End: 2023-11-01
Attending: THORACIC SURGERY (CARDIOTHORACIC VASCULAR SURGERY)
Payer: COMMERCIAL

## 2023-11-01 ENCOUNTER — HOSPITAL ENCOUNTER (INPATIENT)
Facility: HOSPITAL | Age: 63
LOS: 3 days | Discharge: HOME HEALTH | DRG: 235 | End: 2023-11-04
Attending: THORACIC SURGERY (CARDIOTHORACIC VASCULAR SURGERY) | Admitting: THORACIC SURGERY (CARDIOTHORACIC VASCULAR SURGERY)
Payer: COMMERCIAL

## 2023-11-01 DIAGNOSIS — I25.10 TRIPLE VESSEL CORONARY ARTERY DISEASE: ICD-10-CM

## 2023-11-01 DIAGNOSIS — E78.5 HYPERLIPIDEMIA, UNSPECIFIED HYPERLIPIDEMIA TYPE: ICD-10-CM

## 2023-11-01 DIAGNOSIS — E11.9 TYPE 2 DIABETES MELLITUS WITHOUT COMPLICATION, WITH LONG-TERM CURRENT USE OF INSULIN (HCC): ICD-10-CM

## 2023-11-01 DIAGNOSIS — I25.10 CAD IN NATIVE ARTERY: ICD-10-CM

## 2023-11-01 DIAGNOSIS — Z95.1 S/P CABG (CORONARY ARTERY BYPASS GRAFT): Primary | ICD-10-CM

## 2023-11-01 DIAGNOSIS — N17.9 AKI (ACUTE KIDNEY INJURY) (HCC): ICD-10-CM

## 2023-11-01 DIAGNOSIS — K21.00 GASTROESOPHAGEAL REFLUX DISEASE WITH ESOPHAGITIS WITHOUT HEMORRHAGE: ICD-10-CM

## 2023-11-01 DIAGNOSIS — Z79.4 TYPE 2 DIABETES MELLITUS WITHOUT COMPLICATION, WITH LONG-TERM CURRENT USE OF INSULIN (HCC): ICD-10-CM

## 2023-11-01 LAB
ABO GROUP BLD BPU: NORMAL
ANION GAP SERPL CALCULATED.3IONS-SCNC: 6 MMOL/L
BASE EXCESS BLDA CALC-SCNC: -1 MMOL/L (ref -2–3)
BASE EXCESS BLDA CALC-SCNC: -1 MMOL/L (ref -2–3)
BASE EXCESS BLDA CALC-SCNC: -2 MMOL/L (ref -2–3)
BASE EXCESS BLDA CALC-SCNC: -3 MMOL/L (ref -2–3)
BASE EXCESS BLDA CALC-SCNC: -4 MMOL/L (ref -2–3)
BASE EXCESS BLDA CALC-SCNC: -5 MMOL/L (ref -2–3)
BASE EXCESS BLDA CALC-SCNC: 0 MMOL/L (ref -2–3)
BPU ID: NORMAL
BUN SERPL-MCNC: 13 MG/DL (ref 5–25)
CA-I BLD-SCNC: 1.1 MMOL/L (ref 1.12–1.32)
CA-I BLD-SCNC: 1.12 MMOL/L (ref 1.12–1.32)
CA-I BLD-SCNC: 1.15 MMOL/L (ref 1.12–1.32)
CA-I BLD-SCNC: 1.15 MMOL/L (ref 1.12–1.32)
CA-I BLD-SCNC: 1.26 MMOL/L (ref 1.12–1.32)
CA-I BLD-SCNC: 1.29 MMOL/L (ref 1.12–1.32)
CA-I BLD-SCNC: 1.32 MMOL/L (ref 1.12–1.32)
CALCIUM SERPL-MCNC: 8.6 MG/DL (ref 8.4–10.2)
CHLORIDE SERPL-SCNC: 108 MMOL/L (ref 96–108)
CO2 SERPL-SCNC: 23 MMOL/L (ref 21–32)
CREAT SERPL-MCNC: 0.93 MG/DL (ref 0.6–1.3)
CROSSMATCH: NORMAL
GFR SERPL CREATININE-BSD FRML MDRD: 87 ML/MIN/1.73SQ M
GLUCOSE SERPL-MCNC: 118 MG/DL (ref 65–140)
GLUCOSE SERPL-MCNC: 132 MG/DL (ref 65–140)
GLUCOSE SERPL-MCNC: 157 MG/DL (ref 65–140)
GLUCOSE SERPL-MCNC: 168 MG/DL (ref 65–140)
GLUCOSE SERPL-MCNC: 172 MG/DL (ref 65–140)
GLUCOSE SERPL-MCNC: 174 MG/DL (ref 65–140)
GLUCOSE SERPL-MCNC: 181 MG/DL (ref 65–140)
GLUCOSE SERPL-MCNC: 186 MG/DL (ref 65–140)
GLUCOSE SERPL-MCNC: 189 MG/DL (ref 65–140)
GLUCOSE SERPL-MCNC: 191 MG/DL (ref 65–140)
GLUCOSE SERPL-MCNC: 199 MG/DL (ref 65–140)
GLUCOSE SERPL-MCNC: 208 MG/DL (ref 65–140)
GLUCOSE SERPL-MCNC: 215 MG/DL (ref 65–140)
GLUCOSE SERPL-MCNC: 222 MG/DL (ref 65–140)
GLUCOSE SERPL-MCNC: 234 MG/DL (ref 65–140)
GLUCOSE SERPL-MCNC: 238 MG/DL (ref 65–140)
HCO3 BLDA-SCNC: 21.4 MMOL/L (ref 22–28)
HCO3 BLDA-SCNC: 21.6 MMOL/L (ref 22–28)
HCO3 BLDA-SCNC: 23.5 MMOL/L (ref 22–28)
HCO3 BLDA-SCNC: 25 MMOL/L (ref 22–28)
HCO3 BLDA-SCNC: 25.6 MMOL/L (ref 24–30)
HCO3 BLDA-SCNC: 25.7 MMOL/L (ref 24–30)
HCO3 BLDA-SCNC: 27 MMOL/L (ref 22–28)
HCT VFR BLD AUTO: 42.8 % (ref 36.5–49.3)
HCT VFR BLD AUTO: 44.8 % (ref 36.5–49.3)
HCT VFR BLD CALC: 30 % (ref 36.5–49.3)
HCT VFR BLD CALC: 31 % (ref 36.5–49.3)
HCT VFR BLD CALC: 31 % (ref 36.5–49.3)
HCT VFR BLD CALC: 33 % (ref 36.5–49.3)
HCT VFR BLD CALC: 36 % (ref 36.5–49.3)
HCT VFR BLD CALC: 39 % (ref 36.5–49.3)
HCT VFR BLD CALC: 41 % (ref 36.5–49.3)
HGB BLD-MCNC: 14.2 G/DL (ref 12–17)
HGB BLD-MCNC: 15 G/DL (ref 12–17)
HGB BLDA-MCNC: 10.2 G/DL (ref 12–17)
HGB BLDA-MCNC: 10.5 G/DL (ref 12–17)
HGB BLDA-MCNC: 10.5 G/DL (ref 12–17)
HGB BLDA-MCNC: 11.2 G/DL (ref 12–17)
HGB BLDA-MCNC: 12.2 G/DL (ref 12–17)
HGB BLDA-MCNC: 13.3 G/DL (ref 12–17)
HGB BLDA-MCNC: 13.9 G/DL (ref 12–17)
KCT BLD-ACNC: 120 SEC (ref 89–137)
KCT BLD-ACNC: 132 SEC (ref 89–137)
KCT BLD-ACNC: 398 SEC (ref 89–137)
KCT BLD-ACNC: 469 SEC (ref 89–137)
KCT BLD-ACNC: 503 SEC (ref 89–137)
KCT BLD-ACNC: 503 SEC (ref 89–137)
KCT BLD-ACNC: 510 SEC (ref 89–137)
KCT BLD-ACNC: 510 SEC (ref 89–137)
PCO2 BLD: 23 MMOL/L (ref 21–32)
PCO2 BLD: 23 MMOL/L (ref 21–32)
PCO2 BLD: 25 MMOL/L (ref 21–32)
PCO2 BLD: 27 MMOL/L (ref 21–32)
PCO2 BLD: 29 MMOL/L (ref 21–32)
PCO2 BLD: 41.9 MM HG (ref 36–44)
PCO2 BLD: 43.8 MM HG (ref 36–44)
PCO2 BLD: 48.3 MM HG (ref 36–44)
PCO2 BLD: 50.6 MM HG (ref 42–50)
PCO2 BLD: 50.9 MM HG (ref 36–44)
PCO2 BLD: 51.7 MM HG (ref 36–44)
PCO2 BLD: 53.5 MM HG (ref 42–50)
PH BLD: 7.29 [PH] (ref 7.3–7.4)
PH BLD: 7.3 [PH] (ref 7.35–7.45)
PH BLD: 7.31 [PH] (ref 7.3–7.4)
PH BLD: 7.32 [PH] (ref 7.35–7.45)
PH BLD: 7.33 [PH] (ref 7.35–7.45)
PLATELET # BLD AUTO: 159 THOUSANDS/UL (ref 149–390)
PLATELET # BLD AUTO: 217 THOUSANDS/UL (ref 149–390)
PMV BLD AUTO: 11.9 FL (ref 8.9–12.7)
PMV BLD AUTO: 12 FL (ref 8.9–12.7)
PO2 BLD: 244 MM HG (ref 75–129)
PO2 BLD: 278 MM HG (ref 75–129)
PO2 BLD: 43 MM HG (ref 35–45)
PO2 BLD: 48 MM HG (ref 35–45)
PO2 BLD: 86 MM HG (ref 75–129)
PO2 BLD: >400 MM HG (ref 75–129)
PO2 BLD: >400 MM HG (ref 75–129)
POTASSIUM BLD-SCNC: 3.5 MMOL/L (ref 3.5–5.3)
POTASSIUM BLD-SCNC: 3.8 MMOL/L (ref 3.5–5.3)
POTASSIUM BLD-SCNC: 3.9 MMOL/L (ref 3.5–5.3)
POTASSIUM BLD-SCNC: 3.9 MMOL/L (ref 3.5–5.3)
POTASSIUM BLD-SCNC: 4.2 MMOL/L (ref 3.5–5.3)
POTASSIUM BLD-SCNC: 4.3 MMOL/L (ref 3.5–5.3)
POTASSIUM BLD-SCNC: 4.5 MMOL/L (ref 3.5–5.3)
POTASSIUM SERPL-SCNC: 4 MMOL/L (ref 3.5–5.3)
POTASSIUM SERPL-SCNC: 4 MMOL/L (ref 3.5–5.3)
POTASSIUM SERPL-SCNC: 4.2 MMOL/L (ref 3.5–5.3)
SAO2 % BLD FROM PO2: 100 % (ref 60–85)
SAO2 % BLD FROM PO2: 100 % (ref 60–85)
SAO2 % BLD FROM PO2: 73 % (ref 60–85)
SAO2 % BLD FROM PO2: 78 % (ref 60–85)
SAO2 % BLD FROM PO2: 95 % (ref 60–85)
SODIUM BLD-SCNC: 138 MMOL/L (ref 136–145)
SODIUM BLD-SCNC: 140 MMOL/L (ref 136–145)
SODIUM SERPL-SCNC: 137 MMOL/L (ref 135–147)
SPECIMEN SOURCE: ABNORMAL
SPECIMEN SOURCE: NORMAL
SPECIMEN SOURCE: NORMAL
UNIT DISPENSE STATUS: NORMAL
UNIT PRODUCT CODE: NORMAL
UNIT PRODUCT VOLUME: 350 ML
UNIT RH: NORMAL

## 2023-11-01 PROCEDURE — 02HV33Z INSERTION OF INFUSION DEVICE INTO SUPERIOR VENA CAVA, PERCUTANEOUS APPROACH: ICD-10-PCS | Performed by: INTERNAL MEDICINE

## 2023-11-01 PROCEDURE — 85018 HEMOGLOBIN: CPT | Performed by: PHYSICIAN ASSISTANT

## 2023-11-01 PROCEDURE — 33533 CABG ARTERIAL SINGLE: CPT | Performed by: THORACIC SURGERY (CARDIOTHORACIC VASCULAR SURGERY)

## 2023-11-01 PROCEDURE — 06BQ4ZZ EXCISION OF LEFT SAPHENOUS VEIN, PERCUTANEOUS ENDOSCOPIC APPROACH: ICD-10-PCS | Performed by: THORACIC SURGERY (CARDIOTHORACIC VASCULAR SURGERY)

## 2023-11-01 PROCEDURE — 82803 BLOOD GASES ANY COMBINATION: CPT

## 2023-11-01 PROCEDURE — 82330 ASSAY OF CALCIUM: CPT

## 2023-11-01 PROCEDURE — 5A1221Z PERFORMANCE OF CARDIAC OUTPUT, CONTINUOUS: ICD-10-PCS | Performed by: THORACIC SURGERY (CARDIOTHORACIC VASCULAR SURGERY)

## 2023-11-01 PROCEDURE — 33533 CABG ARTERIAL SINGLE: CPT | Performed by: PHYSICIAN ASSISTANT

## 2023-11-01 PROCEDURE — 85049 AUTOMATED PLATELET COUNT: CPT | Performed by: PHYSICIAN ASSISTANT

## 2023-11-01 PROCEDURE — 84295 ASSAY OF SERUM SODIUM: CPT

## 2023-11-01 PROCEDURE — 33519 CABG ARTERY-VEIN THREE: CPT | Performed by: THORACIC SURGERY (CARDIOTHORACIC VASCULAR SURGERY)

## 2023-11-01 PROCEDURE — 80048 BASIC METABOLIC PNL TOTAL CA: CPT | Performed by: PHYSICIAN ASSISTANT

## 2023-11-01 PROCEDURE — 82948 REAGENT STRIP/BLOOD GLUCOSE: CPT

## 2023-11-01 PROCEDURE — 85014 HEMATOCRIT: CPT

## 2023-11-01 PROCEDURE — 86920 COMPATIBILITY TEST SPIN: CPT

## 2023-11-01 PROCEDURE — 30233N0 TRANSFUSION OF AUTOLOGOUS RED BLOOD CELLS INTO PERIPHERAL VEIN, PERCUTANEOUS APPROACH: ICD-10-PCS | Performed by: INTERNAL MEDICINE

## 2023-11-01 PROCEDURE — 93355 ECHO TRANSESOPHAGEAL (TEE): CPT

## 2023-11-01 PROCEDURE — 85347 COAGULATION TIME ACTIVATED: CPT

## 2023-11-01 PROCEDURE — 33519 CABG ARTERY-VEIN THREE: CPT | Performed by: PHYSICIAN ASSISTANT

## 2023-11-01 PROCEDURE — 5A1223Z PERFORMANCE OF CARDIAC PACING, CONTINUOUS: ICD-10-PCS | Performed by: THORACIC SURGERY (CARDIOTHORACIC VASCULAR SURGERY)

## 2023-11-01 PROCEDURE — 71045 X-RAY EXAM CHEST 1 VIEW: CPT

## 2023-11-01 PROCEDURE — 85049 AUTOMATED PLATELET COUNT: CPT | Performed by: THORACIC SURGERY (CARDIOTHORACIC VASCULAR SURGERY)

## 2023-11-01 PROCEDURE — 94002 VENT MGMT INPAT INIT DAY: CPT

## 2023-11-01 PROCEDURE — 84132 ASSAY OF SERUM POTASSIUM: CPT

## 2023-11-01 PROCEDURE — A7041 WATER SEAL DRAIN CONTAINER: HCPCS | Performed by: THORACIC SURGERY (CARDIOTHORACIC VASCULAR SURGERY)

## 2023-11-01 PROCEDURE — 33508 ENDOSCOPIC VEIN HARVEST: CPT | Performed by: THORACIC SURGERY (CARDIOTHORACIC VASCULAR SURGERY)

## 2023-11-01 PROCEDURE — 84132 ASSAY OF SERUM POTASSIUM: CPT | Performed by: PHYSICIAN ASSISTANT

## 2023-11-01 PROCEDURE — 85014 HEMATOCRIT: CPT | Performed by: PHYSICIAN ASSISTANT

## 2023-11-01 PROCEDURE — 93005 ELECTROCARDIOGRAM TRACING: CPT

## 2023-11-01 PROCEDURE — 82947 ASSAY GLUCOSE BLOOD QUANT: CPT

## 2023-11-01 PROCEDURE — 021209W BYPASS CORONARY ARTERY, THREE ARTERIES FROM AORTA WITH AUTOLOGOUS VENOUS TISSUE, OPEN APPROACH: ICD-10-PCS | Performed by: THORACIC SURGERY (CARDIOTHORACIC VASCULAR SURGERY)

## 2023-11-01 PROCEDURE — 33508 ENDOSCOPIC VEIN HARVEST: CPT | Performed by: PHYSICIAN ASSISTANT

## 2023-11-01 PROCEDURE — 94760 N-INVAS EAR/PLS OXIMETRY 1: CPT

## 2023-11-01 PROCEDURE — 02100Z9 BYPASS CORONARY ARTERY, ONE ARTERY FROM LEFT INTERNAL MAMMARY, OPEN APPROACH: ICD-10-PCS | Performed by: THORACIC SURGERY (CARDIOTHORACIC VASCULAR SURGERY)

## 2023-11-01 DEVICE — MARKER CORONARY BYPASS VOSS GRAFT: Type: IMPLANTABLE DEVICE | Site: AORTA | Status: FUNCTIONAL

## 2023-11-01 RX ORDER — OXYCODONE HYDROCHLORIDE 10 MG/1
10 TABLET ORAL EVERY 4 HOURS PRN
Status: DISCONTINUED | OUTPATIENT
Start: 2023-11-01 | End: 2023-11-04 | Stop reason: HOSPADM

## 2023-11-01 RX ORDER — CALCIUM CHLORIDE 100 MG/ML
INJECTION INTRAVENOUS; INTRAVENTRICULAR AS NEEDED
Status: DISCONTINUED | OUTPATIENT
Start: 2023-11-01 | End: 2023-11-01

## 2023-11-01 RX ORDER — MANNITOL 250 MG/ML
INJECTION, SOLUTION INTRAVENOUS AS NEEDED
Status: DISCONTINUED | OUTPATIENT
Start: 2023-11-01 | End: 2023-11-01

## 2023-11-01 RX ORDER — METHOCARBAMOL 500 MG/1
500 TABLET, FILM COATED ORAL 4 TIMES DAILY
Status: DISCONTINUED | OUTPATIENT
Start: 2023-11-01 | End: 2023-11-04 | Stop reason: HOSPADM

## 2023-11-01 RX ORDER — ASPIRIN 325 MG
325 TABLET ORAL DAILY
Status: DISCONTINUED | OUTPATIENT
Start: 2023-11-01 | End: 2023-11-04 | Stop reason: HOSPADM

## 2023-11-01 RX ORDER — SODIUM CHLORIDE, SODIUM LACTATE, POTASSIUM CHLORIDE, CALCIUM CHLORIDE 600; 310; 30; 20 MG/100ML; MG/100ML; MG/100ML; MG/100ML
INJECTION, SOLUTION INTRAVENOUS CONTINUOUS PRN
Status: DISCONTINUED | OUTPATIENT
Start: 2023-11-01 | End: 2023-11-01

## 2023-11-01 RX ORDER — ATORVASTATIN CALCIUM 80 MG/1
80 TABLET, FILM COATED ORAL
Status: DISCONTINUED | OUTPATIENT
Start: 2023-11-01 | End: 2023-11-04 | Stop reason: HOSPADM

## 2023-11-01 RX ORDER — ONDANSETRON 2 MG/ML
4 INJECTION INTRAMUSCULAR; INTRAVENOUS EVERY 6 HOURS PRN
Status: DISCONTINUED | OUTPATIENT
Start: 2023-11-01 | End: 2023-11-04 | Stop reason: HOSPADM

## 2023-11-01 RX ORDER — FENTANYL CITRATE 50 UG/ML
50 INJECTION, SOLUTION INTRAMUSCULAR; INTRAVENOUS
Status: DISCONTINUED | OUTPATIENT
Start: 2023-11-01 | End: 2023-11-02

## 2023-11-01 RX ORDER — OXYCODONE HYDROCHLORIDE 5 MG/1
5 TABLET ORAL EVERY 4 HOURS PRN
Status: DISCONTINUED | OUTPATIENT
Start: 2023-11-01 | End: 2023-11-01

## 2023-11-01 RX ORDER — LIDOCAINE HCL/PF 100 MG/5ML
SYRINGE (ML) INJECTION AS NEEDED
Status: DISCONTINUED | OUTPATIENT
Start: 2023-11-01 | End: 2023-11-01

## 2023-11-01 RX ORDER — ACETAMINOPHEN 650 MG/1
650 SUPPOSITORY RECTAL EVERY 4 HOURS PRN
Status: DISCONTINUED | OUTPATIENT
Start: 2023-11-01 | End: 2023-11-04 | Stop reason: HOSPADM

## 2023-11-01 RX ORDER — POTASSIUM CHLORIDE 14.9 MG/ML
20 INJECTION INTRAVENOUS
Status: DISCONTINUED | OUTPATIENT
Start: 2023-11-01 | End: 2023-11-02

## 2023-11-01 RX ORDER — PANTOPRAZOLE SODIUM 40 MG/1
40 TABLET, DELAYED RELEASE ORAL
Status: DISCONTINUED | OUTPATIENT
Start: 2023-11-02 | End: 2023-11-04 | Stop reason: HOSPADM

## 2023-11-01 RX ORDER — FENOFIBRATE 145 MG/1
145 TABLET, COATED ORAL DAILY
Status: DISCONTINUED | OUTPATIENT
Start: 2023-11-01 | End: 2023-11-01 | Stop reason: SDUPTHER

## 2023-11-01 RX ORDER — HEPARIN SODIUM 1000 [USP'U]/ML
10000 INJECTION, SOLUTION INTRAVENOUS; SUBCUTANEOUS ONCE
Status: COMPLETED | OUTPATIENT
Start: 2023-11-01 | End: 2023-11-01

## 2023-11-01 RX ORDER — CALCIUM CHLORIDE 100 MG/ML
1 INJECTION INTRAVENOUS; INTRAVENTRICULAR ONCE
Status: DISCONTINUED | OUTPATIENT
Start: 2023-11-01 | End: 2023-11-01

## 2023-11-01 RX ORDER — LIDOCAINE HCL/PF 100 MG/5ML
100 SYRINGE (ML) INJECTION
Status: DISCONTINUED | OUTPATIENT
Start: 2023-11-01 | End: 2023-11-04 | Stop reason: HOSPADM

## 2023-11-01 RX ORDER — HEPARIN SODIUM 1000 [USP'U]/ML
400 INJECTION, SOLUTION INTRAVENOUS; SUBCUTANEOUS ONCE
Status: DISCONTINUED | OUTPATIENT
Start: 2023-11-01 | End: 2023-11-01 | Stop reason: HOSPADM

## 2023-11-01 RX ORDER — FENTANYL CITRATE 50 UG/ML
50 INJECTION, SOLUTION INTRAMUSCULAR; INTRAVENOUS ONCE
Status: COMPLETED | OUTPATIENT
Start: 2023-11-01 | End: 2023-11-01

## 2023-11-01 RX ORDER — KETAMINE HCL IN NACL, ISO-OSM 100MG/10ML
SYRINGE (ML) INJECTION AS NEEDED
Status: DISCONTINUED | OUTPATIENT
Start: 2023-11-01 | End: 2023-11-01

## 2023-11-01 RX ORDER — POTASSIUM CHLORIDE 14.9 MG/ML
INJECTION INTRAVENOUS CONTINUOUS PRN
Status: DISCONTINUED | OUTPATIENT
Start: 2023-11-01 | End: 2023-11-01

## 2023-11-01 RX ORDER — DEXMEDETOMIDINE HYDROCHLORIDE 4 UG/ML
.1-.7 INJECTION, SOLUTION INTRAVENOUS
Status: DISCONTINUED | OUTPATIENT
Start: 2023-11-01 | End: 2023-11-01

## 2023-11-01 RX ORDER — OXYCODONE HYDROCHLORIDE 5 MG/1
5 TABLET ORAL EVERY 4 HOURS PRN
Status: DISCONTINUED | OUTPATIENT
Start: 2023-11-01 | End: 2023-11-04 | Stop reason: HOSPADM

## 2023-11-01 RX ORDER — SODIUM CHLORIDE 450 MG/100ML
20 INJECTION, SOLUTION INTRAVENOUS CONTINUOUS
Status: DISCONTINUED | OUTPATIENT
Start: 2023-11-01 | End: 2023-11-04 | Stop reason: HOSPADM

## 2023-11-01 RX ORDER — PROPOFOL 10 MG/ML
INJECTION, EMULSION INTRAVENOUS CONTINUOUS PRN
Status: DISCONTINUED | OUTPATIENT
Start: 2023-11-01 | End: 2023-11-01

## 2023-11-01 RX ORDER — SODIUM CHLORIDE 9 MG/ML
INJECTION, SOLUTION INTRAVENOUS CONTINUOUS PRN
Status: DISCONTINUED | OUTPATIENT
Start: 2023-11-01 | End: 2023-11-01

## 2023-11-01 RX ORDER — FENTANYL CITRATE 50 UG/ML
INJECTION, SOLUTION INTRAMUSCULAR; INTRAVENOUS AS NEEDED
Status: DISCONTINUED | OUTPATIENT
Start: 2023-11-01 | End: 2023-11-01

## 2023-11-01 RX ORDER — LIDOCAINE HYDROCHLORIDE 20 MG/ML
INJECTION, SOLUTION EPIDURAL; INFILTRATION; INTRACAUDAL; PERINEURAL AS NEEDED
Status: DISCONTINUED | OUTPATIENT
Start: 2023-11-01 | End: 2023-11-01

## 2023-11-01 RX ORDER — HYDROMORPHONE HCL/PF 1 MG/ML
0.5 SYRINGE (ML) INJECTION EVERY 2 HOUR PRN
Status: DISCONTINUED | OUTPATIENT
Start: 2023-11-01 | End: 2023-11-02

## 2023-11-01 RX ORDER — ROCURONIUM BROMIDE 10 MG/ML
INJECTION, SOLUTION INTRAVENOUS AS NEEDED
Status: DISCONTINUED | OUTPATIENT
Start: 2023-11-01 | End: 2023-11-01

## 2023-11-01 RX ORDER — HYDROMORPHONE HCL/PF 1 MG/ML
SYRINGE (ML) INJECTION AS NEEDED
Status: DISCONTINUED | OUTPATIENT
Start: 2023-11-01 | End: 2023-11-01

## 2023-11-01 RX ORDER — ACETAMINOPHEN 325 MG/1
650 TABLET ORAL
Status: DISCONTINUED | OUTPATIENT
Start: 2023-11-01 | End: 2023-11-04 | Stop reason: HOSPADM

## 2023-11-01 RX ORDER — FONDAPARINUX SODIUM 2.5 MG/.5ML
2.5 INJECTION SUBCUTANEOUS DAILY
Status: DISCONTINUED | OUTPATIENT
Start: 2023-11-02 | End: 2023-11-03

## 2023-11-01 RX ORDER — PROPOFOL 10 MG/ML
INJECTION, EMULSION INTRAVENOUS AS NEEDED
Status: DISCONTINUED | OUTPATIENT
Start: 2023-11-01 | End: 2023-11-01

## 2023-11-01 RX ORDER — AMIODARONE HYDROCHLORIDE 200 MG/1
200 TABLET ORAL EVERY 8 HOURS SCHEDULED
Status: DISCONTINUED | OUTPATIENT
Start: 2023-11-01 | End: 2023-11-04 | Stop reason: HOSPADM

## 2023-11-01 RX ORDER — OXYCODONE HYDROCHLORIDE 5 MG/1
2.5 TABLET ORAL EVERY 4 HOURS PRN
Status: DISCONTINUED | OUTPATIENT
Start: 2023-11-01 | End: 2023-11-01

## 2023-11-01 RX ORDER — VANCOMYCIN HYDROCHLORIDE 1 G/20ML
INJECTION, POWDER, LYOPHILIZED, FOR SOLUTION INTRAVENOUS AS NEEDED
Status: DISCONTINUED | OUTPATIENT
Start: 2023-11-01 | End: 2023-11-01 | Stop reason: HOSPADM

## 2023-11-01 RX ORDER — POLYETHYLENE GLYCOL 3350 17 G/17G
17 POWDER, FOR SOLUTION ORAL DAILY
Status: DISCONTINUED | OUTPATIENT
Start: 2023-11-01 | End: 2023-11-04 | Stop reason: HOSPADM

## 2023-11-01 RX ORDER — CHLORHEXIDINE GLUCONATE ORAL RINSE 1.2 MG/ML
15 SOLUTION DENTAL ONCE
Status: COMPLETED | OUTPATIENT
Start: 2023-11-01 | End: 2023-11-01

## 2023-11-01 RX ORDER — POTASSIUM CHLORIDE 14.9 MG/ML
20 INJECTION INTRAVENOUS ONCE AS NEEDED
Status: DISCONTINUED | OUTPATIENT
Start: 2023-11-01 | End: 2023-11-02

## 2023-11-01 RX ORDER — MAGNESIUM SULFATE HEPTAHYDRATE 40 MG/ML
2 INJECTION, SOLUTION INTRAVENOUS ONCE
Status: COMPLETED | OUTPATIENT
Start: 2023-11-01 | End: 2023-11-01

## 2023-11-01 RX ORDER — LANOLIN ALCOHOL/MO/W.PET/CERES
100 CREAM (GRAM) TOPICAL DAILY
Status: DISCONTINUED | OUTPATIENT
Start: 2023-11-01 | End: 2023-11-04 | Stop reason: HOSPADM

## 2023-11-01 RX ORDER — CEFAZOLIN SODIUM 2 G/50ML
2000 SOLUTION INTRAVENOUS ONCE
Status: COMPLETED | OUTPATIENT
Start: 2023-11-01 | End: 2023-11-01

## 2023-11-01 RX ORDER — FOLIC ACID 1 MG/1
1 TABLET ORAL DAILY
Status: DISCONTINUED | OUTPATIENT
Start: 2023-11-01 | End: 2023-11-04 | Stop reason: HOSPADM

## 2023-11-01 RX ORDER — SODIUM CHLORIDE, SODIUM GLUCONATE, SODIUM ACETATE, POTASSIUM CHLORIDE, MAGNESIUM CHLORIDE, SODIUM PHOSPHATE, DIBASIC, AND POTASSIUM PHOSPHATE .53; .5; .37; .037; .03; .012; .00082 G/100ML; G/100ML; G/100ML; G/100ML; G/100ML; G/100ML; G/100ML
INJECTION, SOLUTION INTRAVENOUS AS NEEDED
Status: DISCONTINUED | OUTPATIENT
Start: 2023-11-01 | End: 2023-11-01

## 2023-11-01 RX ORDER — CEFAZOLIN SODIUM 2 G/50ML
2000 SOLUTION INTRAVENOUS EVERY 8 HOURS
Status: COMPLETED | OUTPATIENT
Start: 2023-11-01 | End: 2023-11-02

## 2023-11-01 RX ORDER — EPHEDRINE SULFATE 50 MG/ML
INJECTION INTRAVENOUS AS NEEDED
Status: DISCONTINUED | OUTPATIENT
Start: 2023-11-01 | End: 2023-11-01

## 2023-11-01 RX ORDER — CHLORHEXIDINE GLUCONATE ORAL RINSE 1.2 MG/ML
15 SOLUTION DENTAL 2 TIMES DAILY
Status: DISCONTINUED | OUTPATIENT
Start: 2023-11-01 | End: 2023-11-02

## 2023-11-01 RX ORDER — HEPARIN SODIUM 1000 [USP'U]/ML
INJECTION, SOLUTION INTRAVENOUS; SUBCUTANEOUS AS NEEDED
Status: DISCONTINUED | OUTPATIENT
Start: 2023-11-01 | End: 2023-11-01

## 2023-11-01 RX ORDER — FUROSEMIDE 10 MG/ML
40 INJECTION INTRAMUSCULAR; INTRAVENOUS EVERY 6 HOURS PRN
Status: DISCONTINUED | OUTPATIENT
Start: 2023-11-01 | End: 2023-11-02

## 2023-11-01 RX ORDER — FENOFIBRATE 145 MG/1
145 TABLET, COATED ORAL DAILY
Status: DISCONTINUED | OUTPATIENT
Start: 2023-11-01 | End: 2023-11-04 | Stop reason: HOSPADM

## 2023-11-01 RX ORDER — PROTAMINE SULFATE 10 MG/ML
INJECTION, SOLUTION INTRAVENOUS AS NEEDED
Status: DISCONTINUED | OUTPATIENT
Start: 2023-11-01 | End: 2023-11-01

## 2023-11-01 RX ORDER — BISACODYL 10 MG
10 SUPPOSITORY, RECTAL RECTAL DAILY PRN
Status: DISCONTINUED | OUTPATIENT
Start: 2023-11-01 | End: 2023-11-04 | Stop reason: HOSPADM

## 2023-11-01 RX ORDER — GABAPENTIN 400 MG/1
400 CAPSULE ORAL 2 TIMES DAILY
Status: DISCONTINUED | OUTPATIENT
Start: 2023-11-01 | End: 2023-11-04 | Stop reason: HOSPADM

## 2023-11-01 RX ORDER — MIDAZOLAM HYDROCHLORIDE 2 MG/2ML
INJECTION, SOLUTION INTRAMUSCULAR; INTRAVENOUS AS NEEDED
Status: DISCONTINUED | OUTPATIENT
Start: 2023-11-01 | End: 2023-11-01

## 2023-11-01 RX ADMIN — Medication 30 MG: at 07:58

## 2023-11-01 RX ADMIN — LIDOCAINE HYDROCHLORIDE 100 MG: 20 INJECTION, SOLUTION EPIDURAL; INFILTRATION; INTRACAUDAL; PERINEURAL at 07:58

## 2023-11-01 RX ADMIN — CEFAZOLIN SODIUM 2000 MG: 2 SOLUTION INTRAVENOUS at 19:02

## 2023-11-01 RX ADMIN — DEXMEDETOMIDINE HYDROCHLORIDE 0.4 MCG/KG/HR: 4 INJECTION, SOLUTION INTRAVENOUS at 13:42

## 2023-11-01 RX ADMIN — PHENYLEPHRINE HYDROCHLORIDE 500 MCG: 10 INJECTION INTRAVENOUS at 12:01

## 2023-11-01 RX ADMIN — ROCURONIUM BROMIDE 20 MG: 10 INJECTION, SOLUTION INTRAVENOUS at 12:32

## 2023-11-01 RX ADMIN — ACETAMINOPHEN 650 MG: 325 TABLET, FILM COATED ORAL at 19:01

## 2023-11-01 RX ADMIN — MIDAZOLAM 2 MG: 1 INJECTION INTRAMUSCULAR; INTRAVENOUS at 07:44

## 2023-11-01 RX ADMIN — CEFAZOLIN SODIUM 2000 MG: 2 SOLUTION INTRAVENOUS at 12:04

## 2023-11-01 RX ADMIN — POTASSIUM CHLORIDE: 14.9 INJECTION, SOLUTION INTRAVENOUS at 12:25

## 2023-11-01 RX ADMIN — CALCIUM CHLORIDE 1 G: 100 INJECTION INTRAVENOUS; INTRAVENTRICULAR at 12:05

## 2023-11-01 RX ADMIN — CHLORHEXIDINE GLUCONATE 15 ML: 1.2 SOLUTION ORAL at 06:28

## 2023-11-01 RX ADMIN — CHLORHEXIDINE GLUCONATE 15 ML: 1.2 SOLUTION ORAL at 18:58

## 2023-11-01 RX ADMIN — HYDROMORPHONE HYDROCHLORIDE 0.5 MG: 1 INJECTION, SOLUTION INTRAMUSCULAR; INTRAVENOUS; SUBCUTANEOUS at 09:55

## 2023-11-01 RX ADMIN — ONDANSETRON 4 MG: 2 INJECTION INTRAMUSCULAR; INTRAVENOUS at 19:40

## 2023-11-01 RX ADMIN — PHENYLEPHRINE HYDROCHLORIDE 250 MCG: 10 INJECTION INTRAVENOUS at 11:07

## 2023-11-01 RX ADMIN — HYDROMORPHONE HYDROCHLORIDE 0.5 MG: 1 INJECTION, SOLUTION INTRAMUSCULAR; INTRAVENOUS; SUBCUTANEOUS at 20:53

## 2023-11-01 RX ADMIN — SODIUM CHLORIDE, SODIUM GLUCONATE, SODIUM ACETATE, POTASSIUM CHLORIDE, MAGNESIUM CHLORIDE, SODIUM PHOSPHATE, DIBASIC, AND POTASSIUM PHOSPHATE 500 ML: .53; .5; .37; .037; .03; .012; .00082 INJECTION, SOLUTION INTRAVENOUS at 08:39

## 2023-11-01 RX ADMIN — PHENYLEPHRINE HYDROCHLORIDE 250 MCG: 10 INJECTION INTRAVENOUS at 11:57

## 2023-11-01 RX ADMIN — PROTAMINE SULFATE 300 MG: 10 INJECTION, SOLUTION INTRAVENOUS at 12:09

## 2023-11-01 RX ADMIN — ROCURONIUM BROMIDE 100 MG: 10 INJECTION, SOLUTION INTRAVENOUS at 07:58

## 2023-11-01 RX ADMIN — AMIODARONE HYDROCHLORIDE 200 MG: 200 TABLET ORAL at 21:01

## 2023-11-01 RX ADMIN — PHENYLEPHRINE HYDROCHLORIDE 250 MCG: 10 INJECTION INTRAVENOUS at 11:15

## 2023-11-01 RX ADMIN — MAGNESIUM SULFATE HEPTAHYDRATE 2 G: 500 INJECTION, SOLUTION INTRAMUSCULAR; INTRAVENOUS at 11:49

## 2023-11-01 RX ADMIN — INSULIN HUMAN 4 UNITS: 100 INJECTION, SOLUTION PARENTERAL at 10:30

## 2023-11-01 RX ADMIN — FENTANYL CITRATE 150 MCG: 50 INJECTION, SOLUTION INTRAMUSCULAR; INTRAVENOUS at 07:58

## 2023-11-01 RX ADMIN — HYDROMORPHONE HYDROCHLORIDE 0.5 MG: 1 INJECTION, SOLUTION INTRAMUSCULAR; INTRAVENOUS; SUBCUTANEOUS at 18:58

## 2023-11-01 RX ADMIN — OXYCODONE HYDROCHLORIDE 5 MG: 5 TABLET ORAL at 16:06

## 2023-11-01 RX ADMIN — METHOCARBAMOL 500 MG: 500 TABLET ORAL at 17:05

## 2023-11-01 RX ADMIN — PHENYLEPHRINE HYDROCHLORIDE 250 MCG: 10 INJECTION INTRAVENOUS at 10:54

## 2023-11-01 RX ADMIN — SODIUM CHLORIDE: 0.9 INJECTION, SOLUTION INTRAVENOUS at 07:43

## 2023-11-01 RX ADMIN — HEPARIN SODIUM 44000 UNITS: 1000 INJECTION INTRAVENOUS; SUBCUTANEOUS at 07:00

## 2023-11-01 RX ADMIN — FENOFIBRATE 145 MG: 145 TABLET, FILM COATED ORAL at 16:59

## 2023-11-01 RX ADMIN — PHENYLEPHRINE HYDROCHLORIDE 250 MCG: 10 INJECTION INTRAVENOUS at 11:30

## 2023-11-01 RX ADMIN — HEPARIN SODIUM 44000 UNITS: 1000 INJECTION INTRAVENOUS; SUBCUTANEOUS at 10:13

## 2023-11-01 RX ADMIN — FENTANYL CITRATE 50 MCG: 50 INJECTION INTRAMUSCULAR; INTRAVENOUS at 13:34

## 2023-11-01 RX ADMIN — MAGNESIUM SULFATE HEPTAHYDRATE 2 G: 40 INJECTION, SOLUTION INTRAVENOUS at 13:50

## 2023-11-01 RX ADMIN — MUPIROCIN 1 APPLICATION: 20 OINTMENT TOPICAL at 20:54

## 2023-11-01 RX ADMIN — ROCURONIUM BROMIDE 20 MG: 10 INJECTION, SOLUTION INTRAVENOUS at 10:54

## 2023-11-01 RX ADMIN — Medication 12.5 MG: at 06:28

## 2023-11-01 RX ADMIN — PHENYLEPHRINE HYDROCHLORIDE 250 MCG: 10 INJECTION INTRAVENOUS at 11:11

## 2023-11-01 RX ADMIN — LIDOCAINE HYDROCHLORIDE 100 MG: 20 INJECTION INTRAVENOUS at 12:01

## 2023-11-01 RX ADMIN — METHOCARBAMOL 500 MG: 500 TABLET ORAL at 21:01

## 2023-11-01 RX ADMIN — OXYCODONE HYDROCHLORIDE 5 MG: 5 TABLET ORAL at 15:25

## 2023-11-01 RX ADMIN — HYDROMORPHONE HYDROCHLORIDE 0.5 MG: 1 INJECTION, SOLUTION INTRAMUSCULAR; INTRAVENOUS; SUBCUTANEOUS at 09:18

## 2023-11-01 RX ADMIN — HEPARIN SODIUM 5000 UNITS: 1000 INJECTION INTRAVENOUS; SUBCUTANEOUS at 10:28

## 2023-11-01 RX ADMIN — AMINOCAPROIC ACID 5 G: 250 INJECTION, SOLUTION INTRAVENOUS at 10:15

## 2023-11-01 RX ADMIN — MANNITOL 25 G: 12.5 INJECTION, SOLUTION INTRAVENOUS at 08:59

## 2023-11-01 RX ADMIN — SODIUM CHLORIDE 20 ML/HR: 0.45 INJECTION, SOLUTION INTRAVENOUS at 13:25

## 2023-11-01 RX ADMIN — HYDROMORPHONE HYDROCHLORIDE 0.5 MG: 1 INJECTION, SOLUTION INTRAMUSCULAR; INTRAVENOUS; SUBCUTANEOUS at 17:05

## 2023-11-01 RX ADMIN — PHENYLEPHRINE HYDROCHLORIDE 250 MCG: 10 INJECTION INTRAVENOUS at 12:04

## 2023-11-01 RX ADMIN — HYDROMORPHONE HYDROCHLORIDE 0.5 MG: 1 INJECTION, SOLUTION INTRAMUSCULAR; INTRAVENOUS; SUBCUTANEOUS at 15:06

## 2023-11-01 RX ADMIN — PROPOFOL 50 MG: 10 INJECTION, EMULSION INTRAVENOUS at 07:58

## 2023-11-01 RX ADMIN — SUGAMMADEX 200 MG: 100 INJECTION, SOLUTION INTRAVENOUS at 13:24

## 2023-11-01 RX ADMIN — CEFAZOLIN SODIUM 2000 MG: 2 SOLUTION INTRAVENOUS at 08:30

## 2023-11-01 RX ADMIN — HEPARIN SODIUM 5000 UNITS: 1000 INJECTION INTRAVENOUS; SUBCUTANEOUS at 10:51

## 2023-11-01 RX ADMIN — EPHEDRINE SULFATE 5 MG: 50 INJECTION INTRAVENOUS at 10:32

## 2023-11-01 RX ADMIN — FENTANYL CITRATE 100 MCG: 50 INJECTION, SOLUTION INTRAMUSCULAR; INTRAVENOUS at 07:46

## 2023-11-01 RX ADMIN — CALCIUM CHLORIDE 0.5 G: 100 INJECTION INTRAVENOUS; INTRAVENTRICULAR at 12:38

## 2023-11-01 RX ADMIN — FENTANYL CITRATE 100 MCG: 50 INJECTION, SOLUTION INTRAMUSCULAR; INTRAVENOUS at 09:08

## 2023-11-01 RX ADMIN — SODIUM CHLORIDE, SODIUM LACTATE, POTASSIUM CHLORIDE, AND CALCIUM CHLORIDE: .6; .31; .03; .02 INJECTION, SOLUTION INTRAVENOUS at 07:43

## 2023-11-01 RX ADMIN — SODIUM CHLORIDE 5 UNITS/HR: 9 INJECTION, SOLUTION INTRAVENOUS at 11:37

## 2023-11-01 RX ADMIN — ROCURONIUM BROMIDE 10 MG: 10 INJECTION, SOLUTION INTRAVENOUS at 09:06

## 2023-11-01 RX ADMIN — OXYCODONE HYDROCHLORIDE 10 MG: 10 TABLET ORAL at 23:42

## 2023-11-01 RX ADMIN — SODIUM CHLORIDE 1 EACH: 0.9 INJECTION, SOLUTION INTRAVENOUS at 08:39

## 2023-11-01 RX ADMIN — SODIUM BICARBONATE 750 ML: 84 INJECTION, SOLUTION INTRAVENOUS at 10:54

## 2023-11-01 RX ADMIN — ROCURONIUM BROMIDE 20 MG: 10 INJECTION, SOLUTION INTRAVENOUS at 10:13

## 2023-11-01 RX ADMIN — Medication 20 MG: at 08:53

## 2023-11-01 RX ADMIN — ROCURONIUM BROMIDE 30 MG: 10 INJECTION, SOLUTION INTRAVENOUS at 11:45

## 2023-11-01 RX ADMIN — GABAPENTIN 400 MG: 400 CAPSULE ORAL at 17:05

## 2023-11-01 RX ADMIN — ATORVASTATIN CALCIUM 80 MG: 80 TABLET, FILM COATED ORAL at 16:05

## 2023-11-01 RX ADMIN — HYDROMORPHONE HYDROCHLORIDE 0.5 MG: 1 INJECTION, SOLUTION INTRAMUSCULAR; INTRAVENOUS; SUBCUTANEOUS at 22:49

## 2023-11-01 RX ADMIN — MUPIROCIN 1 APPLICATION: 20 OINTMENT TOPICAL at 06:28

## 2023-11-01 RX ADMIN — PROPOFOL 30 MCG/KG/MIN: 10 INJECTION, EMULSION INTRAVENOUS at 12:51

## 2023-11-01 RX ADMIN — AMINOCAPROIC ACID 1 G/HR: 250 INJECTION, SOLUTION INTRAVENOUS at 10:15

## 2023-11-01 RX ADMIN — PHENYLEPHRINE HYDROCHLORIDE 250 MCG: 10 INJECTION INTRAVENOUS at 11:40

## 2023-11-01 RX ADMIN — OXYCODONE HYDROCHLORIDE 10 MG: 10 TABLET ORAL at 19:41

## 2023-11-01 RX ADMIN — SODIUM BICARBONATE 50 MEQ: 84 INJECTION, SOLUTION INTRAVENOUS at 09:00

## 2023-11-01 RX ADMIN — NICARDIPINE HYDROCHLORIDE 5 MG/HR: 25 INJECTION, SOLUTION INTRAVENOUS at 21:03

## 2023-11-01 RX ADMIN — INSULIN HUMAN 5 UNITS: 100 INJECTION, SOLUTION PARENTERAL at 11:37

## 2023-11-01 RX ADMIN — AMIODARONE HYDROCHLORIDE 150 MG: 50 INJECTION, SOLUTION INTRAVENOUS at 12:02

## 2023-11-01 RX ADMIN — HEPARIN SODIUM 10000 UNITS: 1000 INJECTION INTRAVENOUS; SUBCUTANEOUS at 11:07

## 2023-11-01 NOTE — ANESTHESIA PROCEDURE NOTES
Arterial Line Insertion    Performed by: Parisa East MD  Authorized by: Parisa East MD  Patient identity confirmed: arm band  Preparation: Patient was prepped and draped in the usual sterile fashion. Indications: multiple ABGs and hemodynamic monitoring  Orientation:  Left  Location: radial artery  Procedure Details:  Needle gauge: 20  Seldinger technique: Seldinger technique used  Number of attempts: 1    Post-procedure:  Post-procedure: dressing applied  Waveform: good waveform  Comments: Single skin and vessel puncture via ultrasound. Easy thread of wires. No apparent hematoma or complications.

## 2023-11-01 NOTE — CONSULTS
[address] [facility]  Consult: Critical Care   Date of Service: 11/1/2023  Hospital Day: 0  Name: Hiram Coronado  MRN: [id number]  Unit/Bed#: [facility] 912-91    Inpatient consult to Debbie Gregory Grier performed by: Clinton Long PA-C  Consult ordered by: Marene Olszewski, PA-C        Assessment/Plan     Impressions:  MVCAD s/p CABG x 4  HTN  HLD  DM2  Alcohol abuse  Tobacco abuse  GERD    Neuro:   Discontinue continuous sedation. ATC tylenol, PRN oxycodone for pain  Trend neuro exam  Delirium precautions    CV:   Goals:   Cardiac Surgery Hemodynamic Monitoring goals: MAP goal >65 CI >2.2 Continue pulmonary artery catheter for hemodynamic monitoring  Continue central line due to vasoactive medications Continue arterial line for blood pressure monitoring and/or frequent blood gas draws  Volume resuscitation as needed. Epicardial pacing wire plan : Epicardial pacing wires in place. Will pace as needed for bradycardia or cardiac output   Monitor rhythm on telemetry. Lung:   Check STAT post-op ABG and CXR  Wean vent with spontaneous breathing trial with goal to extubate today    GI:   GI prophylaxis with PPI  Bowel regimen  Zofran PRN for nausea. FEN:   NPO Replenish K >4.0, mag >2.0 and calcium >7.0. :   Check STAT post-op BMP  Prabhakar in place. Monitor UOP with goal >0.5cc/kg/hour. Lasix versus volume resuscitate as needed depending on hemodynamics and volume status. ID:   Prophylactic post-op abx. Maintain normothermia. Trend temps. Heme:   Check STAT post-op H/H and platelets. Monitor incision site, invasive lines, and chest tube outputs for bleeding. Send coag panel if needed. Endo: Insulin gtt for blood sugar control.      Disposition: ICU Care        Subjective     HPI: Leeanne Vega is a 61 y.o. male PMhx DM2, HTN, HLD, GERD, tobacco abuse, and alcohol abuse who presented to [address] [place] [address] on 10/19 with exertional chest pain. He had mildly elevated troponins and was transferred to [facility] for cardiac catheterization, which revealed MVCAD. He was discharged home with outpatient follow up to cardiac surgery, who recommended CABG. He presents to the ICU s/p CABG x 4. Intra-op AMBREEN LVEF 55%  Post-op medications: Critical Care Infusions : No cardiac infusions    ROS: ROS unable to be obtained due to patient being intubated and sedated. History obtained from chart review due to patient being intubated and sedated. Objective          Vitals: Invasive Monitoring      /87   Pulse 79   Resp (!) 35   O2 Sat 90 %   O2 Device None (Room air)   Temp (!) 96.3 °F (35.7 °C)    Arterial Line  Grahn /66  Arterial Line BP  Min: 124/66  Max: 143/72   MAP 86 mmHg  Arterial Line MAP (mmHg)  Min: 86 mmHg  Max: 91 mmHg     PA Catheter   Most Recent  Min/Max in 24hrs    PAP 58/-6 PAP  Min: 42/14  Max: 58/-6   CVP 21 mmHg CVP (mean)  Min: 15 mmHg  Max: 21 mmHg   CI 3.5 L/min/m2  CI (L/min/m2)  Min: 3.5 L/min/m2  Max: 3.5 L/min/m2    (dyne*sec)/cm5 SVR (dyne*sec)/cm5  Min: 740 (dyne*sec)/cm5  Max: 740 (dyne*sec)/cm5        Diagnostic Studies Physical exam     11/01/23 CXR: Lines and tubes intact, no ptx. This was personally reviewed by myself in PACS. 11/01/23 EKG: NSR w/ 1st degree AVB. This was personally reviewed by myself. Physical Exam  Eyes:      Pupils: Pupils are equal, round, and reactive to light. Skin:     General: Skin is warm and dry. HENT:      Head: Normocephalic and atraumatic. Neck:      Comments: +CVC  Cardiovascular:      Rate and Rhythm: Normal rate and regular rhythm. Heart sounds: Normal heart sounds. Musculoskeletal:      Right lower leg: No edema. Left lower leg: No edema. Abdominal:      General: There is no distension. Palpations: Abdomen is soft. Constitutional:       Appearance: He is obese. Interventions: He is sedated, intubated and restrained.    Pulmonary: Effort: Pulmonary effort is normal. He is intubated. Breath sounds: No decreased breath sounds, wheezing or rhonchi. Neurological:      Comments: Sedated   Vitals and nursing note reviewed. Medications:  Scheduled PRN   acetaminophen, 650 mg, Q6H While awake  amiodarone, 200 mg, Q8H TRACEY  aspirin, 325 mg, Daily  atorvastatin, 80 mg, Daily With Dinner  calcium chloride, 1 g, Once  cefazolin, 2,000 mg, Q8H  chlorhexidine, 15 mL, BID  fenofibrate, 111 mg, Daily  folic acid, 1 mg, Daily  [START ON 11/2/2023] fondaparinux, 2.5 mg, Daily  gabapentin, 400 mg, BID  magnesium sulfate, 2 g, Once  methocarbamol, 500 mg, 4x Daily  multivitamin-minerals, 1 tablet, Daily  mupirocin, 1 Application, W65F [address]  [START ON 11/2/2023] pantoprazole, 40 mg, Early Morning  polyethylene glycol, 17 g, Daily  thiamine, 100 mg, Daily      acetaminophen, 650 mg, Q4H PRN  bisacodyl, 10 mg, Daily PRN  fentanyl citrate (PF), 50 mcg, Q1H PRN  furosemide, 40 mg, Q6H PRN  HYDROmorphone, 0.5 mg, Q2H PRN  lactated ringers, 500 mL, Q30 Min PRN  lidocaine (cardiac), 100 mg, Q30 Min PRN  ondansetron, 4 mg, Q6H PRN  oxyCODONE, 2.5 mg, Q4H PRN  oxyCODONE, 5 mg, Q4H PRN  potassium chloride, 20 mEq, Once PRN  potassium chloride, 20 mEq, Q1H PRN  potassium chloride, 20 mEq, Q30 Min PRN       Continuous    dexmedetomidine, 0.1-0.7 mcg/kg/hr, Last Rate: 0.4 mcg/kg/hr (11/01/23 1342)  insulin regular (HumuLIN R,NovoLIN R) 1 Units/mL in sodium chloride 0.9 % 100 mL infusion, 0.3-21 Units/hr, Last Rate: 3 Units/hr (11/01/23 1342)  niCARdipine, 2.5-15 mg/hr, Last Rate: 5 mg/hr (11/01/23 1325)  sodium chloride, 20 mL/hr, Last Rate: 20 mL/hr (11/01/23 1325)         Labs:  CBC    Recent Labs     11/01/23  1149 11/01/23  1202 11/01/23  1328 11/01/23  1331   HGB  --    < > 13.3 14.2   HCT  --    < > 39 42.8     --   --  159    < > = values in this interval not displayed.      BMP    Recent Labs     11/01/23  1328 11/01/23  1331   SODIUM --  137   K  --  4.0   CL  --  108   CO2 23 23   AGAP  --  6   BUN  --  13   CREATININE  --  0.93   CALCIUM  --  8.6       Coags    No recent results     Additional Electrolytes  Recent Labs     11/01/23  1219 11/01/23  1328   CAIONIZED 1.26 1.32          Blood Gas    No recent results  No recent results LFTs  No recent results    Infectious    No recent results     No recent results Glucose  Recent Labs     11/01/23  1331   GLUC 191*          Invasive lines and devices: Invasive Devices       Central Venous Catheter Line  Duration             CVC Central Lines 11/01/23 Triple <1 day    Introducer 11/01/23 <1 day              Peripheral Intravenous Line  Duration             Peripheral IV 11/01/23 Left Forearm <1 day              Arterial Line  Duration             Arterial Line 11/01/23 Left Radial <1 day              Line  Duration             Pacer Wires <1 day    Pacer Wires <1 day              Drain  Duration             Chest Tube Anterior;Mediastinal 32 Fr. <1 day    Chest Tube Left Pleural 32 Fr. <1 day    Chest Tube Posterior;Mediastinal 32 Fr. <1 day    Urethral Catheter Non-latex; Temperature probe 16 Fr. <1 day              Airway  Duration             ETT  Hi-Lo 8 mm <1 day                     Historical Information   Past Medical History:  No date: Chews tobacco regularly  No date: Consumes three beers daily  No date: Controlled diabetes mellitus ([address])  No date: Diabetes (HCC)  No date: Esophageal reflux  No date: GERD (gastroesophageal reflux disease)  No date: Hyperlipidemia  No date: Hypertension  No date: Osteoarthritis      Comment:  LEFT KNEE  No date: Primary localized osteoarthritis of left knee      Comment:  last assessed: 1/16/2018 Past Surgical History:  10/18/2023: CARDIAC CATHETERIZATION; Left      Comment:  Procedure: Cardiac pci;  Surgeon: Adriana Heaton DO;               [facility]: [facility];   Service: Cardiology  10/18/2023: CARDIAC CATHETERIZATION; N/A      Comment: Procedure: Cardiac Coronary Angiogram;  Surgeon:                Sarabjit Davis DO;  [facility]: [facility]; Service: Cardiology  10/18/2023: CARDIAC CATHETERIZATION; N/A      Comment:  Procedure: Cardiac other - IFR;  Surgeon: Sarabjit Davis DO;  Location: [facility];   Service:                Cardiology  No date: COLONOSCOPY  1/8/2018: [place] ARTHRP KNE CONDYLE&PLATU MEDIAL&LAT COMPARTMENTS; Left      Comment:  Procedure: ARTHROPLASTY KNEE TOTAL;  Surgeon: Brenna Hardin MD;  Location: [facility];  Service: Orthopedics  No date: REPLACEMENT TOTAL KNEE; Left   Current Outpatient Medications   Medication Instructions    aspirin (ECOTRIN LOW STRENGTH) 81 mg, Oral, Daily    atorvastatin (LIPITOR) 80 mg, Oral, Daily with dinner    diphenhydrAMINE-acetaminophen (TYLENOL PM)  MG TABS 1 tablet, Oral, Daily at bedtime PRN    Empagliflozin (JARDIANCE) 25 mg, Oral, Every morning    Exenatide ER (Bydureon) 2 MG PEN INJECT THE CONTENTS OF 1  PEN SUBCUTANEOUSLY WEEKLY  ON SUNDAYS    fenofibrate (TRICOR) 145 mg, Oral, Daily    gabapentin (NEURONTIN) 400 mg capsule Take 1 PO BID.    ibuprofen (MOTRIN) 600 mg, Oral, Every 6 hours PRN, Also takes 400mg in the evening    lisinopril-hydrochlorothiazide (PRINZIDE,ZESTORETIC) 20-12.5 MG per tablet 1 tablet, Oral, Daily    metFORMIN (GLUCOPHAGE) 1,000 mg, Oral, 2 times daily with meals    methocarbamol (ROBAXIN) 500 mg, Oral, 4 times daily    metoprolol tartrate (LOPRESSOR) 25 mg, Oral, Every 12 hours scheduled    mupirocin (BACTROBAN) 2 % ointment Topical, 2 times daily    pantoprazole (PROTONIX) 40 mg, Oral, Daily    No Known Allergies   Social History     Tobacco Use    Smoking status: Former     Packs/day: 1.00     Years: 11.00     Total pack years: 11.00     Types: Cigarettes    Smokeless tobacco: Current     Types: Chew    Tobacco comments:     quit 30 + yrs ago    Vaping Use    Vaping Use: Never used   Substance Use Topics    Alcohol use:  Yes     Alcohol/week: 20.0 standard drinks of alcohol     Types: 20 Cans of beer per week     Comment: daily beer & "gin & tonics" on a weekend ; social alcohol use ( as per allscripts)    Drug use: No    Family History   Problem Relation Age of Onset    Colon cancer Father     Mental illness Family         mental disorder    Substance Abuse Neg Hx               SIGNATURE: Pedro Jang PA-C

## 2023-11-01 NOTE — ANESTHESIA POSTPROCEDURE EVALUATION
Post-Op Assessment Note    CV Status:  Stable  Pain Score: 0    Pain management: adequate     Mental Status:  Sleepy   Hydration Status:  Stable   PONV Controlled:  None   Airway Patency:  Patent  Airway: intubated      Post Op Vitals Reviewed: Yes      Staff: Anesthesiologist         No notable events documented. BP   145/71   Temp   96.1   Pulse  80   Resp   20   SpO2   97     Patient transported to ICU in stable condition.  Handoff given to receiving nurse, AP, and intensivist.

## 2023-11-01 NOTE — INTERVAL H&P NOTE
Vitals:    11/01/23 0558   BP: 156/87   Pulse: 64   Resp: 15   Temp: 97.5 °F (36.4 °C)   SpO2: 97%         H&P reviewed. After examining the patient I find no changes in the patients condition since the H&P was completed. Plan for CABG. Preoperative Beta Blocker: indicated. Anticipated Length of Stay: Patient will be admitted on an inpatient basis with an anticipated length of stay of grater than 2 midnights. Justification for Hospital StaY: Post surgical recovery following open heart surgery.       Fam Hi MD  11/01/23  7:36 AM

## 2023-11-01 NOTE — ANESTHESIA PROCEDURE NOTES
Procedure Performed: AMBREEN Anesthesia  Start Time:         Preanesthesia Checklist    Patient identified, IV assessed, risks and benefits discussed, monitors and equipment assessed, procedure being performed at surgeon's request and anesthesia consent obtained. Procedure     Type of AMBREEN: complete AMBREEN with interpretation. Images Saved: ultrasound permanent image saved. Location performed: OR. Intubated. Heart visualized. Insertion of AMBREEN Probe:  Easy. Probe Type:  Epiaortic and multiplane. Modalities:  Color flow mapping, 3D, pulse wave Doppler and continuous wave Doppler. Echocardiographic and Doppler Measurements    PREPROCEDURE    LEFT VENTRICLE:  Systolic Function: normal. Ejection Fraction: 55%. Cavity size: normal.       RIGHT VENTRICLE:  Systolic Function: normal.  Cavity size normal. No hypertrophy              AORTIC VALVE:  Leaflets: normal and trileaflet. Leaflet motions Calcified. Restricted LCC. Mean Gradient: 6 mmHg. Peak Gradient: 10 mmHg. Maximum velocity (cm/s): 1.6. Regurgitation: none. MITRAL VALVE:  Leaflets: normal. Leaflet Motions: normal. Regurgitation: mild. Stenosis: none. TRICUSPID VALVE:  Leaflets: normal. Leaflet Motions: normal.  Regurgitation: trace. PULMONIC VALVE:  Leaflets: normal.          ASCENDING AORTA:  Size:  normal. Diameter (cm): 4.0. Dissection not present. AORTIC ARCH:  Size:  normal.  dissection not present. Grade 3: atheroma protruding < 0.5 cm into lumen. DESCENDING AORTA:  Size: normal.  Dissection not present. Grade 3: atheroma protruding < 0.5 cm into lumen. RIGHT ATRIUM:  Size:  normal.     LEFT ATRIUM:  Size: normal.     LEFT ATRIAL APPENDAGE:  Size: normal. No spontaneous echo contrast         ATRIAL SEPTUM:  Intra-atrial septal morphology: normal.          VENTRICULAR SEPTUM:  Intra-ventricular septum morphology: normal.         EPIAORTIC:  Plaque Thickness: 0-5 mm.     OTHER FINDINGS:  Pericardium:  normal. Pleural Effusion:  none. POSTPROCEDURE    LEFT VENTRICLE: Unchanged . Systolic Function: normal. Ejection Fraction: 65 %. AORTIC VALVE: Unchanged . MITRAL VALVE: Unchanged . TRICUSPID VALVE: Unchanged . PULMONIC VALVE: Unchanged             ATRIA: Unchanged . AORTA: Unchanged . Dissection: Dissection not present. REMOVAL:  Probe Removal: atraumatic.

## 2023-11-01 NOTE — RESPIRATORY THERAPY NOTE
RT Ventilator Management Note  Jamaal Oliver 61 y.o. male MRN: [id number]  Unit/Bed#: [facility] 417-01 Encounter: [id number]      Daily Screen         11/1/2023  1323             Patient safety screen outcome[de-identified] Failed    Not Ready for Weaning due to[de-identified] Underline problem not resolved              Physical Exam:   Assessment Type: Assess only  General Appearance: Sedated  Respiratory Pattern: Assisted  Chest Assessment: Chest expansion symmetrical  Bilateral Breath Sounds: Diminished      Resp Comments: (P) Pt arrived post CABG and placed on ventilator, pt on 20/450/60/ 6 of peep. Pt currently saturating 99%, fio2 weaned at this time. Tube is 24 at the lips. Will  IS post extubation and wean as  able.

## 2023-11-01 NOTE — ANESTHESIA PROCEDURE NOTES
Introducer/Allen-Dinorah    Performed by: Meir Mcdaniel MD  Authorized by: Meir Mcdaniel MD    Date/Time: 11/1/2023 8:22 AM  Consent: Written consent obtained.   Consent given by: patient  Patient identity confirmed: arm band  Indications: vascular access and central pressure monitoring  Catheter size: 9 Fr  Patient position: Trendelenburg  Assessment: blood return through all ports and free fluid flow  Pre-procedure: landmarks identified  Number of attempts: 1  Successful placement: yes  Post-procedure: line sutured and dressing applied

## 2023-11-01 NOTE — ANESTHESIA PROCEDURE NOTES
Central Line Insertion    Performed by: Yuliana Meade MD  Authorized by: Yuliana Meade MD    Date/Time: 11/1/2023 8:19 AM  Catheter Type:  triple lumen  Consent: Written consent obtained. Patient identity confirmed: arm band  Time out: Immediately prior to procedure a "time out" was called to verify the correct patient, procedure, equipment, support staff and site/side marked as required.   Indications: vascular access  Catheter size: 7 Fr  Patient position: Trendelenburg  Assessment: blood return through all ports and free fluid flow  Preparation: skin prepped with 2% chlorhexidine  Skin prep agent dried: skin prep agent completely dried prior to procedure  Sterile barriers: all five maximum sterile barriers used - cap, mask, sterile gown, sterile gloves, and large sterile sheet  Hand hygiene: hand hygiene performed prior to central venous catheter insertion  sterile gel and probe cover used in ultrasound-guided central venous catheter insertionVessel of Catheter Tip End: RA  Number of attempts: 1  Successful placement: yes  Post-procedure: chlorhexidine patch applied, dressing applied and line sutured

## 2023-11-01 NOTE — ANESTHESIA PREPROCEDURE EVALUATION
Procedure:  CORONARY ARTERY BYPASS GRAFT (CABG) 3-4 VESSELS w/ AMBREEN (Chest)  HARVEST VEIN ENDOSCOPIC (EVH) (Abdomen)    Relevant Problems   CARDIO   (+) Hyperlipidemia   (+) Hypertension   (+) Triple vessel coronary artery disease/chest pain/elevated troponin   (+) Type 2 MI (myocardial infarction) (HCC)      ENDO   (+) Type 2 diabetes mellitus without complication, without long-term current use of insulin (HCC)      GI/HEPATIC   (+) Esophageal reflux      MUSCULOSKELETAL   (+) Chronic left-sided low back pain without sciatica   (+) Lumbar spondylosis   (+) Myofascial pain syndrome      NEURO/PSYCH   (+) Chronic left-sided low back pain without sciatica   (+) Continuous opioid dependence (HCC)   (+) Myofascial pain syndrome        Physical Exam    Airway    Mallampati score: II  TM Distance: >3 FB  Neck ROM: full     Dental   Comment: Poor dentition. Multiple missing. Cardiovascular  Cardiovascular exam normal    Pulmonary  Pulmonary exam normal     Other Findings      Anesthesia Plan  ASA Score- 4     Anesthesia Type- general with ASA Monitors. Additional Monitors: arterial line, central venous line and pulmonary artery catheter. Airway Plan: ETT. Plan Factors-    Chart reviewed. EKG reviewed. Imaging results reviewed. Existing labs reviewed. Patient summary reviewed. Induction- intravenous. Postoperative Plan- Plan for postoperative opioid use. Informed Consent- Anesthetic plan and risks discussed with patient and spouse. I personally reviewed this patient with the CRNA. Discussed and agreed on the Anesthesia Plan with the CRNA. Kendrick Lennox

## 2023-11-01 NOTE — OP NOTE
OPERATIVE REPORT  PATIENT NAME: Obed Rowe    :  1960  MRN: [id number]  Pt Location: [facility] OR ROOM 10    SURGERY DATE: 2023    SURGEON: Eugenia Turner MD    ASSISTANT: Brandy Guzman PA-C    ADDITIONAL ASSISTANT: Veena Sandhu MD (resident)    PREOPERATIVE DIAGNOSIS:  Multivessel coronary artery disease    POSTOPERATIVE DIAGNOSIS:  Multivessel coronary artery disease    NYHA Class: 4    CCS Class: 4    PROCEDURE: Coronary artery bypass grafting x 4 with left internal mammary artery to left anterior descending, saphenous vein graft to right posterior descending artery, saphenous vein graft to obtuse marginal 1, saphenous vein graft to second diagonal.     ANESTHESIA: General endotracheal anesthesia with transesophageal echocardiogram guidance, Dr. Johnathon Mcrae: 75 minutes. CROSSCLAMP TIME: 68 minutes. PACKS/TUBES/DRAINS: Chest tubes x 3. MATERIALS: Pacing wires: A x1. V x1. TRANSFUSION: None. SPECIMENS: None. ESTIMATED BLOOD LOSS: 200 mL    OPERATIVE TECHNIQUE:    The patient was taken to the operating room and placed supine on the operating table. Following the satisfactory induction of general anesthesia and placement of monitoring lines, the patient was prepped and draped in the usual sterile fashion. A time-out procedure was performed. The patient underwent median sternotomy, LIMA harvest, endoscopic left greater saphenous vein harvest, systemic heparinization and conduit preparation. The patient underwent pericardiotomy and epiaortic ultrasound was used to evaluate the ascending aorta, which was found to be free of significant atheromatous disease. The patient underwent aortic and right atrial cannulation and was initiated on bypass. The ascending aorta was crossclamped.  [place]te[place] cardioplegia was delivered with an excellent arrest.    The saphenous vein was anastomosed to the right posterior descending artery in end-to-side fashion using running 7-0 Prolene suture. The saphenous vein was anastomosed to the obtuse marginal 1 in end-to-side fashion using running 7-0 Prolene suture. The saphenous vein was anastomosed to the  second diagonal in end-to-side fashion using running 7-0 Prolene suture. The left internal mammary artery was anastomosed to the left anterior descending in end-to-side fashion using running 7-0 Prolene suture. The quality of all 4 grafts was excellent. A total of 3 proximal anastomoses were completed on the ascending aorta in end-to-side fashion using running 5-0 Prolene suture. The heart was de-aired and the crossclamp was removed. Atrial and ventricular pacing wires were placed. Following a period of reperfusion, the patient was weaned from cardiopulmonary bypass and decannulated. Protamine was administered with normalization of the ACT. Hemostasis was confirmed in all fields. Thoracostomy tubes were placed. The sternum was closed with stainless steel wires. The fascia, subdermis and skin were closed with multiple layers of running absorbable suture. As the attending surgeon, I was present and scrubbed for all critical portions of this procedure. Sponge, needle and instrument counts were reported as correct by the nursing staff. There is no cardiac residency program at this facility and for complex procedures such as this, it is vital to have a physician assistant experienced in cardiac surgery. The assistance of Girish Allen PA-C was necessary for endoscopic saphenous vein harvesting, retraction of the heart and coronary conduit with proper tissue handling techniques, and following of the suture with correct tension during construction of the proximal and distal coronary anastomoses. Final transesophageal echocardiogram demonstrated normal biventricular function.         Rashad Lawrence MD  DATE: November 1, 2023  TIME: 1:00 PM

## 2023-11-02 ENCOUNTER — HOME HEALTH ADMISSION (OUTPATIENT)
Dept: HOME HEALTH SERVICES | Facility: HOME HEALTHCARE | Age: 63
End: 2023-11-02
Payer: COMMERCIAL

## 2023-11-02 ENCOUNTER — APPOINTMENT (INPATIENT)
Dept: RADIOLOGY | Facility: HOSPITAL | Age: 63
DRG: 235 | End: 2023-11-02
Payer: COMMERCIAL

## 2023-11-02 LAB
ANION GAP SERPL CALCULATED.3IONS-SCNC: 9 MMOL/L
ANION GAP SERPL CALCULATED.3IONS-SCNC: 9 MMOL/L
BUN SERPL-MCNC: 16 MG/DL (ref 5–25)
BUN SERPL-MCNC: 22 MG/DL (ref 5–25)
CALCIUM SERPL-MCNC: 8.2 MG/DL (ref 8.4–10.2)
CALCIUM SERPL-MCNC: 8.5 MG/DL (ref 8.4–10.2)
CHLORIDE SERPL-SCNC: 102 MMOL/L (ref 96–108)
CHLORIDE SERPL-SCNC: 109 MMOL/L (ref 96–108)
CO2 SERPL-SCNC: 20 MMOL/L (ref 21–32)
CO2 SERPL-SCNC: 24 MMOL/L (ref 21–32)
CREAT SERPL-MCNC: 0.96 MG/DL (ref 0.6–1.3)
CREAT SERPL-MCNC: 1.65 MG/DL (ref 0.6–1.3)
ERYTHROCYTE [DISTWIDTH] IN BLOOD BY AUTOMATED COUNT: 12.8 % (ref 11.6–15.1)
GFR SERPL CREATININE-BSD FRML MDRD: 43 ML/MIN/1.73SQ M
GFR SERPL CREATININE-BSD FRML MDRD: 83 ML/MIN/1.73SQ M
GLUCOSE SERPL-MCNC: 132 MG/DL (ref 65–140)
GLUCOSE SERPL-MCNC: 135 MG/DL (ref 65–140)
GLUCOSE SERPL-MCNC: 136 MG/DL (ref 65–140)
GLUCOSE SERPL-MCNC: 142 MG/DL (ref 65–140)
GLUCOSE SERPL-MCNC: 154 MG/DL (ref 65–140)
GLUCOSE SERPL-MCNC: 156 MG/DL (ref 65–140)
GLUCOSE SERPL-MCNC: 157 MG/DL (ref 65–140)
GLUCOSE SERPL-MCNC: 165 MG/DL (ref 65–140)
GLUCOSE SERPL-MCNC: 175 MG/DL (ref 65–140)
GLUCOSE SERPL-MCNC: 179 MG/DL (ref 65–140)
HCT VFR BLD AUTO: 46.1 % (ref 36.5–49.3)
HGB BLD-MCNC: 14.7 G/DL (ref 12–17)
MAGNESIUM SERPL-MCNC: 1.9 MG/DL (ref 1.9–2.7)
MCH RBC QN AUTO: 29.7 PG (ref 26.8–34.3)
MCHC RBC AUTO-ENTMCNC: 31.9 G/DL (ref 31.4–37.4)
MCV RBC AUTO: 93 FL (ref 82–98)
PLATELET # BLD AUTO: 201 THOUSANDS/UL (ref 149–390)
PMV BLD AUTO: 12.3 FL (ref 8.9–12.7)
POTASSIUM SERPL-SCNC: 4.2 MMOL/L (ref 3.5–5.3)
POTASSIUM SERPL-SCNC: 4.2 MMOL/L (ref 3.5–5.3)
RBC # BLD AUTO: 4.95 MILLION/UL (ref 3.88–5.62)
SODIUM SERPL-SCNC: 135 MMOL/L (ref 135–147)
SODIUM SERPL-SCNC: 138 MMOL/L (ref 135–147)
WBC # BLD AUTO: 14.04 THOUSAND/UL (ref 4.31–10.16)

## 2023-11-02 PROCEDURE — 82948 REAGENT STRIP/BLOOD GLUCOSE: CPT

## 2023-11-02 PROCEDURE — 71045 X-RAY EXAM CHEST 1 VIEW: CPT

## 2023-11-02 PROCEDURE — 99222 1ST HOSP IP/OBS MODERATE 55: CPT | Performed by: INTERNAL MEDICINE

## 2023-11-02 PROCEDURE — 94664 DEMO&/EVAL PT USE INHALER: CPT

## 2023-11-02 PROCEDURE — 99024 POSTOP FOLLOW-UP VISIT: CPT | Performed by: THORACIC SURGERY (CARDIOTHORACIC VASCULAR SURGERY)

## 2023-11-02 PROCEDURE — 97163 PT EVAL HIGH COMPLEX 45 MIN: CPT

## 2023-11-02 PROCEDURE — 80048 BASIC METABOLIC PNL TOTAL CA: CPT | Performed by: PHYSICIAN ASSISTANT

## 2023-11-02 PROCEDURE — 93005 ELECTROCARDIOGRAM TRACING: CPT

## 2023-11-02 PROCEDURE — 94760 N-INVAS EAR/PLS OXIMETRY 1: CPT

## 2023-11-02 PROCEDURE — 85027 COMPLETE CBC AUTOMATED: CPT | Performed by: PHYSICIAN ASSISTANT

## 2023-11-02 PROCEDURE — 97535 SELF CARE MNGMENT TRAINING: CPT

## 2023-11-02 PROCEDURE — 97166 OT EVAL MOD COMPLEX 45 MIN: CPT

## 2023-11-02 PROCEDURE — 83735 ASSAY OF MAGNESIUM: CPT | Performed by: PHYSICIAN ASSISTANT

## 2023-11-02 RX ORDER — PHENOBARBITAL SODIUM 65 MG/ML
130 INJECTION INTRAMUSCULAR ONCE
Status: COMPLETED | OUTPATIENT
Start: 2023-11-02 | End: 2023-11-02

## 2023-11-02 RX ORDER — KETOROLAC TROMETHAMINE 30 MG/ML
15 INJECTION, SOLUTION INTRAMUSCULAR; INTRAVENOUS EVERY 6 HOURS
Status: DISCONTINUED | OUTPATIENT
Start: 2023-11-02 | End: 2023-11-02

## 2023-11-02 RX ORDER — FUROSEMIDE 10 MG/ML
40 INJECTION INTRAMUSCULAR; INTRAVENOUS
Status: DISCONTINUED | OUTPATIENT
Start: 2023-11-02 | End: 2023-11-02

## 2023-11-02 RX ORDER — FUROSEMIDE 10 MG/ML
80 INJECTION INTRAMUSCULAR; INTRAVENOUS ONCE
Status: DISCONTINUED | OUTPATIENT
Start: 2023-11-02 | End: 2023-11-02

## 2023-11-02 RX ORDER — FUROSEMIDE 10 MG/ML
10 SYRINGE (ML) INJECTION CONTINUOUS
Status: DISPENSED | OUTPATIENT
Start: 2023-11-02 | End: 2023-11-03

## 2023-11-02 RX ORDER — POTASSIUM CHLORIDE 20 MEQ/1
20 TABLET, EXTENDED RELEASE ORAL 2 TIMES DAILY
Status: DISCONTINUED | OUTPATIENT
Start: 2023-11-03 | End: 2023-11-04 | Stop reason: HOSPADM

## 2023-11-02 RX ORDER — TEMAZEPAM 15 MG/1
15 CAPSULE ORAL
Status: DISCONTINUED | OUTPATIENT
Start: 2023-11-02 | End: 2023-11-04 | Stop reason: HOSPADM

## 2023-11-02 RX ORDER — FUROSEMIDE 10 MG/ML
80 INJECTION INTRAMUSCULAR; INTRAVENOUS ONCE
Status: COMPLETED | OUTPATIENT
Start: 2023-11-02 | End: 2023-11-02

## 2023-11-02 RX ORDER — MAGNESIUM SULFATE HEPTAHYDRATE 40 MG/ML
2 INJECTION, SOLUTION INTRAVENOUS ONCE
Status: COMPLETED | OUTPATIENT
Start: 2023-11-02 | End: 2023-11-02

## 2023-11-02 RX ORDER — LORAZEPAM 1 MG/1
2 TABLET ORAL ONCE
Status: COMPLETED | OUTPATIENT
Start: 2023-11-02 | End: 2023-11-02

## 2023-11-02 RX ORDER — DOCUSATE SODIUM 100 MG/1
100 CAPSULE, LIQUID FILLED ORAL 2 TIMES DAILY
Status: DISCONTINUED | OUTPATIENT
Start: 2023-11-02 | End: 2023-11-04 | Stop reason: HOSPADM

## 2023-11-02 RX ADMIN — THIAMINE HCL TAB 100 MG 100 MG: 100 TAB at 08:08

## 2023-11-02 RX ADMIN — METHOCARBAMOL 500 MG: 500 TABLET ORAL at 11:34

## 2023-11-02 RX ADMIN — ATORVASTATIN CALCIUM 80 MG: 80 TABLET, FILM COATED ORAL at 17:12

## 2023-11-02 RX ADMIN — HYDROMORPHONE HYDROCHLORIDE 0.5 MG: 1 INJECTION, SOLUTION INTRAMUSCULAR; INTRAVENOUS; SUBCUTANEOUS at 04:47

## 2023-11-02 RX ADMIN — Medication 40 MG/HR: at 23:54

## 2023-11-02 RX ADMIN — ACETAMINOPHEN 650 MG: 325 TABLET, FILM COATED ORAL at 13:25

## 2023-11-02 RX ADMIN — CEFAZOLIN SODIUM 2000 MG: 2 SOLUTION INTRAVENOUS at 11:30

## 2023-11-02 RX ADMIN — HYDROMORPHONE HYDROCHLORIDE 0.5 MG: 1 INJECTION, SOLUTION INTRAMUSCULAR; INTRAVENOUS; SUBCUTANEOUS at 00:42

## 2023-11-02 RX ADMIN — SODIUM CHLORIDE 4 UNITS/HR: 9 INJECTION, SOLUTION INTRAVENOUS at 09:01

## 2023-11-02 RX ADMIN — HYDROMORPHONE HYDROCHLORIDE 0.5 MG: 1 INJECTION, SOLUTION INTRAMUSCULAR; INTRAVENOUS; SUBCUTANEOUS at 02:44

## 2023-11-02 RX ADMIN — FUROSEMIDE 80 MG: 10 INJECTION, SOLUTION INTRAMUSCULAR; INTRAVENOUS at 21:02

## 2023-11-02 RX ADMIN — MAGNESIUM SULFATE HEPTAHYDRATE 2 G: 40 INJECTION, SOLUTION INTRAVENOUS at 06:23

## 2023-11-02 RX ADMIN — CEFAZOLIN SODIUM 2000 MG: 2 SOLUTION INTRAVENOUS at 03:35

## 2023-11-02 RX ADMIN — AMIODARONE HYDROCHLORIDE 200 MG: 200 TABLET ORAL at 13:25

## 2023-11-02 RX ADMIN — AMIODARONE HYDROCHLORIDE 200 MG: 200 TABLET ORAL at 05:48

## 2023-11-02 RX ADMIN — POLYETHYLENE GLYCOL 3350 17 G: 17 POWDER, FOR SOLUTION ORAL at 08:07

## 2023-11-02 RX ADMIN — FUROSEMIDE 40 MG: 10 INJECTION, SOLUTION INTRAMUSCULAR; INTRAVENOUS at 08:09

## 2023-11-02 RX ADMIN — GABAPENTIN 400 MG: 400 CAPSULE ORAL at 08:08

## 2023-11-02 RX ADMIN — OXYCODONE HYDROCHLORIDE 10 MG: 10 TABLET ORAL at 03:33

## 2023-11-02 RX ADMIN — MUPIROCIN 1 APPLICATION: 20 OINTMENT TOPICAL at 08:09

## 2023-11-02 RX ADMIN — PANTOPRAZOLE SODIUM 40 MG: 40 TABLET, DELAYED RELEASE ORAL at 05:48

## 2023-11-02 RX ADMIN — METHOCARBAMOL 500 MG: 500 TABLET ORAL at 08:08

## 2023-11-02 RX ADMIN — METHOCARBAMOL 500 MG: 500 TABLET ORAL at 17:12

## 2023-11-02 RX ADMIN — ASPIRIN 325 MG ORAL TABLET 325 MG: 325 PILL ORAL at 08:07

## 2023-11-02 RX ADMIN — KETOROLAC TROMETHAMINE 15 MG: 30 INJECTION, SOLUTION INTRAMUSCULAR; INTRAVENOUS at 08:14

## 2023-11-02 RX ADMIN — METHOCARBAMOL 500 MG: 500 TABLET ORAL at 21:02

## 2023-11-02 RX ADMIN — ACETAMINOPHEN 650 MG: 325 TABLET, FILM COATED ORAL at 21:02

## 2023-11-02 RX ADMIN — FONDAPARINUX SODIUM 2.5 MG: 2.5 INJECTION, SOLUTION SUBCUTANEOUS at 08:09

## 2023-11-02 RX ADMIN — PHENOBARBITAL SODIUM 130 MG: 65 INJECTION INTRAMUSCULAR at 02:53

## 2023-11-02 RX ADMIN — AMIODARONE HYDROCHLORIDE 200 MG: 200 TABLET ORAL at 21:02

## 2023-11-02 RX ADMIN — OXYCODONE HYDROCHLORIDE 10 MG: 10 TABLET ORAL at 12:13

## 2023-11-02 RX ADMIN — KETOROLAC TROMETHAMINE 15 MG: 30 INJECTION, SOLUTION INTRAMUSCULAR; INTRAVENOUS at 21:02

## 2023-11-02 RX ADMIN — ACETAMINOPHEN 650 MG: 325 TABLET, FILM COATED ORAL at 08:11

## 2023-11-02 RX ADMIN — MAGNESIUM SULFATE HEPTAHYDRATE 2 G: 40 INJECTION, SOLUTION INTRAVENOUS at 11:35

## 2023-11-02 RX ADMIN — DOCUSATE SODIUM 100 MG: 100 CAPSULE, LIQUID FILLED ORAL at 08:11

## 2023-11-02 RX ADMIN — DOCUSATE SODIUM 100 MG: 100 CAPSULE, LIQUID FILLED ORAL at 17:12

## 2023-11-02 RX ADMIN — LORAZEPAM 2 MG: 1 TABLET ORAL at 12:13

## 2023-11-02 RX ADMIN — GABAPENTIN 400 MG: 400 CAPSULE ORAL at 17:12

## 2023-11-02 RX ADMIN — FUROSEMIDE 40 MG: 10 INJECTION, SOLUTION INTRAMUSCULAR; INTRAVENOUS at 17:12

## 2023-11-02 RX ADMIN — Medication 1 TABLET: at 08:08

## 2023-11-02 RX ADMIN — KETOROLAC TROMETHAMINE 15 MG: 30 INJECTION, SOLUTION INTRAMUSCULAR; INTRAVENOUS at 13:28

## 2023-11-02 RX ADMIN — METOPROLOL TARTRATE 25 MG: 25 TABLET, FILM COATED ORAL at 08:08

## 2023-11-02 RX ADMIN — FOLIC ACID 1 MG: 1 TABLET ORAL at 08:08

## 2023-11-02 RX ADMIN — OXYCODONE HYDROCHLORIDE 10 MG: 10 TABLET ORAL at 08:08

## 2023-11-02 RX ADMIN — HYDROMORPHONE HYDROCHLORIDE 0.5 MG: 1 INJECTION, SOLUTION INTRAMUSCULAR; INTRAVENOUS; SUBCUTANEOUS at 05:51

## 2023-11-02 RX ADMIN — FENOFIBRATE 145 MG: 145 TABLET, FILM COATED ORAL at 08:07

## 2023-11-02 NOTE — PLAN OF CARE
Problem: OCCUPATIONAL THERAPY ADULT  Goal: Performs self-care activities at highest level of function for planned discharge setting. See evaluation for individualized goals. Description: Treatment Interventions: ADL retraining, Functional transfer training, Endurance training, Continued evaluation, Cardiac education, Energy conservation, Activityengagement          See flowsheet documentation for full assessment, interventions and recommendations. Outcome: Progressing  Note: Limitation: Decreased ADL status, Decreased self-care trans, Decreased high-level ADLs, Decreased endurance  Prognosis: Good  Assessment: Pt is a 61 y.o. male who was admitted to [address] [place] [address] on 11/1/2023 with Triple vessel coronary artery disease, s/p CABG x 4. Pt seen for an OT evaluation per active OT orders, CTs in place, cardiac pxns observed throughout session. Pt  has a past medical history of Chews tobacco regularly, Consumes three beers daily, Controlled diabetes mellitus ([address]), Diabetes ([address]), Esophageal reflux, GERD (gastroesophageal reflux disease), Hyperlipidemia, Hypertension, Osteoarthritis, and Primary localized osteoarthritis of left knee. Pt lives with spouse in a 2  with 1 [facility], FF to 2nd fl bed/bath although pt plans to stay on 1st fl for a week after D/C, uses a tub shower and standard height toilet. Pta, pt was independent w/ ADL/IADL and functional mobility, was (+) driving and was not using any DME at baseline but has a RW and cane. Currently, pt is Supervision for UB ADL, Min Ax1 for LB ADL, and completed transfers/FM w Supervision-Min Ax1 with RW. Pt currently presents with impairments in the following categories -steps to enter environment and difficulty performing ADLS activity tolerance and endurance.  These impairments, as well as pt's fatigue, pain, and cardiac/sternal precautions  limit pt's ability to safely engage in all baseline areas of occupation, includingbathing, dressing, toileting, functional mobility/transfers, and community mobility. The patient's raw score on the AM-PAC Daily Activity Inpatient Short Form is 19. A raw score of greater than or equal to 19 suggests the patient may benefit from discharge to home. Please refer to the recommendation of the Occupational Therapist for safe discharge planning. Pt would benefit from continued acute OT services throughout hospital course. Plan for OT interventions 2-3x per week. From OT standpoint, recommend home with no further rehab needs pending progress upon D/C. Pt was left seated in bedside chair with all needs within reach.      Rehab Resource Intensity Level, OT: No post-acute rehabilitation needs (pending progress)

## 2023-11-02 NOTE — UTILIZATION REVIEW
Initial Clinical Review    Elective INPATIENT surgical procedure  Age/Sex: 61 y.o. male  Surgery Date: 11/1/2023   Procedure: Coronary artery bypass grafting x 4 with left internal mammary artery to left anterior descending, saphenous vein graft to right posterior descending artery, saphenous vein graft to obtuse marginal 1, saphenous vein graft to second diagonal.   Anesthesia: General endotracheal anesthesia with transesophageal echocardiogram guidance       POD#1 Progress Note: ICU s/p CABG x 4. Tachy / decreased breath sounds; O2 @ 12 L [address] sat 91%; Extubated yesterday. Cardiopulmonary status stable. On nicardipine infusion. Start PO lopressor 25 mg bid to wean off drip. History of alcohol abuse. Was complaining of headache possible related to withdraw symptoms. Received 130 mg of phenobarbital yesterday and symptoms resolved. Continue CIWA protocol. Cont Insulin gtt; cont iv abx; cont ivf; cont lasix iv; rec'd Mg Sulf iv x1; pain / nausea control; monitor labs; Replenish electrolytes ; Cardiopulmonary monitoring; neuro checks; titrate O2; Continue chest tubes to suction ; PT/ OT eval - tx      Admission Orders: Date/Time/Statement:   Admission Orders (From admission, onward)       Ordered        11/01/23 0656  Inpatient Admission  Once                          Orders Placed This Encounter   Procedures    Inpatient Admission     Standing Status:   Standing     Number of Occurrences:   1     Order Specific Question:   Level of Care     Answer:   Critical Care [15]     Order Specific Question:   Estimated length of stay     Answer:   More than 2 Midnights     Order Specific Question:   Certification     Answer:   I certify that inpatient services are medically necessary for this patient for a duration of greater than two midnights. See H&P and MD Progress Notes for additional information about the patient's course of treatment.      Vital Signs: /57   Pulse 78   Temp 99 °F (37.2 °C) (Core)   Resp 21    5' 8.9" (1.75 m)   Wt 109 kg (240 lb 8.4 oz)   SpO2 91%   BMI 35.62 kg/m²     Additional Vital Signs:   Date/Time Temp Pulse Resp BP Arterial Line BP MAP SpO2 FiO2 (%) O2 Flow Rate (L/min) O2 Device O2 Interface Device CO CI Patient Position - Orthostatic VS CVP (mean)   11/02/23 0808 -- 78 -- 116/57 -- -- -- -- -- -- -- -- -- -- --   11/02/23 0754 -- -- -- -- -- -- 91 % -- 12 L/min Mid flow nasal cannula MFNC prongs -- -- -- --   11/02/23 0600 -- -- -- 125/61 -- -- -- -- -- -- -- -- -- -- --   11/02/23 0400 99 °F (37.2 °C) 79 21 121/59 130/62 81 mmHg 91 % -- 4 L/min Nasal cannula -- 6.4 L/min 2.7 L/min/m2 -- 15 mmHg   11/02/23 0200 99 °F (37.2 °C) 80 24 Abnormal  114/56 130/63 83 mmHg 91 % -- 4 L/min Nasal cannula -- 6 L/min 2.5 L/min/m2 -- 16 mmHg   11/02/23 0000 98.6 °F (37 °C) 84 19 -- 123/59 79 mmHg 93 % -- -- -- -- 6.9 L/min 2.9 L/min/m2 -- 14 mmHg   11/01/23 2300 98.4 °F (36.9 °C) 86 19 122/60 119/59 78 mmHg 93 % -- -- -- -- 7.5 L/min 3.2 L/min/m2 -- 14 mmHg   11/01/23 2200 98.1 °F (36.7 °C) 86 19 -- 120/58 77 mmHg 94 % -- -- -- -- 7.1 L/min 3 L/min/m2 -- 14 mmHg   11/01/23 2100 98.2 °F (36.8 °C) 87 24 Abnormal  -- 119/58 76 mmHg 92 % -- 4 L/min Nasal cannula -- 6.6 L/min 2.8 L/min/m2 -- 15 mmHg   11/01/23 2000 98.1 °F (36.7 °C) 86 20 118/72 115/58 76 mmHg 93 % -- 4 L/min Nasal cannula -- 7.5 L/min 3.2 L/min/m2 Lying 13 mmHg   11/01/23 1900 97.9 °F (36.6 °C) 86 25 Abnormal  -- 125/61 80 mmHg 92 % -- -- -- -- 8.1 L/min 3.4 L/min/m2 -- 18 mmHg   11/01/23 1800 97.5 °F (36.4 °C) 88 25 Abnormal  -- 115/54 74 mmHg 93 % -- -- -- -- 7.4 L/min 3.2 L/min/m2 -- 12 mmHg   11/01/23 1700 97.2 °F (36.2 °C) Abnormal  87 27 Abnormal  -- 118/56 76 mmHg 93 % -- -- -- -- 7.5 L/min 3.2 L/min/m2 -- 15 mmHg   11/01/23 1600 96.8 °F (36 °C) Abnormal  84 22 -- 122/60 77 mmHg 93 % -- 4 L/min Nasal cannula -- 7.4 L/min 3.2 L/min/m2 -- 14 mmHg   11/01/23 1500 96.4 °F (35.8 °C) Abnormal  83 24 Abnormal  -- 105/55 71 mmHg 90 % -- -- -- -- 8.6 L/min 3.6 L/min/m2 -- 132 mmHg   11/01/23 1415 -- 81 19 -- 118/56 73 mmHg 94 % -- -- -- -- -- -- -- 15 mmHg   11/01/23 1405 -- 81 21 -- 128/61 80 mmHg 93 % -- -- -- -- -- -- -- 15 mmHg   11/01/23 1400 96.3 °F (35.7 °C) Abnormal  83 31 Abnormal  -- 106/68 82 mmHg 95 % 50 -- Ventilator -- 8.3 L/min 3.5 L/min/m2 -- 16 mmHg   11/01/23 1350 -- 74 18 -- 122/60 78 mmHg 94 % -- -- -- -- -- -- -- 10 mmHg   11/01/23 1346 -- -- -- -- -- -- 90 % -- -- -- -- -- -- -- --   11/01/23 1345 96.3 °F (35.7 °C) Abnormal  79 35 Abnormal  -- 124/66 86 mmHg 94 % -- -- -- -- -- -- -- 21 mmHg   11/01/23 1340 -- 75 33 Abnormal  -- 114/69 86 mmHg 91 % -- -- -- -- -- -- -- 19 mmHg   11/01/23 1331 -- 68 25 Abnormal  -- 143/72 91 mmHg 97 % -- -- -- -- -- -- -- 15 mmHg   11/01/23 1326 -- -- -- -- -- -- 94 % -- -- Ventilator -- -- -- -- --   11/01/23 1323 -- -- -- -- -- -- 99 % -- -- -- -- -- 3.5 L/min/m2 -- --   11/01/23 0558 97.5 °F (36.4 °C) 64 15 156/87 -- -- 97 % -- -- None (Room air) -- -- -- -- --       Pertinent Labs/Diagnostic Test Results:   XR chest portable ICU   Final Result by Mita Prabhakar MD (11/02 0557)      No acute cardiopulmonary disease. Postoperative change. Workstation performed: [id number]         XR chest portable    (Results Pending)       Results from last 7 days   Lab Units 11/02/23 0344 11/01/23 2101 11/01/23  1331 11/01/23  1328 11/01/23  1219 11/01/23  1202 11/01/23  1149   WBC Thousand/uL 14.04*  --   --   --   --   --   --    HEMOGLOBIN g/dL 14.7 15.0 14.2  --   --   --   --    I STAT HEMOGLOBIN g/dl  --   --   --  13.3 10.5*   < >  --    HEMATOCRIT % 46.1 44.8 42.8  --   --   --   --    HEMATOCRIT, ISTAT %  --   --   --  39 31*   < >  --    PLATELETS Thousands/uL 201  --  159  --   --   --  217    < > = values in this interval not displayed.        Results from last 7 days   Lab Units 11/02/23 0344 11/01/23 2101 11/01/23  1744 11/01/23  1331 11/01/23  1328 11/01/23  1219 11/01/23  1202 11/01/23  1131 11/01/23  1103   SODIUM mmol/L 138  --   --  137  --   --   --   --   --    POTASSIUM mmol/L 4.2 4.2 4.0 4.0  --   --   --   --   --    CHLORIDE mmol/L 109*  --   --  108  --   --   --   --   --    CO2 mmol/L 20*  --   --  23  --   --   --   --   --    CO2, I-STAT mmol/L  --   --   --   --  23 23 27 29 27   ANION GAP mmol/L 9  --   --  6  --   --   --   --   --    BUN mg/dL 16  --   --  13  --   --   --   --   --    CREATININE mg/dL 0.96  --   --  0.93  --   --   --   --   --    EGFR ml/min/1.73sq m 83  --   --  87  --   --   --   --   --    CALCIUM mg/dL 8.2*  --   --  8.6  --   --   --   --   --    CALCIUM, IONIZED, ISTAT mmol/L  --   --   --   --  1.32 1.26 1.15 1.12 1.10*   MAGNESIUM mg/dL 1.9  --   --   --   --   --   --   --   --        Results from last 7 days   Lab Units 11/02/23  0821 11/02/23  0614 11/02/23  0344 11/02/23  0152 11/01/23  2346 11/01/23  2149 11/01/23  2022 11/01/23  1821 11/01/23  1604 11/01/23  1408 11/01/23  1331 11/01/23  0612   POC GLUCOSE mg/dl 142* 156* 165* 157* 157* 168* 181* 208* 215* 174* 172* 118     Results from last 7 days   Lab Units 11/02/23  0344 11/01/23  1331   GLUCOSE RANDOM mg/dL 179* 191*       Results from last 7 days   Lab Units 11/01/23  1328 11/01/23  1219 11/01/23  1202 11/01/23  1131 11/01/23  1103 11/01/23  1020 11/01/23  0816   PH, RAFFI I-STAT   --   --   --   --  7.288*  --  7.313   PCO2, RAFFI ISTAT mm HG  --   --   --   --  53.5*  --  50.6*   PO2, RAFFI ISTAT mm HG  --   --   --   --  48.0*  --  43.0   HCO3, RAFFI ISTAT mmol/L  --   --   --   --  25.6  --  25.7   I STAT BASE EXC mmol/L -5* -4* -2   < > -1   < > -1   I STAT O2 SAT % 95* 100*  --   --  78   < > 73   ISTAT PH ART  7.300* 7.317* 7.299*   < >  --    < >  --    I STAT ART PCO2 mm HG 43.8 41.9 50.9*   < >  --    < >  --    I STAT ART PO2 mm HG 86.0 278.0* >400.0*   < >  --    < >  --    I STAT ART HCO3 mmol/L 21.6* 21.4* 25.0   < >  --    < >  --     < > = values in this interval not displayed.        Results from last 7 days   Lab Units 11/01/23  1421   UNIT PRODUCT CODE  [id number]  [id number]  [id number]  [id number]   UNIT NUMBER  [id number]  [id number]  [id number]  [id number]   UNITABO  O  O  O  O   UNITRH  POS  POS  POS  POS   CROSSMATCH  Compatible  Compatible  Compatible  Compatible   UNIT DISPENSE STATUS  Return to Inv  Return to Inv  Return to Inv  Return to Inv   UNIT PRODUCT VOL mL 350  350  350  350       Diet: cardiac diet / fluid restriction 1800  Mobility: OOB as tolerated  DVT Prophylaxis: SCDs    Medications/Pain Control:   Scheduled Medications:  acetaminophen, 650 mg, Oral, Q6H While awake  amiodarone, 200 mg, Oral, Q8H TRACEY  aspirin, 325 mg, Oral, Daily  atorvastatin, 80 mg, Oral, Daily With Dinner  cefazolin, 2,000 mg, Intravenous, Q8H  docusate sodium, 100 mg, Oral, BID  fenofibrate, 145 mg, Oral, Daily  folic acid, 1 mg, Oral, Daily  fondaparinux, 2.5 mg, Subcutaneous, Daily  furosemide, 40 mg, Intravenous, BID (diuretic)  gabapentin, 400 mg, Oral, BID  ketorolac, 15 mg, Intravenous, Q6H  magnesium sulfate, 2 g, Intravenous, Once  methocarbamol, 500 mg, Oral, 4x Daily  metoprolol tartrate, 25 mg, Oral, Q12H [address]  multivitamin-minerals, 1 tablet, Oral, Daily  mupirocin, 1 Application, Nasal, O90S TRACEY  pantoprazole, 40 mg, Oral, Early Morning  polyethylene glycol, 17 g, Oral, Daily  [START ON 11/3/2023] potassium chloride, 20 mEq, Oral, BID  thiamine, 100 mg, Oral, Daily      Continuous IV Infusions:  insulin regular (HumuLIN R,NovoLIN R) 1 Units/mL in sodium chloride 0.9 % 100 mL infusion, 0.3-21 Units/hr, Intravenous, Titrated  sodium chloride, 20 mL/hr, Intravenous, Continuous      PRN Meds:  acetaminophen, 650 mg, Rectal, Q4H PRN  bisacodyl, 10 mg, Rectal, Daily PRN  lidocaine (cardiac), 100 mg, Intravenous, Q30 Min PRN  ondansetron, 4 mg, Intravenous, Q6H PRN  oxyCODONE, 10 mg, Oral, Q4H PRN  oxyCODONE, 5 mg, Oral, Q4H PRN  temazepam, 15 mg, Oral, HS PRN        Network Utilization Review Department  ATTENTION: Please call with any questions or concerns to [phone] and carefully listen to the prompts so that you are directed to the right person. All voicemails are confidential.   For Discharge needs, contact Care Management DC Support Team at [phone] opt. 2  Send all requests for admission clinical reviews, approved or denied determinations and any other requests to dedicated fax number below belonging to the campus where the patient is receiving treatment.  List of dedicated fax numbers for the Facilities:  [place] DENIALS (Administrative/Medical Necessity) [phone]   DISCHARGE SUPPORT TEAM (NETWORK) [address] [facility] (Maternity/NICU/Pediatrics) [phone]   [address] [facility] [address] 15[address] [facility] 1000 [facility] [phone]   1505 [facility] 207 [facility] [address] [facility] [address] [address] [facility] [address] [facility] [address] [facility] [address] [phone]

## 2023-11-02 NOTE — CASE MANAGEMENT
Case Management Assessment & Discharge Planning Note    Patient name January Lombardo  Location [facility] 431/[facility] 780-06 MRN [id number]  : 1960 Date 2023       Current Admission Date: 2023  Current Admission Diagnosis:Triple vessel coronary artery disease/chest pain/elevated troponin   Patient Active Problem List    Diagnosis Date Noted    S/P CABG (coronary artery bypass graft) 2023    Type 2 MI (myocardial infarction) ([address]) 10/31/2023    Triple vessel coronary artery disease/chest pain/elevated troponin 10/16/2023    Tobacco abuse 10/16/2023    Alcohol abuse 10/16/2023    Myofascial pain syndrome 2023    Lumbar spondylosis 2023    Herniated nucleus pulposus, L3-4 left 2022    Lumbar radiculopathy 2022    Spinal stenosis of lumbar region with neurogenic claudication 2022    Chronic left-sided low back pain without sciatica 2022    Continuous opioid dependence ([address]) 2022    It band syndrome, left 2019    Aftercare following left knee joint replacement surgery 2018    Hyperlipidemia 2018    Hypertension 2018    Type 2 diabetes mellitus without complication, without long-term current use of insulin ([address]) 2018    Esophageal reflux 2018      LOS (days): 1  Geometric Mean LOS (GMLOS) (days): 8.20  Days to GMLOS:6.9     OBJECTIVE:  PATIENT READMITTED TO HOSPITAL  Risk of Unplanned Readmission Score: 16.55         Current admission status: Inpatient       Preferred Pharmacy:   [address] [facility], [address] [facility] [address] [facility]  [address] [facility] [address]  Phone: [phone] Fax: [address] , [address]  Phone: [phone] Fax: [phone]    Primary Care Provider: Elier Gomez MD    Primary Insurance: BLUE CROSS  Secondary Insurance:     ASSESSMENT:  [place] Proxies    There are no active Health Care Proxies on file. Readmission Root Cause  30 Day Readmission: Yes    Patient Information  Admitted from[de-identified] Home  Mental Status: Alert  During Assessment patient was accompanied by: Not accompanied during assessment  Assessment information provided by[de-identified] Patient  Primary Caregiver: Self  Support Systems: Spouse/significant other  [place] of Residence: [address] [facility] do you live in?: [address] [place] [address] entry access options.  Select all that apply.: Stairs  Number of steps to enter home.: 2  Do the steps have railings?: Yes  Type of Current Residence: 2 story home  Upon entering residence, is there a bedroom on the main floor (no further steps)?: No  A bedroom is located on the following floor levels of residence (select all that apply):: 2nd Floor  Upon entering residence, is there a bathroom on the main floor (no further steps)?: Yes  Number of steps to 2nd floor from main floor: One Flight  In the last 12 months, was there a time when you were not able to pay the mortgage or rent on time?: No  In the last 12 months, how many places have you lived?: 1  In the last 12 months, was there a time when you did not have a steady place to sleep or slept in a shelter (including now)?: No  Homeless/housing insecurity resource given?: N/A  Living Arrangements: Lives w/ Spouse/significant other  Is patient a ?: Yes  Is patient active with [facility])?: No    Activities of Daily Living Prior to Admission  Functional Status: Independent  Completes ADLs independently?: Yes  Ambulates independently?: Yes  Does patient use assisted devices?: No  Does patient currently own DME?: Yes  What DME does the patient currently own?: Lida Sterling  Does patient have a history of Outpatient Therapy (PT/OT)?: Yes  Does the patient have a history of Short-Term Rehab?: No  Does patient have a history of HHC?: Yes  Does patient currently have [address]?: No         Patient Information Continued  Income Source: Employed  Does patient have prescription coverage?: Yes  Within the past 12 months, you worried that your food would run out before you got the money to buy more.: Never true  Within the past 12 months, the food you bought just didn't last and you didn't have money to get more.: Never true  Food insecurity resource given?: N/A  Does patient receive dialysis treatments?: No  Does patient have a history of substance abuse?: No  Does patient have a history of Mental Health Diagnosis?: No         Means of Transportation  Means of Transport to Appts[de-identified] Drives Self  In the past 12 months, has lack of transportation kept you from medical appointments or from getting medications?: No  In the past 12 months, has lack of transportation kept you from meetings, work, or from getting things needed for daily living?: No  Was application for public transport provided?: N/A        DISCHARGE DETAILS:    Discharge planning discussed with[de-identified] pt at bedside  [place] of Choice: Yes  Comments - [place] of Choice: CM discussed discharge recommendation with pt at bedside. Pt is agreeable. [facility] is pt first choice. [facility] reserved for nursing. CM will continue to follow as needed.   CM contacted family/caregiver?: No- see comments  Were Treatment Team discharge recommendations reviewed with patient/caregiver?: Yes  Did patient/caregiver verbalize understanding of patient care needs?: Yes  Were patient/caregiver advised of the risks associated with not following Treatment Team discharge recommendations?: Yes    Contacts  Patient Contacts: Willy Zuniga  Relationship to Patient[de-identified] Family  Contact Method: Phone  Phone Number: [phone]  Reason/Outcome: Emergency Contact, Discharge Planning, Continuity of [address] [address]         Is the patient interested in [facility] at discharge?: Yes  [facility] requested[de-identified] [facility] Name[de-identified] Samir Caldwell ANASTACIO  [facility] External Referral Reason (only applicable if external HHA name selected): Patient has established relationship with provider  [facility] Provider[de-identified] PCP  Home Health Services Needed[de-identified] Post-Op Care and Assessment, Diabetes Management  Homebound Criteria Met[de-identified] Requires the Assistance of Another Person for Safe Ambulation or to Leave the Home  Supporting Clincal Findings[de-identified] Limited Endurance, Fatigues Easliy in [employer]    DME Referral Provided  Referral made for DME?: No    Other Referral/Resources/Interventions Provided:  Interventions: [facility]         Treatment Team Recommendation: Home with [address]  Discharge Destination Plan[de-identified] Home with [address] [place] at Discharge : Family                                      Additional Comments: CM introduced herself and role to pt at bedside. Pt stated he is independent at baseline with ADLS/IADLS. Pt stated he lives in a two story home with spouse. Pt stated he has a walker. Pt stated once medically cleared and ready for discharge his spouse will be able to provide transport. CM discussed discharge recommendation with pt at bedside. Pt is agreeable. [facility] is pt first choice. [facility] reserved for nursing. CM will continue to follow as needed.

## 2023-11-02 NOTE — PHYSICAL THERAPY NOTE
Physical Therapy Evaluation     Patient's Name: Jolynn Guzman    Admitting Diagnosis  Triple vessel coronary artery disease [I25.10]    Problem List  Patient Active Problem List   Diagnosis    Hyperlipidemia    Hypertension    Type 2 diabetes mellitus without complication, without long-term current use of insulin ([address])    Esophageal reflux    Aftercare following left knee joint replacement surgery    It band syndrome, left    Continuous opioid dependence ([address])    Herniated nucleus pulposus, L3-4 left    Lumbar radiculopathy    Spinal stenosis of lumbar region with neurogenic claudication    Chronic left-sided low back pain without sciatica    Myofascial pain syndrome    Lumbar spondylosis    Triple vessel coronary artery disease/chest pain/elevated troponin    Tobacco abuse    Alcohol abuse    Type 2 MI (myocardial infarction) ([address])    S/P CABG (coronary artery bypass graft)       Past Medical History  Past Medical History:   Diagnosis Date    Chews tobacco regularly     Consumes three beers daily     Controlled diabetes mellitus ([address])     Diabetes ([address])     Esophageal reflux     GERD (gastroesophageal reflux disease)     Hyperlipidemia     Hypertension     Osteoarthritis     LEFT KNEE    Primary localized osteoarthritis of left knee     last assessed: 1/16/2018       Past Surgical History  Past Surgical History:   Procedure Laterality Date    CARDIAC CATHETERIZATION Left 10/18/2023    Procedure: Cardiac pci;  Surgeon: Igor Thompson DO;  [facility]; Service: Cardiology    CARDIAC CATHETERIZATION N/A 10/18/2023    Procedure: Cardiac Coronary Angiogram;  Surgeon: Igor Thompson DO;  [facility]; Service: Cardiology    CARDIAC CATHETERIZATION N/A 10/18/2023    Procedure: Cardiac other - IFR;  Surgeon: Igor Thompsno DO;  [facility];   Service: Cardiology    COLONOSCOPY      HARVEST VEIN Left 11/1/2023    Procedure: HARVEST VEIN ENDOSCOPIC ([address]); Surgeon: Chetan Agrawal MD;  [facility];  Service: Cardiac Surgery    WY ARTHRP KNE CONDYLE&PLATU MEDIAL&LAT COMPARTMENTS Left 1/8/2018    Procedure: ARTHROPLASTY KNEE TOTAL;  Surgeon: Elliott Duran MD;  Location: [facility];  Service: Orthopedics    WY CORONARY ARTERY BYP W/VEIN & ARTERY GRAFT 4 VEIN N/A 11/1/2023    Procedure: CORONARY ARTERY BYPASS GRAFT (CABG) 4 VESSELS, LIMA TO LAD, SVG TO PDA, SVG TO OM1, SVG TO DIAG2; w/ AMBREEN;  Surgeon: Chetan Agrawal MD;  [facility];  Service: Cardiac Surgery    REPLACEMENT TOTAL KNEE Left           11/02/23 0945   PT Last Visit   PT Visit Date 11/02/23   Note Type   Note type Evaluation   Pain Assessment   Pain Assessment Tool 0-10   Pain Score 5   Pain Location/Orientation Location: Chest;Location: Incision   Pain Onset/Description Onset: Ongoing;Frequency: Constant/Continuous; Descriptor: Aching;Descriptor: Discomfort; Descriptor: Sore   Effect of Pain on Daily Activities limits comfort and mobility   Patient's Stated Pain Goal No pain   Hospital Pain Intervention(s) Repositioned; Ambulation/increased activity; Emotional support;Splinting   Restrictions/Precautions   Weight Bearing Precautions Per Order No   Other Precautions Cardiac/sternal;Multiple lines;Telemetry;O2;Fall Risk;Pain   Home Living   Type of [address] [place] [address] Two level;Stairs to enter with rails;Bed/bath upstairs; Access  [facility] 1STE FFOS to second floor bed/bath)   Bathroom Shower/Tub Tub/shower unit   Bathroom Toilet Standard   Bathroom Accessibility Accessible   Home Equipment Walker;Cane  (does not use PTA)   Prior Function   Level of [place] Independent with ADLs; Independent with functional mobility; Independent with IADLS   Lives With Spouse   Receives Help From Family   IADLs Independent with driving; Independent with meal prep; Independent with medication management   Falls in the last 6 months 0   Vocational Full time employment   General   Family/Caregiver Present No   Cognition   Overall Cognitive Status WFL   Arousal/Participation Alert   Orientation Level Oriented X4   Memory Within functional limits   Following Commands Follows one step commands without difficulty   Comments Patient pleasant and cooperative. Subjective   Subjective Patient agreeable to PT eval   RUE Assessment   RUE Assessment   (within precautions)   LUE Assessment   LUE Assessment   (within precautions)   RLE Assessment   RLE Assessment   (grossly 4/5)   LLE Assessment   LLE Assessment   (grossly 4/5)   Bed Mobility   Supine to Sit Unable to assess   Sit to Supine Unable to assess   Additional Comments OOB in chair on arrival   Transfers   Sit to Stand 5  Supervision   Additional items Increased time required;Verbal cues   Stand to Sit 5  Supervision   Additional items Increased time required;Verbal cues   Additional Comments VCs for hand placement and sternal precautions   Ambulation/Elevation   Gait pattern Decreased foot clearance; Excessively slow; Short stride   Gait Assistance 4  Minimal assist   Additional items Assist x 1;Verbal cues   Assistive Device Rolling walker   Distance 100'x2   Stair Management Assistance Not tested   Balance   Static Sitting Fair +   Dynamic Sitting Fair   Static Standing Fair -   Dynamic Standing Fair -   Ambulatory Poor +   Endurance Deficit   Endurance Deficit Yes   Endurance Deficit Description fatigue, weakness   Activity Tolerance   Activity Tolerance Patient tolerated treatment well;Patient limited by fatigue   Medical Staff Made Aware OT   Nurse Made Aware RN cleared   Assessment   Prognosis Good   Problem List Decreased strength;Decreased endurance; Impaired balance;Decreased mobility;Obesity;Pain   Assessment Pt is a 61 y.o. male seen for a high complexity PT evaluation due to Ongoing medical management for primary dx, Increased reliance on more restrictive AD compared to baseline, Decreased activity tolerance compared to baseline, Fall risk, Increased assistance needed from caregiver at current time, Ongoing telemetry monitoring, Continuous pulse oximetry monitoring , s/p surgical intervention. Patient is s/p admit to [facility] on [facility]/2023 for Triple vessel coronary artery disease (I25.10). Patient  has a past medical history of Chews tobacco regularly, Consumes three beers daily, Controlled diabetes mellitus ([address]), Diabetes ([address]), Esophageal reflux, GERD (gastroesophageal reflux disease), Hyperlipidemia, Hypertension, Osteoarthritis, and Primary localized osteoarthritis of left knee. .     PT now consulted to assess functional mobility and needs for safe d/c planning. Prior to admission, pt independent with functional mobility, ADLs, and IADLs. Personal factors affecting status include steps to enter home, steps to negotiate within home, and medical status     Currently pt requires supervision/set-up  for functional transfers with rolling walker ; minimal assistance x1 for ambulation with rolling walker. Pt presents functioning below baseline and w/ overall mobility deficits 2* to: decreased strength, decreased endurance, decreased mobility, impaired balance. These impairments place pt at risk for falls. Pt will continue to benefit from skilled PT interventions to address stated impairments; to maximize functional potential; for ongoing pt/family education; and DME needs. The patient's AM-PAC Basic Mobility Inpatient Short Form Raw Score Is 16. PT is currently anticipating Home with no needs on d/c from hospital. Will continue to follow as able.    Goals   Patient Goals to go home   STG Expiration Date 11/16/23   Short Term Goal #1 In 14 days, patient will    1) increase strength in BUE/BLE by 1/2 to 1 full grade for increased strength and stability needed for functional mobility 2) improve bed mobility to MI for improved mobility and decreased need for assist 3) increase functional transfers to MI for improved safety and functional mobility 4) ambulate 250ft with MI using LRAD for increased endurance and safety ambulating home and community environments 5) improve balance by 1 grade for improved safety and stability and decreased risk for falls. 6) ascend/descend at least 12 stairs using HR with MI in order to safely enter home   PT Treatment Day 0   Plan   Treatment/Interventions ADL retraining;LE strengthening/ROM; Functional transfer training;Elevations; Therapeutic exercise; Endurance training;Patient/family training;Equipment eval/education; Bed mobility;Gait training;Spoke to nursing;Spoke to case management;OT   PT Frequency 4-6x/wk   Discharge Recommendation   Rehab Resource Intensity Level, PT No post-acute rehabilitation needs  (anticipating no needs)   Equipment Recommended Walker  (owns)   AM-PAC Basic Mobility Inpatient   Turning in Flat Bed Without Bedrails 2   Lying on Back to Sitting on Edge of Flat Bed Without Bedrails 2   Moving Bed to Chair 3   Standing Up From Chair Using Arms 3   Walk in Room 3   Climb 3-5 Stairs With Railing 3   Basic Mobility Inpatient Raw Score 16   Basic Mobility Standardized Score 38.32   Highest Level Of Mobility   JH-HLM Goal 5: Stand one or more mins   JH-HLM Achieved 7: Walk 25 feet or more   Modified [place] Scale   Modified [place] Scale 4   End of Consult   Patient Position at End of Consult Bedside chair; All needs within reach         Von Ricks, PT, DPT

## 2023-11-02 NOTE — PROGRESS NOTES
Progress Note - Cardiothoracic Surgery   Rajiv Waller 61 y.o. male MRN: [id number]  Unit/Bed#: [facility] 417-01 Encounter: [id number]      POD # 1 s/p CABG 4    Pt seen/examined. Interval history and data reviewed with critical care team.  Pt doing well. No specific complaints.         Medications:   Scheduled Meds:  Current Facility-Administered Medications   Medication Dose Route Frequency Provider Last Rate    acetaminophen  650 mg Rectal Q4H PRN Eddie Fairchild PA-C      acetaminophen  650 mg Oral Q6H While awake Eddie Fairchild PA-C      amiodarone  200 mg Oral [facility] Eddie Fairchild PA-C      aspirin  325 mg Oral Daily Eddie Fairchild PA-C      atorvastatin  80 mg Oral Daily With [employer] ArnaldoEMELY tenorio      bisacodyl  10 mg Rectal Daily PRN Eddie Fairchild PA-C      cefazolin  2,000 mg Intravenous Q8H Eddie Fairchild PA-C Stopped (11/02/23 0400)    docusate sodium  100 mg Oral BID Ramiro Mendoza PA-C      fenofibrate  145 mg Oral Daily Eddie Fairchild PA-C      folic acid  1 mg Oral Daily Chai Mcgill PA-C      fondaparinux  2.5 mg Subcutaneous Daily Eddie Fairchild PA-C      furosemide  40 mg Intravenous BID (diuretic) Ramiro Mendoza PA-C      gabapentin  400 mg Oral BID Eddie Fairchild PA-C      insulin regular (HumuLIN R,NovoLIN R) 1 Units/mL in sodium chloride 0.9 % 100 mL infusion  0.3-21 Units/hr Intravenous Titrated Eddie Fairchild PA-C 4 Units/hr (11/02/23 0400)    ketorolac  15 mg Intravenous Q6H Ramiro Mendoza PA-C      lidocaine (cardiac)  100 mg Intravenous Q30 Min PRN Eddie Fairchild PA-C      magnesium sulfate  2 g Intravenous Once Ramiro Mendoza PA-C 2 g (11/02/23 8767)    magnesium sulfate  2 g Intravenous Once Ramiro Mendoza PA-C      methocarbamol  500 mg Oral 4x Daily Eddie Fairchild PA-C      metoprolol tartrate  25 mg Oral Q12H [address] Ramiro Mendoza PA-C      multivitamin-minerals  1 tablet Oral Daily Chai Mcgill PA-C mupirocin  1 Application Nasal V85Q [facility] Chemo Ortiz PA-C      niCARdipine  2.5-15 mg/hr Intravenous Titrated Chemo Ortiz PA-C 7 mg/hr (11/02/23 0209)    ondansetron  4 mg Intravenous Q6H PRN Chemo Ortiz PA-C      oxyCODONE  10 mg Oral Q4H PRN Beti Lamas PA-C      oxyCODONE  5 mg Oral Q4H PRN Beti Lamas PA-C      pantoprazole  40 mg Oral Early Morning Chemo Ortiz PA-C      polyethylene glycol  17 g Oral Daily Chemo Ortiz PA-C      [START ON 11/3/2023] potassium chloride  20 mEq Oral BID Beti Lamas PA-C      sodium chloride  20 mL/hr Intravenous Continuous Chemo Ortiz PA-C 20 mL/hr (11/01/23 1325)    temazepam  15 mg Oral HS PRN Beti Lamas PA-C      thiamine  100 mg Oral Daily Sb Spence PA-C       Continuous Infusions:insulin regular (HumuLIN R,NovoLIN R) 1 Units/mL in sodium chloride 0.9 % 100 mL infusion, 0.3-21 Units/hr, Last Rate: 4 Units/hr (11/02/23 0400)  niCARdipine, 2.5-15 mg/hr, Last Rate: 7 mg/hr (11/02/23 0209)  sodium chloride, 20 mL/hr, Last Rate: 20 mL/hr (11/01/23 1325)      PRN Meds:.  acetaminophen    bisacodyl    lidocaine (cardiac)    ondansetron    oxyCODONE    oxyCODONE    temazepam    Vitals: Blood pressure 125/61, pulse 79, temperature 99 °F (37.2 °C), temperature source Core, resp. rate 21, height 5' 8.9" (1.75 m), weight 109 kg (240 lb 8.4 oz), SpO2 91 %. ,Body mass index is 35.62 kg/m². I/O last 24 hours: In: 4982.1 [P.O.:10; I.V.:3702.1; IV Piggyback:270]  Out: 9455 [Urine:1480; Blood:1000;  Chest Tube:595]  Invasive Devices       Central Venous Catheter Line  Duration             CVC Central Lines 11/01/23 Triple <1 day              Peripheral Intravenous Line  Duration             Peripheral IV 11/01/23 Left Forearm 1 day              Arterial Line  Duration             Arterial Line 11/01/23 Left Radial 1 day              Line  Duration             Pacer Wires <1 day    Pacer Wires <1 day              Drain Duration             Chest Tube Anterior;Mediastinal 32 Fr. <1 day    Chest Tube Left Pleural 32 Fr. <1 day    Chest Tube Posterior;Mediastinal 32 Fr. <1 day    Urethral Catheter Non-latex; Temperature probe 16 Fr. <1 day                      Lab, Imaging and other studies:   Results from last 7 days   Lab Units 11/02/23 0344 11/01/23 2101 11/01/23  1331 11/01/23  1202 11/01/23  1149   WBC Thousand/uL 14.04*  --   --   --   --    HEMOGLOBIN g/dL 14.7 15.0 14.2  --   --    I STAT HEMOGLOBIN   --   --   --    < >  --    HEMATOCRIT % 46.1 44.8 42.8  --   --    HEMATOCRIT, ISTAT   --   --   --    < >  --    PLATELETS Thousands/uL 201  --  159  --  217    < > = values in this interval not displayed. Results from last 7 days   Lab Units 11/02/23 0344 11/01/23 2101 11/01/23  1744 11/01/23  1331 11/01/23  1331 11/01/23  1328   POTASSIUM mmol/L 4.2 4.2 4.0   < > 4.0  --    CHLORIDE mmol/L 109*  --   --   --  108  --    CO2 mmol/L 20*  --   --   --  23  --    CO2, I-STAT mmol/L  --   --   --   --   --  23   BUN mg/dL 16  --   --   --  13  --    CREATININE mg/dL 0.96  --   --   --  0.93  --    GLUCOSE, ISTAT mg/dl  --   --   --   --   --  189*   CALCIUM mg/dL 8.2*  --   --   --  8.6  --     < > = values in this interval not displayed. Plan:  Collaborative rounds with CCM team  DC Brody/Cordjustin/[place]/Prabhakar. Continue chest tubes, pacing wires. Transfer to floor. Ambulate. Incentive spirometry. Diuresis. PO ASA/Statin/B blocker.         SIGNATURE: Maicol Bishop DO  DATE: November 2, 2023  TIME: 8:01 AM

## 2023-11-02 NOTE — DISCHARGE INSTRUCTIONS
Instructions Following Open Heart Surgery      Protect your sternum (breast bone). Wires are placed during surgery to hold the sternum together. Do not lift anything heavier than 10 pounds for the first four weeks after surgery. (For example, a gallon of milk weighs 8 pounds) When you are seen for a routine postop check, you will be cleared to lift up to 25 lbs. Following three months from the time of surgery, you may lift without any restrictions. Hug a pillow to your chest or cross your arms over your chest when you laugh, sneeze, or cough. You may resume sexual activity when you feel ready. Do not put any excessive pressure on your chest.    Be careful when you get into or out of a chair or bed. Hug a pillow or cross your arms when you stand or sit. Do not twist as you move. Use only your legs to sit and stand. You may need a raised toilet seat if you have trouble standing up without using your arms. No sleeping on side for the first 4 weeks. Limit daytime naps, to prevent difficulty sleeping at night. Women: Wear a clean surgical bra every day. Do not play sports that use your shoulder. Examples include tennis and golf. Do not drive until cleared by surgeon. Typically cleared to drive after one month, determination will be made at routine postop appointment. When a passenger in the car, wear a seat belt. Continue you breathing exercises throughout the day with incentive spirometry    Activity: Your goal is to go on frequent walks, slowly increasing your activity level. Walking will help prevent leg swelling and prevent blood clots. When you start outpatient cardiac rehab in one month, you will be given additional instructions and a formal exercise plan. It is OK to go up steps, with help. You may resume sexual activity when you are comfortable. Do not put excessive pressure on your chest.     Weigh yourself daily in the morning and keep of log of your weight.  If your weight increases more than 2 lbs per day or more than 5 lbs in a week, call your surgeon's office. Keep your legs elevated when sitting. Pressure stockings may be worn to prevent significant leg swelling. You may get routine vaccines any time after open heart surgery    Do not smoke:  Nicotine can damage blood vessels and make it more difficult to heal. Do not use e-cigarettes or smokeless tobacco in place of cigarettes or to help you quit. They still contain nicotine. Ask your primary care provider for information if you currently smoke and need help quitting. Incision/Wound Care: Shower daily. Gently wash your incision with warm water and mild soap. Do not scrub the area. Gently pat the area dry with a clean towel. If you have a bandage, dry the area and put on a new, clean bandage. Change your bandage at least daily, or if it gets wet or dirty. Always wash your hands before you care for your wound. Do not apply ointments, creams, lotions, powders, etc. If you develop signs of an infection (fever > 101, redness, warm skin, or drainage) please call your surgeon's office. Do not pick at or touch puncture sites or incisions. Allow and scabs to come off on their own. It is normal to have some drainage from the chest tube sites. Apply clean/dry dressings, until this resolves. You may have discomfort or numbness along the incision for 3-4 weeks. It is normal to occasionally experience brief sharp shooing pains, tightness, or pulling sensations. Do not take a bath or swim until cleared by surgeon. As your incision heals, sometimes small tender lumps or pimple-like bumps develop which is due to your body attempting to [facility] the suture (stiches). Call your local emergency number (911 in the [address]) or have someone call if:   You have squeezing, pressure, or pain in your chest or back, lightheadedness or sudden cold sweat  Short of breath that does not go away with rest  You cough up blood.   You have a fast heartbeat that flutters. Or palpitations  You faint. Signs of a stroke:  Numbness or drooping on one side of your face. Weakness in an arm or leg. Confusion or difficulty speaking. Dizziness, a severe headache, or vision loss    Call your surgeons office if:   You have bleeding that does not stop even after you apply pressure for 5 minutes. You have redness, tenderness, or signs of infection at incision (pain, swelling, odor, or green/yellow discharge from incision site)  You have a severe headache. You have a fever higher than 101°F (38.4°C). You urinate less, or not at all. You have persistent nausea, vomiting, or diarrhea  You hear persistent or worsening crunching or grinding in your sternum. You have questions or concerns about your condition or care. Diet:    Eat heart-healthy foods, low in unhealthy fats and sodium (salt). A heart healthy diet helps improve your cholesterol levels and lowers your risk for heart disease and stroke. You will receive formal education on healthy eating and label reading during your cardiac rehab in one month. Choose foods that contain healthy fats, such as soybean, canola, olive, corn, and sunflower oils. Limit unhealthy fats. Examples include:  Cholesterol  is found in animal foods, such as eggs and lobster, and in dairy products made from whole milk. Saturated fat  is found in meats, such as mancuso and hamburger. It is also found in chicken or turkey skin, whole milk, and butter. Trans fat  is found in packaged foods, such as potato chips and cookies. It is also in some fried foods, and shortening. Do not eat foods that contain trans fats. Get 20 to 30 grams of fiber each day. Fruits, vegetables, whole-grain foods, and legumes (cooked beans) are good sources of fiber. Low Sodium: Limit to 2,000 mg or less each day, unless otherwise directed. Choose low-sodium or no-salt-added foods. Add little or no salt to food you prepare.  Use herbs and spices in place of salt. Foods high in sodium include frozen dinners, macaroni and cheese, instant potatoes, canned vegetables, cured or smoked meats, hot dogs, mancuso and sausage, ketchup, barbecue sauce, salad dressing, pickles, olives, soy sauce, and miso. Fluid Restriction: Fluid restriction after hospital discharge is advised. Limit your fluid intake to no more than 8 cups of liquid (8oz = 1cup) or 2000 mL per day. Limit/Do not drink alcohol. Alcohol can damage heart and raise your blood pressure. The general recommended limit is 1 drink a day for women 21 or older and for men 72 or older. Do not have more than 3 drinks within 24 hours or 7 within a week. A drink of alcohol is 12 oz of beer, 5 oz of wine, or 1½ oz of liquor. Narcotic Safety     What do I need to know about narcotic safety? A narcotic is a type of medicine used to treat pain. When you are discharged from the hospital, you will be prescribed a narcotic called oxycodone. Pain control and management may help you rest, heal, and return to your daily activities. Do not take more than the recommended amount. Too much can cause a life-threatening overdose. Do not continue to take it after your pain stops. How do I use narcotics safely? Take prescribed narcotics exactly as directed. You may develop tolerance. This means you keep needing higher doses to get the same effect. You may also develop narcotic use disorder. This means you are not able to control your narcotic use. Do not give narcotics to others or take narcotics that belong to someone else. Do not mix narcotics with other medicines or alcohol. The combination can cause an overdose, or cause you to stop breathing. Do not drive or operate heavy machinery after you use a narcotic. Talk to your healthcare provider if you have any side effects. Side effects include nausea, sleepiness, itching, and trouble thinking clearly. What can I do to manage constipation? Constipation is the most common side effect of narcotic medicine. Constipation is when you have hard, dry bowel movements, or you go longer than usual between bowel movements. The following are ways you can prevent or relieve constipation:  Drink liquids as allowed. Drinking extra liquids will help soften and move your bowels. You should drink up to your maximum fluid allotment of 2 liters. Eat high-fiber foods. This may help decrease constipation by adding bulk to your bowel movements. High-fiber foods include fruits, vegetables, whole-grain breads and cereals, and beans  Supplements. You have been prescribed a fiber supplement called polyethylene glycol (Cynthia lax) and a stool softener called docusate sodium (Colace). You should take these for as long as you continue narcotic medications. Exercise regularly. Regular physical activity can help stimulate your intestines. Walking is a good exercise to prevent or relieve constipation. Ask which exercises are best for you. How do I store narcotics safely? Store narcotics where others cannot easily get them. Keep them in a locked cabinet or secure area. Do not  keep them in a purse or other bag you carry with you. A person may be looking for something else and find the narcotics. Make sure narcotics are stored out of the reach of children. A child can easily overdose on narcotics. Narcotics may look like candy to a small child.

## 2023-11-02 NOTE — PROGRESS NOTES
Patient:    MRN:  [id number]    Kevinin Request ID:  [id number]    Level of care reserved:  Ye5 Apolinar Danielle    Partner Reserved:  VERN OWEN [facility]- [facility], [place],  [facility] [phone]    Clinical needs requested:    Geography searched:  [id number]    Start of Service:    Request sent:  10:47am EDT on 11/2/2023 by James Zayas    Partner reserved:  1:13pm EDT on 11/2/2023 by James Zayas    Choice list shared:  1:13pm EDT on 11/2/2023 by James Zayas

## 2023-11-02 NOTE — CONSULTS
Consultation - Cardiology   Caro Rushing 61 y.o. male MRN: [id number]  Unit/Bed#: [facility] 714-77 Encounter: [id number]    Inpatient consult to Cardiology  Consult performed by: Yessica Giles MD  Consult ordered by: Blake Arriola PA-C          History of Present Illness   Physician Requesting Consult: Fartun Francisco MD  Reason for Consult / Principal Problem: CAD s/p CABG      Impression:  CAD s/p CABG x 4 - hemodynamically stable. Dyslipidemia - on statin. Plan:  Continue current medications. Transfer to telemetry. HPI: Caro Rushing is a 61y.o. year old male with recent NSTEMI and multivessel dz, dyslipidemia, and preserved LV systolic function, who underwent elective CABG x 4 yesterday. Doing well from cardiac standpoint. Some chest wall pain. Review of Systems   Constitutional: Negative for chills, diaphoresis, fever and malaise/fatigue. HENT: Negative. Eyes: Negative. Cardiovascular:  Positive for chest pain. Negative for dyspnea on exertion, leg swelling and palpitations. Respiratory:  Negative for cough, shortness of breath, snoring and wheezing. Endocrine: Negative. Hematologic/Lymphatic: Negative. Skin:  Negative for rash. Musculoskeletal: Negative. Gastrointestinal:  Negative for abdominal pain, constipation and diarrhea. Genitourinary:  Negative for bladder incontinence, dysuria and frequency. Neurological:  Negative for dizziness and light-headedness. Psychiatric/Behavioral: Negative. All other systems reviewed and are negative.     Historical Information   Past Medical History:   Diagnosis Date    Chews tobacco regularly     Consumes three beers daily     Controlled diabetes mellitus ([address])     Diabetes ([address])     Esophageal reflux     GERD (gastroesophageal reflux disease)     Hyperlipidemia     Hypertension     Osteoarthritis     LEFT KNEE    Primary localized osteoarthritis of left knee     last assessed: 1/16/2018     Past Surgical History:   Procedure Laterality Date    CARDIAC CATHETERIZATION Left 10/18/2023    Procedure: Cardiac pci;  Surgeon: Simona Buck DO;  [facility]; Service: Cardiology    CARDIAC CATHETERIZATION N/A 10/18/2023    Procedure: Cardiac Coronary Angiogram;  Surgeon: Simona Buck DO;  [facility]; Service: Cardiology    CARDIAC CATHETERIZATION N/A 10/18/2023    Procedure: Cardiac other - IFR;  Surgeon: Simona Buck DO;  [facility];   Service: Cardiology    COLONOSCOPY      CO ARTHRP KNE CONDYLE&PLATU MEDIAL&LAT COMPARTMENTS Left 1/8/2018    Procedure: ARTHROPLASTY KNEE TOTAL;  Surgeon: Boubacar Oshea MD;  Location: [facility];  Service: Orthopedics    REPLACEMENT TOTAL KNEE Left      Social History     Substance and Sexual Activity   Alcohol Use Yes    Alcohol/week: 20.0 standard drinks of alcohol    Types: 20 Cans of beer per week    Comment: daily beer & "gin & tonics" on a weekend ; social alcohol use ( as per allscripts)     Social History     Substance and Sexual Activity   Drug Use No     Social History     Tobacco Use   Smoking Status Former    Packs/day: 1.00    Years: 11.00    Total pack years: 11.00    Types: Cigarettes   Smokeless Tobacco Current    Types: Chew   Tobacco Comments    quit 30 + yrs ago      Family History: non-contributory    Meds/Allergies   Current Facility-Administered Medications   Medication Dose Route Frequency Provider Last Rate    acetaminophen  650 mg Rectal Q4H PRN Casey Edwards PA-C      acetaminophen  650 mg Oral Q6H While awake Casey Edwards PA-C      amiodarone  200 mg Oral [facility] Casey Edwards PA-C      aspirin  325 mg Oral Daily Casey Edwards PA-C      atorvastatin  80 mg Oral Daily With [employer] EMELY Mcintosh      bisacodyl  10 mg Rectal Daily PRN Casey Edwards PA-C      cefazolin  2,000 mg Intravenous Q8H Casey Edwards PA-C Stopped (11/02/23 0400)    docusate sodium  100 mg Oral BID Xander Wei Shantell Retana PA-C      fenofibrate  145 mg Oral Daily Doree Breeding, EMELY      folic acid  1 mg Oral Daily Adaline Stuart, EMELY      fondaparinux  2.5 mg Subcutaneous Daily Doree Breeding, EMELY      furosemide  40 mg Intravenous BID (diuretic) Alma Saunders PA-C      gabapentin  400 mg Oral BID Doree Breeding, PA-SKYLER      insulin regular (HumuLIN R,NovoLIN R) 1 Units/mL in sodium chloride 0.9 % 100 mL infusion  0.3-21 Units/hr Intravenous Titrated Doree Breeding, PA-C 4 Units/hr (11/02/23 0901)    ketorolac  15 mg Intravenous Q6H [employer] Saunders, EMELY      lidocaine (cardiac)  100 mg Intravenous Q30 Min PRN Doree Breeding, EMELY      magnesium sulfate  2 g Intravenous Once Alma Saunders, EMELY      methocarbamol  500 mg Oral 4x Daily Doree Breeding, EMELY      metoprolol tartrate  25 mg Oral Q12H [address] Alma Nicky, EMELY      multivitamin-minerals  1 tablet Oral Daily Adaline Stuart, EMELY      mupirocin  1 Application Nasal D21M [address] [facility], EMELY      niCARdipine  2.5-15 mg/hr Intravenous Titrated Doree Breeding, EMELY 7 mg/hr (11/02/23 0209)    ondansetron  4 mg Intravenous Q6H PRN Doree Breeding, EMELY      oxyCODONE  10 mg Oral Q4H PRN Alma Saunders, PA-SKYLER      oxyCODONE  5 mg Oral Q4H PRN [employer] Saunders, EMELY      pantoprazole  40 mg Oral Early Morning Doree Breeding, PA-SKYLER      polyethylene glycol  17 g Oral Daily Doree Breeding, EMELY      [START ON 11/3/2023] potassium chloride  20 mEq Oral BID Alma Saunders PA-C      sodium chloride  20 mL/hr Intravenous Continuous Doree Breeding, PA-C 20 mL/hr (11/01/23 1325)    temazepam  15 mg Oral HS PRN Alma Saunders, EMELY      thiamine  100 mg Oral Daily Sb Spence PA-C       insulin regular (HumuLIN R,NovoLIN R) 1 Units/mL in sodium chloride 0.9 % 100 mL infusion, 0.3-21 Units/hr, Last Rate: 4 Units/hr (11/02/23 0901)  niCARdipine, 2.5-15 mg/hr, Last Rate: 7 mg/hr (11/02/23 0209)  sodium chloride, 20 mL/hr, Last Rate: 20 mL/hr (11/01/23 1325)      Medications Prior to Admission   Medication    aspirin (ECOTRIN LOW STRENGTH) 81 mg EC tablet    diphenhydrAMINE-acetaminophen (TYLENOL PM)  MG TABS    metoprolol tartrate (LOPRESSOR) 25 mg tablet    mupirocin (BACTROBAN) 2 % ointment    atorvastatin (LIPITOR) 80 mg tablet    Empagliflozin (Jardiance) 25 MG TABS    Exenatide ER (Bydureon) 2 MG PEN    fenofibrate (TRICOR) 145 mg tablet    gabapentin (NEURONTIN) 400 mg capsule    ibuprofen (MOTRIN) 400 mg tablet    lisinopril-hydrochlorothiazide (PRINZIDE,ZESTORETIC) 20-12.5 MG per tablet    metFORMIN (GLUCOPHAGE) 1000 MG tablet    methocarbamol (ROBAXIN) 500 mg tablet    pantoprazole (PROTONIX) 40 mg tablet       No Known Allergies    Objective   Vitals: Blood pressure 116/57, pulse 78, temperature 99 °F (37.2 °C), temperature source Core, resp.  rate 21, height 5' 8.9" (1.75 m), weight 109 kg (240 lb 8.4 oz), SpO2 91 %., Body mass index is 35.62 kg/m².,   Orthostatic Blood Pressures      Flowsheet Row Most Recent Value   Blood Pressure 116/57 filed at 11/02/2023 0808   Patient Position - Orthostatic VS Lying filed at 11/01/2023 4088          Systolic (05JZY), MJL:211 , Min:114 , DJI:639     Diastolic (49GTO), LTJ:68, Min:56, Max:72      Intake/Output Summary (Last 24 hours) at 11/2/2023 0905  Last data filed at 11/2/2023 0600  Gross per 24 hour   Intake 4982.12 ml   Output 3075 ml   Net 1907.12 ml       Weight (last 2 days)       Date/Time Weight    11/02/23 0600 109 (240.52)    11/01/23 0616 108 (237.99)            Invasive Devices       Central Venous Catheter Line  Duration             CVC Central Lines 11/01/23 Triple 1 day              Peripheral Intravenous Line  Duration             Peripheral IV 11/01/23 Left Forearm 1 day              Arterial Line  Duration             Arterial Line 11/01/23 Left Radial 1 day              Line  Duration             Pacer Wires <1 day    Pacer Wires <1 day Drain  Duration             Urethral Catheter Non-latex; Temperature probe 16 Fr. 1 day    Chest Tube Anterior;Mediastinal 32 Fr. <1 day    Chest Tube Left Pleural 32 Fr. <1 day    Chest Tube Posterior;Mediastinal 32 Fr. <1 day                      Physical Exam  Constitutional:       Appearance: He is well-developed. HENT:      Head: Normocephalic and atraumatic. Neck:      Vascular: No JVD. Cardiovascular:      Rate and Rhythm: Normal rate and regular rhythm. Heart sounds: Normal heart sounds. No murmur heard. No friction rub. No gallop. Pulmonary:      Effort: Pulmonary effort is normal.      Breath sounds: Decreased air movement present. No wheezing or rales. Abdominal:      General: Bowel sounds are normal. There is no distension. Palpations: Abdomen is soft. Tenderness: There is no abdominal tenderness. Skin:     General: Skin is warm and dry. Findings: No erythema or rash. Neurological:      Mental Status: He is alert and oriented to person, place, and time. Deep Tendon Reflexes: Reflexes are normal and symmetric.              Laboratory Results:        CBC with diff:   Results from last 7 days   Lab Units 11/02/23  0344 11/01/23  2101 11/01/23  1331 11/01/23  1328 11/01/23  1219 11/01/23  1202 11/01/23  1149 11/01/23  1131   WBC Thousand/uL 14.04*  --   --   --   --   --   --   --    HEMOGLOBIN g/dL 14.7 15.0 14.2  --   --   --   --   --    I STAT HEMOGLOBIN g/dl  --   --   --  13.3 10.5* 11.2*  --  10.5*   HEMATOCRIT % 46.1 44.8 42.8  --   --   --   --   --    HEMATOCRIT, ISTAT %  --   --   --  39 31* 33*  --  31*   MCV fL 93  --   --   --   --   --   --   --    PLATELETS Thousands/uL 201  --  159  --   --   --  217  --    RBC Million/uL 4.95  --   --   --   --   --   --   --    MCH pg 29.7  --   --   --   --   --   --   --    MCHC g/dL 31.9  --   --   --   --   --   --   --    RDW % 12.8  --   --   --   --   --   --   --    MPV fL 12.3  --  11.9  -- --   --  12.0  --          CMP:  Results from last 7 days   Lab Units 11/02/23 0344 11/01/23 2101 11/01/23 1744 11/01/23  1331 11/01/23  1328 11/01/23  1219 11/01/23  1202 11/01/23  1131 11/01/23  1103 11/01/23  1020 11/01/23  0816   POTASSIUM mmol/L 4.2 4.2 4.0 4.0  --   --   --   --   --   --   --    CHLORIDE mmol/L 109*  --   --  108  --   --   --   --   --   --   --    CO2 mmol/L 20*  --   --  23  --   --   --   --   --   --   --    CO2, I-STAT mmol/L  --   --   --   --  23 23 27 29 27 25 27   BUN mg/dL 16  --   --  13  --   --   --   --   --   --   --    CREATININE mg/dL 0.96  --   --  0.93  --   --   --   --   --   --   --    GLUCOSE, ISTAT mg/dl  --   --   --   --  189* 222* 238* 234* 186* 199* 132   CALCIUM mg/dL 8.2*  --   --  8.6  --   --   --   --   --   --   --    EGFR ml/min/1.73sq m 83  --   --  87  --   --   --   --   --   --   --          BMP:  Results from last 7 days   Lab Units 11/02/23 0344 11/01/23 2101 11/01/23 1744 11/01/23  1331 11/01/23  1328 11/01/23  1219 11/01/23  1202 11/01/23  1131 11/01/23  1103   POTASSIUM mmol/L 4.2 4.2 4.0 4.0  --   --   --   --   --    CHLORIDE mmol/L 109*  --   --  108  --   --   --   --   --    CO2 mmol/L 20*  --   --  23  --   --   --   --   --    CO2, I-STAT mmol/L  --   --   --   --  23 23 27 29 27   BUN mg/dL 16  --   --  13  --   --   --   --   --    CREATININE mg/dL 0.96  --   --  0.93  --   --   --   --   --    GLUCOSE, ISTAT mg/dl  --   --   --   --  189* 222* 238* 234* 186*   CALCIUM mg/dL 8.2*  --   --  8.6  --   --   --   --   --        BNP:   Results Reviewed       None          No results for input(s): "BNP" in the last 72 hours.     Magnesium:   Results from last 7 days   Lab Units 11/02/23  0344   MAGNESIUM mg/dL 1.9         Telemetry reviewed personally: NSR    Assessment:  Principal Problem:    Triple vessel coronary artery disease/chest pain/elevated troponin  Active Problems:    Hyperlipidemia    Hypertension    Type 2 diabetes mellitus without complication, without long-term current use of insulin (HCC)    Esophageal reflux    Continuous opioid dependence ([address])    Spinal stenosis of lumbar region with neurogenic claudication    Tobacco abuse    S/P CABG (coronary artery bypass graft)

## 2023-11-02 NOTE — PROGRESS NOTES
Progress Note - Cardiothoracic Surgery   Alyson Funes 61 y.o. male MRN: [id number]  Unit/Bed#: [facility] 431-01 Encounter: [id number]    Coronary artery disease. S/P coronary artery bypass grafting x 4; POD # 2      24 Hour Events: Transferred to telemetry yesterday. Started on Lasix infusion around MN due to decreased UOP and rising creatinine. Initiated at 40mg/hr, decreased to 20mg/hr at 0430 with good response. Weaned off Midflow NC, currently on 2L NC. Pt reports having difficulty with tobacco withdrawal. Questioning if he can have  tobacco postop and whether having a small pinch of tobacco will have harmful effects.      Medications:   Scheduled Meds:  Current Facility-Administered Medications   Medication Dose Route Frequency Provider Last Rate    acetaminophen  650 mg Rectal Q4H PRN Clermont Guy, PA-C      acetaminophen  650 mg Oral Q6H While awake RENETTA Hall-C      amiodarone  200 mg Oral [facility] Clermontus Tovar, PA-C      aspirin  325 mg Oral Daily Doug RENETTA Tovar-C      atorvastatin  80 mg Oral Daily With [employer] [facility] Favian Lozano PA-C      bisacodyl  10 mg Rectal Daily PRN Clermont Guy, PA-C      docusate sodium  100 mg Oral BID Clermont Guy, PA-C      fenofibrate  145 mg Oral Daily Doug Guy, PA-C      folic acid  1 mg Oral Daily Clermont Guy, PA-C      fondaparinux  2.5 mg Subcutaneous Daily Doug Guy, PA-C      furosemide  20 mg/hr Intravenous Continuous Meme Donald PA-C 20 mg/hr (11/03/23 0438)    gabapentin  400 mg Oral BID Doug Guy, PA-C      insulin regular (HumuLIN R,NovoLIN R) 1 Units/mL in sodium chloride 0.9 % 100 mL infusion  0.3-21 Units/hr Intravenous Titrated Clermont Guy, PA-C 1.5 Units/hr (11/03/23 0406)    lidocaine (cardiac)  100 mg Intravenous Q30 Min PRN Doug Guy, PA-C      methocarbamol  500 mg Oral 4x Daily Doug Guy, PA-C metoprolol tartrate  25 mg Oral Q12H [address] [facility], PA-SKYLER      multivitamin-minerals  1 tablet Oral Daily [facility], PA-C      mupirocin  1 Application Nasal T48X [address] [facility], PA-C      ondansetron  4 mg Intravenous Q6H PRN [facility], PA-C      oxyCODONE  10 mg Oral Q4H PRN [facility], PA-C      oxyCODONE  5 mg Oral Q4H PRN [facility], PA-C      pantoprazole  40 mg Oral Early Morning [facility], PA-C      polyethylene glycol  17 g Oral Daily [facility], PA-C      potassium chloride  20 mEq Oral BID [facility], PA-C      sodium chloride  20 mL/hr Intravenous Continuous [facility], PA-C 20 mL/hr (11/01/23 1325)    temazepam  15 mg Oral HS PRN [facility], PA-C      thiamine  100 mg Oral Daily [facility], PA-C       Continuous Infusions:furosemide, 20 mg/hr, Last Rate: 20 mg/hr (11/03/23 0438)  insulin regular (HumuLIN R,NovoLIN R) 1 Units/mL in sodium chloride 0.9 % 100 mL infusion, 0.3-21 Units/hr, Last Rate: 1.5 Units/hr (11/03/23 0406)  sodium chloride, 20 mL/hr, Last Rate: 20 mL/hr (11/01/23 1325)      PRN Meds:.  acetaminophen    bisacodyl    lidocaine (cardiac)    ondansetron    oxyCODONE    oxyCODONE    temazepam    Vitals:   Vitals:    11/03/23 0205 11/03/23 0445 11/03/23 0600 11/03/23 0640   BP:  123/70  125/83   BP Location:       Pulse:  84  87   Resp:    17   Temp:    98.2 °F (36.8 °C)   TempSrc:       SpO2: 95% 94%  95%   Weight:   112 kg (247 lb 12.8 oz)    Height:           Telemetry: NSR; Heart Rate: 71    Respiratory:   SpO2: SpO2: 95 %; 2 LPM    Intake/Output:     Intake/Output Summary (Last 24 hours) at 11/3/2023 0733  Last data filed at 11/3/2023 6668  Gross per 24 hour   Intake 1228.4 ml   Output 2665 ml   Net -1436.6 ml        Chest tube Output:    Mediastinal tubes: 80 mL/8 hours  240 mL/24 hours   Pleural tubes: 50 mL/8 hours  120 mL/24 hours Weights:   Weight (last 2 days)       Date/Time Weight    11/03/23 0600 112 (247.8)    11/02/23 0600 109 (240.52)    11/01/23 0616 108 (237.99)              Results:   Results from last 7 days   Lab Units 11/03/23  0447 11/02/23  0344 11/01/23  2101 11/01/23  1331   WBC Thousand/uL 12.20* 14.04*  --   --    HEMOGLOBIN g/dL 13.6 14.7 15.0 14.2   HEMATOCRIT % 40.9 46.1 44.8 42.8   PLATELETS Thousands/uL 168 201  --  159     Results from last 7 days   Lab Units 11/03/23  0447 11/02/23  2227 11/02/23  0344 11/01/23  1331 11/01/23  1328   SODIUM mmol/L 137 135 138   < >  --    POTASSIUM mmol/L 3.6 4.2 4.2   < >  --    CHLORIDE mmol/L 102 102 109*   < >  --    CO2 mmol/L 23 24 20*   < >  --    CO2, I-STAT mmol/L  --   --   --   --  23   BUN mg/dL 22 22 16   < >  --    CREATININE mg/dL 1.58* 1.65* 0.96   < >  --    GLUCOSE, ISTAT mg/dl  --   --   --   --  189*   CALCIUM mg/dL 8.3* 8.5 8.2*   < >  --     < > = values in this interval not displayed. Point of care glucose:  - 175  Insulin gtt, Endo following    Studies:  PCXR 11/2: Left basilar opacity may be due to atelectasis or small effusion  No worsening  S/p extubation with mild decreased lung volumes    I have personally reviewed pertinent reports. and I have personally reviewed pertinent films in PACS    Invasive Lines/Tubes:  Invasive Devices       Central Venous Catheter Line  Duration             CVC Central Lines 11/01/23 Triple Right Internal jugular 1 day              Peripheral Intravenous Line  Duration             Peripheral IV 11/01/23 Left Forearm 2 days              Line  Duration             Pacer Wires 1 day    Pacer Wires 1 day              Drain  Duration             Chest Tube Anterior;Mediastinal 32 Fr. 1 day    Chest Tube Left Pleural 32 Fr. 1 day    Chest Tube Posterior;Mediastinal 32 Fr. 1 day                    Physical Exam:    HEENT/NECK:  Normocephalic. Atraumatic. No jugular venous distention.     Cardiac: Regular rate and rhythm and Pericardial friction rub  Pulmonary:  Breath sounds diminished in the bases bilaterally  and inspiratory crackles bilateral bases  Abdomen:  Non-tender, Non-distended, Normal bowel sounds, and obese  Incisions: Sternum is stable. Incision dressed with Acticoat. No erythema or drainage and Saphenectomy incision dressed with Acticoat. No erythema or drainage  Extremities: Extremities warm/dry and Trace edema B/L  Neuro: Alert and oriented X 3, Sensation is grossly intact, and No focal deficits  Skin: Warm/Dry, without rashes or lesions. Assessment:  Principal Problem:    Triple vessel coronary artery disease/chest pain/elevated troponin  Active Problems:    Hyperlipidemia    Hypertension    Type 2 diabetes mellitus without complication, without long-term current use of insulin ([facility])    Esophageal reflux    Continuous opioid dependence ([address])    Spinal stenosis of lumbar region with neurogenic claudication    Tobacco abuse    S/P CABG (coronary artery bypass graft)       Coronary artery disease. S/P coronary artery bypass grafting x 4; POD # 2    Plan:    Cardiac:     Elective surgical admission  Normal ventricular systolic function, EF 29%    NSR; HR well-controlled  BP well-controlled    Continue Lopresor, 25mg PO BID  Hold ACE inhibitor/ARB secondary to renal dysfunction  Continue prophylactic Amiodarone, 200 mg PO TID    Continue ASA and Statin therapy    Epicardial pacing wires no longer required. Remove today    Maintain central IV access today for lack of peripheral access      Transition to Heparin from Arixtra  for DVT prophylaxis due to RAFAELA    Pulmonary:     Acute post-op pulmonary insufficiency; Requiring 2 Liters via nasal cannula, secondary to history of tobacco use, atelectasis, poor inspiratory effort secondary to pain, and body habitus/obesity. Continue incentive spirometry/coughing/deep breathing exercises.   Wean supplemental oxygen as tolerated for saturation > 90%  Chest tube drainage diminished; D/C today   Nicotine dependence - h/o chewing tobacco. Educated pt on effects of tobacco and importance of cessation    Renal:   Postoperative RAFAELA, with creatinine 1.58 from 1.65, from 0.96; Follow up daily BMP  intake/Output net: -1436 mL/24 hours  Diuretic Regimen:  Continue Continuous Lasix infusion at 20 mg/hour,   Increase to Potassium Chloride 20 mEq PO TID    Neuro:  Neurologically intact; No active issues     Incisional pain well controlled   Continue tylenol, 975 mg PO q 8, standing dose   Continue oxycodone, 5 to 10 mg PO q 4 hours prn pain    Continue methocarbamol, 500 mg PO q 6 hours prn pain/muscle spasm  Gabapentin 400mg PO BID    GI:  Controlled carbohydrate diet level two/Cardiac diet, with 1800 mL fluid restriction  Tolerating diet without complaint  Continue stool softeners and prn suppository  Continue GI prophylaxis    Endo:   History of diabetes; Continue insulin therapy as directed by endocrinology physician. Insulin administration teaching for home therapy ordered once transitioned off insulin gtt.    7    Hematology:   Post-operative blood count acceptable; Trend prn  Leukocytosis; Afebrile with no signs of infection; Trend CBC prn    8.    Disposition:      Following daily PT/OT recommendations regarding home vs. rehab when medically cleared for discharge  Anticipated discharge date: 11/5       VTE Pharmacologic Prophylaxis: Heparin  VTE Mechanical Prophylaxis: sequential compression device    Collaborative rounds completed with supervising physician  Plan of care discussed with bedside nurse    SIGNATURE: Shannon Boyle PA-C  DATE: November 3, 2023  TIME: 7:33 AM

## 2023-11-02 NOTE — PROGRESS NOTES
[address] [facility]  Progress Note: Critical Care   Date of Service: 11/2/2023  Hospital Day: 1  Name: Linda Ko I  MRN: [id number]  Unit/Bed#: [facility] 417-01      Assessment/Plan     Impressions:  MVCAD s/p CABG x 4  HTN  HLD  DM2  Alcohol abuse  Tobacco abuse  GERD    Neuro:   Cardiac Surgery Pain Control: ATC tylenol, Scheduled Robaxin , Consider short course of toradol, and oxycodone 5/10mg  Consider APS consult  Delirium precautions  Regulate sleep/wake cycle  CAM-ICU daily  Trend neuro exam      CV:   Cardiac Surgery Hemodynamic Monitoring: MAP goal >65  Follow rhythm on telemetry  Critical Care Infusions : Cardene 7 mg/hour  D/c a-line when off cardene  Beta Blocker Plan: Start Lopressor 25 mg BID  Epicardial pacing wire plan : Epicardial pacing wires in place. Will pace as needed for bradycardia or cardiac output   Post op cardiac surgery medications:    mg QD  Statin: Lipitor 80 mg qHS  Amiodarone 200 mg PO q8 hours     Lung:   Acute post-op pulmonary insufficiency requiring 10 liters/min via nasal cannula. Secondary to poor inspiratory effort secondary to pain and pulmonary vascular congestion  Chest tube output remains persistently high; Continue chest tubes to suction today    GI:   Continue PPI for stress ulcer prophylaxis  Continue bowel regimen  Trend abdominal exam and bowel function    FEN:   Diuresis Plan: Lasix 40mg IV BID  Nutrition plan: as tolerated  Replenish electrolytes with goals: K >4.0, Mag >2.0, and Phos >3.0    :   Prabhakar Plan: Prabhakar to be removed. Order has been placed  Trend UOP and BUN/creat  Strict I and O     ID:   Trend temps and WBC count  Maintain normothermia    Heme:   Trend hgb and plts    Endo:   Continue Insulin infusion and consult endocrine. Initiate home insulin teaching.       MSK/Skin:  Mobility goal:   PT/OT consult as medically appropriate   Frequent turning and pressure off-loading  Local wound care as needed    Disposition: Stepdown Level 1    ICU Core Measures     A: Assess, Prevent, and Manage Pain Has pain been assessed? Yes  Need for changes to pain regimen? No   B: Both SAT/SAT  N/A   C: Choice of Sedation RASS Goal: N/A patient not on sedation  Need for changes to sedation or analgesia regimen? NA   D: Delirium CAM-ICU: Negative   E: Early Mobility  Plan for early mobility? Yes   F: Family Engagement Plan for family engagement today? Yes       Antibiotic Review: Post op requirements     Review of Invasive Devices: Prabhakar Plan: Prabhakar to be removed. Order has been placed  Central access plan: Hemodynamic monitoring  Penelope Plan: Keep arterial line for hemodynamic monitoring    Prophylaxis:  VTE VTE covered by:  fondaparinux, Subcutaneous       Stress Ulcer  covered bypantoprazole (PROTONIX) EC tablet 40 mg [[id number]]          Significant 24hr Events      24 Hour Events/HPI: POD # 1 s/p CABG x 4. Started on cardene post-operatively, currently at 7. Headache and diaphoresis overnight, received 130mg phenobarbital with improvement.  Index stable, delined this AM. On midflow this AM.    Critical Care Infusions : Cardene 7 mg/hour   Subjective   Review of Systems   Constitutional:         +uncontrolled pain         Objective     Medications:  Scheduled Continuous   acetaminophen, 650 mg, Q6H While awake  amiodarone, 200 mg, Q8H TRACEY  aspirin, 325 mg, Daily  atorvastatin, 80 mg, Daily With Dinner  cefazolin, 2,000 mg, Q8H  chlorhexidine, 15 mL, BID  fenofibrate, 807 mg, Daily  folic acid, 1 mg, Daily  fondaparinux, 2.5 mg, Daily  gabapentin, 400 mg, BID  magnesium sulfate, 2 g, Once  methocarbamol, 500 mg, 4x Daily  multivitamin-minerals, 1 tablet, Daily  mupirocin, 1 Application, L19H TRACEY  pantoprazole, 40 mg, Early Morning  polyethylene glycol, 17 g, Daily  thiamine, 100 mg, Daily      insulin regular (HumuLIN R,NovoLIN R) 1 Units/mL in sodium chloride 0.9 % 100 mL infusion, 0.3-21 Units/hr, Last Rate: 4 Units/hr (11/02/23 0400)  niCARdipine, 2.5-15 mg/hr, Last Rate: 7 mg/hr (11/02/23 0209)  sodium chloride, 20 mL/hr, Last Rate: 20 mL/hr (11/01/23 1325)         Vitals: Invasive Monitoring       Most Recent Min/Max in 24hrs   Temp 99 °F (37.2 °C) Temp  Min: 96.3 °F (35.7 °C)  Max: 99 °F (37.2 °C)   Pulse 79 Pulse  Min: 68  Max: 88   Resp 21 Resp  Min: 18  Max: 35   /61 BP  Min: 114/56  Max: 125/61   O2 Sat 91 % SpO2  Min: 90 %  Max: 99 %   O2 Device: Nasal cannula Arterial Line  Penelope /62  Arterial Line BP  Min: 105/55  Max: 143/72   MAP 81 mmHg  Arterial Line MAP (mmHg)  Min: 71 mmHg  Max: 91 mmHg     PA Catheter   Most Recent  Min/Max in 24hrs    PAP 40/15 PAP  Min: 31/16  Max: 59/-1   CVP 15 mmHg CVP (mean)  Min: 10 mmHg  Max: 132 mmHg   CI 2.7 L/min/m2  CI (L/min/m2)  Min: 2.5 L/min/m2  Max: 3.6 L/min/m2    (dyne*sec)/cm5 SVR (dyne*sec)/cm5  Min: 494 (dyne*sec)/cm5  Max: 834 (dyne*sec)/cm5        I/O Chest tube output (if present)     Intake/Output Summary (Last 24 hours) at 11/2/2023 0653  Last data filed at 11/2/2023 0600  Gross per 24 hour   Intake 4982.12 ml   Output 3075 ml   Net 1907.12 ml     UOP: 30cc/hour    BM: 0 in the last 24 hours  Weight change: 1.15 kg (2 lb 8.6 oz)     Mediastinal tubes:  110 mL/8hr  405 mL/24hr   Pleural tubes: 40 mL/8hr  110 mL/24hr        Physical Exam   Physical Exam  Eyes:      Pupils: Pupils are equal, round, and reactive to light. Skin:     General: Skin is warm and dry. HENT:      Head: Normocephalic and atraumatic. Cardiovascular:      Rate and Rhythm: Normal rate and regular rhythm. Heart sounds: Normal heart sounds. Heart sounds not distant. Musculoskeletal:      Right lower leg: No edema. Left lower leg: No edema. Abdominal:      General: There is distension. Palpations: Abdomen is soft. Tenderness: There is no abdominal tenderness. Constitutional:       Interventions: Nasal cannula in place.    Pulmonary:      Effort: Tachypnea present. Breath sounds: Decreased breath sounds present. Psychiatric:         Behavior: Behavior is cooperative. Neurological:      General: No focal deficit present. Mental Status: He is alert and oriented to person, place, and time. Vitals and nursing note reviewed. Diagnostic Studies      11/02/23 CXR: Low lung volumes, large gastric bubble. This was personally reviewed by myself in PACS. 11/02/23 EKG: NSR w/ 1st degree AV block. This was personally reviewed by myself. Labs:  CBC    Recent Labs     11/01/23  1331 11/01/23  2101 11/02/23  0344   WBC  --   --  14.04*   HGB 14.2 15.0 14.7   HCT 42.8 44.8 46.1     --  201     BMP    Recent Labs     11/01/23  1331 11/01/23  1744 11/01/23 2101 11/02/23  0344   SODIUM 137  --   --  138   K 4.0   < > 4.2 4.2     --   --  109*   CO2 23  --   --  20*   AGAP 6  --   --  9   BUN 13  --   --  16   CREATININE 0.93  --   --  0.96   CALCIUM 8.6  --   --  8.2*    < > = values in this interval not displayed. Baseline Creat: 0.9   Coags    No recent results           Additional Electrolytes  Recent Labs     11/01/23  1219 11/01/23  1328 11/02/23  0344   MG  --   --  1.9   CAIONIZED 1.26 1.32  --           Blood Gas    No recent results  No recent results LFTs  No recent results    Infectious    No recent results     No recent results Glucose  Recent Labs     11/01/23  1331 11/02/23  0344   GLUC 191* 179*            Collaborative bedside rounds performed with cardiac surgery attending, critical care attending and bedside RN.      Alan Antunez PA-C

## 2023-11-02 NOTE — CONSULTS
Consultation - Khang Gomes 61 y.o. male MRN: [id number]    Unit/Bed#: [facility] 417-01 Encounter: [id number]      Assessment/Plan     Assessment: This is a 61y.o.-year-old male with past medical history of diabetes A1c 8.0, postop day 1 status post CABG X4. Endocrinology is consulted for diabetes management. Plan:  DM2  Patient is currently on an insulin drip  He is not eating much at this time, will continue on insulin drip for now  will continue to assess for appropriateness to transition to subq insulin  Per chart review, he has been on his current regiment since 2016. May benefit from transition of Exenatide to another GLP-1 agonist with better cardioprotection. CC: Diabetes Consult    History of Present Illness     HPI: Khang Gomes is a 61y.o. year old male with hypertension and type 2 diabetes A1c of 8.0 . Initially presented with exertional chest pain, left heart cath found multivessel disease. He is postop day 1 status post CABG X4. Endocrinology is consulted for diabetes management. Patient seen bedside, states that his diabetes is managed by his PCP. Home regimen includes Exenatide 2 mg subcu weekly, empagliflozin 25 mg daily and metformin 1000 mg twice daily. Admits to missing his Exenatide roughly once a month. He will also forget to take his oral diabetic medications roughly 1 day out of the week. Feels tired today. Slightly dizzy. Has not been eating much food since his procedure. Some mild pain at his left chest area. No other complaints. Inpatient consult to Endocrinology  Consult performed by: Shelli Perez MD  Consult ordered by: Alma Saunders PA-C          Review of Systems   Constitutional:  Positive for fatigue. Negative for chills and fever. HENT:  Negative for ear pain and sore throat. Eyes:  Negative for pain and visual disturbance. Respiratory:  Negative for cough and shortness of breath.     Cardiovascular:  Positive for chest pain (Left chest wall). Negative for palpitations. Gastrointestinal:  Negative for abdominal pain and vomiting. Genitourinary:  Negative for dysuria and hematuria. Musculoskeletal:  Negative for arthralgias and back pain. Skin:  Negative for color change and rash. Neurological:  Positive for dizziness. Negative for seizures and syncope. All other systems reviewed and are negative. Historical Information   Past Medical History:   Diagnosis Date    Chews tobacco regularly     Consumes three beers daily     Controlled diabetes mellitus ([address])     Diabetes ([address])     Esophageal reflux     GERD (gastroesophageal reflux disease)     Hyperlipidemia     Hypertension     Osteoarthritis     LEFT KNEE    Primary localized osteoarthritis of left knee     last assessed: 1/16/2018     Past Surgical History:   Procedure Laterality Date    CARDIAC CATHETERIZATION Left 10/18/2023    Procedure: Cardiac pci;  Surgeon: Stella Vergara DO;  [facility]; Service: Cardiology    CARDIAC CATHETERIZATION N/A 10/18/2023    Procedure: Cardiac Coronary Angiogram;  Surgeon: Stella Vergara DO;  [facility]; Service: Cardiology    CARDIAC CATHETERIZATION N/A 10/18/2023    Procedure: Cardiac other - IFR;  Surgeon: Stella Vergara DO;  [facility];   Service: Cardiology    COLONOSCOPY      MT ARTHRP KNE CONDYLE&PLATU MEDIAL&LAT COMPARTMENTS Left 1/8/2018    Procedure: ARTHROPLASTY KNEE TOTAL;  Surgeon: Manav Stanley MD;  Location:  MAIN OR;  Service: Orthopedics    REPLACEMENT TOTAL KNEE Left      Social History   Social History     Substance and Sexual Activity   Alcohol Use Yes    Alcohol/week: 20.0 standard drinks of alcohol    Types: 20 Cans of beer per week    Comment: daily beer & "gin & tonics" on a weekend ; social alcohol use ( as per allscripts)     Social History     Substance and Sexual Activity   Drug Use No     Social History     Tobacco Use   Smoking Status Former Packs/day: 1.00    Years: 11.00    Total pack years: 11.00    Types: Cigarettes   Smokeless Tobacco Current    Types: Chew   Tobacco Comments    quit 30 + yrs ago      Family History:   Family History   Problem Relation Age of Onset    Colon cancer Father     Mental illness Family         mental disorder    Substance Abuse Neg Hx        Meds/Allergies   Current Facility-Administered Medications   Medication Dose Route Frequency Provider Last Rate Last Admin    acetaminophen (TYLENOL) rectal suppository 650 mg  650 mg Rectal Q4H PRN Katarzyna Joe PA-C        acetaminophen (TYLENOL) tablet 650 mg  650 mg Oral Q6H While awake Katarzyna Joe PA-C   650 mg at 11/02/23 2588    amiodarone tablet 200 mg  200 mg Oral [facility] Katarzyna Joe PA-C   200 mg at 11/02/23 0548    aspirin tablet 325 mg  325 mg Oral Daily Katarzyna Joe PA-C   325 mg at 11/02/23 3718    atorvastatin (LIPITOR) tablet 80 mg  80 mg Oral Daily With [employer] ArnaldoEMELY tenorio   80 mg at 11/01/23 1605    bisacodyl (DULCOLAX) rectal suppository 10 mg  10 mg Rectal Daily PRN Katarzyna Joe PA-C        ceFAZolin (ANCEF) IVPB (premix in dextrose) 2,000 mg 50 mL  2,000 mg Intravenous Q8H Katarzyna Joe PA-C   Stopped at 11/02/23 0400    docusate sodium (COLACE) capsule 100 mg  100 mg Oral BID [employer]ELY   100 mg at 11/02/23 0811    fenofibrate (TRICOR) tablet 145 mg  145 mg Oral Daily Katarzyna Joe PA-C   145 mg at 88/72/54 4364    folic acid (FOLVITE) tablet 1 mg  1 mg Oral Daily Dang Matt PA-C   1 mg at 11/02/23 4663    fondaparinux (ARIXTRA) subcutaneous injection 2.5 mg  2.5 mg Subcutaneous Daily Katarzyna Joe PA-C   2.5 mg at 11/02/23 0809    furosemide (LASIX) injection 40 mg  40 mg Intravenous BID (diuretic) [employer]ELY   40 mg at 11/02/23 0809    gabapentin (NEURONTIN) capsule 400 mg  400 mg Oral BID Katarzyna Joe PA-C   400 mg at 11/02/23 0808    insulin regular (HumuLIN R,NovoLIN R) 1 Units/mL in sodium chloride 0.9 % 100 mL infusion  0.3-21 Units/hr Intravenous Titrated Lizy Tineo PA-C 4 mL/hr at 11/02/23 0901 4 Units/hr at 11/02/23 0901    ketorolac (TORADOL) injection 15 mg  15 mg Intravenous Q6H Ubly EMELY Fernández   15 mg at 11/02/23 5559    lidocaine (cardiac) injection 100 mg  100 mg Intravenous Q30 Min PRN Lizy Tineo PA-C        magnesium sulfate 2 g/50 mL IVPB (premix) 2 g  2 g Intravenous Once Ubly EMELY Fernández        methocarbamol (ROBAXIN) tablet 500 mg  500 mg Oral 4x Daily Lizy Tineo PA-C   500 mg at 11/02/23 3712    metoprolol tartrate (LOPRESSOR) tablet 25 mg  25 mg Oral Q12H [address] Ubly EMELY Fernández   25 mg at 11/02/23 9477    multivitamin-minerals (CENTRUM) tablet 1 tablet  1 tablet Oral Daily Brandon Johnson PA-C   1 tablet at 11/02/23 0808    mupirocin (BACTROBAN) 2 % nasal ointment 1 Application  1 Application Nasal Q04E [address]  Lizy Tineo PA-C   1 Application at 11/38/73 0809    niCARdipine (CARDENE) 25 mg (STANDARD CONCENTRATION) in sodium chloride 0.9% 250 mL  2.5-15 mg/hr Intravenous Titrated Lizy Tineo PA-C   Stopped at 11/02/23 0905    ondansetron (ZOFRAN) injection 4 mg  4 mg Intravenous Q6H PRN Lizy Tineo PA-C   4 mg at 11/01/23 1940    oxyCODONE (ROXICODONE) immediate release tablet 10 mg  10 mg Oral Q4H PRN Ubly EMELY Fernández   10 mg at 11/02/23 8159    oxyCODONE (ROXICODONE) IR tablet 5 mg  5 mg Oral Q4H PRN Ubly EMELY Fernández   5 mg at 11/01/23 1606    pantoprazole (PROTONIX) EC tablet 40 mg  40 mg Oral Early Morning Lizy Tineo PA-C   40 mg at 11/02/23 0548    polyethylene glycol (MIRALAX) packet 17 g  17 g Oral Daily Lizy Tineo PA-C   17 g at 11/02/23 0807    [START ON 11/3/2023] potassium chloride (K-DUR,KLOR-CON) CR tablet 20 mEq  20 mEq Oral BID Jessee Fernández PA-C        sodium chloride infusion 0.45 %  20 mL/hr Intravenous Continuous Lizy Tineo PA-C 20 mL/hr at 11/01/23 1325 20 mL/hr at 11/01/23 1325    temazepam (RESTORIL) capsule 15 mg  15 mg Oral HS PRN Chelsea Bridges PA-C        thiamine tablet 100 mg  100 mg Oral Daily Jacob Hart PA-C   100 mg at 11/02/23 0808     No Known Allergies    Objective   Vitals: Blood pressure 116/57, pulse 78, temperature 99 °F (37.2 °C), temperature source Core, resp. rate 21, height 5' 8.9" (1.75 m), weight 109 kg (240 lb 8.4 oz), SpO2 91 %. Intake/Output Summary (Last 24 hours) at 11/2/2023 5780  Last data filed at 11/2/2023 0600  Gross per 24 hour   Intake 4982.12 ml   Output 3075 ml   Net 1907.12 ml     Invasive Devices       Central Venous Catheter Line  Duration             CVC Central Lines 11/01/23 Triple 1 day              Peripheral Intravenous Line  Duration             Peripheral IV 11/01/23 Left Forearm 1 day              Arterial Line  Duration             Arterial Line 11/01/23 Left Radial 1 day              Line  Duration             Pacer Wires <1 day    Pacer Wires <1 day              Drain  Duration             Urethral Catheter Non-latex; Temperature probe 16 Fr. 1 day    Chest Tube Anterior;Mediastinal 32 Fr. <1 day    Chest Tube Left Pleural 32 Fr. <1 day    Chest Tube Posterior;Mediastinal 32 Fr. <1 day                    Physical Exam  Constitutional:       General: He is not in acute distress. Appearance: He is not toxic-appearing. HENT:      Head: Normocephalic and atraumatic. Right Ear: External ear normal.      Left Ear: External ear normal.      Mouth/Throat:      Mouth: Mucous membranes are moist.   Eyes:      Extraocular Movements: Extraocular movements intact. Pupils: Pupils are equal, round, and reactive to light. Cardiovascular:      Rate and Rhythm: Normal rate and regular rhythm. Pulses: Normal pulses. Heart sounds: Normal heart sounds. No murmur heard. No gallop. Pulmonary:      Effort: Pulmonary effort is normal. No respiratory distress.       Breath sounds: Normal breath sounds. No wheezing or rales. Abdominal:      General: Bowel sounds are normal. There is no distension. Palpations: Abdomen is soft. Tenderness: There is no abdominal tenderness. There is no guarding. Musculoskeletal:         General: No swelling or deformity. Right lower leg: No edema. Left lower leg: No edema. Skin:     General: Skin is warm. Coloration: Skin is not jaundiced. Findings: No bruising or lesion. Neurological:      General: No focal deficit present. Mental Status: He is alert. The history was obtained from the review of the chart, patient. Lab Results:       Lab Results   Component Value Date    WBC 14.04 (H) 11/02/2023    HGB 14.7 11/02/2023    HCT 46.1 11/02/2023    MCV 93 11/02/2023     11/02/2023     Lab Results   Component Value Date/Time    BUN 16 11/02/2023 03:44 AM    BUN 21 11/19/2021 07:03 AM     12/16/2016 08:49 AM    K 4.2 11/02/2023 03:44 AM    K 4.4 11/19/2021 07:03 AM     (H) 11/02/2023 03:44 AM    CL 98 11/19/2021 07:03 AM    CO2 20 (L) 11/02/2023 03:44 AM    CO2 23 11/01/2023 01:28 PM    CO2 23 11/19/2021 07:03 AM    CREATININE 0.96 11/02/2023 03:44 AM    CREATININE 1.08 12/16/2016 08:49 AM    AST 18 10/17/2023 07:09 AM    AST 25 11/19/2021 07:03 AM    ALT 23 10/17/2023 07:09 AM    ALT 33 11/19/2021 07:03 AM    TP 6.6 10/17/2023 07:09 AM    TP 7.2 11/19/2021 07:03 AM    ALB 4.1 10/17/2023 07:09 AM    ALB 4.3 12/16/2016 08:49 AM    GLOB 2.5 11/19/2021 07:03 AM     No results for input(s): "CHOL", "HDL", "LDL", "TRIG", "VLDL" in the last 72 hours.   No results found for: "Snehal Buster", "[id number]"  POC Glucose (mg/dl)   Date Value   11/02/2023 142 (H)   11/02/2023 156 (H)   11/02/2023 165 (H)   11/02/2023 157 (H)   11/01/2023 157 (H)   11/01/2023 168 (H)   11/01/2023 181 (H)   11/01/2023 208 (H)   11/01/2023 215 (H)   11/01/2023 174 (H)       Imaging Studies: I have personally reviewed pertinent reports. Portions of the record may have been created with voice recognition software.

## 2023-11-02 NOTE — OCCUPATIONAL THERAPY NOTE
Occupational Therapy Evaluation and Treatment     Patient Name: Robert Angelo  GIHZR'U Date: 11/2/2023  Problem List  Principal Problem:    Triple vessel coronary artery disease/chest pain/elevated troponin  Active Problems:    Hyperlipidemia    Hypertension    Type 2 diabetes mellitus without complication, without long-term current use of insulin ([facility])    Esophageal reflux    Continuous opioid dependence ([address])    Spinal stenosis of lumbar region with neurogenic claudication    Tobacco abuse    S/P CABG (coronary artery bypass graft)    Past Medical History  Past Medical History:   Diagnosis Date    Chews tobacco regularly     Consumes three beers daily     Controlled diabetes mellitus ([address])     Diabetes ([address])     Esophageal reflux     GERD (gastroesophageal reflux disease)     Hyperlipidemia     Hypertension     Osteoarthritis     LEFT KNEE    Primary localized osteoarthritis of left knee     last assessed: 1/16/2018     Past Surgical History  Past Surgical History:   Procedure Laterality Date    CARDIAC CATHETERIZATION Left 10/18/2023    Procedure: Cardiac pci;  Surgeon: Randal Nolan DO;  [facility]; Service: Cardiology    CARDIAC CATHETERIZATION N/A 10/18/2023    Procedure: Cardiac Coronary Angiogram;  Surgeon: Randal Nolan DO;  [facility]; Service: Cardiology    CARDIAC CATHETERIZATION N/A 10/18/2023    Procedure: Cardiac other - IFR;  Surgeon: Randal Nolan DO;  [facility]; Service: Cardiology    COLONOSCOPY      HARVEST VEIN Left 11/1/2023    Procedure: HARVEST VEIN ENDOSCOPIC ([address]);   Surgeon: Fatou Mcclellan MD;  Location: [facility] MA[facility];  Service: Cardiac Surgery    WA ARTHRP KNE CONDYLE&PLATU MEDIAL&LAT COMPARTMENTS Left 1/8/2018    Procedure: ARTHROPLASTY KNEE TOTAL;  Surgeon: Isadora Shields MD;  Location: [facility];  Service: Orthopedics    WA CORONARY ARTERY BYP W/VEIN & ARTERY GRAFT 4 VEIN N/A 11/1/2023    Procedure: CORONARY ARTERY BYPASS GRAFT (CABG) 4 VESSELS, LIMA TO LAD, SVG TO PDA, SVG TO OM1, SVG TO DIAG2; w/ AMBREEN;  Surgeon: Monico Cope MD;  Location: [facility];  Service: Cardiac Surgery    REPLACEMENT TOTAL KNEE Left          11/02/23 0944   OT Last Visit   OT Visit Date 11/02/23   Note Type   Note type Evaluation   Pain Assessment   Pain Assessment Tool 0-10   Pain Score 5   Pain Location/Orientation Orientation: Mid;Location: Chest;Location: Incision   Patient's Stated Pain Goal No pain   Hospital Pain Intervention(s) Repositioned; Ambulation/increased activity   Restrictions/Precautions   Weight Bearing Precautions Per Order No   Other Precautions Cardiac/sternal;Multiple lines;Telemetry;O2;Fall Risk;Pain  (+CT, [address] [address])   Home Living   Type of [address] [place] [address] Two level;Stairs to enter with rails;Bed/bath upstairs  (1 HAILEY, FF to 2nd fl although pt plans to stay on 1st fl for 1 week post D/C)   Bathroom Shower/Tub Tub/shower unit   Bathroom Toilet Standard   Home Equipment Walker;Cane  (does not use pta)   Additional Comments Pt lives in a 2  with 1 HAILEY, FF to 2nd fl bed/bath although pt plans to stay on 1st fl for 1 week after D/C, uses a tub shower and standard toilet   Prior Function   Level of [place] Independent with ADLs; Independent with functional mobility; Independent with IADLS   Lives With Spouse   Receives Help From Family   IADLs Independent with meal prep; Independent with medication management; Independent with driving   Falls in the last 6 months 0   Vocational Full time employment   Lifestyle   Autonomy Pta pt I with ADL, IADL and fxnal mobility with no AD although has a cane and RW, (+)    Reciprocal Relationships supportive spouse   Service to Others works in [employer]   [address] [place] [address] enjoys fly fishing   Subjective   Subjective "I have the walker and cane from when I got my knees done"   ADL   Where Assessed Chair   Eating Assistance 6  Modified independent Grooming Assistance 5  Supervision/Setup   UB Bathing Assistance 5  Supervision/Setup   LB [address] [facility] 4  Minimal Assistance   Bed Mobility   Additional Comments Pt OOB at beginning and end of session, left in chair with all needs within reach   Transfers   Sit to Stand 5  Supervision   Additional items Increased time required;Verbal cues   Stand to Sit 5  Supervision   Additional items Increased time required;Verbal cues   Additional Comments with RW   Functional Mobility   Functional Mobility 4  Minimal assistance   Additional Comments Pt performs short household distance mobility with MIN A x 1 with RW   Additional items Rolling walker   Balance   Static Sitting Fair +   Dynamic Sitting Fair   Static Standing Fair -   Dynamic Standing Fair -   Ambulatory Poor +   Activity Tolerance   Activity Tolerance Patient tolerated treatment well;Patient limited by fatigue;Patient limited by pain   Medical Staff Made Aware Co-eval with DPT due to medical complexity and multiple lines, assist for lines by restorative tech Annel Ruiz   Nurse Made Aware RN cleared pt for therapy   RUE Assessment   RUE Assessment WFL   LUE Assessment   LUE Assessment WFL   Hand Function   Gross Motor Coordination Functional   Fine Motor Coordination Functional   Sensation   Light Touch No apparent deficits   Vision-Basic Assessment   Current Vision Wears glasses only for reading   Psychosocial   Psychosocial (WDL) WDL   Cognition   Overall Cognitive Status WFL   Arousal/Participation Alert; Cooperative   Attention Within functional limits   Orientation Level Oriented X4   Memory Within functional limits   Following Commands Follows one step commands without difficulty   Comments Pt cooperative to therapy, demonstrates understanding to all provided vc for safety, pt recalls cardiac pxns   Assessment   Limitation Decreased ADL status; Decreased self-care trans;Decreased high-level ADLs; Decreased endurance   Prognosis Good   Assessment Pt is a 61 y.o. male who was admitted to [address] [place] [address] on 11/1/2023 with Triple vessel coronary artery disease, s/p CABG x 4. Pt seen for an OT evaluation per active OT orders, CTs in place, cardiac pxns observed throughout session. Pt  has a past medical history of Chews tobacco regularly, Consumes three beers daily, Controlled diabetes mellitus ([address]), Diabetes ([address]), Esophageal reflux, GERD (gastroesophageal reflux disease), Hyperlipidemia, Hypertension, Osteoarthritis, and Primary localized osteoarthritis of left knee. Pt lives with spouse in a 2  with 1 HAILEY, FF to 2nd fl bed/bath although pt plans to stay on [facility] fl for a week after D/C, uses a tub shower and standard height toilet. Pta, pt was independent w/ ADL/IADL and functional mobility, was (+) driving and was not using any DME at baseline but has a RW and cane. Currently, pt is Supervision for UB ADL, Min Ax1 for LB ADL, and completed transfers/FM w Supervision-Min Ax1 with RW. Pt currently presents with impairments in the following categories -steps to enter environment and difficulty performing ADLS activity tolerance and endurance. These impairments, as well as pt's fatigue, pain, and cardiac/sternal precautions  limit pt's ability to safely engage in all baseline areas of occupation, includingbathing, dressing, toileting, functional mobility/transfers, and community mobility. The patient's raw score on the AM-PAC Daily Activity Inpatient Short Form is 19. A raw score of greater than or equal to 19 suggests the patient may benefit from discharge to home. Please refer to the recommendation of the Occupational Therapist for safe discharge planning. Pt would benefit from continued acute OT services throughout hospital course. Plan for OT interventions 2-3x per week.  From OT standpoint, recommend home with no further rehab needs pending progress upon D/C. Pt was left seated in bedside chair with all needs within reach. Goals   Patient Goals to go home   Plan   Treatment Interventions ADL retraining;Functional transfer training; Endurance training;Continued evaluation;Cardiac education; Energy conservation; Activityengagement   Goal Expiration Date 11/16/23   OT Frequency 2-3x/wk   Discharge Recommendation   Rehab Resource Intensity Level, OT No post-acute rehabilitation needs  (pending progress)   AM-PAC Daily Activity Inpatient   Lower Body Dressing 3   Bathing 3   Toileting 3   Upper Body Dressing 3   Grooming 3   Eating 4   Daily Activity Raw Score 19   Daily Activity Standardized Score (Calc for Raw Score >=11) 40.22   AM-PAC Applied Cognition Inpatient   Following a Speech/Presentation 4   Understanding Ordinary Conversation 4   Taking Medications 4   Remembering Where Things Are Placed or Put Away 4   Remembering List of 4-5 Errands 4   Taking Care of Complicated Tasks 4   Applied Cognition Raw Score 24   Applied Cognition Standardized Score 62.21   Modified [place] Scale   Modified Kieran Scale 4   Additional Treatment Session   Start Time 1351   End Time 1400   Treatment Assessment Pt provided s/p cardiac sx education packet, reviewed w/ OT, discussed cardiac/sternal precautions, lifestyle modifications, post hospitalization IADL management, environmental temperature control, emotional regulation and management, lifting/driving restrictions, energy conservation techniques, mobility schedule, incisional management, VNA, and cardiac rehabilitation initiation upon clearance per physician at follow up. Pt reviewed education packet and acknowledged reception of such. Additional Treatment Day 1   End of Consult   Education Provided Yes   Patient Position at End of Consult Bedside chair; All needs within reach   Nurse Communication Nurse aware of consult     OT Goals    - Pt will be Mod I with LB ADL by end of hospital course. - Pt will be Mod I with UB ADL by end of hospital course. - Pt will be Mod I with all functional transfers required for patient safety by end of hospital course. - Pt will be Mod I with functional mobility to/from bathroom for increased independence with toileting tasks. - Pt activity tolerance will increase to 30 minutes in order to safely engage in ADL and transfers.     - Pt standing tolerance will increase to 10 minutes  in order to promote sink side ADLs and IADL activities. - Pt will recall all cardiac precautions during OT sessions to promote safety following hospital stay. - Pt will verbalize and demonstrate understanding to cardiac packet following education in order to promote safety following hospital stay.         SUNDAY Ceron, OTR/L

## 2023-11-02 NOTE — PLAN OF CARE
Problem: PHYSICAL THERAPY ADULT  Goal: Performs mobility at highest level of function for planned discharge setting. See evaluation for individualized goals. Description: Treatment/Interventions: ADL retraining, LE strengthening/ROM, Functional transfer training, Elevations, Therapeutic exercise, Endurance training, Patient/family training, Equipment eval/education, Bed mobility, Gait training, Spoke to nursing, Spoke to case management, OT  Equipment Recommended: Afsaneh Gtz (owns)       See flowsheet documentation for full assessment, interventions and recommendations. Outcome: Progressing  Note: Prognosis: Good  Problem List: Decreased strength, Decreased endurance, Impaired balance, Decreased mobility, Obesity, Pain  Assessment: Pt is a 61 y.o. male seen for a high complexity PT evaluation due to Ongoing medical management for primary dx, Increased reliance on more restrictive AD compared to baseline, Decreased activity tolerance compared to baseline, Fall risk, Increased assistance needed from caregiver at current time, Ongoing telemetry monitoring, Continuous pulse oximetry monitoring , s/p surgical intervention. Patient is s/p admit to [address] [place] [address] on 11/1/2023 for Triple vessel coronary artery disease (I25.10). Patient  has a past medical history of Chews tobacco regularly, Consumes three beers daily, Controlled diabetes mellitus ([address]), Diabetes ([address]), Esophageal reflux, GERD (gastroesophageal reflux disease), Hyperlipidemia, Hypertension, Osteoarthritis, and Primary localized osteoarthritis of left knee. .     PT now consulted to assess functional mobility and needs for safe d/c planning. Prior to admission, pt independent with functional mobility, ADLs, and IADLs.  Personal factors affecting status include steps to enter home, steps to negotiate within home, and medical status     Currently pt requires supervision/set-up  for functional transfers with rolling walker ; minimal assistance x1 for ambulation with rolling walker. Pt presents functioning below baseline and w/ overall mobility deficits 2* to: decreased strength, decreased endurance, decreased mobility, impaired balance. These impairments place pt at risk for falls. Pt will continue to benefit from skilled PT interventions to address stated impairments; to maximize functional potential; for ongoing pt/family education; and DME needs. The patient's AM-PAC Basic Mobility Inpatient Short Form Raw Score Is 16. PT is currently anticipating Home with no needs on d/c from hospital. Will continue to follow as able. Rehab Resource Intensity Level, PT: No post-acute rehabilitation needs (anticipating no needs)    See flowsheet documentation for full assessment.

## 2023-11-03 PROBLEM — N17.9 AKI (ACUTE KIDNEY INJURY) (HCC): Status: ACTIVE | Noted: 2023-11-03

## 2023-11-03 PROBLEM — D72.829 LEUKOCYTOSIS: Status: ACTIVE | Noted: 2023-11-03

## 2023-11-03 LAB
ANION GAP SERPL CALCULATED.3IONS-SCNC: 12 MMOL/L
ANION GAP SERPL CALCULATED.3IONS-SCNC: 7 MMOL/L
ANION GAP SERPL CALCULATED.3IONS-SCNC: 9 MMOL/L
ATRIAL RATE: 70 BPM
ATRIAL RATE: 78 BPM
BUN SERPL-MCNC: 22 MG/DL (ref 5–25)
BUN SERPL-MCNC: 24 MG/DL (ref 5–25)
BUN SERPL-MCNC: 27 MG/DL (ref 5–25)
CALCIUM SERPL-MCNC: 8.1 MG/DL (ref 8.4–10.2)
CALCIUM SERPL-MCNC: 8.3 MG/DL (ref 8.4–10.2)
CALCIUM SERPL-MCNC: 8.5 MG/DL (ref 8.4–10.2)
CHLORIDE SERPL-SCNC: 100 MMOL/L (ref 96–108)
CHLORIDE SERPL-SCNC: 101 MMOL/L (ref 96–108)
CHLORIDE SERPL-SCNC: 102 MMOL/L (ref 96–108)
CO2 SERPL-SCNC: 23 MMOL/L (ref 21–32)
CO2 SERPL-SCNC: 28 MMOL/L (ref 21–32)
CO2 SERPL-SCNC: 29 MMOL/L (ref 21–32)
CREAT SERPL-MCNC: 1.52 MG/DL (ref 0.6–1.3)
CREAT SERPL-MCNC: 1.58 MG/DL (ref 0.6–1.3)
CREAT SERPL-MCNC: 1.6 MG/DL (ref 0.6–1.3)
ERYTHROCYTE [DISTWIDTH] IN BLOOD BY AUTOMATED COUNT: 13 % (ref 11.6–15.1)
GFR SERPL CREATININE-BSD FRML MDRD: 45 ML/MIN/1.73SQ M
GFR SERPL CREATININE-BSD FRML MDRD: 45 ML/MIN/1.73SQ M
GFR SERPL CREATININE-BSD FRML MDRD: 48 ML/MIN/1.73SQ M
GLUCOSE SERPL-MCNC: 103 MG/DL (ref 65–140)
GLUCOSE SERPL-MCNC: 112 MG/DL (ref 65–140)
GLUCOSE SERPL-MCNC: 118 MG/DL (ref 65–140)
GLUCOSE SERPL-MCNC: 123 MG/DL (ref 65–140)
GLUCOSE SERPL-MCNC: 124 MG/DL (ref 65–140)
GLUCOSE SERPL-MCNC: 134 MG/DL (ref 65–140)
GLUCOSE SERPL-MCNC: 139 MG/DL (ref 65–140)
GLUCOSE SERPL-MCNC: 140 MG/DL (ref 65–140)
GLUCOSE SERPL-MCNC: 152 MG/DL (ref 65–140)
GLUCOSE SERPL-MCNC: 162 MG/DL (ref 65–140)
GLUCOSE SERPL-MCNC: 165 MG/DL (ref 65–140)
GLUCOSE SERPL-MCNC: 175 MG/DL (ref 65–140)
GLUCOSE SERPL-MCNC: 202 MG/DL (ref 65–140)
GLUCOSE SERPL-MCNC: 219 MG/DL (ref 65–140)
GLUCOSE SERPL-MCNC: 236 MG/DL (ref 65–140)
HCT VFR BLD AUTO: 40.9 % (ref 36.5–49.3)
HGB BLD-MCNC: 13.6 G/DL (ref 12–17)
MAGNESIUM SERPL-MCNC: 2.3 MG/DL (ref 1.9–2.7)
MCH RBC QN AUTO: 31 PG (ref 26.8–34.3)
MCHC RBC AUTO-ENTMCNC: 33.3 G/DL (ref 31.4–37.4)
MCV RBC AUTO: 93 FL (ref 82–98)
P AXIS: 59 DEGREES
P AXIS: 74 DEGREES
PLATELET # BLD AUTO: 168 THOUSANDS/UL (ref 149–390)
PMV BLD AUTO: 12.1 FL (ref 8.9–12.7)
POTASSIUM SERPL-SCNC: 3.6 MMOL/L (ref 3.5–5.3)
POTASSIUM SERPL-SCNC: 3.7 MMOL/L (ref 3.5–5.3)
POTASSIUM SERPL-SCNC: 4 MMOL/L (ref 3.5–5.3)
PR INTERVAL: 224 MS
PR INTERVAL: 260 MS
QRS AXIS: 70 DEGREES
QRS AXIS: 93 DEGREES
QRSD INTERVAL: 106 MS
QRSD INTERVAL: 90 MS
QT INTERVAL: 392 MS
QT INTERVAL: 434 MS
QTC INTERVAL: 446 MS
QTC INTERVAL: 468 MS
RBC # BLD AUTO: 4.39 MILLION/UL (ref 3.88–5.62)
SODIUM SERPL-SCNC: 136 MMOL/L (ref 135–147)
SODIUM SERPL-SCNC: 137 MMOL/L (ref 135–147)
SODIUM SERPL-SCNC: 138 MMOL/L (ref 135–147)
T WAVE AXIS: -13 DEGREES
T WAVE AXIS: -3 DEGREES
VENTRICULAR RATE: 70 BPM
VENTRICULAR RATE: 78 BPM
WBC # BLD AUTO: 12.2 THOUSAND/UL (ref 4.31–10.16)

## 2023-11-03 PROCEDURE — 83735 ASSAY OF MAGNESIUM: CPT | Performed by: PHYSICIAN ASSISTANT

## 2023-11-03 PROCEDURE — 93010 ELECTROCARDIOGRAM REPORT: CPT | Performed by: INTERNAL MEDICINE

## 2023-11-03 PROCEDURE — 99232 SBSQ HOSP IP/OBS MODERATE 35: CPT | Performed by: INTERNAL MEDICINE

## 2023-11-03 PROCEDURE — 80048 BASIC METABOLIC PNL TOTAL CA: CPT | Performed by: PHYSICIAN ASSISTANT

## 2023-11-03 PROCEDURE — 97116 GAIT TRAINING THERAPY: CPT

## 2023-11-03 PROCEDURE — 97530 THERAPEUTIC ACTIVITIES: CPT

## 2023-11-03 PROCEDURE — 99024 POSTOP FOLLOW-UP VISIT: CPT | Performed by: THORACIC SURGERY (CARDIOTHORACIC VASCULAR SURGERY)

## 2023-11-03 PROCEDURE — 94760 N-INVAS EAR/PLS OXIMETRY 1: CPT

## 2023-11-03 PROCEDURE — 99024 POSTOP FOLLOW-UP VISIT: CPT | Performed by: PHYSICIAN ASSISTANT

## 2023-11-03 PROCEDURE — 85027 COMPLETE CBC AUTOMATED: CPT | Performed by: PHYSICIAN ASSISTANT

## 2023-11-03 PROCEDURE — 82948 REAGENT STRIP/BLOOD GLUCOSE: CPT

## 2023-11-03 PROCEDURE — 94664 DEMO&/EVAL PT USE INHALER: CPT

## 2023-11-03 RX ORDER — INSULIN LISPRO 100 [IU]/ML
8 INJECTION, SOLUTION INTRAVENOUS; SUBCUTANEOUS
Status: DISCONTINUED | OUTPATIENT
Start: 2023-11-04 | End: 2023-11-04 | Stop reason: HOSPADM

## 2023-11-03 RX ORDER — HEPARIN SODIUM 5000 [USP'U]/ML
5000 INJECTION, SOLUTION INTRAVENOUS; SUBCUTANEOUS EVERY 8 HOURS SCHEDULED
Status: DISCONTINUED | OUTPATIENT
Start: 2023-11-03 | End: 2023-11-04 | Stop reason: HOSPADM

## 2023-11-03 RX ORDER — INSULIN GLARGINE 100 [IU]/ML
24 INJECTION, SOLUTION SUBCUTANEOUS
Status: DISCONTINUED | OUTPATIENT
Start: 2023-11-03 | End: 2023-11-04 | Stop reason: HOSPADM

## 2023-11-03 RX ORDER — INSULIN LISPRO 100 [IU]/ML
1-6 INJECTION, SOLUTION INTRAVENOUS; SUBCUTANEOUS
Status: DISCONTINUED | OUTPATIENT
Start: 2023-11-03 | End: 2023-11-04 | Stop reason: HOSPADM

## 2023-11-03 RX ORDER — FUROSEMIDE 10 MG/ML
40 INJECTION INTRAMUSCULAR; INTRAVENOUS
Status: DISCONTINUED | OUTPATIENT
Start: 2023-11-04 | End: 2023-11-04 | Stop reason: HOSPADM

## 2023-11-03 RX ADMIN — POLYETHYLENE GLYCOL 3350 17 G: 17 POWDER, FOR SOLUTION ORAL at 09:08

## 2023-11-03 RX ADMIN — ACETAMINOPHEN 650 MG: 325 TABLET, FILM COATED ORAL at 20:16

## 2023-11-03 RX ADMIN — METHOCARBAMOL 500 MG: 500 TABLET ORAL at 17:53

## 2023-11-03 RX ADMIN — FOLIC ACID 1 MG: 1 TABLET ORAL at 09:08

## 2023-11-03 RX ADMIN — POTASSIUM CHLORIDE 20 MEQ: 1500 TABLET, EXTENDED RELEASE ORAL at 17:53

## 2023-11-03 RX ADMIN — DOCUSATE SODIUM 100 MG: 100 CAPSULE, LIQUID FILLED ORAL at 17:53

## 2023-11-03 RX ADMIN — MUPIROCIN 1 APPLICATION: 20 OINTMENT TOPICAL at 09:08

## 2023-11-03 RX ADMIN — Medication 20 MG/HR: at 09:07

## 2023-11-03 RX ADMIN — AMIODARONE HYDROCHLORIDE 200 MG: 200 TABLET ORAL at 21:09

## 2023-11-03 RX ADMIN — ACETAMINOPHEN 650 MG: 325 TABLET, FILM COATED ORAL at 09:10

## 2023-11-03 RX ADMIN — METHOCARBAMOL 500 MG: 500 TABLET ORAL at 12:00

## 2023-11-03 RX ADMIN — HEPARIN SODIUM 5000 UNITS: 5000 INJECTION INTRAVENOUS; SUBCUTANEOUS at 09:10

## 2023-11-03 RX ADMIN — PANTOPRAZOLE SODIUM 40 MG: 40 TABLET, DELAYED RELEASE ORAL at 06:23

## 2023-11-03 RX ADMIN — MUPIROCIN 1 APPLICATION: 20 OINTMENT TOPICAL at 20:16

## 2023-11-03 RX ADMIN — FENOFIBRATE 145 MG: 145 TABLET, FILM COATED ORAL at 09:08

## 2023-11-03 RX ADMIN — ATORVASTATIN CALCIUM 80 MG: 80 TABLET, FILM COATED ORAL at 17:53

## 2023-11-03 RX ADMIN — AMIODARONE HYDROCHLORIDE 200 MG: 200 TABLET ORAL at 06:23

## 2023-11-03 RX ADMIN — SODIUM CHLORIDE 8 UNITS/HR: 9 INJECTION, SOLUTION INTRAVENOUS at 14:16

## 2023-11-03 RX ADMIN — OXYCODONE HYDROCHLORIDE 10 MG: 10 TABLET ORAL at 00:07

## 2023-11-03 RX ADMIN — METOPROLOL TARTRATE 25 MG: 25 TABLET, FILM COATED ORAL at 20:16

## 2023-11-03 RX ADMIN — Medication 1 TABLET: at 09:08

## 2023-11-03 RX ADMIN — GABAPENTIN 400 MG: 400 CAPSULE ORAL at 09:08

## 2023-11-03 RX ADMIN — METHOCARBAMOL 500 MG: 500 TABLET ORAL at 21:08

## 2023-11-03 RX ADMIN — ACETAMINOPHEN 650 MG: 325 TABLET, FILM COATED ORAL at 14:01

## 2023-11-03 RX ADMIN — HEPARIN SODIUM 5000 UNITS: 5000 INJECTION INTRAVENOUS; SUBCUTANEOUS at 21:08

## 2023-11-03 RX ADMIN — INSULIN GLARGINE 24 UNITS: 100 INJECTION, SOLUTION SUBCUTANEOUS at 22:00

## 2023-11-03 RX ADMIN — METHOCARBAMOL 500 MG: 500 TABLET ORAL at 09:08

## 2023-11-03 RX ADMIN — GABAPENTIN 400 MG: 400 CAPSULE ORAL at 17:53

## 2023-11-03 RX ADMIN — AMIODARONE HYDROCHLORIDE 200 MG: 200 TABLET ORAL at 14:01

## 2023-11-03 RX ADMIN — ASPIRIN 325 MG ORAL TABLET 325 MG: 325 PILL ORAL at 09:08

## 2023-11-03 RX ADMIN — THIAMINE HCL TAB 100 MG 100 MG: 100 TAB at 09:08

## 2023-11-03 RX ADMIN — DOCUSATE SODIUM 100 MG: 100 CAPSULE, LIQUID FILLED ORAL at 09:08

## 2023-11-03 RX ADMIN — TEMAZEPAM 15 MG: 15 CAPSULE ORAL at 21:08

## 2023-11-03 RX ADMIN — METOPROLOL TARTRATE 25 MG: 25 TABLET, FILM COATED ORAL at 09:08

## 2023-11-03 RX ADMIN — POTASSIUM CHLORIDE 20 MEQ: 1500 TABLET, EXTENDED RELEASE ORAL at 09:09

## 2023-11-03 RX ADMIN — HEPARIN SODIUM 5000 UNITS: 5000 INJECTION INTRAVENOUS; SUBCUTANEOUS at 14:01

## 2023-11-03 NOTE — PROGRESS NOTES
11/03/23    Procedure: Epicardial Pacing Wire removal    August Closs was returned to bed and informed of mandatory one hour post-procedure bed rest.  The assigned nurse was notified. Epicardial pacing wires removed in routine fashion, without incident. The patient tolerated the procedure well. Vital signs ordered  q 15 minutes for one hour, as per protocol. SIGNATUREBee Schmidt  DATE: November 3, 2023  TIME: 10:38 AM         11/03/23    Procedure: Chest tube removal    Chest tubes removed in routine fashion without incident. Insertion site dressed with Acticoat. Isael Vallecillo tolerated the procedure well. Nurse notified.     SIGNATURE: Bee Schmidt  DATE: November 3, 2023  TIME: 10:38 AM

## 2023-11-03 NOTE — RESPIRATORY THERAPY NOTE
Resp care   11/03/23 0803   Respiratory Assessment   Assessment Type Assess only   General Appearance Awake; Alert   Respiratory Pattern Normal   Chest Assessment Chest expansion symmetrical   Bilateral Breath Sounds Diminished;Clear   Resp Comments [address], spo2 96%, pt titrated to ra, spo2 is 94%, bs are diminished, IS therapy performed, will d/c acp.    Oxygen Therapy/Pulse Ox   O2 Device None (Room air)   SpO2 96 %   SpO2 Activity At Rest   $ Pulse Oximetry Spot Check Charge Completed

## 2023-11-03 NOTE — PROGRESS NOTES
Cardiology Progress Note - Roderick Herrera 61 y.o. male MRN: [id number]    Unit/Bed#: [facility] 431-01 Encounter: [id number]      Impression:  CAD s/p CABG x 4 - hemodynamically stable. Dyslipidemia - on statin. Plan:  Continue current medications. Subjective:    No significant events overnight. Review of Systems   Cardiovascular:  Negative for chest pain, leg swelling and palpitations. Respiratory:  Negative for shortness of breath. Objective:   Vitals: Blood pressure 126/68, pulse 70, temperature 98.2 °F (36.8 °C), resp. rate 17, height 5' 8.9" (1.75 m), weight 112 kg (247 lb 12.8 oz), SpO2 90 %. , Body mass index is 36.7 kg/m².,   Orthostatic Blood Pressures      Flowsheet Row Most Recent Value   Blood Pressure 126/68 filed at 11/03/2023 1022   Patient Position - Orthostatic VS Sitting filed at 11/02/2023 0079           Systolic (17IZP), VQL:723 , Min:93 , SHP:633     Diastolic (82VID), TARI:43, Min:58, Max:83      Intake/Output Summary (Last 24 hours) at 11/3/2023 1031  Last data filed at 11/3/2023 1014  Gross per 24 hour   Intake 1166.57 ml   Output 3250 ml   Net -2083.43 ml     Weight (last 2 days)       Date/Time Weight    11/03/23 0600 112 (247.8)    11/02/23 0600 109 (240.52)    11/01/23 0616 108 (237.99)                Telemetry Review: NSR    Physical Exam  Cardiovascular:      Rate and Rhythm: Normal rate and regular rhythm. Heart sounds: Normal heart sounds. No murmur heard. No friction rub. No gallop. Pulmonary:      Breath sounds: Normal breath sounds. No wheezing or rales.            Laboratory Results:        CBC with diff:   Results from last 7 days   Lab Units 11/03/23  0447 11/02/23  0344 11/01/23  2101 11/01/23  1331 11/01/23  1328 11/01/23  1219 11/01/23  1202 11/01/23  1149   WBC Thousand/uL 12.20* 14.04*  --   --   --   --   --   --    HEMOGLOBIN g/dL 13.6 14.7 15.0 14.2  --   --   --   --    I STAT HEMOGLOBIN g/dl  --   --   --   --  13.3 10.5* 11.2*  -- HEMATOCRIT % 40.9 46.1 44.8 42.8  --   --   --   --    HEMATOCRIT, ISTAT %  --   --   --   --  39 31* 33*  --    MCV fL 93 93  --   --   --   --   --   --    PLATELETS Thousands/uL 168 201  --  159  --   --   --  217   RBC Million/uL 4.39 4.95  --   --   --   --   --   --    MCH pg 31.0 29.7  --   --   --   --   --   --    MCHC g/dL 33.3 31.9  --   --   --   --   --   --    RDW % 13.0 12.8  --   --   --   --   --   --    MPV fL 12.1 12.3  --  11.9  --   --   --  12.0         CMP:  Results from last 7 days   Lab Units 11/03/23  0447 11/02/23  2227 11/02/23  0344 11/01/23  2101 11/01/23  1744 11/01/23  1331 11/01/23  1328 11/01/23  1219 11/01/23  1202 11/01/23  1131 11/01/23  1103 11/01/23  1020 11/01/23  0816   POTASSIUM mmol/L 3.6 4.2 4.2 4.2 4.0 4.0  --   --   --   --   --   --   --    CHLORIDE mmol/L 102 102 109*  --   --  108  --   --   --   --   --   --   --    CO2 mmol/L 23 24 20*  --   --  23  --   --   --   --   --   --   --    CO2, I-STAT mmol/L  --   --   --   --   --   --  23 23 27 29 27 25 27   BUN mg/dL 22 22 16  --   --  13  --   --   --   --   --   --   --    CREATININE mg/dL 1.58* 1.65* 0.96  --   --  0.93  --   --   --   --   --   --   --    GLUCOSE, ISTAT mg/dl  --   --   --   --   --   --  189* 222* 238* 234* 186* 199* 132   CALCIUM mg/dL 8.3* 8.5 8.2*  --   --  8.6  --   --   --   --   --   --   --    EGFR ml/min/1.73sq m 45 43 83  --   --  87  --   --   --   --   --   --   --          BMP:  Results from last 7 days   Lab Units 11/03/23  0447 11/02/23  2227 11/02/23  0344 11/01/23  2101 11/01/23  1744 11/01/23  1331 11/01/23  1328 11/01/23  1219 11/01/23  1202   POTASSIUM mmol/L 3.6 4.2 4.2 4.2 4.0 4.0  --   --   --    CHLORIDE mmol/L 102 102 109*  --   --  108  --   --   --    CO2 mmol/L 23 24 20*  --   --  23  --   --   --    CO2, I-STAT mmol/L  --   --   --   --   --   --  23 23 27   BUN mg/dL 22 22 16  --   --  13  --   --   --    CREATININE mg/dL 1.58* 1.65* 0.96  --   --  0.93  --   -- --    GLUCOSE, ISTAT mg/dl  --   --   --   --   --   --  189* 222* 238*   CALCIUM mg/dL 8.3* 8.5 8.2*  --   --  8.6  --   --   --        BNP:   Results Reviewed       None          No results for input(s): "BNP" in the last 72 hours. Magnesium:   Results from last 7 days   Lab Units 11/03/23  0447 11/02/23  0344   MAGNESIUM mg/dL 2.3 1.9       Coags:       TSH:       Lipid Profile:             Cardiac testing:   No results found for this or any previous visit. No results found for this or any previous visit. No results found for this or any previous visit. No results found for this or any previous visit.       Meds/Allergies   Current Facility-Administered Medications   Medication Dose Route Frequency Provider Last Rate    acetaminophen  650 mg Rectal Q4H PRN Adri Emerson PA-C      acetaminophen  650 mg Oral Q6H While awake Adri Emerson PA-C      amiodarone  200 mg Oral [facility] Adri Emerson PA-C      aspirin  325 mg Oral Daily Adri Emerson PA-C      atorvastatin  80 mg Oral Daily [facility]      bisacodyl  10 mg Rectal Daily PRN Adri Emerson PA-C      docusate sodium  100 mg Oral BID Adri Emerson PA-C      fenofibrate  145 mg Oral Daily Adri Emerson PA-C      folic acid  1 mg Oral Daily Adri Emerson PA-C      furosemide  20 mg/hr Intravenous Continuous Ronaldodrake Stephen PA-C 20 mg/hr (11/03/23 4362)    gabapentin  400 mg Oral BID Adri Emerson PA-C      heparin (porcine)  5,000 Units Subcutaneous Q8H [facility]      insulin regular (HumuLIN R,NovoLIN R) 1 Units/mL in sodium chloride 0.9 % 100 mL infusion  0.3-21 Units/hr Intravenous Titrated Adri Emerson PA-C 3 Units/hr (11/03/23 1018)    lidocaine (cardiac)  100 mg Intravenous Q30 Min PRN Adri Emerson PA-C      methocarbamol  500 mg Oral 4x Daily Adri Emerson PA-C      metoprolol tartrate  25 mg Oral Q12H [facility] Christiano Gomes PA-C      multivitamin-minerals  1 tablet Oral Daily Bee Huff      mupirocin  1 Application Nasal R17D [address] Christiano Gomes PA-C      ondansetron  4 mg Intravenous Q6H PRN Christiano Gomes PA-C      oxyCODONE  10 mg Oral Q4H PRN Christiano Gomes PA-C      oxyCODONE  5 mg Oral Q4H PRN Christiano Gomes PA-C      pantoprazole  40 mg Oral Early Morning Christiano Gomes PA-C      polyethylene glycol  17 g Oral Daily Christiano Gomes PA-C      potassium chloride  20 mEq Oral BID Christiano Gomes PA-C      sodium chloride  20 mL/hr Intravenous Continuous Christiano Gomes PA-C 20 mL/hr (11/01/23 1325)    temazepam  15 mg Oral HS PRN Christiano Gomes PA-C      thiamine  100 mg Oral Daily Christiano Gomes PA-C       furosemide, 20 mg/hr, Last Rate: 20 mg/hr (11/03/23 0907)  insulin regular (HumuLIN R,NovoLIN R) 1 Units/mL in sodium chloride 0.9 % 100 mL infusion, 0.3-21 Units/hr, Last Rate: 3 Units/hr (11/03/23 1018)  sodium chloride, 20 mL/hr, Last Rate: 20 mL/hr (11/01/23 1325)      Medications Prior to Admission   Medication    aspirin (ECOTRIN LOW STRENGTH) 81 mg EC tablet    diphenhydrAMINE-acetaminophen (TYLENOL PM)  MG TABS    metoprolol tartrate (LOPRESSOR) 25 mg tablet    mupirocin (BACTROBAN) 2 % ointment    atorvastatin (LIPITOR) 80 mg tablet    Empagliflozin (Jardiance) 25 MG TABS    Exenatide ER (Bydureon) 2 MG PEN    fenofibrate (TRICOR) 145 mg tablet    gabapentin (NEURONTIN) 400 mg capsule    ibuprofen (MOTRIN) 400 mg tablet    lisinopril-hydrochlorothiazide (PRINZIDE,ZESTORETIC) 20-12.5 MG per tablet    metFORMIN (GLUCOPHAGE) 1000 MG tablet    methocarbamol (ROBAXIN) 500 mg tablet    pantoprazole (PROTONIX) 40 mg tablet       Assessment:  Principal Problem:    Triple vessel coronary artery disease/chest pain/elevated troponin  Active Problems: Hyperlipidemia    Hypertension    Type 2 diabetes mellitus without complication, without long-term current use of insulin (HCC)    Esophageal reflux    Continuous opioid dependence (HCC)    Tobacco abuse    Alcohol abuse    S/P CABG (coronary artery bypass graft)    Leukocytosis    RAFAELA (acute kidney injury) ([address])

## 2023-11-03 NOTE — PROGRESS NOTES
Progress Note - Cardiothoracic Surgery   Serg Salcido 61 y.o. male MRN: [id number]  Unit/Bed#: [facility] 431-01 Encounter: [id number]    Coronary artery disease. S/P coronary artery bypass grafting x 4; POD # 3      24 Hour Events: Transitioned off Lasix infusion to IV bolus dosing yesterday afternoon with good UOP. Pt feels well, denies CP or SOB. Wants to go home today.      Medications:   Scheduled Meds:  Current Facility-Administered Medications   Medication Dose Route Frequency Provider Last Rate    acetaminophen  650 mg Rectal Q4H PRN Tracey Orourke PA-C      acetaminophen  650 mg Oral Q6H While awake Tracey Orourke PA-C      amiodarone  200 mg Oral [facility] Tracey Orourke PA-C      aspirin  325 mg Oral Daily Tracey Orourke PA-C      atorvastatin  80 mg Oral Daily With [facility] Juwan Nieto PA-C      bisacodyl  10 mg Rectal Daily PRN Tracey Orourke PA-C      docusate sodium  100 mg Oral BID Tracey Orourke PA-C      fenofibrate  145 mg Oral Daily Tracey Orourke PA-C      folic acid  1 mg Oral Daily Tracey Orourke PA-C      furosemide  40 mg Intravenous BID (diuretic) Kiara Clifton PA-C      gabapentin  400 mg Oral BID Tracey Orourke PA-C      heparin (porcine)  5,000 Units Subcutaneous [facility] Meera Sanders PA-C      insulin glargine  24 Units Subcutaneous HS Carol Perez MD      insulin lispro  1-6 Units Subcutaneous TID [facility] Carol Perez MD      insulin lispro  1-6 Units Subcutaneous HS Carol Perez MD      insulin lispro  8 Units Subcutaneous Daily With Breakfast Carol Perez MD      insulin lispro  8 Units Subcutaneous Daily With Lunch Carol Perez MD      insulin lispro  8 Units Subcutaneous Daily With Abdi Perez MD      lidocaine (cardiac)  100 mg Intravenous Q30 Min PRN Tracey Orourke PA-C      methocarbamol  500 mg Oral 4x Daily Tracey Orourke PA-C metoprolol tartrate  25 mg Oral Q12H [address] [place] EMELY [place]      multivitamin-minerals  1 tablet Oral Daily Marquis Cliff PA-C      mupirocin  1 Application Nasal D27I [address] Marquis Cliff PA-C      ondansetron  4 mg Intravenous Q6H PRN RENETTA Jean-SKYLER      oxyCODONE  10 mg Oral Q4H PRN RENETTA Jean-SKYLER      oxyCODONE  5 mg Oral Q4H PRN Cecilio RENETTA [place]-SKYLER      pantoprazole  40 mg Oral Early Morning Marquis Cliff PA-C      polyethylene glycol  17 g Oral Daily RENETTA Jean-SKYLER      potassium chloride  20 mEq Oral BID RENETTA Jean-SKYLER      sodium chloride  20 mL/hr Intravenous Continuous RASHID JeanC 20 mL/hr (11/01/23 1325)    temazepam  15 mg Oral HS PRN Marquis Cliff PA-C      thiamine  100 mg Oral Daily Marquis Cliff PA-C       Continuous Infusions:sodium chloride, 20 mL/hr, Last Rate: 20 mL/hr (11/01/23 1325)      PRN Meds:.  acetaminophen    bisacodyl    lidocaine (cardiac)    ondansetron    oxyCODONE    oxyCODONE    temazepam    Vitals:   Vitals:    11/04/23 0600 11/04/23 0653 11/04/23 0653 11/04/23 0800   BP:  136/71 136/71    Pulse:  67 63    Resp:       Temp:  99 °F (37.2 °C) 99 °F (37.2 °C)    TempSrc:       SpO2:  93% 93% 93%   Weight: 111 kg (244 lb 11.4 oz)      Height:           Telemetry: NSR; Heart Rate: 62    Respiratory:   SpO2: SpO2: 93 %;  Room air    Intake/Output:     Intake/Output Summary (Last 24 hours) at 11/4/2023 1014  Last data filed at 11/4/2023 0925  Gross per 24 hour   Intake 890.91 ml   Output 2925 ml   Net -2034.09 ml        Chest tube Output: removed        Weights:   Weight (last 2 days)       Date/Time Weight    11/04/23 0600 111 (244.71)    11/03/23 0600 112 (247.8)    11/02/23 0600 109 (240.52)              Results:   Results from last 7 days   Lab Units 11/03/23  0447 11/02/23  0344 11/01/23  2101 11/01/23  1331   WBC Thousand/uL 12.20* 14.04*  -- --    HEMOGLOBIN g/dL 13.6 14.7 15.0 14.2   HEMATOCRIT % 40.9 46.1 44.8 42.8   PLATELETS Thousands/uL 168 201  --  159     Results from last 7 days   Lab Units 11/04/23  0428 11/03/23  1758 11/03/23  1203 11/01/23  1331 11/01/23  1328   SODIUM mmol/L 139 138 136   < >  --    POTASSIUM mmol/L 3.6 4.0 3.7   < >  --    CHLORIDE mmol/L 101 100 101   < >  --    CO2 mmol/L 28 29 28   < >  --    CO2, I-STAT mmol/L  --   --   --   --  23   BUN mg/dL 27* 27* 24   < >  --    CREATININE mg/dL 1.26 1.60* 1.52*   < >  --    GLUCOSE, ISTAT mg/dl  --   --   --   --  189*   CALCIUM mg/dL 8.3* 8.5 8.1*   < >  --     < > = values in this interval not displayed. Point of care glucose:  - 219  Endo following    Studies:  No new studies past 24 hrs        Invasive Lines/Tubes:  Invasive Devices       Central Venous Catheter Line  Duration             CVC Central Lines 11/01/23 Triple Right Internal jugular 3 days              Peripheral Intravenous Line  Duration             Peripheral IV 11/01/23 Left Forearm 3 days                    Physical Exam:    HEENT/NECK:  Normocephalic. Atraumatic. No jugular venous distention. Cardiac: Regular rate and rhythm and No murmurs/rubs/gallops  Pulmonary:  Breath sounds clear bilaterally and No rales/rhonchi/wheezes  Abdomen:  Non-tender, Non-distended, and Normal bowel sounds  Incisions: Sternum is stable. Incision is clean, dry, and intact. and Saphenectomy incison is clean, dry, and intact. Extremities: Extremities warm/dry and Trace edema B/L  Neuro: Alert and oriented X 3, Sensation is grossly intact, and No focal deficits  Skin: Warm/Dry, without rashes or lesions.        Assessment:  Principal Problem:    Triple vessel coronary artery disease/chest pain/elevated troponin  Active Problems:    Hyperlipidemia    Hypertension    Type 2 diabetes mellitus without complication, without long-term current use of insulin (HCC)    Esophageal reflux    Continuous opioid dependence ([address])    Tobacco abuse    Alcohol abuse    S/P CABG (coronary artery bypass graft)    Leukocytosis    RAFAELA (acute kidney injury) ([address])       Coronary artery disease. S/P coronary artery bypass grafting x 4; POD # 3    Plan:    Cardiac:     Elective surgical admission  Normal ventricular systolic function, EF 68%    NSR; HR well-controlled  BP well-controlled  Continue Lopresor, 25mg PO BID  Hold ACE inhibitor/ARB secondary to renal dysfunction  Continue prophylactic Amiodarone, 200 mg PO TID  Continue ASA and Statin therapy    EPW removed  Remove central IV access today     Transitioned to Heparin from Arixtra  for DVT prophylaxis due to RAFAELA    Pulmonary:   Acute post-op pulmonary insufficiency; resolved. Continue incentive spirometry/coughing/deep breathing exercises. Wean supplemental oxygen as tolerated for saturation > 90%  CTs removed   Nicotine dependence - h/o chewing tobacco. Educated pt on effects of tobacco and importance of cessation    Renal:   Postoperative RAFAELA, with creatinine 1.26 from 1.58 from 1.65, from 0.96; Follow up daily BMP  intake/Output net: -2004 mL/24 hours  Diuretic Regimen:  Continue Lasix 40mg IV BID   Potassium Chloride 20 mEq PO BID    Neuro:  Neurologically intact; No active issues   H/O ETOH abuse - continue CIWA protocol    Incisional pain well controlled   Continue tylenol, 975 mg PO q 8, standing dose   Continue oxycodone, 5 to 10 mg PO q 4 hours prn pain    Continue methocarbamol, 500 mg PO q 6 hours prn pain/muscle spasm  Gabapentin 400mg PO BID    GI:  Controlled carbohydrate diet level two/Cardiac diet, with 1800 mL fluid restriction  Tolerating diet without complaint  Continue stool softeners and prn suppository  Continue GI prophylaxis    Endo:   History of diabetes; Continue insulin therapy as directed by endocrinology physician.   Insulin administration teaching for home therapy ordered once transitioned off insulin gtt.    7    Hematology:   Post-operative blood count acceptable; Trend prn  Leukocytosis; Afebrile with no signs of infection; Trend CBC prn    8.    Disposition:      Following daily PT/OT recommendations regarding home vs. rehab when medically cleared for discharge  Anticipated discharge date: today     VTE Pharmacologic Prophylaxis: Heparin  VTE Mechanical Prophylaxis: sequential compression device    Collaborative rounds completed with supervising physician  Plan of care discussed with bedside nurse    SIGNATURE: Ras Holbrook PA-C  DATE: November 4, 2023  TIME: 10:14 AM

## 2023-11-03 NOTE — PHYSICAL THERAPY NOTE
Physical Therapy Treatment Note       11/03/23 1015   PT Last Visit   PT Visit Date 11/03/23   Note Type   Note Type Treatment   Pain Assessment   Pain Assessment Tool 0-10   Pain Score 3   Pain Location/Orientation Location: Chest;Location: Generalized; Location: Incision   Patient's Stated Pain Goal No pain   Hospital Pain Intervention(s) Ambulation/increased activity;Repositioned   Restrictions/Precautions   Weight Bearing Precautions Per Order No   Other Precautions Cardiac/sternal;Multiple lines;Telemetry; Fall Risk;Pain   General   Chart Reviewed Yes   Family/Caregiver Present No   Cognition   Overall Cognitive Status WFL   Arousal/Participation Responsive   Attention Attends with cues to redirect   Orientation Level Oriented X4   Memory Unable to assess   Following Commands Follows one step commands without difficulty   Subjective   Subjective states he feels OK.  willing to mobilize   Bed Mobility   Sit to Supine 5  Supervision   Additional items Increased time required   Additional Comments returned to supine to have tubes/wires pulled   Transfers   Sit to Stand 5  Supervision   Additional items Assist x 1; Increased time required;Verbal cues   Stand to Sit 5  Supervision   Additional items Assist x 1; Increased time required;Verbal cues   Ambulation/Elevation   Gait pattern   (slow, short step length, forward flexion)   Gait Assistance   (CGA/S)   Additional items Assist x 1   Assistive Device Rolling walker   Distance 140'x2, standing rest x 2-3 min.   increased time spent for repositioning > setup   Balance   Static Sitting Good   Dynamic Sitting Fair   Static Standing Fair -   Dynamic Standing Fair -   Ambulatory Fair -   Endurance Deficit   Endurance Deficit Yes   Endurance Deficit Description fatigue, weakness, pain   Activity Tolerance   Activity Tolerance Patient tolerated treatment well;Patient limited by fatigue;Patient limited by pain;Treatment limited secondary to medical complications (Comment) Nurse Made Aware yes   Assessment   Prognosis Good   Problem List Decreased strength;Decreased endurance; Impaired balance;Decreased mobility;Pain   Assessment Pt seen for session for setup, transfers, gait assessmnet w/ rest time, bed mob, repositioning. Pt cooperative w/ session, some pain. Needing less physical assist for all mobility tasks w/ improving endurance. continue to anticipate d/c home w/ no post acute care therapy needs   Goals   Patient Goals to go home   STG Expiration Date 11/16/23   PT Treatment Day 1   Plan   Treatment/Interventions Functional transfer training;LE strengthening/ROM; Therapeutic exercise; Endurance training;Patient/family training;Equipment eval/education;Gait training;Bed mobility;Elevations   Progress Progressing toward goals   PT Frequency 4-6x/wk   Discharge Recommendation   Rehab Resource Intensity Level, PT No post-acute rehabilitation needs   AM-PAC Basic Mobility Inpatient   Turning in Flat Bed Without Bedrails 4   Lying on Back to Sitting on Edge of Flat Bed Without Bedrails 4   Moving Bed to Chair 3   Standing Up From Chair Using Arms 4   Walk in Room 3   Climb 3-5 Stairs With Railing 3   Basic Mobility Inpatient Raw Score 21   Basic Mobility Standardized Score 45.55   Highest Level Of Mobility   JH-HLM Goal 6: Walk 10 steps or more   JH-HLM Achieved 8: Walk 250 feet ot more     Trell Beasley PT, DPT CSRS

## 2023-11-03 NOTE — PLAN OF CARE
Problem: PHYSICAL THERAPY ADULT  Goal: Performs mobility at highest level of function for planned discharge setting. See evaluation for individualized goals. Description: Treatment/Interventions: ADL retraining, LE strengthening/ROM, Functional transfer training, Elevations, Therapeutic exercise, Endurance training, Patient/family training, Equipment eval/education, Bed mobility, Gait training, Spoke to nursing, Spoke to case management, OT  Equipment Recommended: [employer] (owns)       See flowsheet documentation for full assessment, interventions and recommendations. Outcome: Progressing  Note: Prognosis: Good  Problem List: Decreased strength, Decreased endurance, Impaired balance, Decreased mobility, Pain  Assessment: Pt seen for session for setup, transfers, gait assessmnet w/ rest time, bed mob, repositioning. Pt cooperative w/ session, some pain. Needing less physical assist for all mobility tasks w/ improving endurance. continue to anticipate d/c home w/ no post acute care therapy needs        Rehab Resource Intensity Level, PT: No post-acute rehabilitation needs    See flowsheet documentation for full assessment.

## 2023-11-03 NOTE — PROGRESS NOTES
ENDOCRINOLOGY PROGRESS NOTE     Name: Kyler Esparza   Age & Sex: 61 y.o. male   MRN: [id number]  Unit/Bed#: [facility] 431-01   Encounter: [id number]  Team: ENDOCRINOLOGY    PATIENT INFORMATION     Name: Kyler Esparza   Age & Sex: 61 y.o. male   MRN: [id number]  Hospital Stay Days: 2    ASSESSMENT/PLAN     61y.o.-year-old male with past medical history of diabetes A1c 8.0, postop day 2 status post CABG X4. Endocrinology is consulted for diabetes management. Plan: Will transition off insulin drip to 24U lantus qPM with Humalog 8U TID with meals and SSI for coverage  Continue to monitor blood sugar and adjust regimen as necessary  Patient's RAFAELA noted  If patient to be discharged over the weekend, will need to assess kidney function for appropriate home plan: If RAFAELA does not resolve, can discharge home on current subq insulin regimen  If RAFAELA resolves, can consider discharging on home regimen of metformin 1000mg BID, Jardiance 25mg qd, and exenatide 2mg subq weekly along with a small amount of long acting insulin as patient's most recent A1c is 8.0    SUBJECTIVE     Patient seen and examined. Patient with RAFAELA overnight, started on lasix drip. Was able to titrate down midflow to RA. Endorses mild left sided chest wall discomfort. Breathing has improved since on the lasix drip. OBJECTIVE     Vitals:    23 0833 23 1022 23 1025 23 1515   BP:  126/68 126/68 115/70   Pulse:  70 69 64   Resp:       Temp:    98.1 °F (36.7 °C)   TempSrc:       SpO2: 96% 90% 92% 91%   Weight:       Height:          Temperature:   Temp (24hrs), Av.2 °F (36.8 °C), Min:98.1 °F (36.7 °C), Max:98.2 °F (36.8 °C)    Temperature: 98.1 °F (36.7 °C)  Intake & Output:  I/O          0701   0700  0701   0700  07 0700    P. O. 10 360 300    I.V. (mL/kg) 3702.1 (34) 818.4 (7.3) 73 (0.7)    IV Piggyback 270 50     Cell Saver 1000      Total Intake(mL/kg) 4982.1 (45.7) 1228.4 (11) 373 (3.3) Urine (mL/kg/hr) 1480 (0.6) 2305 (0.9) 1475 (1.3)    Blood 1000      Chest Tube 595 360     Total Output 3075 2665 1475    Net +1907.1 -1436.6 -1102                 Weights:   IBW (Ideal Body Weight): 70.46 kg    Body mass index is 36.7 kg/m². Weight (last 2 days)       Date/Time Weight    11/03/23 0600 112 (247.8)    11/02/23 0600 109 (240.52)    11/01/23 0616 108 (237.99)          Physical Exam  Vitals and nursing note reviewed. Constitutional:       Appearance: He is well-developed. HENT:      Head: Normocephalic and atraumatic. Right Ear: External ear normal.      Left Ear: External ear normal.      Mouth/Throat:      Mouth: Mucous membranes are moist.   Eyes:      Extraocular Movements: Extraocular movements intact. Conjunctiva/sclera: Conjunctivae normal.      Pupils: Pupils are equal, round, and reactive to light. Cardiovascular:      Rate and Rhythm: Normal rate and regular rhythm. Pulses: Normal pulses. Heart sounds: Normal heart sounds. No murmur heard. Pulmonary:      Effort: Pulmonary effort is normal. No respiratory distress. Breath sounds: Normal breath sounds. No wheezing, rhonchi or rales. Abdominal:      General: Bowel sounds are normal. There is no distension. Palpations: Abdomen is soft. There is no mass. Tenderness: There is no abdominal tenderness. There is no guarding. Musculoskeletal:         General: No swelling or deformity. Cervical back: Neck supple. Right lower leg: No edema. Left lower leg: No edema. Skin:     General: Skin is warm and dry. Capillary Refill: Capillary refill takes less than 2 seconds. Coloration: Skin is not jaundiced. Findings: No bruising, lesion or rash. Neurological:      General: No focal deficit present. Mental Status: He is alert and oriented to person, place, and time. LABORATORY DATA     Labs: I have personally reviewed pertinent reports.   Results from last 7 days Lab Units 11/03/23  0447 11/02/23  0344 11/01/23  2101 11/01/23  1331   WBC Thousand/uL 12.20* 14.04*  --   --    HEMOGLOBIN g/dL 13.6 14.7 15.0 14.2   HEMATOCRIT % 40.9 46.1 44.8 42.8   PLATELETS Thousands/uL 168 201  --  159      Results from last 7 days   Lab Units 11/03/23  1203 11/03/23  0447 11/02/23  2227 11/01/23  1331 11/01/23  1328 11/01/23  1219 11/01/23  1202   POTASSIUM mmol/L 3.7 3.6 4.2   < >  --   --   --    CHLORIDE mmol/L 101 102 102   < >  --   --   --    CO2 mmol/L 28 23 24   < >  --   --   --    CO2, I-STAT mmol/L  --   --   --   --  23 23 27   BUN mg/dL 24 22 22   < >  --   --   --    CREATININE mg/dL 1.52* 1.58* 1.65*   < >  --   --   --    CALCIUM mg/dL 8.1* 8.3* 8.5   < >  --   --   --    GLUCOSE, ISTAT mg/dl  --   --   --   --  189* 222* 238*    < > = values in this interval not displayed. Results from last 7 days   Lab Units 11/03/23  0447 11/02/23  0344   MAGNESIUM mg/dL 2.3 1.9                        IMAGING & DIAGNOSTIC TESTING     Radiology Results: I have personally reviewed pertinent reports. XR chest portable    Result Date: 11/2/2023  Impression: Left basilar opacity may be due to atelectasis or small effusion No worsening S/p extubation with mild decreased lung volumes Workstation performed: [id number]     XR chest portable ICU    Result Date: 11/2/2023  Impression: No acute cardiopulmonary disease. Postoperative change. Workstation performed: [id number]     Other Diagnostic Testing: I have personally reviewed pertinent reports.     ACTIVE MEDICATIONS     Current Facility-Administered Medications   Medication Dose Route Frequency    acetaminophen (TYLENOL) rectal suppository 650 mg  650 mg Rectal Q4H PRN    acetaminophen (TYLENOL) tablet 650 mg  650 mg Oral Q6H While awake    amiodarone tablet 200 mg  200 mg Oral Q8H TRACEY    aspirin tablet 325 mg  325 mg Oral Daily    atorvastatin (LIPITOR) tablet 80 mg  80 mg Oral Daily With Dinner    bisacodyl (DULCOLAX) rectal suppository 10 mg  10 mg Rectal Daily PRN    docusate sodium (COLACE) capsule 100 mg  100 mg Oral BID    fenofibrate (TRICOR) tablet 145 mg  145 mg Oral Daily    folic acid (FOLVITE) tablet 1 mg  1 mg Oral Daily    furosemide (LASIX) 500 mg infusion 50 mL  10 mg/hr Intravenous Continuous    [START ON 11/4/2023] furosemide (LASIX) injection 40 mg  40 mg Intravenous BID (diuretic)    gabapentin (NEURONTIN) capsule 400 mg  400 mg Oral BID    heparin (porcine) subcutaneous injection 5,000 Units  5,000 Units Subcutaneous Q8H [address]    insulin glargine (LANTUS) subcutaneous injection 24 Units 0.24 mL  24 Units Subcutaneous HS    insulin lispro (HumaLOG) 100 units/mL subcutaneous injection 1-6 Units  1-6 Units Subcutaneous TID AC    insulin lispro (HumaLOG) 100 units/mL subcutaneous injection 1-6 Units  1-6 Units Subcutaneous HS    [START ON 11/4/2023] insulin lispro (HumaLOG) 100 units/mL subcutaneous injection 8 Units  8 Units Subcutaneous Daily With Breakfast    [START ON 11/4/2023] insulin lispro (HumaLOG) 100 units/mL subcutaneous injection 8 Units  8 Units Subcutaneous Daily With Lunch    [START ON 11/4/2023] insulin lispro (HumaLOG) 100 units/mL subcutaneous injection 8 Units  8 Units Subcutaneous Daily With Dinner    insulin regular (HumuLIN R,NovoLIN R) 1 Units/mL in sodium chloride 0.9 % 100 mL infusion  0.3-21 Units/hr Intravenous Titrated    lidocaine (cardiac) injection 100 mg  100 mg Intravenous Q30 Min PRN    methocarbamol (ROBAXIN) tablet 500 mg  500 mg Oral 4x Daily    metoprolol tartrate (LOPRESSOR) tablet 25 mg  25 mg Oral Q12H [facility]    multivitamin-minerals (CENTRUM) tablet 1 tablet  1 tablet Oral Daily    mupirocin (BACTROBAN) 2 % nasal ointment 1 Application  1 Application Nasal [id number] [facility]    ondansetron (ZOFRAN) injection 4 mg  4 mg Intravenous Q6H PRN    oxyCODONE (ROXICODONE) immediate release tablet 10 mg  10 mg Oral Q4H PRN    oxyCODONE (ROXICODONE) IR tablet 5 mg  5 mg Oral Q4H PRN    pantoprazole (PROTONIX) EC tablet 40 mg  40 mg Oral Early Morning    polyethylene glycol (MIRALAX) packet 17 g  17 g Oral Daily    potassium chloride (K-DUR,KLOR-CON) CR tablet 20 mEq  20 mEq Oral BID    sodium chloride infusion 0.45 %  20 mL/hr Intravenous Continuous    temazepam (RESTORIL) capsule 15 mg  15 mg Oral HS PRN    thiamine tablet 100 mg  100 mg Oral Daily       Portions of the record may have been created with voice recognition software. Occasional wrong word or "sound a like" substitutions may have occurred due to the inherent limitations of voice recognition software.   Read the chart carefully and recognize, using context, where substitutions have occurred.  ==  Shelli Perez MD  [address] [facility]  Internal Medicine Residency PGY-3

## 2023-11-04 VITALS
HEIGHT: 69 IN | WEIGHT: 244.71 LBS | HEART RATE: 63 BPM | RESPIRATION RATE: 17 BRPM | DIASTOLIC BLOOD PRESSURE: 71 MMHG | OXYGEN SATURATION: 93 % | BODY MASS INDEX: 36.24 KG/M2 | SYSTOLIC BLOOD PRESSURE: 136 MMHG | TEMPERATURE: 99 F

## 2023-11-04 LAB
ANION GAP SERPL CALCULATED.3IONS-SCNC: 10 MMOL/L
BUN SERPL-MCNC: 27 MG/DL (ref 5–25)
CALCIUM SERPL-MCNC: 8.3 MG/DL (ref 8.4–10.2)
CHLORIDE SERPL-SCNC: 101 MMOL/L (ref 96–108)
CO2 SERPL-SCNC: 28 MMOL/L (ref 21–32)
CREAT SERPL-MCNC: 1.26 MG/DL (ref 0.6–1.3)
GFR SERPL CREATININE-BSD FRML MDRD: 60 ML/MIN/1.73SQ M
GLUCOSE SERPL-MCNC: 109 MG/DL (ref 65–140)
GLUCOSE SERPL-MCNC: 125 MG/DL (ref 65–140)
GLUCOSE SERPL-MCNC: 220 MG/DL (ref 65–140)
POTASSIUM SERPL-SCNC: 3.6 MMOL/L (ref 3.5–5.3)
SODIUM SERPL-SCNC: 139 MMOL/L (ref 135–147)

## 2023-11-04 PROCEDURE — 99024 POSTOP FOLLOW-UP VISIT: CPT | Performed by: PHYSICIAN ASSISTANT

## 2023-11-04 PROCEDURE — 82948 REAGENT STRIP/BLOOD GLUCOSE: CPT

## 2023-11-04 PROCEDURE — 99232 SBSQ HOSP IP/OBS MODERATE 35: CPT | Performed by: INTERNAL MEDICINE

## 2023-11-04 PROCEDURE — 99024 POSTOP FOLLOW-UP VISIT: CPT | Performed by: THORACIC SURGERY (CARDIOTHORACIC VASCULAR SURGERY)

## 2023-11-04 PROCEDURE — 80048 BASIC METABOLIC PNL TOTAL CA: CPT | Performed by: PHYSICIAN ASSISTANT

## 2023-11-04 RX ORDER — OXYCODONE HYDROCHLORIDE 5 MG/1
TABLET ORAL
Qty: 30 TABLET | Refills: 0 | Status: SHIPPED | OUTPATIENT
Start: 2023-11-04 | End: 2023-11-14

## 2023-11-04 RX ORDER — LANCETS 30 GAUGE
EACH MISCELLANEOUS
Qty: 1 KIT | Refills: 0 | Status: SHIPPED | OUTPATIENT
Start: 2023-11-04

## 2023-11-04 RX ORDER — ASPIRIN 325 MG
325 TABLET ORAL DAILY
Qty: 90 TABLET | Refills: 0 | Status: SHIPPED | OUTPATIENT
Start: 2023-11-05

## 2023-11-04 RX ORDER — POTASSIUM CHLORIDE 20 MEQ/1
20 TABLET, EXTENDED RELEASE ORAL DAILY
Qty: 10 TABLET | Refills: 0 | Status: SHIPPED | OUTPATIENT
Start: 2023-11-04 | End: 2023-11-14

## 2023-11-04 RX ORDER — GLUCOSAMINE HCL/CHONDROITIN SU 500-400 MG
CAPSULE ORAL
Qty: 100 EACH | Refills: 0 | Status: SHIPPED | OUTPATIENT
Start: 2023-11-04

## 2023-11-04 RX ORDER — BLOOD-GLUCOSE METER
EACH MISCELLANEOUS
Qty: 100 EACH | Refills: 0 | Status: SHIPPED | OUTPATIENT
Start: 2023-11-04

## 2023-11-04 RX ORDER — POLYETHYLENE GLYCOL 3350 17 G/17G
17 POWDER, FOR SOLUTION ORAL DAILY PRN
Qty: 10 EACH | Refills: 0 | Status: SHIPPED | OUTPATIENT
Start: 2023-11-04 | End: 2023-11-14

## 2023-11-04 RX ORDER — LANCETS 33 GAUGE
EACH MISCELLANEOUS
Qty: 100 EACH | Refills: 0 | Status: SHIPPED | OUTPATIENT
Start: 2023-11-04 | End: 2023-11-14 | Stop reason: SDUPTHER

## 2023-11-04 RX ORDER — DOCUSATE SODIUM 100 MG/1
100 CAPSULE, LIQUID FILLED ORAL 2 TIMES DAILY
Qty: 60 CAPSULE | Refills: 0 | Status: SHIPPED | OUTPATIENT
Start: 2023-11-04 | End: 2023-12-04

## 2023-11-04 RX ORDER — PANTOPRAZOLE SODIUM 40 MG/1
40 TABLET, DELAYED RELEASE ORAL DAILY
Qty: 30 TABLET | Refills: 0 | Status: SHIPPED | OUTPATIENT
Start: 2023-11-04

## 2023-11-04 RX ORDER — ACETAMINOPHEN 325 MG/1
650 TABLET ORAL EVERY 6 HOURS PRN
Qty: 30 TABLET | Refills: 0 | COMMUNITY
Start: 2023-11-04

## 2023-11-04 RX ORDER — PEN NEEDLE, DIABETIC 32GX 5/32"
NEEDLE, DISPOSABLE MISCELLANEOUS
Qty: 100 EACH | Refills: 0 | Status: SHIPPED | OUTPATIENT
Start: 2023-11-04

## 2023-11-04 RX ORDER — INSULIN GLARGINE 100 [IU]/ML
18 INJECTION, SOLUTION SUBCUTANEOUS
Qty: 3 ML | Refills: 2 | Status: SHIPPED | OUTPATIENT
Start: 2023-11-04 | End: 2023-11-14 | Stop reason: DRUGHIGH

## 2023-11-04 RX ORDER — LORAZEPAM 1 MG/1
2 TABLET ORAL ONCE
Status: COMPLETED | OUTPATIENT
Start: 2023-11-04 | End: 2023-11-04

## 2023-11-04 RX ORDER — FENOFIBRATE 145 MG/1
145 TABLET, COATED ORAL DAILY
Qty: 90 TABLET | Refills: 0 | Status: SHIPPED | OUTPATIENT
Start: 2023-11-04

## 2023-11-04 RX ORDER — ATORVASTATIN CALCIUM 80 MG/1
80 TABLET, FILM COATED ORAL
Qty: 90 TABLET | Refills: 0 | Status: SHIPPED | OUTPATIENT
Start: 2023-11-04

## 2023-11-04 RX ORDER — TORSEMIDE 20 MG/1
20 TABLET ORAL DAILY
Qty: 10 TABLET | Refills: 0 | Status: SHIPPED | OUTPATIENT
Start: 2023-11-04 | End: 2023-11-14

## 2023-11-04 RX ADMIN — FOLIC ACID 1 MG: 1 TABLET ORAL at 08:08

## 2023-11-04 RX ADMIN — METHOCARBAMOL 500 MG: 500 TABLET ORAL at 08:07

## 2023-11-04 RX ADMIN — FENOFIBRATE 145 MG: 145 TABLET, FILM COATED ORAL at 08:07

## 2023-11-04 RX ADMIN — THIAMINE HCL TAB 100 MG 100 MG: 100 TAB at 08:07

## 2023-11-04 RX ADMIN — ASPIRIN 325 MG ORAL TABLET 325 MG: 325 PILL ORAL at 08:07

## 2023-11-04 RX ADMIN — METHOCARBAMOL 500 MG: 500 TABLET ORAL at 11:26

## 2023-11-04 RX ADMIN — ACETAMINOPHEN 650 MG: 325 TABLET, FILM COATED ORAL at 08:07

## 2023-11-04 RX ADMIN — INSULIN LISPRO 2 UNITS: 100 INJECTION, SOLUTION INTRAVENOUS; SUBCUTANEOUS at 11:27

## 2023-11-04 RX ADMIN — LORAZEPAM 2 MG: 1 TABLET ORAL at 08:07

## 2023-11-04 RX ADMIN — INSULIN LISPRO 8 UNITS: 100 INJECTION, SOLUTION INTRAVENOUS; SUBCUTANEOUS at 08:05

## 2023-11-04 RX ADMIN — POTASSIUM CHLORIDE 20 MEQ: 1500 TABLET, EXTENDED RELEASE ORAL at 08:07

## 2023-11-04 RX ADMIN — DOCUSATE SODIUM 100 MG: 100 CAPSULE, LIQUID FILLED ORAL at 08:07

## 2023-11-04 RX ADMIN — MUPIROCIN 1 APPLICATION: 20 OINTMENT TOPICAL at 08:07

## 2023-11-04 RX ADMIN — GABAPENTIN 400 MG: 400 CAPSULE ORAL at 08:07

## 2023-11-04 RX ADMIN — HEPARIN SODIUM 5000 UNITS: 5000 INJECTION INTRAVENOUS; SUBCUTANEOUS at 06:09

## 2023-11-04 RX ADMIN — Medication 1 TABLET: at 08:07

## 2023-11-04 RX ADMIN — PANTOPRAZOLE SODIUM 40 MG: 40 TABLET, DELAYED RELEASE ORAL at 06:09

## 2023-11-04 RX ADMIN — FUROSEMIDE 40 MG: 10 INJECTION, SOLUTION INTRAMUSCULAR; INTRAVENOUS at 08:07

## 2023-11-04 RX ADMIN — METOPROLOL TARTRATE 25 MG: 25 TABLET, FILM COATED ORAL at 08:07

## 2023-11-04 RX ADMIN — AMIODARONE HYDROCHLORIDE 200 MG: 200 TABLET ORAL at 06:09

## 2023-11-04 RX ADMIN — INSULIN LISPRO 8 UNITS: 100 INJECTION, SOLUTION INTRAVENOUS; SUBCUTANEOUS at 11:27

## 2023-11-04 NOTE — PROGRESS NOTES
Cardiology Progress Note - Rajiv Waller 61 y.o. male MRN: [id number]    Unit/Bed#: [facility] 431-01 Encounter: [id number]      Impression:  CAD s/p CABG x 4 - hemodynamically stable. Dyslipidemia - on statin. Plan:  Continue current medications. Likely d/c today. Subjective:    No significant events overnight. Review of Systems   Cardiovascular:  Negative for chest pain, leg swelling and palpitations. Respiratory:  Negative for shortness of breath. Objective:   Vitals: Blood pressure 136/71, pulse 63, temperature 99 °F (37.2 °C), resp. rate 17, height 5' 8.9" (1.75 m), weight 111 kg (244 lb 11.4 oz), SpO2 93 %. , Body mass index is 36.24 kg/m².,   Orthostatic Blood Pressures      Flowsheet Row Most Recent Value   Blood Pressure 136/71 filed at 11/04/2023 2136   Patient Position - Orthostatic VS Sitting filed at 11/02/2023 7519           Systolic (39SWA), GCB:123 , Min:115 , WGL:477     Diastolic (97RXA), XTS:42, Min:68, Max:88      Intake/Output Summary (Last 24 hours) at 11/4/2023 1010  Last data filed at 11/4/2023 0925  Gross per 24 hour   Intake 890.91 ml   Output 3475 ml   Net -2584.09 ml       Weight (last 2 days)       Date/Time Weight    11/04/23 0600 111 (244.71)    11/03/23 0600 112 (247.8)    11/02/23 0600 109 (240.52)                Telemetry Review: NSR    Physical Exam  Cardiovascular:      Rate and Rhythm: Normal rate and regular rhythm. Heart sounds: Normal heart sounds. No murmur heard. No friction rub. No gallop. Pulmonary:      Breath sounds: Normal breath sounds. No wheezing or rales.            Laboratory Results:        CBC with diff:   Results from last 7 days   Lab Units 11/03/23  0447 11/02/23  0344 11/01/23  2101 11/01/23  1331 11/01/23  1328 11/01/23  1219 11/01/23  1202 11/01/23  1149   WBC Thousand/uL 12.20* 14.04*  --   --   --   --   --   --    HEMOGLOBIN g/dL 13.6 14.7 15.0 14.2  --   --   --   --    I STAT HEMOGLOBIN g/dl  --   --   --   --  13.3 10.5* 11.2*  --    HEMATOCRIT % 40.9 46.1 44.8 42.8  --   --   --   --    HEMATOCRIT, ISTAT %  --   --   --   --  39 31* 33*  --    MCV fL 93 93  --   --   --   --   --   --    PLATELETS Thousands/uL 168 201  --  159  --   --   --  217   RBC Million/uL 4.39 4.95  --   --   --   --   --   --    MCH pg 31.0 29.7  --   --   --   --   --   --    MCHC g/dL 33.3 31.9  --   --   --   --   --   --    RDW % 13.0 12.8  --   --   --   --   --   --    MPV fL 12.1 12.3  --  11.9  --   --   --  12.0           CMP:  Results from last 7 days   Lab Units 11/04/23  0428 11/03/23  1758 11/03/23  1203 11/03/23  0447 11/02/23  2227 11/02/23  0344 11/01/23  2101 11/01/23  1744 11/01/23  1331 11/01/23  1328 11/01/23  1219 11/01/23  1202 11/01/23  1131 11/01/23  1103 11/01/23  1020 11/01/23  0816   POTASSIUM mmol/L 3.6 4.0 3.7 3.6 4.2 4.2 4.2   < > 4.0  --   --   --   --   --   --   --    CHLORIDE mmol/L 101 100 101 102 102 109*  --   --  108  --   --   --   --   --   --   --    CO2 mmol/L 28 29 28 23 24 20*  --   --  23  --   --   --   --   --   --   --    CO2, I-STAT mmol/L  --   --   --   --   --   --   --   --   --  23 23 27 29 27 25 27   BUN mg/dL 27* 27* 24 22 22 16  --   --  13  --   --   --   --   --   --   --    CREATININE mg/dL 1.26 1.60* 1.52* 1.58* 1.65* 0.96  --   --  0.93  --   --   --   --   --   --   --    GLUCOSE, ISTAT mg/dl  --   --   --   --   --   --   --   --   --  189* 222* 238* 234* 186* 199* 132   CALCIUM mg/dL 8.3* 8.5 8.1* 8.3* 8.5 8.2*  --   --  8.6  --   --   --   --   --   --   --    EGFR ml/min/1.73sq m 60 45 48 45 43 83  --   --  87  --   --   --   --   --   --   --     < > = values in this interval not displayed.            BMP:  Results from last 7 days   Lab Units 11/04/23  0428 11/03/23  1758 11/03/23  1203 11/03/23  0447 11/02/23  2227 11/02/23  0344 11/01/23  2101 11/01/23  1744 11/01/23  1331 11/01/23  1328   POTASSIUM mmol/L 3.6 4.0 3.7 3.6 4.2 4.2 4.2   < > 4.0  --    CHLORIDE mmol/L 101 100 101 102 102 109*  --   --  108  --    CO2 mmol/L 28 29 28 23 24 20*  --   --  23  --    CO2, I-STAT mmol/L  --   --   --   --   --   --   --   --   --  23   BUN mg/dL 27* 27* 24 22 22 16  --   --  13  --    CREATININE mg/dL 1.26 1.60* 1.52* 1.58* 1.65* 0.96  --   --  0.93  --    GLUCOSE, ISTAT mg/dl  --   --   --   --   --   --   --   --   --  189*   CALCIUM mg/dL 8.3* 8.5 8.1* 8.3* 8.5 8.2*  --   --  8.6  --     < > = values in this interval not displayed. BNP:   Results Reviewed       None          No results for input(s): "BNP" in the last 72 hours. Magnesium:   Results from last 7 days   Lab Units 11/03/23  0447 11/02/23  0344   MAGNESIUM mg/dL 2.3 1.9         Coags:       TSH:       Lipid Profile:             Cardiac testing:   No results found for this or any previous visit. No results found for this or any previous visit. No results found for this or any previous visit. No results found for this or any previous visit.       Meds/Allergies   Current Facility-Administered Medications   Medication Dose Route Frequency Provider Last Rate    acetamin[facility]  650 mg [facility]al [facility]H [facility]      acetaminophen  650 mg Oral Q6H While awake [facility]      [facility]iodarone  200 [facility]      aspirin  325 mg Oral Daily [facility]      atorvastatin  80 mg Oral Daily With [employer] Lety Aleman PA-C      bisacodyl  10 mg Rectal Daily PRN [facility]      docusate sodium  100 mg Oral BID [facility]      fenofibrate  145 mg Oral Daily [facility]      folic acid  1 mg Oral Daily [facility]      furosemide  40 mg Intravenous BID (diuretic) Char Clifton PA-C      gabapentin  400 mg Oral BID [facility]      heparin (porcine)  5,000 Units Subcutaneous [facility] Coby Hinton PA-C      insulin glargine  24 Units Subcutaneous ALESSANDRO Edwards Yury Perez MD      insulin lispro  1-6 Units Subcutaneous TID [facility] Bonne Cushing Day, MD      insulin lispro  1-6 Units Subcutaneous HS Bonne Cushing Day, MD      insulin lispro  8 Units Subcutaneous Daily With Breakfast Bonne Cushing Day, MD      insulin lispro  8 Units Subcutaneous Daily With Lunch Bonne Cushing Day, MD      insulin lispro  8 Units Subcutaneous Daily With Lan Daniel Perez MD      lidocaine (cardiac)  100 mg Intravenous Q30 Min PRN Chelsea Bridges PA-C      methocarbamol  500 mg Oral 4x Daily Chelsea Bridges, PA-C      metoprolol tartrate  25 mg Oral Q12H [address] Chelsea Bridges PA-C      multivitamin-minerals  1 tablet Oral Daily Chelsea Bridges, PA-C      mupirocin  1 Application Nasal O76N [address] Chelsea Bridges PA-C      ondansetron  4 mg Intravenous Q6H PRN Chelsea Bridges, PA-C      oxyCODONE  10 mg Oral Q4H PRN Newbern Yuliana, PA-C      oxyCODONE  5 mg Oral Q4H PRN Newbernlanie Bridges, PA-C      pantoprazole  40 mg Oral Early Morning Chelsea Bridges PA-C      polyethylene glycol  17 g Oral Daily Newbern Yuliana, PA-C      potassium chloride  20 mEq Oral BID Chelsea Bridges, PA-C      sodium chloride  20 mL/hr Intravenous Continuous Chelsea Bridges, PA-C 20 mL/hr (11/01/23 1325)    temazepam  15 mg Oral HS PRN Chelsea Bridges PA-C      thiamine  100 mg Oral Daily Newbern Yuliana, PA-C       sodium chloride, 20 mL/hr, Last Rate: 20 mL/hr (11/01/23 1325)      Medications Prior to Admission   Medication    aspirin (ECOTRIN LOW STRENGTH) 81 mg EC tablet    diphenhydrAMINE-acetaminophen (TYLENOL PM)  MG TABS    metoprolol tartrate (LOPRESSOR) 25 mg tablet    mupirocin (BACTROBAN) 2 % ointment    atorvastatin (LIPITOR) 80 mg tablet    Empagliflozin (Jardiance) 25 MG TABS    Exenatide ER (Bydureon) 2 MG PEN    fenofibrate (TRICOR) 145 mg tablet    gabapentin (NEURONTIN) 400 mg capsule    ibuprofen (MOTRIN) 400 mg tablet    lisinopril-hydrochlorothiazide (PRINZIDE,ZESTORETIC) 20-12.5 MG per tablet    metFORMIN (GLUCOPHAGE) 1000 MG tablet    methocarbamol (ROBAXIN) 500 mg tablet    pantoprazole (PROTONIX) 40 mg tablet       Assessment:  Principal Problem:    Triple vessel coronary artery disease/chest pain/elevated troponin  Active Problems:    Hyperlipidemia    Hypertension    Type 2 diabetes mellitus without complication, without long-term current use of insulin (HCC)    Esophageal reflux    Continuous opioid dependence (HCC)    Tobacco abuse    Alcohol abuse    S/P CABG (coronary artery bypass graft)    Leukocytosis    RAFAELA (acute kidney injury) ([address])

## 2023-11-04 NOTE — DISCHARGE SUMMARY
Discharge Summary - Cardiothoracic Surgery   Alberta Gravely 61 y.o. male MRN: [id number]  Unit/Bed#: [facility] 431-01 Encounter: [id number]    Admission Date: 11/1/2023     Discharge Date: 11/04/23    Admitting Diagnosis: Triple vessel coronary artery disease [I25.10]    Primary Discharge Diagnosis:   Coronary artery disease. S/P coronary artery bypass grafting;    Secondary Discharge Diagnosis:    HTN, HLD, GERD, DM2 (A1c 8), Tobacco Abuse, Alcohol Abuse    Attending: Sonya Villarreal M.D. Consulting Physician(s):   Cardiology  Endocrinology  Medical critical care    Procedures Performed:   Procedure(s):  CORONARY ARTERY BYPASS GRAFT (CABG) 4 VESSELS, LIMA TO LAD, SVG TO PDA, SVG TO OM1, SVG TO DIAG2; w/ AMBREEN  [facility])     Hospital Course: The patient was seen in consultation prior to this admission for evaluation of Coronary artery disease. Risks and benefits of coronary artery bypass grafting were discussed in detail, and patient was agreeable. Routine preoperative evaluation was completed and informed consent was obtained prior to admission. 11/1: Elective admission for CABG X 4.   Intraoperative blood products include: None. No significant postoperative bleeding. Transferred to ICU supported with no vasoactive gtts. A-Paced at 80 (SB underlying). SR 70s in ICU, turn off pacing. Wean towards extubation. CIWA protocol ordered. 11/2: Had HA and diaphoresis overnight - hx of EtOH use - given phenobarbital with resolution. Increased pain regimen for c/o pain. CI 2.7 Delined. Toradol x 4 doses. 10L NC, labs stable. Start metoprolol 25 mg bid, Lasix bid. Endocrinology overnight. Transfer to telemetry. 11/3: Received 2mg IV Ativan yesterday for withdrawal symptoms. Pt reports today he is having difficulty with the nicotine withdrawal. Oliguriua overnight, not responsive to lasix bolus. Started on Lasix infusion at 40mg/hr with good response.  At approx 0430, decreased lasix infusion to 20mg/hr. Continued good response, so Lasix gtt decreased to 10mg/hr at 1100. Remains on Insulin infusion due to poor appetite. On 2L NC, wean as able. -1436ml/24 hrs. Cr 1.58 from 1.65 from 0.96. Continue q6hr BMP while on Lasix infusion. WBC downtrending, Hg 13. 6. Change Arixtra to SQ Heparin. D/C CT and EPW. Increase KCL to 20meq TID. PM: stopped lasix gtt, start . lasix 40 IV BID     11/4: Good UOP response with Lasix bolus dosing. -2004ml/24 hrs. NSR, on RA. Cr downtrending to 1.26. Transitioned off Insulin infusion to SQ dosing. Provide insulin teaching. Deemed stable for discharge home today. Condition at Discharge:   good     Discharge Physical Exam:    Please see the documented physical exam from this morning's progress note for details. Discharge Data:  Results from last 7 days   Lab Units 11/03/23  0447 11/02/23  0344 11/01/23  2101 11/01/23  1331   WBC Thousand/uL 12.20* 14.04*  --   --    HEMOGLOBIN g/dL 13.6 14.7 15.0 14.2   HEMATOCRIT % 40.9 46.1 44.8 42.8   PLATELETS Thousands/uL 168 201  --  159     Results from last 7 days   Lab Units 11/04/23  0428 11/03/23  1758 11/03/23  1203 11/01/23  1331 11/01/23  1328   POTASSIUM mmol/L 3.6 4.0 3.7   < >  --    CHLORIDE mmol/L 101 100 101   < >  --    CO2 mmol/L 28 29 28   < >  --    CO2, I-STAT mmol/L  --   --   --   --  23   BUN mg/dL 27* 27* 24   < >  --    CREATININE mg/dL 1.26 1.60* 1.52*   < >  --    GLUCOSE, ISTAT mg/dl  --   --   --   --  189*   CALCIUM mg/dL 8.3* 8.5 8.1*   < >  --     < > = values in this interval not displayed. Discharge instructions/Information to patient and family:   See after visit summary for information provided to patient and family. Donalynn Pillow was educated on restrictions regarding driving and lifting, and techniques of proper incisional care.   They were specifically counselled on signs and symptoms of an incisional infection, and advised to contact our service immediately should they develop fevers, sweats, chill, redness or drainage at the site of any incisions. Provisions for Follow-Up Care:  See after visit summary for information related to follow-up care and any pertinent home health orders. Disposition:  Home    Planned Readmission:   No    Discharge Medications:  See after visit summary for reconciled discharge medications provided to patient and family. Samir Mendes was provided contact information and scheduled a follow up appointment with Leonid Carreon M.D. Additionally, follow up appointments have been scheduled for their primary care physician and primary cardiologist.  Contact information was provided. Samir Mendes was counseled on the importance of avoiding tobacco products. As with all patients whom have undergone open heart surgery, tobacco cessation medication was contraindicated at the time of discharge. ACE/ARB was Contraindicated secondary to renal dysfunction    Beta Blocker was Prescribed at discharge    Aspirin was Prescribed at discharge    Statin was Prescribed at discharge      The patient was discharged on ongoing diuretic therapy with Torsemide, 20 mg, PO QD and Potassium Chloride 20 mEq, PO QD. They were advised to continue these medications for 10 days, unless otherwise directed. Narcotic pain medication was prescribed in the form of Oxycodone. Prior to prescribing, their prescription profile was reviewed on the [facility] of health prescription drug monitoring program.    The patient was informed that following their postoperative surgical evaluation, they will be referred to outpatient cardiac rehabilitation. They were counseled that this program is run by specialists who will help them safely strengthen their heart and prevent more heart disease. Cardiac rehabilitation will include exercise, relaxation, stress management, and heart-healthy nutrition.   Caregivers will also check to make sure their medication regimen is working. During this admission, the patient was questioned on their use of tobacco, alcohol, and illicit/non-prescription drug use in the  previous 24 months. During this time frame they admit to using tobacco and unhealthy alcohol use. As such they have been counseled on the importance of cessation and abstinence. I spent 30 minutes discharging the patient. This time was spent on the day of discharge. I had direct contact with the patient on the day of discharge. Additional documentation is required if more than 30 minutes were spent on discharge.      SIGNATURE: Bee Argueta  DATE: November 4, 2023  TIME: 12:01 PM

## 2023-11-05 ENCOUNTER — HOME CARE VISIT (OUTPATIENT)
Dept: HOME HEALTH SERVICES | Facility: HOME HEALTHCARE | Age: 63
End: 2023-11-05
Payer: COMMERCIAL

## 2023-11-05 VITALS
OXYGEN SATURATION: 94 % | DIASTOLIC BLOOD PRESSURE: 70 MMHG | SYSTOLIC BLOOD PRESSURE: 128 MMHG | RESPIRATION RATE: 16 BRPM | HEART RATE: 58 BPM | TEMPERATURE: 97.4 F

## 2023-11-05 PROCEDURE — 400013 VN SOC

## 2023-11-05 PROCEDURE — G0299 HHS/HOSPICE OF RN EA 15 MIN: HCPCS

## 2023-11-05 NOTE — CASE COMMUNICATION
[facility] has admitted your patient to [facility] service with the following disciplines:      SN  This report is informational only, no responses is needed  Primary focus of home health care: post CABG  Patient stated goals of care: "I want these incisions to heal and to be able to get back to work"  Anticipated visit pattern and next visit date: 3w1,2w2,1w2    See medication list - meds in home differ from AVS: Pts Lantus was no t available for  from his local [facility] pharmacy but per pharmacist it will be available for  tomorrow AM along with glucometer. Please be advised, EMR/EPIC identified drug interactions for this patient based on the current medication profile. Significant clinical findings: A&Ox4. HRR, no edema. Lungs clear, LLL diminished, occ dry NP cough. LBM today, denies incont b/b. Incisions healing well. Potential barriers to goa l achievement: pain, impaired functional mobility, on multiple medications, fall risk, SOB with ADLs      Thank you for allowing us to participate in the care of your patient.       Eligio Canales RN  [facility]

## 2023-11-06 NOTE — UTILIZATION REVIEW
NOTIFICATION OF ADMISSION DISCHARGE   This is a Notification of Discharge from [address] [facility]. Please be advised that this patient has been discharge from our facility. Below you will find the admission and discharge date and time including the patient’s disposition. UTILIZATION REVIEW CONTACT:  Josué Felix  Utilization   Network Utilization Review Department  Phone: [phone] x carefully listen to the prompts. All voicemails are confidential.  Email: [email or web address]     ADMISSION INFORMATION  PRESENTATION DATE: 11/1/2023  5:44 AM  OBERVATION ADMISSION DATE:   INPATIENT ADMISSION DATE: 11/1/23  6:56 AM   DISCHARGE DATE: 11/4/2023 12:57 PM   DISPOSITION:Home with [address] [place] [address] Utilization Review Department  ATTENTION: Please call with any questions or concerns to [phone] and carefully listen to the prompts so that you are directed to the right person. All voicemails are confidential.   For Discharge needs, contact Care Management DC Support Team at [phone] opt. 2  Send all requests for admission clinical reviews, approved or denied determinations and any other requests to dedicated fax number below belonging to the campus where the patient is receiving treatment.  List of dedicated fax numbers for the Facilities:  [place] DENIALS (Administrative/Medical Necessity) [phone]   DISCHARGE SUPPORT TEAM (Network) [phone] [address] [facility] (Maternity/NICU/Pediatrics) [phone]   [address] [facility] [address] [place] [address] [address]  [facility] [phone]

## 2023-11-07 ENCOUNTER — TRANSITIONAL CARE MANAGEMENT (OUTPATIENT)
Dept: FAMILY MEDICINE CLINIC | Facility: CLINIC | Age: 63
End: 2023-11-07

## 2023-11-07 ENCOUNTER — HOME CARE VISIT (OUTPATIENT)
Dept: HOME HEALTH SERVICES | Facility: HOME HEALTHCARE | Age: 63
End: 2023-11-07
Payer: COMMERCIAL

## 2023-11-07 VITALS
DIASTOLIC BLOOD PRESSURE: 80 MMHG | BODY MASS INDEX: 34.51 KG/M2 | SYSTOLIC BLOOD PRESSURE: 122 MMHG | HEART RATE: 58 BPM | RESPIRATION RATE: 16 BRPM | OXYGEN SATURATION: 98 % | WEIGHT: 233 LBS

## 2023-11-07 PROCEDURE — G0299 HHS/HOSPICE OF RN EA 15 MIN: HCPCS

## 2023-11-09 ENCOUNTER — HOME CARE VISIT (OUTPATIENT)
Dept: HOME HEALTH SERVICES | Facility: HOME HEALTHCARE | Age: 63
End: 2023-11-09
Payer: COMMERCIAL

## 2023-11-09 PROCEDURE — G0180 MD CERTIFICATION HHA PATIENT: HCPCS | Performed by: THORACIC SURGERY (CARDIOTHORACIC VASCULAR SURGERY)

## 2023-11-11 LAB
ALBUMIN/CREAT UR: 11 MG/G CREAT (ref 0–29)
BUN SERPL-MCNC: 23 MG/DL (ref 8–27)
BUN/CREAT SERPL: 15 (ref 10–24)
CALCIUM SERPL-MCNC: 10.2 MG/DL (ref 8.6–10.2)
CHLORIDE SERPL-SCNC: 101 MMOL/L (ref 96–106)
CO2 SERPL-SCNC: 22 MMOL/L (ref 20–29)
CREAT SERPL-MCNC: 1.56 MG/DL (ref 0.76–1.27)
CREAT UR-MCNC: 136.2 MG/DL
EGFR: 50 ML/MIN/1.73
EST. AVERAGE GLUCOSE BLD GHB EST-MCNC: 180 MG/DL
GLUCOSE SERPL-MCNC: 159 MG/DL (ref 70–99)
HBA1C MFR BLD: 7.9 % (ref 4.8–5.6)
MICROALBUMIN UR-MCNC: 15.6 UG/ML
POTASSIUM SERPL-SCNC: 4.6 MMOL/L (ref 3.5–5.2)
SODIUM SERPL-SCNC: 140 MMOL/L (ref 134–144)

## 2023-11-13 ENCOUNTER — TELEPHONE (OUTPATIENT)
Dept: CARDIAC SURGERY | Facility: CLINIC | Age: 63
End: 2023-11-13

## 2023-11-13 ENCOUNTER — TELEPHONE (OUTPATIENT)
Dept: HOME HEALTH SERVICES | Facility: HOME HEALTHCARE | Age: 63
End: 2023-11-13

## 2023-11-13 ENCOUNTER — TELEPHONE (OUTPATIENT)
Dept: FAMILY MEDICINE CLINIC | Facility: CLINIC | Age: 63
End: 2023-11-13

## 2023-11-13 NOTE — TELEPHONE ENCOUNTER
Confrmation - Referral [id number] Effective: 11/13/2023 Expires: 02/11/2024 [address]erred From [address] Specialty: Family Practice Group NPI: [id number] Provider ID: [id number] Tax ID: [id number] [address] [place], [address] Referred To [facility] Specialty: Not Available Group NPI: [id number] Provider ID: [id number] Tax ID: [id number] Individual Provider NPI: Not Available This referral is valid at any location for the above group Patient Info [address] [facility] [phone] Male 1960 [address] [place] [phone] Clinical Information Place of Service Ofce Service Type Medical Care Diagnoses 1 I10- essential (primary) hypertension Procedures 1 26335- unlisted evaluation and management service Disclaimer [employer] and its [employer] ([employer]) will pay for only those services covered under the [place] which are specifcally noted and requested by the PCP or OBGYN on the referral. If any additiona

## 2023-11-13 NOTE — TELEPHONE ENCOUNTER
POST OP CALL    11/1/23: Elective CABG x 4  11/4/23: Discharge home  11/13/23: Called and spoke to patient. He had his repeat BMP obtained on 11/10/23 as ordered at discharge. Review results with patient. Lab Results   Component Value Date    SODIUM 140 11/10/2023    K 4.6 11/10/2023     11/10/2023    CO2 22 11/10/2023    BUN 23 11/10/2023    CREATININE 1.56 (H) 11/10/2023    GLUC 159 (H) 11/10/2023    CALCIUM 8.3 (L) 11/04/2023     Patient is feeling well. NO SOB, edema, palpitations, lightheadedness. Current weight is 231 lb (pre op weight 237 lb)  Instructed to stop Torsemide and Potassium chloride. Patient reports clear/ pink tinged drainage from chest incision, no open areas, redness swelling or pain. He denies fever or chills. HE IS NOT CLEANSING HIS INCISIONS DAILY AS INSTRUCTED AT DISCHARGE. INSTRUCTED TO SHOWER DAILY AND USE DISPOSABLE CLOTS AND CHLORHEXIDINE SOAP  [address] INCISIONS DAILY. PAT DRY, COVER WITH DRY STERILE Paralee Pair. DO NOT APPLY ANY OINTMENTS, ETC.     Reviewed appts with patient.   PCP tomorrow  Cardiology 11/16/23  Surgeon 12/1    Questions answered

## 2023-11-14 ENCOUNTER — HOME CARE VISIT (OUTPATIENT)
Dept: HOME HEALTH SERVICES | Facility: HOME HEALTHCARE | Age: 63
End: 2023-11-14
Payer: COMMERCIAL

## 2023-11-14 ENCOUNTER — OFFICE VISIT (OUTPATIENT)
Dept: FAMILY MEDICINE CLINIC | Facility: CLINIC | Age: 63
End: 2023-11-14
Payer: COMMERCIAL

## 2023-11-14 VITALS — DIASTOLIC BLOOD PRESSURE: 78 MMHG | SYSTOLIC BLOOD PRESSURE: 122 MMHG | WEIGHT: 233 LBS | BODY MASS INDEX: 34.51 KG/M2

## 2023-11-14 DIAGNOSIS — I25.10 CAD IN NATIVE ARTERY: ICD-10-CM

## 2023-11-14 DIAGNOSIS — Z95.1 S/P CABG (CORONARY ARTERY BYPASS GRAFT): ICD-10-CM

## 2023-11-14 DIAGNOSIS — Z76.89 ENCOUNTER FOR SUPPORT AND COORDINATION OF TRANSITION OF CARE: ICD-10-CM

## 2023-11-14 DIAGNOSIS — N17.9 AKI (ACUTE KIDNEY INJURY) (HCC): ICD-10-CM

## 2023-11-14 DIAGNOSIS — Z79.4 TYPE 2 DIABETES MELLITUS WITHOUT COMPLICATION, WITH LONG-TERM CURRENT USE OF INSULIN (HCC): ICD-10-CM

## 2023-11-14 DIAGNOSIS — E11.9 TYPE 2 DIABETES MELLITUS WITHOUT COMPLICATION, WITH LONG-TERM CURRENT USE OF INSULIN (HCC): ICD-10-CM

## 2023-11-14 DIAGNOSIS — I25.10 TRIPLE VESSEL CORONARY ARTERY DISEASE: Primary | ICD-10-CM

## 2023-11-14 PROCEDURE — 99214 OFFICE O/P EST MOD 30 MIN: CPT | Performed by: FAMILY MEDICINE

## 2023-11-14 RX ORDER — LANCETS 33 GAUGE
EACH MISCELLANEOUS
Qty: 100 EACH | Refills: 10 | Status: SHIPPED | OUTPATIENT
Start: 2023-11-14

## 2023-11-14 RX ORDER — INSULIN GLARGINE 100 [IU]/ML
20 INJECTION, SOLUTION SUBCUTANEOUS
Qty: 100 ML | Refills: 5 | Status: SHIPPED | OUTPATIENT
Start: 2023-11-14

## 2023-11-14 RX ORDER — METFORMIN HYDROCHLORIDE 500 MG/1
500 TABLET, EXTENDED RELEASE ORAL 2 TIMES DAILY WITH MEALS
Qty: 180 TABLET | Refills: 3 | Status: SHIPPED | OUTPATIENT
Start: 2023-11-14

## 2023-11-14 NOTE — PROGRESS NOTES
Assessment/Plan:    Triple vessel coronary artery disease/chest pain/elevated troponin  TCM sp CABG. Reviewed labs-- RAFAELA-- diuretic discontinued/metformin decrease to 500 mg BID. Monitoring bg with log-- encouraged fbg. Increase lantus to 20 U. Cards f/u 11/16/23. Recheck CMP within the next month. Diagnoses and all orders for this visit:    Triple vessel coronary artery disease/chest pain/elevated troponin  -     OneTouch Delica Lancets 47B MISC; May substitute brand based on insurance coverage. Check glucose TID. Encounter for support and coordination of transition of care    Type 2 diabetes mellitus without complication, with long-term current use of insulin (HCC)  -     Insulin Glargine Solostar (Lantus SoloStar) 100 UNIT/ML SOPN; Inject 0.2 mL (20 Units total) under the skin daily at bedtime  -     OneTouch Delica Lancets 93D MISC; May substitute brand based on insurance coverage. Check glucose TID. -     metFORMIN (GLUCOPHAGE-XR) 500 mg 24 hr tablet; Take 1 tablet (500 mg total) by mouth 2 (two) times a day with meals    S/P CABG (coronary artery bypass graft)  -     OneTouch Delica Lancets 89S MISC; May substitute brand based on insurance coverage. Check glucose TID. CAD in native artery  -     OneTouch Delica Lancets 93W MISC; May substitute brand based on insurance coverage. Check glucose TID. RAFAELA (acute kidney injury) ([address])  -     Comprehensive metabolic panel; Future  -     Comprehensive metabolic panel          Subjective:   Chief Complaint   Patient presents with    Transition of Care Management        Patient ID: Sandy Espinoza is a 61 y.o. male. HPI    The following portions of the patient's history were reviewed and updated as appropriate: allergies, current medications, past family history, past medical history, past social history, past surgical history and problem list.    Review of Systems   Constitutional:  Negative for fatigue, fever and unexpected weight change.    HENT: Negative for congestion, sinus pain and sore throat. Eyes:  Negative for visual disturbance. Respiratory:  Negative for shortness of breath and wheezing. Cardiovascular:  Negative for chest pain and palpitations. Gastrointestinal:  Negative for abdominal pain, nausea and vomiting. Musculoskeletal: Negative. Negative for arthralgias and myalgias. Neurological:  Negative for syncope, weakness and numbness. Psychiatric/Behavioral: Negative. Negative for confusion, dysphoric mood and suicidal ideas. Objective:  Vitals:    11/14/23 1129   BP: 122/78   BP Location: Left arm   Patient Position: Sitting   Cuff Size: Standard   Weight: 106 kg (233 lb)      Physical Exam  Vitals and nursing note reviewed. Constitutional:       Appearance: Normal appearance. He is well-developed. HENT:      Head: Normocephalic. Right Ear: Ear canal normal. Tympanic membrane is not injected. Left Ear: Ear canal normal. Tympanic membrane is not injected. Nose: Nose normal.   Eyes:      General:         Right eye: No discharge. Left eye: No discharge. Conjunctiva/sclera: Conjunctivae normal.      Pupils: Pupils are equal, round, and reactive to light. Neck:      Thyroid: No thyromegaly. Cardiovascular:      Rate and Rhythm: Normal rate and regular rhythm. Heart sounds: Normal heart sounds. No murmur heard. Pulmonary:      Effort: Pulmonary effort is normal. No respiratory distress. Breath sounds: Normal breath sounds. No wheezing. Abdominal:      General: Bowel sounds are normal. There is no distension. Palpations: Abdomen is soft. Tenderness: There is no abdominal tenderness. Musculoskeletal:         General: Normal range of motion. Cervical back: Normal range of motion and neck supple. Lymphadenopathy:      Cervical: No cervical adenopathy. Skin:     General: Skin is warm and dry.    Neurological:      Mental Status: He is alert and oriented to person, place, and time. He is not disoriented. Sensory: No sensory deficit. Motor: No weakness. Coordination: Coordination normal.      Gait: Gait normal.      Deep Tendon Reflexes: Reflexes are normal and symmetric. Psychiatric:         Speech: Speech normal.         Behavior: Behavior normal.         Thought Content:  Thought content normal.         Judgment: Judgment normal.

## 2023-11-14 NOTE — ASSESSMENT & PLAN NOTE
TCM sp CABG. Reviewed labs-- RAFAELA-- diuretic discontinued/metformin decrease to 500 mg BID. Monitoring bg with log-- encouraged fbg. Increase lantus to 20 U. Cards f/u 11/16/23. Recheck CMP within the next month.

## 2023-11-14 NOTE — PROGRESS NOTES
Cardiology Office Follow Up  Kristina David  1960  [id number]      ASSESSMENT:  Coronary artery disease  10/2023 NSTEMI  10/2023 LHC: Multivessel coronary disease  10/2023 CABG x4: LIMA-LAD, SVG-RPDA, SVG-OM1, SVG-second diagonal  10/2023 Echo: LVEF 60%, mild aortic stenosis  Type 2 diabetes  Hypertension  Dyslipidemia  10/2023 lipids: Cholesterol 223, triglycerides 409, HDL 37, direct    Current Rx: Lipitor 80  Tobacco use, chewing tobacco  GERD  Chronic back pain    PLAN/ DISCUSSION:  Patient seen and examined with attending this afternoon as sternotomy site appears erythematous and inflamed with some purulent drainage. CT surgery was contacted and are arranging a visit tomorrow in their office. In the meantime we have started Keflex 500 mg 4 times daily. He does also report swelling to his left lower extremity which suspect could be some vascular congestion related to vein harvesting. For completeness sake I did order lower extremity duplex to rule out DVT. He reports no fevers or chills. Heart rate and blood pressure stable today. For CAD he remains on aspirin, statin, Tricor, metoprolol  Once recovered from surgery cardiac rehab is recommended  We will arrange follow-up in 2-3 months    Interval History/ HPI:   77-year-old gentleman recently hospitalized with chest pain and elevated troponin. He underwent cardiac catheterization at this time which showed multivessel coronary disease. He subsequently underwent elective CABG x4. He is here today for posthospital follow-up. Today comes the office accompanied by his significant other. He reports that since his surgery he feels his breathing might be somewhat better. He has been trying to walk with his dog but not having the dog on the leash. He feels that maybe his breathing is better while walking. He has some discomfort from his sternotomy site but is having no deeper chest pain. He has no palpitations or dizziness.   He reports left lower extremity swelling and tightness and some discomfort. He also reports that his sternotomy site is red and having some drainage. He is showering and washing it daily. He has no fevers or chills. Vitals:  /70 (BP Location: Right arm, Patient Position: Sitting, Cuff Size: Standard)   Pulse 70   Ht 5' 8.9" (1.75 m)   Wt 104 kg (230 lb)   SpO2 95%   BMI 34.06 kg/m²      Past Medical History:   Diagnosis Date    Chews tobacco regularly     Consumes three beers daily     Controlled diabetes mellitus (HCC)     Diabetes ([address])     Esophageal reflux     GERD (gastroesophageal reflux disease)     Hyperlipidemia     Hypertension     Osteoarthritis     LEFT KNEE    Primary localized osteoarthritis of left knee     last assessed: 1/16/2018     Social History     Socioeconomic History    Marital status: /Civil Union     Spouse name: Not on file    Number of children: Not on file    Years of education: Not on file    Highest education level: Not on file   Occupational History    Not on file   Tobacco Use    Smoking status: Former     Packs/day: 1.00     Years: 11.00     Total pack years: 11.00     Types: Cigarettes    Smokeless tobacco: Current     Types: Chew    Tobacco comments:     quit 30 + yrs ago    Vaping Use    Vaping Use: Never used   Substance and Sexual Activity    Alcohol use:  Yes     Alcohol/week: 20.0 standard drinks of alcohol     Types: 20 Cans of beer per week     Comment: daily beer & "gin & tonics" on a weekend ; social alcohol use ( as per allscripts)    Drug use: No    Sexual activity: Not on file   Other Topics Concern    Not on file   Social History Narrative    Not on file     Social Determinants of Health     Financial Resource Strain: Not on file   Food Insecurity: No Food Insecurity (11/2/2023)    Hunger Vital Sign     Worried About Running Out of Food in the Last Year: Never true     Ran Out of Food in the Last Year: Never true   Transportation Needs: No Transportation Needs (11/2/2023)    PRAPARE - Transportation     Lack of Transportation (Medical): No     Lack of Transportation (Non-Medical): No   Physical Activity: Not on file   Stress: Not on file   Social Connections: Not on file   Intimate Partner Violence: Not on file   Housing Stability: Low Risk  (11/2/2023)    Housing Stability Vital Sign     Unable to Pay for Housing in the Last Year: No     Number of Places Lived in the Last Year: 1     Unstable Housing in the Last Year: No      Family History   Problem Relation Age of Onset    Colon cancer Father     Mental illness Family         mental disorder    Substance Abuse Neg Hx      Past Surgical History:   Procedure Laterality Date    CARDIAC CATHETERIZATION Left 10/18/2023    Procedure: Cardiac pci;  Surgeon: Leeann Painter DO;  [facility]; Service: Cardiology    CARDIAC CATHETERIZATION N/A 10/18/2023    Procedure: Cardiac Coronary Angiogram;  Surgeon: Leeann Painter DO;  [facility]; Service: Cardiology    CARDIAC CATHETERIZATION N/A 10/18/2023    Procedure: Cardiac other - IFR;  Surgeon: Leeann Painter DO;  [facility]; Service: Cardiology    COLONOSCOPY      HARVEST VEIN Left 11/1/2023    Procedure: HARVEST VEIN ENDOSCOPIC ([address]);   Surgeon: Jolene Caceres MD;  Location: [facility];  Service: Cardiac Surgery    IN ARTHRP KNE CONDYLE&PLATU MEDIAL&LAT COMPARTMENTS Left 1/8/2018    Procedure: ARTHROPLASTY KNEE TOTAL;  Surgeon: Dia Conner MD;  Location: [facility];  Service: Orthopedics    IN CORONARY ARTERY BYP W/VEIN & ARTERY GRAFT 4 VEIN N/A 11/1/2023    Procedure: CORONARY ARTERY BYPASS GRAFT (CABG) 4 VESSELS, LIMA TO LAD, SVG TO PDA, SVG TO OM1, SVG TO DIAG2; w/ AMBREEN;  Surgeon: Jolene Caceres MD;  Location: [facility];  Service: Cardiac Surgery    REPLACEMENT TOTAL KNEE Left        Current Outpatient Medications:     acetaminophen (TYLENOL) 325 mg tablet, Take 2 tablets (650 mg total) by mouth every 6 (six) hours as needed for mild pain, headaches or fever, Disp: 30 tablet, Rfl: 0    Alcohol Swabs 70 % PADS, May substitute brand based on insurance coverage. Check glucose TID., Disp: 100 each, Rfl: 0    aspirin 325 mg tablet, Take 1 tablet (325 mg total) by mouth daily Do not start before November 5, 2023., Disp: 90 tablet, Rfl: 0    atorvastatin (LIPITOR) 80 mg tablet, Take 1 tablet (80 mg total) by mouth daily with dinner, Disp: 90 tablet, Rfl: 0    Blood Glucose Monitoring Suppl (OneTouch Verio Reflect) w/Device KIT, May substitute brand based on insurance coverage. Check glucose TID., Disp: 1 kit, Rfl: 0    diphenhydrAMINE-acetaminophen (TYLENOL PM)  MG TABS, Take 1 tablet by mouth daily at bedtime as needed for sleep, Disp: , Rfl:     docusate sodium (COLACE) 100 mg capsule, Take 1 capsule (100 mg total) by mouth 2 (two) times a day, Disp: 60 capsule, Rfl: 0    Empagliflozin (Jardiance) 25 MG TABS, Take 1 tablet (25 mg total) by mouth every morning, Disp: 90 tablet, Rfl: 3    Exenatide ER (Bydureon) 2 MG PEN, INJECT THE CONTENTS OF 1  PEN SUBCUTANEOUSLY WEEKLY  ON SUNDAYS, Disp: 12 each, Rfl: 3    fenofibrate (TRICOR) 145 mg tablet, Take 1 tablet (145 mg total) by mouth daily, Disp: 90 tablet, Rfl: 0    gabapentin (NEURONTIN) 400 mg capsule, Take 1 PO BID., Disp: 180 capsule, Rfl: 3    glucose blood (OneTouch Verio) test strip, May substitute brand based on insurance coverage. Check glucose TID., Disp: 100 each, Rfl: 0    Insulin Glargine Solostar (Lantus SoloStar) 100 UNIT/ML SOPN, Inject 0.2 mL (20 Units total) under the skin daily at bedtime, Disp: 100 mL, Rfl: 5    Insulin Pen Needle (BD Pen Needle Audelia 2nd Gen) 32G X 4 MM MISC, For use with insulin pen.  Pharmacy may dispense brand covered by insurance., Disp: 100 each, Rfl: 0    metFORMIN (GLUCOPHAGE-XR) 500 mg 24 hr tablet, Take 1 tablet (500 mg total) by mouth 2 (two) times a day with meals, Disp: 180 tablet, Rfl: 3    methocarbamol (ROBAXIN) 500 mg tablet, Take 1 tablet (500 mg total) by mouth 4 (four) times a day, Disp: 60 tablet, Rfl: 5    metoprolol tartrate (LOPRESSOR) 25 mg tablet, Take 1 tablet (25 mg total) by mouth every 12 (twelve) hours, Disp: 60 tablet, Rfl: 2    OneTouch Delica Lancets 11Y MISC, May substitute brand based on insurance coverage. Check glucose TID., Disp: 100 each, Rfl: 10    pantoprazole (PROTONIX) 40 mg tablet, Take 1 tablet (40 mg total) by mouth daily, Disp: 30 tablet, Rfl: 0    polyethylene glycol (GLYCOLAX) 17 GM/SCOOP powder, Take 17 g by mouth daily, Disp: , Rfl:       Review of Systems:  Review of Systems   Constitutional:  Negative for chills and fever. HENT:  Negative for ear pain and sore throat. Eyes:  Negative for pain and visual disturbance. Respiratory:  Negative for cough and shortness of breath. Cardiovascular:  Positive for leg swelling. Negative for chest pain and palpitations. Gastrointestinal:  Negative for abdominal pain and vomiting. Genitourinary:  Negative for dysuria and hematuria. Musculoskeletal:  Negative for arthralgias and back pain. Redness and draining from sternotomy   Skin:  Negative for color change and rash. Neurological:  Negative for seizures and syncope. All other systems reviewed and are negative. Physical Exam:  Physical Exam  Constitutional:       Appearance: He is well-developed. HENT:      Head: Normocephalic and atraumatic. Eyes:      Pupils: Pupils are equal, round, and reactive to light. Cardiovascular:      Rate and Rhythm: Normal rate and regular rhythm. Heart sounds: No murmur heard. No friction rub. No gallop. Pulmonary:      Effort: Pulmonary effort is normal. No respiratory distress. Breath sounds: Normal breath sounds. No wheezing or rales. Abdominal:      General: Bowel sounds are normal. There is no distension. Palpations: Abdomen is soft. Tenderness: There is no abdominal tenderness. Musculoskeletal:         General: No deformity. Normal range of motion. Cervical back: Normal range of motion and neck supple. Comments: Midline sternotomy site appears erythematous with small amount of purulent drainage  Left lower extremity is tight with 1+ edema, right lower extremity no edema   Skin:     General: Skin is warm and dry. Neurological:      Mental Status: He is alert and oriented to person, place, and time. Psychiatric:         Behavior: Behavior normal.         This note was completed in part utilizing M-Modal Fluency Direct Software. Grammatical errors, random word insertions, spelling mistakes, and incomplete sentences can be an occasional consequence of this system secondary to software limitations, ambient noise, and hardware issues. If you have any questions or concerns about the content, text, or information contained within the body of this dictation, please contact the provider for clarification.

## 2023-11-15 ENCOUNTER — HOME CARE VISIT (OUTPATIENT)
Dept: HOME HEALTH SERVICES | Facility: HOME HEALTHCARE | Age: 63
End: 2023-11-15
Payer: COMMERCIAL

## 2023-11-15 VITALS
DIASTOLIC BLOOD PRESSURE: 75 MMHG | TEMPERATURE: 96.8 F | SYSTOLIC BLOOD PRESSURE: 130 MMHG | RESPIRATION RATE: 16 BRPM | HEART RATE: 86 BPM | WEIGHT: 230 LBS | BODY MASS INDEX: 34.07 KG/M2 | OXYGEN SATURATION: 98 %

## 2023-11-15 PROCEDURE — G0299 HHS/HOSPICE OF RN EA 15 MIN: HCPCS

## 2023-11-16 ENCOUNTER — OFFICE VISIT (OUTPATIENT)
Dept: CARDIOLOGY CLINIC | Facility: CLINIC | Age: 63
End: 2023-11-16
Payer: COMMERCIAL

## 2023-11-16 VITALS
BODY MASS INDEX: 34.07 KG/M2 | HEIGHT: 69 IN | WEIGHT: 230 LBS | DIASTOLIC BLOOD PRESSURE: 70 MMHG | SYSTOLIC BLOOD PRESSURE: 122 MMHG | OXYGEN SATURATION: 95 % | HEART RATE: 70 BPM

## 2023-11-16 DIAGNOSIS — L03.313 CELLULITIS OF CHEST WALL: ICD-10-CM

## 2023-11-16 DIAGNOSIS — R60.0 LOWER EXTREMITY EDEMA: Primary | ICD-10-CM

## 2023-11-16 PROCEDURE — 99214 OFFICE O/P EST MOD 30 MIN: CPT | Performed by: PHYSICIAN ASSISTANT

## 2023-11-16 RX ORDER — CEPHALEXIN 500 MG/1
500 CAPSULE ORAL 4 TIMES DAILY
Qty: 56 CAPSULE | Refills: 0 | Status: SHIPPED | OUTPATIENT
Start: 2023-11-16 | End: 2023-11-30

## 2023-11-16 NOTE — PATIENT INSTRUCTIONS
Please begin Keflex antibiotic  Follow up with the surgeons tomorrow  We will arrange for an ultrasound of your left leg to make sure there is no DVT  We will see you back in 2-3 months

## 2023-11-17 ENCOUNTER — OFFICE VISIT (OUTPATIENT)
Dept: CARDIAC SURGERY | Facility: CLINIC | Age: 63
End: 2023-11-17

## 2023-11-17 VITALS
HEIGHT: 69 IN | DIASTOLIC BLOOD PRESSURE: 67 MMHG | WEIGHT: 237.1 LBS | TEMPERATURE: 96.8 F | SYSTOLIC BLOOD PRESSURE: 137 MMHG | OXYGEN SATURATION: 95 % | BODY MASS INDEX: 35.12 KG/M2 | HEART RATE: 73 BPM

## 2023-11-17 DIAGNOSIS — Z95.1 S/P CABG (CORONARY ARTERY BYPASS GRAFT): Primary | ICD-10-CM

## 2023-11-17 PROCEDURE — 99024 POSTOP FOLLOW-UP VISIT: CPT | Performed by: THORACIC SURGERY (CARDIOTHORACIC VASCULAR SURGERY)

## 2023-11-17 NOTE — PROGRESS NOTES
Procedure: S/P coronary artery bypass grafting, performed on November 1    History: Jolynn Guzman is a 61y.o. year old male who presents to our office today for routine post-surgical follow up care. He was seen his primary cardiologist office yesterday and there was some concern for incisional infection. He was started on oral Keflex and referred to our office for examination. During interview today he denies fevers, sweats, and or chills. He notes that he did have some drainage from the midportion of his incision for the preceding several days. Since initiation of antibiotics, he notes that the drainage has decreased. Vital Signs:   Vitals:    11/17/23 1415 11/17/23 1418   BP: 138/80 137/67   BP Location: Left arm Right arm   Patient Position: Sitting Sitting   Cuff Size: Large Standard   Pulse: 73    Temp: (!) 96.8 °F (36 °C)    TempSrc: Tympanic    SpO2: 95%    Weight: 108 kg (237 lb 1.6 oz)    Height: 5' 8.9" (1.75 m)        Home Medications:   Prior to Admission medications    Medication Sig Start Date End Date Taking? Authorizing Provider   acetaminophen (TYLENOL) 325 mg tablet Take 2 tablets (650 mg total) by mouth every 6 (six) hours as needed for mild pain, headaches or fever 11/4/23   Sally Latham PA-C   Alcohol Swabs 70 % PADS May substitute brand based on insurance coverage. Check glucose TID. 11/4/23   Sally Latham PA-C   aspirin 325 mg tablet Take 1 tablet (325 mg total) by mouth daily Do not start before November 5, 2023. 11/5/23   Sally Latham PA-C   atorvastatin (LIPITOR) 80 mg tablet Take 1 tablet (80 mg total) by mouth daily with dinner 11/4/23   Sally Latham PA-C   Blood Glucose Monitoring Suppl (OneTouch Verio Reflect) w/Device KIT May substitute brand based on insurance coverage. Check glucose TID.  11/4/23   Coby Hinton PA-C   cephalexin (KEFLEX) 500 mg capsule Take 1 capsule (500 mg total) by mouth 4 (four) times a day for 14 days 11/16/23 11/30/23  Rigo Yeung EMELY Drake   diphenhydrAMINE-acetaminophen (TYLENOL PM)  MG TABS Take 1 tablet by mouth daily at bedtime as needed for sleep    Historical Provider, MD   docusate sodium (COLACE) 100 mg capsule Take 1 capsule (100 mg total) by mouth 2 (two) times a day 11/4/23 12/4/23  Meera Sanders PA-C   Empagliflozin (Jardiance) 25 MG TABS Take 1 tablet (25 mg total) by mouth every morning 10/25/23   Yessica Parikh MD   Exenatide ER (Bydureon) 2 MG PEN INJECT THE CONTENTS OF 1  PEN SUBCUTANEOUSLY WEEKLY  ON SUNDAYS 10/25/23   Yessica Parikh MD   fenofibrate (TRICOR) 145 mg tablet Take 1 tablet (145 mg total) by mouth daily 11/4/23   Meera Sanders PA-C   gabapentin (NEURONTIN) 400 mg capsule Take 1 PO BID. 10/25/23   Yessica Parikh MD   glucose blood (OneTouch Verio) test strip May substitute brand based on insurance coverage. Check glucose TID. 11/4/23   Coby Hinton PA-C   Insulin Glargine Solostar (Lantus SoloStar) 100 UNIT/ML SOPN Inject 0.2 mL (20 Units total) under the skin daily at bedtime 11/14/23   Yessica Parikh MD   Insulin Pen Needle (BD Pen Needle Audelia 2nd Gen) 32G X 4 MM MISC For use with insulin pen. Pharmacy may dispense brand covered by insurance. 11/4/23   Meera Sanders PA-C   metFORMIN (GLUCOPHAGE-XR) 500 mg 24 hr tablet Take 1 tablet (500 mg total) by mouth 2 (two) times a day with meals 11/14/23   Yessica Parikh MD   methocarbamol (ROBAXIN) 500 mg tablet Take 1 tablet (500 mg total) by mouth 4 (four) times a day 7/17/23   Yessica Parikh MD   metoprolol tartrate (LOPRESSOR) 25 mg tablet Take 1 tablet (25 mg total) by mouth every 12 (twelve) hours 11/4/23   Meera Sanders PA-C   OneTouch Delica Lancets 11Y MISC May substitute brand based on insurance coverage.  Check glucose TID. 11/14/23   Yessica Parikh MD   pantoprazole (PROTONIX) 40 mg tablet Take 1 tablet (40 mg total) by mouth daily 11/4/23   Meera Sanders PA-C   polyethylene glycol (GLYCOLAX) 17 GM/SCOOP powder Take 17 g by mouth daily 11/4/23   Historical Provider, MD       Physical Exam:    HEENT/NECK:  Normocephalic. Atraumatic.  no jugular venous distention. Incisions: Sternum is stable. Incision is intact. No dehiscence noted. There is some serous drainage at the midportion of the incision. Extremities: Extremities warm/dry and 1+ edema B/L  Neuro: Alert and oriented X 3. Sensation is grossly intact. No focal deficits. Skin: Warm/Dry, without rashes or lesions. Lab Results:               Invalid input(s): "LABGLOM"      Lab Results   Component Value Date    HGBA1C 7.9 (H) 11/10/2023     No results found for: "CKTOTAL", "CKMB", "CKMBINDEX", "TROPONINI"    Imaging Studies:     No new images    I have personally reviewed pertinent reports. Assessment: Coronary artery disease. S/P coronary artery bypass grafting;    Plan:     Jolynn Guzman continues to recover well following surgery. To date they have made progressive improvements with their physical rehabilitation. Left lower extremity saphenectomy incision was examined. It is well-healed. There is some unilateral edema on the left side. However, he had negative Homans test.  Low suspicion for DVT. Ultrasound has been ordered by the primary cardiologist.    With regard to his sternal incision, it is intact. There is no superficial or deep dehiscence. There is some serous drainage at the midportion of the incision. There is mild surrounding erythema which likely represents suture reaction. There appears to be no deep sternal wound infection. We will have him continue/complete his antibiotic course and follow-up for repeat incisional check. He will call our office on Monday with an update for consideration of sooner follow-up. Giovanni Cummings PA-C  11/17/23    * This note was completed in part utilizing iCurrent direct voice recognition software.    Grammatical errors, random word insertion, spelling mistakes, and incomplete sentences may be an occasional consequence of the system secondary to software limitations, ambient noise and hardware issues. At the time of dictation, efforts were made to edit, clarify and /or correct errors. Please read the chart carefully and recognize, using context, where substitutions have occurred. If you have any questions or concerns about the context, text or information contained within the body of this dictation, please contact myself, the provider, for further clarification.

## 2023-11-21 ENCOUNTER — HOME CARE VISIT (OUTPATIENT)
Dept: HOME HEALTH SERVICES | Facility: HOME HEALTHCARE | Age: 63
End: 2023-11-21
Payer: COMMERCIAL

## 2023-11-21 VITALS
HEART RATE: 76 BPM | WEIGHT: 232 LBS | SYSTOLIC BLOOD PRESSURE: 150 MMHG | DIASTOLIC BLOOD PRESSURE: 78 MMHG | RESPIRATION RATE: 16 BRPM | OXYGEN SATURATION: 96 % | BODY MASS INDEX: 34.36 KG/M2

## 2023-11-21 PROCEDURE — G0299 HHS/HOSPICE OF RN EA 15 MIN: HCPCS

## 2023-11-29 ENCOUNTER — CLINICAL SUPPORT (OUTPATIENT)
Dept: CARDIAC REHAB | Facility: HOSPITAL | Age: 63
End: 2023-11-29
Payer: COMMERCIAL

## 2023-11-29 DIAGNOSIS — I25.10 CAD IN NATIVE ARTERY: ICD-10-CM

## 2023-11-29 DIAGNOSIS — I25.10 TRIPLE VESSEL CORONARY ARTERY DISEASE: ICD-10-CM

## 2023-11-29 DIAGNOSIS — Z95.1 S/P CABG (CORONARY ARTERY BYPASS GRAFT): Primary | ICD-10-CM

## 2023-11-29 PROCEDURE — 93797 PHYS/QHP OP CAR RHAB WO ECG: CPT

## 2023-11-29 NOTE — PROGRESS NOTES
Cardiac Rehabilitation Plan of Care   Initial Care Plan          Today's date: 2023   # of Exercise Sessions Completed: 1-evaluation  Patient name: Kyler Esparza      : 1960  Age: 61 y.o. MRN: [id number]  Referring Physician: Dr. Rolo Grider  Cardiologist: Dr. Britney Horton  Provider: Symone manrique  Clinician: Tana Ware, MS, CEP      Dx:   Encounter Diagnoses   Name Primary? S/P CABG (coronary artery bypass graft) Yes    CAD in native artery     Triple vessel coronary artery disease/chest pain/elevated troponin      Date of onset: 2023      SUMMARY OF PROGRESS:  Mr. Manny Alejandro is here today for his cardiac rehabilitation evaluation after recent CABG x 4 on 2023. He had NSTEMI on 10/16/2023. He reports he has been doing well with his recovery overall. He did have infection in sternal incision which he is taking antibiotics for. He has follow up with Dr. Rolo Grider this coming Friday. He is hoping to get clearance to return to work. He is unsure if he will be participating in cardiac rehab sessions long term if he is able to go back to work. He also may have a $40 copay per session which he would not be able to pay. He is to call his insurance company to verify if copay will be charged after meeting his max out of pocket with cost of CABG hospitalization. He is in agreement to schedule two weeks of cardiac rehab sessions at this time and then will reassess once he knows about copay and plan for return to work. He reprots he stays active and walks his dogs daily for at least 30 min. He does not like to sit still. He feels he is able to maintain regular exercise on his own with a little guidance from us to get an exercise program started in rehab. His goals for his rehab program are to be able to return to work without any limitations, and to increase chest strength and overall body strength post surgery.   He did not want to complete initial questionnaires so do not have scores for PHQ-9, LYNNETTE-7, [facility], or Rate your plate surveys. He denies depression or anxiety. He has not been following a heart healthy diet, but is trying to decrease salt and fat intake post CABG. Will plan to start his sessions on 2023.     Medication compliance: Yes   Comments: Pt reports to be compliant with medications  Fall Risk: Low   Comments: Ambulates with a steady gait with no assist device    EKG Interpretation: NSR      EXERCISE ASSESSMENT and PLAN    Exercise Prescription:      Frequency: 2 days/week   Supplement with home exercise 2+ days/wk as tolerated       Minutes: 45-60         METS: 3.0-4.5            HR: 30 beats above resting   RPE: 4-6         Modalities: Treadmill, UBE, Lifecycle, Rower, NuStep, and Recumbent bike      30 Day Goals for Exercise Progression:    Frequency: 2 days/week of cardiac rehab       Supplement with home exercise 2+ days/wk as tolerated    Minutes: 30-45                              >150 mins/wk of moderate intensity exercise   METS: 3.0-4.0   HR: 30 beats above resting    RPE: 4-6   Modalities: Treadmill, UBE, Lifecycle, Rower, NuStep, and Recumbent bike    Strength trainin-3 days / week  12-15 repetitions  1-2 sets per modality   Will be added following at least 8 weeks post surgery and 8-10 monitored sessions   Modalities: Pull Downs, Lateral Raise, Arm Extension, Arm Curl, Front Raises, Shoulder Shrugs, and leg ext    Home Exercise: Type: walking, Frequency: 5 days/week, Duration: 30 mins    Goals: 10% improvement in functional capacity - based on max METs achieved in fitness assessment, Reduced dyspnea with physical activity  0-1/10, improved DASI score by 10%, Increase in exercise capacity by 40% - based on peak METs tolerated in cardiac rehab exercise session, Exercise 5 days/wk, >150mins/wk of moderate intensity exercise, Resume ADLs with increased strength, Return to work unrestricted, Attend Rehab regularly, and start a home exercise program    Progression Toward Goals:  Reviewed Pt goals and determined plan of care    Education: benefit of exercise for CAD risk factors, home exercise guidelines, AHA guidelines to achieve >150 mins/wk of moderate exercise, RPE scale, and class: Risk Factors for Heart Disease   Plan:education on home exercise guidelines, home exercise 30+ mins 2 days opposite CR, and Education class: Risk Factors for Heart Disease  Readiness to change: Preparation:  (Getting ready to change)       NUTRITION ASSESSMENT AND PLAN    Weight control:    Starting weight: 237 lb   Current weight:         Diabetes: T2D  A1c: 7.9    last measured: 11/10/2023    Lipid management: Discussed diet and lipid management and Last lipid profile 10/17/2023  Chol 223    HDL 37      Goals:LDL <100, HDL >40, TRG <150, and CHOL <200    Measurable goals were based Rate Your Plate Dietary Self-Assessment. These are the areas in which the patient could score higher on the assessment. Goals include recommendations for a heart healthy diet based on American Heart Association.     Progression Toward Goals: Reviewed Pt goals and determined plan of care    Education: heart healthy eating  low sodium diet  hydration  nutrition for  lipid management  nutrition for Improved BG control  target goal for A1c <7.0  exercise and diabetes management   wt. loss   portion control  Plan: Education class: Reading Food Labels and Education Class: Heart Healthy Eating  Readiness to change: Preparation:  (Getting ready to change)       PSYCHOSOCIAL ASSESSMENT AND PLAN    Emotional:  Depression assessment:  PHQ-9 = did not complete              Anxiety measure:  LYNNETTE-7 = did not complete    Self-reported stress level:  3  Social support: Very Good and Patient reports excellent emotional/social support from wife and family    Goals:   reprots no needs    Progression Toward Goals:    Education: signs/sxs of depression, benefits of a positive support system, and stress management techniques  Plan: Class: Stress and Your Health, Class: Relaxation, and Enroll in [employer] to change: Action:  (Changing behavior)      OTHER CORE COMPONENTS     Tobacco:   Social History     Tobacco Use   Smoking Status Former    Packs/day: 1.00    Years: 11.    Total pack years: .    Types: Cigarettes   Smokeless Tobacco Current    Types: Chew   Tobacco Comments    quit 30 + yrs ago        Tobacco Use Intervention:    uses chewing tobacco    Anginal Symptoms:  chest pressure   NTG use: No prescription    Blood pressure:    Restin/70   Exercise: 152/82    Goals: consistent BP < 130/80, reduced dietary sodium <2300mg, moderate intensity exercise >150 mins/wk, medication compliance, and reduce number of medications  needed for BP control    Progression Toward Goals: Reviewed Pt goals and determined plan of care    Education:  understanding high blood pressure and it's relationship to CAD and low sodium diet and HTN  Plan: Class: Understanding Heart Disease, Class: Common Heart Medications, and medication compliance  Readiness to change: Action:  (Changing behavior)

## 2023-11-29 NOTE — PROGRESS NOTES
CARDIAC REHAB ASSESSMENT    Today's date: 2023  Patient name: Linda Ko     : 1960       MRN: [id number]  PCP: Shayan Arnold MD  Referring Physician: Dr. Jorge Horton  Cardiologist: Dr. Waleska Lieberman  Surgeon: Dr. Jorge Horton  Dx:   Encounter Diagnoses   Name Primary? S/P CABG (coronary artery bypass graft)     CAD in native artery     Triple vessel coronary artery disease/chest pain/elevated troponin        Date of onset: 2023  Cultural needs: n/a    Weight    Wt Readings from Last 1 Encounters:   23 105 kg (232 lb)      Height:   Ht Readings from Last 1 Encounters:   23 5' 8.9" (1.75 m)     Medical History:   Past Medical History:   Diagnosis Date    Chews tobacco regularly     Consumes three beers daily     Controlled diabetes mellitus ([address])     Diabetes ([address])     Esophageal reflux     GERD (gastroesophageal reflux disease)     Hyperlipidemia     Hypertension     Osteoarthritis     LEFT KNEE    Primary localized osteoarthritis of left knee     last assessed: 2018         Physical Limitations: back, R Shoulder, Knees, and B/L hip pain    Fall Risk: Low   Comments: Ambulates with a steady gait with no assist device    Anginal Equivalent: Chest Pressure   NTG use: No prescription    Risk Factors   Cholesterol: Yes  Smoking: Current user:  chewing tobacco  HTN: Yes  DM: Type 2   Obesity: Yes  Inactivity: No  Stress:  perceived  stress: 4/10   Stressors:health and out of work right now   Goals for Stress Management:Practice Relaxation Techniques, Exercise, Keep a positive mindset, Enjoy a hobby, and Spend time with family    Family History:  Family History   Problem Relation Age of Onset    Colon cancer Father     Mental illness Family         mental disorder    Substance Abuse Neg Hx        Allergies: Patient has no known allergies.   ETOH:   Social History     Substance and Sexual Activity   Alcohol Use Yes    Alcohol/week: 20.0 standard drinks of alcohol    Types: 20 Cans of beer per week    Comment: daily beer & "gin & tonics" on a weekend ; social alcohol use ( as per allscripts)         Current Medications:   Current Outpatient Medications   Medication Sig Dispense Refill    acetaminophen (TYLENOL) 325 mg tablet Take 2 tablets (650 mg total) by mouth every 6 (six) hours as needed for mild pain, headaches or fever 30 tablet 0    Alcohol Swabs 70 % PADS May substitute brand based on insurance coverage. Check glucose TID. 100 each 0    aspirin 325 mg tablet Take 1 tablet (325 mg total) by mouth daily Do not start before November 5, 2023. 90 tablet 0    atorvastatin (LIPITOR) 80 mg tablet Take 1 tablet (80 mg total) by mouth daily with dinner (Patient taking differently: Take 40 mg by mouth daily with dinner) 90 tablet 0    Blood Glucose Monitoring Suppl (OneTouch Verio Reflect) w/Device KIT May substitute brand based on insurance coverage. Check glucose TID. 1 kit 0    cephalexin (KEFLEX) 500 mg capsule Take 1 capsule (500 mg total) by mouth 4 (four) times a day for 14 days 56 capsule 0    diphenhydrAMINE-acetaminophen (TYLENOL PM)  MG TABS Take 1 tablet by mouth daily at bedtime as needed for sleep      docusate sodium (COLACE) 100 mg capsule Take 1 capsule (100 mg total) by mouth 2 (two) times a day (Patient not taking: Reported on 11/17/2023) 60 capsule 0    Empagliflozin (Jardiance) 25 MG TABS Take 1 tablet (25 mg total) by mouth every morning 90 tablet 3    Exenatide ER (Bydureon) 2 MG PEN INJECT THE CONTENTS OF 1  PEN SUBCUTANEOUSLY WEEKLY  ON SUNDAYS 12 each 3    fenofibrate (TRICOR) 145 mg tablet Take 1 tablet (145 mg total) by mouth daily 90 tablet 0    gabapentin (NEURONTIN) 400 mg capsule Take 1 PO BID. 180 capsule 3    glucose blood (OneTouch Verio) test strip May substitute brand based on insurance coverage. Check glucose TID.  100 each 0    Insulin Glargine Solostar (Lantus SoloStar) 100 UNIT/ML SOPN Inject 0.2 mL (20 Units total) under the skin daily at bedtime 100 mL 5    Insulin Pen Needle (BD Pen Needle Audelia 2nd Gen) 32G X 4 MM MISC For use with insulin pen. Pharmacy may dispense brand covered by insurance. 100 each 0    metFORMIN (GLUCOPHAGE-XR) 500 mg 24 hr tablet Take 1 tablet (500 mg total) by mouth 2 (two) times a day with meals 180 tablet 3    methocarbamol (ROBAXIN) 500 mg tablet Take 1 tablet (500 mg total) by mouth 4 (four) times a day 60 tablet 5    metoprolol tartrate (LOPRESSOR) 25 mg tablet Take 1 tablet (25 mg total) by mouth every 12 (twelve) hours 60 tablet 2    OneTouch Delica Lancets 42T MISC May substitute brand based on insurance coverage. Check glucose TID. 100 each 10    pantoprazole (PROTONIX) 40 mg tablet Take 1 tablet (40 mg total) by mouth daily 30 tablet 0    polyethylene glycol (GLYCOLAX) 17 GM/SCOOP powder Take 17 g by mouth daily       No current facility-administered medications for this visit.          Functional Status Prior to Diagnosis for Treatment   Occupation: full time job machinery  Recreation: outdoor activities-hunting, fishing, kayaking  ADL’s: No limitations  [place]: No limitations  Exercise: regular walking with his dogs daily  Other: n/a    Current Functional Status  Occupation: full time job machinery  plans to return to work as soon as he is cleared by surgeon  Recreation: none  ADL’s:resumed all ADLs within sternal precautions  [place]: No limitations  Exercise: regular walking with his dogs daily  Other: n/a    Patient Specific Goals:  be able to return to work without limitations, increase chest strength and body strength post surgery    Short Term Program Goals: dietary modifications increased strength improved energy/stamina with ADLs exercise 120-150 mins/wk improved BG control wt loss 1-2 ppw return to work    Long Term Goals: increased maximal walking duration  increased intial training workload  improved exercise tolerance  Improved lipid profile  Reduced waist circumference  Improved A1c  Improved fasting glucose    Ability to reach goals/rehabilitation potential:  Very Good     Projected return to function: 6-8 weeks  Objective tests: sub-max TM ETT      Nutritional   Reviewed details of Rate your Plate. Discussed key elements of heart healthy eating. Reviewed patient goals for dietary modifications and their clinical implications. Reviewed most recent lipid profile.      Goals for dietary modification based on Rate Your Plate Dietary Assessment:  choose lean cuts of meat  poultry without the skin  low fat ground meat and poultry  eliminate processed meats  reduce portions of meat to 3 oz  increase fish intake  more meatless meals  low fat dairy   reduced fat cheese  increase whole grains  increase fruits and vegetables  eliminate butter  low sodium  improved snack choices  more nuts/seeds  reduce sweets/frozen desserts  heathier choices while dining out      Emotional/Social  Patient reports they are coping well with good social support and denies depression or anxiety  Reports sufficient emotional support from family    Marital status:     Domestic Violence Screening: No    Comments: n/a

## 2023-12-01 ENCOUNTER — OFFICE VISIT (OUTPATIENT)
Dept: CARDIAC SURGERY | Facility: CLINIC | Age: 63
End: 2023-12-01

## 2023-12-01 VITALS
HEIGHT: 69 IN | BODY MASS INDEX: 35.56 KG/M2 | HEART RATE: 70 BPM | SYSTOLIC BLOOD PRESSURE: 132 MMHG | DIASTOLIC BLOOD PRESSURE: 86 MMHG | TEMPERATURE: 96.5 F | OXYGEN SATURATION: 97 % | WEIGHT: 240.1 LBS

## 2023-12-01 DIAGNOSIS — Z95.1 S/P CABG (CORONARY ARTERY BYPASS GRAFT): Primary | ICD-10-CM

## 2023-12-01 DIAGNOSIS — Z48.89 ENCOUNTER FOR POSTOPERATIVE CARE: ICD-10-CM

## 2023-12-01 PROCEDURE — 99024 POSTOP FOLLOW-UP VISIT: CPT | Performed by: THORACIC SURGERY (CARDIOTHORACIC VASCULAR SURGERY)

## 2023-12-01 NOTE — LETTER
December 1, 2023     Patient: Rajiv Waller  YOB: 1960  Date of Visit: 12/1/2023      To Whom it May Concern:    Kelsie Armenta is under my professional care. He underwent coronary artery bypass graft surgery on 11/1/23 and was seen in my office on 12/1/2023 for routine follow-up. He is not to participate in strenuous activity or lift more than 25 lb for 2 more months. He is not cleared to return to work until February 1, 2024. If you have any questions or concerns, please don't hesitate to call.          Sincerely,          Marnie Mcnamara MD        CC: No Recipients

## 2023-12-01 NOTE — PROGRESS NOTES
POST OP FOLLOW UP VISIT    Procedure:   11/1/23    Coronary artery bypass grafting x 4 with left internal mammary artery to left anterior descending, saphenous vein graft to right posterior descending artery, saphenous vein graft to obtuse marginal 1, saphenous vein graft to second diagonal. Left endoscopic saphenous vein harvest groin to ankle. History: Khang Gomes is a 61y.o. year old male who presents to our office today for routine follow up care from elective coronary artery bypass grafting x 4 with left EVH. Altaf Banegas Patient tolerated procedure well. He was appropriately  medicated for symptoms of ETOH withdrawal. Creatinine was elevated transiently elevated to 1.6 but trending down (1.24) at discharge. Patient discharged to home on POD # 3. Patient was seen in our office 11/17/23 for chest incision check. He had some serous drainage but no focal signs of infection, however, was treated with 2 week course of Keflex. Patient has had routine f/u with cardiology. Today patient states he feels well and is tolerating gradual increase in activity and denies angina, SOB, TINAJERO, lightheadedness or palpitations. He denies fever, chills, weight gain or edema. He states his chest incision is healing well and no long has drainage. His left leg is feeling better-ecchymosis and paraesthesia improved but some residual edema. Vital Signs:   Vitals:    12/01/23 0856 12/01/23 0906   BP: 129/85 132/86   BP Location: Left arm Right arm   Patient Position: Sitting Sitting   Cuff Size: Standard Large   Pulse: 70    Temp: (!) 96.5 °F (35.8 °C)    TempSrc: Tympanic    SpO2: 97%    Weight: 109 kg (240 lb 1.6 oz)    Height: 5' 8.9" (1.75 m)        Home Medications:   Prior to Admission medications    Medication Sig Start Date End Date Taking?  Authorizing Provider   acetaminophen (TYLENOL) 325 mg tablet Take 2 tablets (650 mg total) by mouth every 6 (six) hours as needed for mild pain, headaches or fever 11/4/23  Yes [facility] Bishop PA-C   Alcohol Swabs 70 % PADS May substitute brand based on insurance coverage. Check glucose TID. 11/4/23  Yes Paula Shah PA-C   aspirin 325 mg tablet Take 1 tablet (325 mg total) by mouth daily Do not start before November 5, 2023. 11/5/23  Yes Paula Shah PA-C   atorvastatin (LIPITOR) 80 mg tablet Take 1 tablet (80 mg total) by mouth daily with dinner  Patient taking differently: Take 40 mg by mouth daily with dinner 11/4/23  Yes Paula Shah PA-C   Blood Glucose Monitoring Suppl (OneTouch Verio Reflect) w/Device KIT May substitute brand based on insurance coverage. Check glucose TID. 11/4/23  Yes Paula Shah PA-C   diphenhydrAMINE-acetaminophen (TYLENOL PM)  MG TABS Take 1 tablet by mouth daily at bedtime as needed for sleep   Yes Historical Provider, MD   Empagliflozin (Jardiance) 25 MG TABS Take 1 tablet (25 mg total) by mouth every morning 10/25/23  Yes Yara Rosenbaum MD   Exenatide ER (Bydureon) 2 MG PEN INJECT THE CONTENTS OF 1  PEN SUBCUTANEOUSLY WEEKLY  ON SUNDAYS 10/25/23  Yes Yara Rosenbaum MD   fenofibrate (TRICOR) 145 mg tablet Take 1 tablet (145 mg total) by mouth daily 11/4/23  Yes Paula Shah PA-C   gabapentin (NEURONTIN) 400 mg capsule Take 1 PO BID. 10/25/23  Yes Yara Rosenbaum MD   glucose blood (OneTouch Verio) test strip May substitute brand based on insurance coverage. Check glucose TID. 11/4/23  Yes Paula Shah PA-C   Insulin Glargine Solostar (Lantus SoloStar) 100 UNIT/ML SOPN Inject 0.2 mL (20 Units total) under the skin daily at bedtime 11/14/23  Yes Yara Rosenbaum MD   Insulin Pen Needle (BD Pen Needle Audelia 2nd Gen) 32G X 4 MM MISC For use with insulin pen. Pharmacy may dispense brand covered by insurance.  11/4/23  Yes Paula Shah PA-C   metFORMIN (GLUCOPHAGE-XR) 500 mg 24 hr tablet Take 1 tablet (500 mg total) by mouth 2 (two) times a day with meals 11/14/23  Yes Yara Rosenbaum MD   methocarbamol (ROBAXIN) 500 mg tablet Take 1 tablet (500 mg total) by mouth 4 (four) times a day  Patient taking differently: Take 500 mg by mouth 4 (four) times a day As needed 7/17/23  Yes Bola Tamayo MD   metoprolol tartrate (LOPRESSOR) 25 mg tablet Take 1 tablet (25 mg total) by mouth every 12 (twelve) hours 11/4/23  Yes Mandy Stephen PA-C   OneTouch Delica Lancets O MISC May substitute brand based on insurance coverage. Check glucose TID. 11/14/23  Yes Bola Tamayo MD   pantoprazole (PROTONIX) 40 mg tablet Take 1 tablet (40 mg total) by mouth daily 11/4/23  Yes Mandy Stephen PA-C   polyethylene glycol (GLYCOLAX) 17 GM/SCOOP powder Take 17 g by mouth daily 11/4/23  Yes Historical Provider, MD   cephalexin (KEFLEX) 500 mg capsule Take 1 capsule (500 mg total) by mouth 4 (four) times a day for 14 days  Patient not taking: Reported on 12/1/2023 11/16/23 12/1/23  Violette Drake PA-C   docusate sodium (COLACE) 100 mg capsule Take 1 capsule (100 mg total) by mouth 2 (two) times a day  Patient not taking: Reported on 11/17/2023 11/4/23 12/1/23  Mandy Stephen PA-C       Physical Exam:  General: Alert, oriented, well developed,no acute distress  HEENT/NECK:  PERRLA. No jugular venous distention. Cardiac:Regular rate and rhythm, no murmurs rubs or gallops. Pulmonary:Breath sounds clear bilaterally  Abdomen:  Non-tender, Non-distended. Positive bowel sounds. Upper extremities: 2+ radial pulses; brisk capillary refill  Lower extremities: Extremities warm/dry. PT/DP pules 2+ bilaterally. Trace edema B/L  Incisions: Sternum is stable. Incision is clean, dry, and intact. Saphenectomy incision is clean, dry, and intact  Musculoskeletal: HUFFMAN,stable gait  Neuro: Alert and oriented X 3. Sensation is grossly intact. No focal deficits  Skin: Warm/Dry, without rashes or lesions.     Lab Results:   Lab Results   Component Value Date    SODIUM 140 11/10/2023    K 4.6 11/10/2023     11/10/2023    CO2 22 11/10/2023    BUN 23 11/10/2023    CREATININE 1.56 (H) 11/10/2023    GLUC 159 (H) 11/10/2023    CALCIUM 8.3 (L) 11/04/2023     Lab Results   Component Value Date    HGBA1C 7.9 (H) 11/10/2023       Assessment:   Coronary artery disease. S/P coronary artery bypass grafting    Leida Bryant is 4 weeks post-op, making good progress in their recovery. Incisions are well-healed and the sternum is stable. Weight and VS are stable. Plan:   Medications reviewed with patient, associated questions answered and no changes made . Benefits of participating in cardiac rehab have been discussed and patient is cleared to begin the outpatient  program.     May resume driving. Increase activity as tolerated and maintain alifting restriction of no more than 25 lbs until 2/1/23, which is 12 weeks from the surgical date. EMPHASIZED THE IMPORTANCE OF FOLLOWING OUR ACTIVITY RESTRICTIONS TO ALLOW STERNUM TO HEAL FULLY. MAY RETURN TO WORK AFTER FEBRUARY 1/ 2024. ALSO EMPHASIZED DAILY CLEANSING OF CHEST INCISION WITH ANTIBACTERIAL SOAP AND WATER AND TO AVOID TOUCHING INCISION. No further follow up in our office is needed; call with questions or concerns. Patient should maintain routine follow-up with Cardiology and Primary Care for ongoing medical care. Patient verbalizes understanding of recommendations and all questions were answered to their satisfaction. The patient recently had a screening colonoscopy in 12/19/14. Therefore GI referral is not indicated at this time.      CHRISTA Rodriguez  12/1/23  0900

## 2023-12-04 ENCOUNTER — HOSPITAL ENCOUNTER (OUTPATIENT)
Dept: NON INVASIVE DIAGNOSTICS | Facility: HOSPITAL | Age: 63
Discharge: HOME | End: 2023-12-04
Payer: COMMERCIAL

## 2023-12-04 DIAGNOSIS — R60.0 LOWER EXTREMITY EDEMA: ICD-10-CM

## 2023-12-04 PROCEDURE — 93971 EXTREMITY STUDY: CPT | Performed by: SURGERY

## 2023-12-04 PROCEDURE — 93971 EXTREMITY STUDY: CPT

## 2023-12-04 NOTE — RESTORATIVE TECHNICIAN NOTE
Restorative Technician Note      Patient Name: Alyson Funes     Restorative Tech Visit Date: 11/03/23  Note Type: Mobility  Patient Position Upon Consult: Bedside chair  Activity Performed: Ambulated  Assistive Device: Roller walker  Patient Position at End of Consult: All needs within reach;  Bedside chair
Restorative Technician Note      Patient Name: Dea Castillo     Restorative Tech Visit Date: 11/02/23  Note Type: Mobility  Patient Position Upon Consult: Bedside chair  Activity Performed: Ambulated  Assistive Device: Roller walker  Patient Position at End of Consult: All needs within reach;  Bedside chair
show

## 2023-12-05 ENCOUNTER — CLINICAL SUPPORT (OUTPATIENT)
Dept: CARDIAC REHAB | Facility: HOSPITAL | Age: 63
End: 2023-12-05
Payer: COMMERCIAL

## 2023-12-05 DIAGNOSIS — Z95.1 S/P CABG (CORONARY ARTERY BYPASS GRAFT): Primary | ICD-10-CM

## 2023-12-05 PROCEDURE — 93798 PHYS/QHP OP CAR RHAB W/ECG: CPT

## 2023-12-07 ENCOUNTER — CLINICAL SUPPORT (OUTPATIENT)
Dept: CARDIAC REHAB | Facility: HOSPITAL | Age: 63
End: 2023-12-07
Payer: COMMERCIAL

## 2023-12-07 DIAGNOSIS — Z95.1 S/P CABG (CORONARY ARTERY BYPASS GRAFT): Primary | ICD-10-CM

## 2023-12-07 PROCEDURE — 93798 PHYS/QHP OP CAR RHAB W/ECG: CPT

## 2023-12-12 ENCOUNTER — CLINICAL SUPPORT (OUTPATIENT)
Dept: CARDIAC REHAB | Facility: HOSPITAL | Age: 63
End: 2023-12-12
Payer: COMMERCIAL

## 2023-12-12 DIAGNOSIS — Z95.1 S/P CABG (CORONARY ARTERY BYPASS GRAFT): Primary | ICD-10-CM

## 2023-12-12 PROCEDURE — 93798 PHYS/QHP OP CAR RHAB W/ECG: CPT

## 2023-12-14 ENCOUNTER — CLINICAL SUPPORT (OUTPATIENT)
Dept: CARDIAC REHAB | Facility: HOSPITAL | Age: 63
End: 2023-12-14
Payer: COMMERCIAL

## 2023-12-14 DIAGNOSIS — Z95.1 S/P CABG (CORONARY ARTERY BYPASS GRAFT): Primary | ICD-10-CM

## 2023-12-14 PROCEDURE — 93798 PHYS/QHP OP CAR RHAB W/ECG: CPT

## 2023-12-19 ENCOUNTER — CLINICAL SUPPORT (OUTPATIENT)
Dept: CARDIAC REHAB | Facility: HOSPITAL | Age: 63
End: 2023-12-19
Payer: COMMERCIAL

## 2023-12-19 DIAGNOSIS — Z95.1 S/P CABG (CORONARY ARTERY BYPASS GRAFT): Primary | ICD-10-CM

## 2023-12-19 PROCEDURE — 93798 PHYS/QHP OP CAR RHAB W/ECG: CPT

## 2023-12-21 ENCOUNTER — CLINICAL SUPPORT (OUTPATIENT)
Dept: CARDIAC REHAB | Facility: HOSPITAL | Age: 63
End: 2023-12-21
Payer: COMMERCIAL

## 2023-12-21 DIAGNOSIS — Z95.1 S/P CABG (CORONARY ARTERY BYPASS GRAFT): Primary | ICD-10-CM

## 2023-12-21 PROCEDURE — 93798 PHYS/QHP OP CAR RHAB W/ECG: CPT

## 2023-12-26 ENCOUNTER — CLINICAL SUPPORT (OUTPATIENT)
Dept: CARDIAC REHAB | Facility: HOSPITAL | Age: 63
End: 2023-12-26
Payer: COMMERCIAL

## 2023-12-26 DIAGNOSIS — Z95.1 S/P CABG (CORONARY ARTERY BYPASS GRAFT): Primary | ICD-10-CM

## 2023-12-26 PROCEDURE — 93798 PHYS/QHP OP CAR RHAB W/ECG: CPT

## 2023-12-28 ENCOUNTER — CLINICAL SUPPORT (OUTPATIENT)
Dept: CARDIAC REHAB | Facility: HOSPITAL | Age: 63
End: 2023-12-28
Payer: COMMERCIAL

## 2023-12-28 ENCOUNTER — APPOINTMENT (OUTPATIENT)
Dept: CARDIAC REHAB | Facility: HOSPITAL | Age: 63
End: 2023-12-28
Payer: COMMERCIAL

## 2023-12-28 DIAGNOSIS — Z95.1 S/P CABG (CORONARY ARTERY BYPASS GRAFT): Primary | ICD-10-CM

## 2023-12-28 PROCEDURE — 93798 PHYS/QHP OP CAR RHAB W/ECG: CPT

## 2023-12-28 NOTE — PROGRESS NOTES
Cardiac Rehabilitation Plan of Care   30 Day Reassessment          Today's date: 2023   # of Exercise Sessions Completed: 9  Patient name: Alex Vallecillo      : 1960  Age: 63 y.o.       MRN: [id number]  Referring Physician: Dr. Franco  Cardiologist: Dr. Ramses Ross  Provider: Shaq  Clinician: Jon Bishop MS, CEP       Dx:   Encounter Diagnosis   Name Primary?    S/P CABG (coronary artery bypass graft) Yes     Date of onset: 2023      SUMMARY OF PROGRESS:  Mr. Vallecillo has begun his cardiac rehabilitation program after recent CABG x 4 on 2023. He had NSTEMI on 10/16/2023. He has attended 12 sessions in the past 30 days attending regularly 2x/week and will be starting 3x/week in the New Year. He reports today that everything has been going well for him. He is feeling well overall. He wants to focus on increasing his upper body strength within his sternal precautions. He is completing formal home exercise with rehab. He reports staying active and walking his dogs daily. He walks his dog 5 days/week for about 30 minutes. Will start to plan formal home exercise within the next 30 days. His goals for the rehab program are to return to work without any limitations, increase chest strength and overall body strength, and to establish home exercise. He continues to work towards his personal goals at 30 days.   He denies depression and anxiety. He did not complete his initial questionnaires. He deferred them. He has overall great support system at home. He reports his diet around the holidays has not been well. I encouraged him to get back on track following the holidays and working towards heart healthy choices. Current weight today: 244.0 lbs. He reports 100% medication compliance.   He is completing 30-50 minutes of aerobic exercise reaching 3.2-4.4 METs on different modalities such as the TRM, Nustep, UBE, and recumbent bike. He is tolerating increasing workloads and durations well  with no cardiac complaints. Will start a light strength training program in the next 30 days. Resting //82 with normal response to exercise reaching 122//96. Telemetry continues to reveal NSR with occ PVCs. He is attending weekly education on cardiac risk factors. Will continue to educate, monitor, and progress as tolerated over the next 30 days.       Medication compliance: Yes   Comments: Pt reports to be compliant with medications  Fall Risk: Low   Comments: Ambulates with a steady gait with no assist device    EKG Interpretation: NSR w/ occ PVCs      EXERCISE ASSESSMENT and PLAN    Exercise Prescription:      Frequency: 2 days/week   Supplement with home exercise 2+ days/wk as tolerated       Minutes:30-50         METS: 3.2-4.4            HR: 106-124   RPE: 4-6         Modalities: Treadmill, UBE, NuStep, and Recumbent bike      30 Day Goals for Exercise Progression:    Frequency: 3 days/week of cardiac rehab       Supplement with home exercise 2+ days/wk as tolerated    Minutes: 45-60                             >150 mins/wk of moderate intensity exercise   METS: 4.5-5.0   HR: 30 beats above resting    RPE: 4-6   Modalities: Treadmill, UBE, Lifecycle, Rower, NuStep, and Recumbent bike    Strength trainin-3 days / week  12-15 repetitions  1-2 sets per modality   Will be added following at least 8 weeks post surgery and 8-10 monitored sessions   Modalities: Pull Downs, Lateral Raise, Arm Extension, Arm Curl, Front Raises, Shoulder Shrugs, and leg ext    Home Exercise: Type: walking, Frequency: 5 days/week, Duration: 30 mins    Goals: 10% improvement in functional capacity - based on max METs achieved in fitness assessment, Reduced dyspnea with physical activity  0-1/10, improved DASI score by 10%, Increase in exercise capacity by 40% - based on peak METs tolerated in cardiac rehab exercise session, Exercise 5 days/wk, >150mins/wk of moderate intensity exercise, Resume ADLs with increased  strength, Return to work unrestricted, Attend Rehab regularly, and start a home exercise program    Progression Toward Goals:  Pt is progressing and showing improvement  toward the following goals:  attending rehab regularly - adding in 3rd day next week, increasing functional METs, increasing strength and endurance, resumed all ADLs, completing formal home exercise : walking, and tolerating exercise well with no cardiac complaints.  , Patient will start strength training program to increase upper body in the next 30 days, Will continue to educate and progress as tolerated.    Education: benefit of exercise for CAD risk factors, home exercise guidelines, AHA guidelines to achieve >150 mins/wk of moderate exercise, RPE scale, class: Risk Factors for Heart Disease, and class: Exercise      Plan:education on home exercise guidelines, home exercise 30+ mins 2 days opposite CR, and Education class: Risk Factors for Heart Disease  Readiness to change: Action:  (Changing behavior)      NUTRITION ASSESSMENT AND PLAN    Weight control:    Starting weight: 237 lb   Current weight:   244.0 lbs       Diabetes: T2D  A1c: 7.9    last measured: 11/10/2023    Lipid management: Discussed diet and lipid management and Last lipid profile 10/17/2023  Chol 223    HDL 37      Goals:LDL <100, HDL >40, TRG <150, and CHOL <200    Measurable goals were based Rate Your Plate Dietary Self-Assessment. These are the areas in which the patient could score higher on the assessment.  Goals include recommendations for a heart healthy diet based on American Heart Association.    Progression Toward Goals: Pt has not made progress toward the following goals: diet has not been well around the holidays and weight gain noted. , Patient will get back to making heart healthy choices following the holidays in the next 30 days, Will continue to educate and progress as tolerated.    Education: heart healthy eating  low sodium  diet  hydration  nutrition for  lipid management  nutrition for Improved BG control  target goal for A1c <7.0  exercise and diabetes management   wt. loss   portion control    Plan: Education class: Reading Food Labels and Education Class: Heart Healthy Eating  Readiness to change: Preparation:  (Getting ready to change)       PSYCHOSOCIAL ASSESSMENT AND PLAN    Emotional:  Depression assessment:  PHQ-9 = did not complete              Anxiety measure:  LYNNETTE-7 = did not complete    Self-reported stress level:  3  Social support: Very Good and Patient reports excellent emotional/social support from wife and family    Goals: Reports no concerns. Defer PHQ-9 and LYNNETTE-7.     Progression Toward Goals:    Education: signs/sxs of depression, benefits of a positive support system, and stress management techniques    Plan: Class: Stress and Your Health, Class: Relaxation, and Enroll in [employer]  Readiness to change: Action:  (Changing behavior)      OTHER CORE COMPONENTS     Tobacco:   Social History     Tobacco Use   Smoking Status Former    Current packs/day: 1.00    Average packs/day: 1 pack/day for 11.0 years (11.0 ttl pk-yrs)    Types: Cigarettes   Smokeless Tobacco Current    Types: Chew   Tobacco Comments    quit 30 + yrs ago        Tobacco Use Intervention:    uses chewing tobacco    Anginal Symptoms:  chest pressure   NTG use: No prescription    Blood pressure:    Restin//82   Exercise: 122//96    Goals: consistent BP < 130/80, reduced dietary sodium <2300mg, moderate intensity exercise >150 mins/wk, medication compliance, and reduce number of medications  needed for BP control    Progression Toward Goals: Pt is progressing and showing improvement  toward the following goals:  medication compliance, blood pressures within normal limits at rest and normal response to exercise, and achieving >150 mins/week of moderate intensity exercise.  , Patient will get back to watching sodium intake  in the  next 30 days, Will continue to educate and progress as tolerated.    Education:  understanding high blood pressure and it's relationship to CAD, low sodium diet and HTN, and Education class: Understanding Heart Disease    Plan: Class: Understanding Heart Disease, Class: Common Heart Medications, and medication compliance  Readiness to change: Action:  (Changing behavior)

## 2024-01-02 ENCOUNTER — CLINICAL SUPPORT (OUTPATIENT)
Dept: CARDIAC REHAB | Facility: HOSPITAL | Age: 64
End: 2024-01-02
Payer: COMMERCIAL

## 2024-01-02 ENCOUNTER — APPOINTMENT (OUTPATIENT)
Dept: CARDIAC REHAB | Facility: HOSPITAL | Age: 64
End: 2024-01-02
Payer: COMMERCIAL

## 2024-01-02 DIAGNOSIS — Z95.1 S/P CABG (CORONARY ARTERY BYPASS GRAFT): Primary | ICD-10-CM

## 2024-01-02 PROCEDURE — 93798 PHYS/QHP OP CAR RHAB W/ECG: CPT

## 2024-01-04 ENCOUNTER — CLINICAL SUPPORT (OUTPATIENT)
Dept: CARDIAC REHAB | Facility: HOSPITAL | Age: 64
End: 2024-01-04
Payer: COMMERCIAL

## 2024-01-04 ENCOUNTER — APPOINTMENT (OUTPATIENT)
Dept: CARDIAC REHAB | Facility: HOSPITAL | Age: 64
End: 2024-01-04
Payer: COMMERCIAL

## 2024-01-04 DIAGNOSIS — Z95.1 S/P CABG (CORONARY ARTERY BYPASS GRAFT): Primary | ICD-10-CM

## 2024-01-04 PROCEDURE — 93798 PHYS/QHP OP CAR RHAB W/ECG: CPT

## 2024-01-05 ENCOUNTER — CLINICAL SUPPORT (OUTPATIENT)
Dept: CARDIAC REHAB | Facility: HOSPITAL | Age: 64
End: 2024-01-05
Payer: COMMERCIAL

## 2024-01-05 DIAGNOSIS — Z95.1 S/P CABG (CORONARY ARTERY BYPASS GRAFT): Primary | ICD-10-CM

## 2024-01-05 PROCEDURE — 93798 PHYS/QHP OP CAR RHAB W/ECG: CPT

## 2024-01-09 ENCOUNTER — CLINICAL SUPPORT (OUTPATIENT)
Dept: CARDIAC REHAB | Facility: HOSPITAL | Age: 64
End: 2024-01-09
Payer: COMMERCIAL

## 2024-01-09 ENCOUNTER — APPOINTMENT (OUTPATIENT)
Dept: CARDIAC REHAB | Facility: HOSPITAL | Age: 64
End: 2024-01-09
Payer: COMMERCIAL

## 2024-01-09 DIAGNOSIS — Z95.1 S/P CABG (CORONARY ARTERY BYPASS GRAFT): Primary | ICD-10-CM

## 2024-01-09 DIAGNOSIS — Z79.4 TYPE 2 DIABETES MELLITUS WITHOUT COMPLICATION, WITH LONG-TERM CURRENT USE OF INSULIN (HCC): Primary | ICD-10-CM

## 2024-01-09 DIAGNOSIS — Z12.5 SCREENING FOR PROSTATE CANCER: ICD-10-CM

## 2024-01-09 DIAGNOSIS — E11.9 TYPE 2 DIABETES MELLITUS WITHOUT COMPLICATION, WITH LONG-TERM CURRENT USE OF INSULIN (HCC): Primary | ICD-10-CM

## 2024-01-09 PROCEDURE — 93798 PHYS/QHP OP CAR RHAB W/ECG: CPT

## 2024-01-11 ENCOUNTER — APPOINTMENT (OUTPATIENT)
Dept: CARDIAC REHAB | Facility: HOSPITAL | Age: 64
End: 2024-01-11
Payer: COMMERCIAL

## 2024-01-11 ENCOUNTER — CLINICAL SUPPORT (OUTPATIENT)
Dept: CARDIAC REHAB | Facility: HOSPITAL | Age: 64
End: 2024-01-11
Payer: COMMERCIAL

## 2024-01-11 DIAGNOSIS — Z95.1 S/P CABG (CORONARY ARTERY BYPASS GRAFT): Primary | ICD-10-CM

## 2024-01-11 PROCEDURE — 93798 PHYS/QHP OP CAR RHAB W/ECG: CPT

## 2024-01-12 ENCOUNTER — APPOINTMENT (OUTPATIENT)
Dept: CARDIAC REHAB | Facility: HOSPITAL | Age: 64
End: 2024-01-12
Payer: COMMERCIAL

## 2024-01-13 LAB
ALBUMIN SERPL-MCNC: 4.5 G/DL (ref 3.9–4.9)
ALBUMIN/GLOB SERPL: 1.8 {RATIO} (ref 1.2–2.2)
ALP SERPL-CCNC: 68 IU/L (ref 44–121)
ALT SERPL-CCNC: 33 IU/L (ref 0–44)
AST SERPL-CCNC: 30 IU/L (ref 0–40)
BASOPHILS # BLD AUTO: 0.1 X10E3/UL (ref 0–0.2)
BASOPHILS NFR BLD AUTO: 1 %
BILIRUB SERPL-MCNC: 0.5 MG/DL (ref 0–1.2)
BUN SERPL-MCNC: 16 MG/DL (ref 8–27)
BUN/CREAT SERPL: 13 (ref 10–24)
CALCIUM SERPL-MCNC: 9.7 MG/DL (ref 8.6–10.2)
CHLORIDE SERPL-SCNC: 105 MMOL/L (ref 96–106)
CHOLEST SERPL-MCNC: 192 MG/DL (ref 100–199)
CO2 SERPL-SCNC: 21 MMOL/L (ref 20–29)
CREAT SERPL-MCNC: 1.25 MG/DL (ref 0.76–1.27)
EGFR: 65 ML/MIN/1.73
EOSINOPHIL # BLD AUTO: 0.3 X10E3/UL (ref 0–0.4)
EOSINOPHIL NFR BLD AUTO: 5 %
ERYTHROCYTE [DISTWIDTH] IN BLOOD BY AUTOMATED COUNT: 13.1 % (ref 11.6–15.4)
GLOBULIN SER-MCNC: 2.5 G/DL (ref 1.5–4.5)
GLUCOSE SERPL-MCNC: 101 MG/DL (ref 70–99)
HCT VFR BLD AUTO: 50.6 % (ref 37.5–51)
HDLC SERPL-MCNC: 30 MG/DL
HGB BLD-MCNC: 16.6 G/DL (ref 13–17.7)
IMM GRANULOCYTES # BLD: 0 X10E3/UL (ref 0–0.1)
IMM GRANULOCYTES NFR BLD: 1 %
LDLC SERPL CALC-MCNC: 119 MG/DL (ref 0–99)
LDLC/HDLC SERPL: 4 RATIO (ref 0–3.6)
LYMPHOCYTES # BLD AUTO: 1.5 X10E3/UL (ref 0.7–3.1)
LYMPHOCYTES NFR BLD AUTO: 28 %
MCH RBC QN AUTO: 29.6 PG (ref 26.6–33)
MCHC RBC AUTO-ENTMCNC: 32.8 G/DL (ref 31.5–35.7)
MCV RBC AUTO: 90 FL (ref 79–97)
MONOCYTES # BLD AUTO: 0.6 X10E3/UL (ref 0.1–0.9)
MONOCYTES NFR BLD AUTO: 11 %
NEUTROPHILS # BLD AUTO: 3 X10E3/UL (ref 1.4–7)
NEUTROPHILS NFR BLD AUTO: 54 %
PLATELET # BLD AUTO: 201 X10E3/UL (ref 150–450)
POTASSIUM SERPL-SCNC: 4.4 MMOL/L (ref 3.5–5.2)
PROT SERPL-MCNC: 7 G/DL (ref 6–8.5)
PSA SERPL-MCNC: 0.3 NG/ML (ref 0–4)
RBC # BLD AUTO: 5.61 X10E6/UL (ref 4.14–5.8)
SL AMB REFLEX CRITERIA: NORMAL
SL AMB VLDL CHOLESTEROL CALC: 43 MG/DL (ref 5–40)
SODIUM SERPL-SCNC: 143 MMOL/L (ref 134–144)
TRIGL SERPL-MCNC: 245 MG/DL (ref 0–149)
WBC # BLD AUTO: 5.5 X10E3/UL (ref 3.4–10.8)

## 2024-01-16 ENCOUNTER — APPOINTMENT (OUTPATIENT)
Dept: CARDIAC REHAB | Facility: HOSPITAL | Age: 64
End: 2024-01-16
Payer: COMMERCIAL

## 2024-01-18 ENCOUNTER — APPOINTMENT (OUTPATIENT)
Dept: CARDIAC REHAB | Facility: HOSPITAL | Age: 64
End: 2024-01-18
Payer: COMMERCIAL

## 2024-01-18 ENCOUNTER — CLINICAL SUPPORT (OUTPATIENT)
Dept: CARDIAC REHAB | Facility: HOSPITAL | Age: 64
End: 2024-01-18
Payer: COMMERCIAL

## 2024-01-18 DIAGNOSIS — Z95.1 S/P CABG (CORONARY ARTERY BYPASS GRAFT): Primary | ICD-10-CM

## 2024-01-18 PROCEDURE — 93798 PHYS/QHP OP CAR RHAB W/ECG: CPT

## 2024-01-19 ENCOUNTER — OFFICE VISIT (OUTPATIENT)
Dept: FAMILY MEDICINE CLINIC | Facility: CLINIC | Age: 64
End: 2024-01-19
Payer: COMMERCIAL

## 2024-01-19 ENCOUNTER — APPOINTMENT (OUTPATIENT)
Dept: CARDIAC REHAB | Facility: HOSPITAL | Age: 64
End: 2024-01-19
Payer: COMMERCIAL

## 2024-01-19 ENCOUNTER — OFFICE VISIT (OUTPATIENT)
Dept: CARDIOLOGY CLINIC | Facility: CLINIC | Age: 64
End: 2024-01-19
Payer: COMMERCIAL

## 2024-01-19 VITALS
TEMPERATURE: 97.6 F | BODY MASS INDEX: 35.99 KG/M2 | SYSTOLIC BLOOD PRESSURE: 132 MMHG | DIASTOLIC BLOOD PRESSURE: 80 MMHG | HEART RATE: 72 BPM | HEIGHT: 69 IN | WEIGHT: 243 LBS

## 2024-01-19 VITALS
SYSTOLIC BLOOD PRESSURE: 120 MMHG | HEART RATE: 71 BPM | HEIGHT: 68 IN | WEIGHT: 243 LBS | DIASTOLIC BLOOD PRESSURE: 68 MMHG | BODY MASS INDEX: 36.83 KG/M2

## 2024-01-19 DIAGNOSIS — E78.5 HYPERLIPIDEMIA, UNSPECIFIED HYPERLIPIDEMIA TYPE: ICD-10-CM

## 2024-01-19 DIAGNOSIS — K21.00 GASTROESOPHAGEAL REFLUX DISEASE WITH ESOPHAGITIS WITHOUT HEMORRHAGE: ICD-10-CM

## 2024-01-19 DIAGNOSIS — I25.10 TRIPLE VESSEL CORONARY ARTERY DISEASE: ICD-10-CM

## 2024-01-19 DIAGNOSIS — R52 PAIN: ICD-10-CM

## 2024-01-19 DIAGNOSIS — Z12.11 SCREEN FOR COLON CANCER: ICD-10-CM

## 2024-01-19 DIAGNOSIS — I21.A1 TYPE 2 MI (MYOCARDIAL INFARCTION) (HCC): ICD-10-CM

## 2024-01-19 DIAGNOSIS — M54.50 CHRONIC BILATERAL LOW BACK PAIN WITHOUT SCIATICA: ICD-10-CM

## 2024-01-19 DIAGNOSIS — M54.16 LUMBAR RADICULOPATHY: ICD-10-CM

## 2024-01-19 DIAGNOSIS — Z79.4 TYPE 2 DIABETES MELLITUS WITHOUT COMPLICATION, WITH LONG-TERM CURRENT USE OF INSULIN (HCC): Primary | ICD-10-CM

## 2024-01-19 DIAGNOSIS — M48.062 SPINAL STENOSIS OF LUMBAR REGION WITH NEUROGENIC CLAUDICATION: ICD-10-CM

## 2024-01-19 DIAGNOSIS — I25.10 CORONARY ARTERY DISEASE INVOLVING NATIVE CORONARY ARTERY OF NATIVE HEART WITHOUT ANGINA PECTORIS: Primary | ICD-10-CM

## 2024-01-19 DIAGNOSIS — Z00.00 WELLNESS EXAMINATION: ICD-10-CM

## 2024-01-19 DIAGNOSIS — I10 PRIMARY HYPERTENSION: ICD-10-CM

## 2024-01-19 DIAGNOSIS — E11.9 TYPE 2 DIABETES MELLITUS WITHOUT COMPLICATION, WITHOUT LONG-TERM CURRENT USE OF INSULIN (HCC): ICD-10-CM

## 2024-01-19 DIAGNOSIS — E11.9 TYPE 2 DIABETES MELLITUS WITHOUT COMPLICATION, WITH LONG-TERM CURRENT USE OF INSULIN (HCC): Primary | ICD-10-CM

## 2024-01-19 DIAGNOSIS — E78.2 MIXED HYPERLIPIDEMIA: ICD-10-CM

## 2024-01-19 DIAGNOSIS — F11.20 CONTINUOUS OPIOID DEPENDENCE (HCC): ICD-10-CM

## 2024-01-19 DIAGNOSIS — G89.29 CHRONIC BILATERAL LOW BACK PAIN WITHOUT SCIATICA: ICD-10-CM

## 2024-01-19 DIAGNOSIS — M51.26 HERNIATED NUCLEUS PULPOSUS, L3-4 LEFT: ICD-10-CM

## 2024-01-19 PROCEDURE — 99214 OFFICE O/P EST MOD 30 MIN: CPT | Performed by: FAMILY MEDICINE

## 2024-01-19 PROCEDURE — 99214 OFFICE O/P EST MOD 30 MIN: CPT | Performed by: INTERNAL MEDICINE

## 2024-01-19 PROCEDURE — 99396 PREV VISIT EST AGE 40-64: CPT | Performed by: FAMILY MEDICINE

## 2024-01-19 RX ORDER — METHOCARBAMOL 500 MG/1
500 TABLET, FILM COATED ORAL 4 TIMES DAILY
Qty: 270 TABLET | Refills: 5 | Status: SHIPPED | OUTPATIENT
Start: 2024-01-19

## 2024-01-19 RX ORDER — METFORMIN HYDROCHLORIDE 500 MG/1
500 TABLET, EXTENDED RELEASE ORAL 2 TIMES DAILY WITH MEALS
Qty: 180 TABLET | Refills: 3 | Status: SHIPPED | OUTPATIENT
Start: 2024-01-19

## 2024-01-19 RX ORDER — GABAPENTIN 400 MG/1
CAPSULE ORAL
Qty: 180 CAPSULE | Refills: 3 | Status: SHIPPED | OUTPATIENT
Start: 2024-01-19

## 2024-01-19 RX ORDER — PANTOPRAZOLE SODIUM 40 MG/1
40 TABLET, DELAYED RELEASE ORAL DAILY
Qty: 30 TABLET | Refills: 5 | Status: SHIPPED | OUTPATIENT
Start: 2024-01-19

## 2024-01-19 RX ORDER — ATORVASTATIN CALCIUM 40 MG/1
40 TABLET, FILM COATED ORAL DAILY
Qty: 90 TABLET | Refills: 3 | Status: SHIPPED | OUTPATIENT
Start: 2024-01-19

## 2024-01-19 RX ORDER — EXENATIDE 2 MG/.65ML
INJECTION, SUSPENSION, EXTENDED RELEASE SUBCUTANEOUS
Qty: 12 EACH | Refills: 3 | Status: SHIPPED | OUTPATIENT
Start: 2024-01-19

## 2024-01-19 RX ORDER — ASPIRIN 81 MG/1
81 TABLET ORAL DAILY
Start: 2024-01-19

## 2024-01-19 RX ORDER — PANTOPRAZOLE SODIUM 40 MG/1
40 TABLET, DELAYED RELEASE ORAL DAILY
Qty: 90 TABLET | Refills: 3 | Status: SHIPPED | OUTPATIENT
Start: 2024-01-19 | End: 2024-01-19 | Stop reason: SDUPTHER

## 2024-01-19 RX ORDER — FENOFIBRATE 145 MG/1
145 TABLET, COATED ORAL DAILY
Qty: 90 TABLET | Refills: 3 | Status: SHIPPED | OUTPATIENT
Start: 2024-01-19

## 2024-01-19 NOTE — PROGRESS NOTES
Assessment/Plan:    No problem-specific Assessment & Plan notes found for this encounter.       Diagnoses and all orders for this visit:    Type 2 diabetes mellitus without complication, with long-term current use of insulin ([facility])  -     IRIS Diabetic eye exam  -     atorvastatin (LIPITOR) 40 mg tablet; Take 1 tablet (40 mg total) by mouth daily  -     Empagliflozin (Jardiance) 25 MG TABS; Take 1 tablet (25 mg total) by mouth every morning  -     fenofibrate (TRICOR) 145 mg tablet; Take 1 tablet (145 mg total) by mouth daily  -     metFORMIN (GLUCOPHAGE-XR) 500 mg 24 hr tablet; Take 1 tablet (500 mg total) by mouth 2 (two) times a day with meals  -     insulin glargine (LANTUS SOLOSTAR) 100 units/mL injection pen; Inject 20 Units under the skin daily    Type 2 diabetes mellitus without complication, without long-term current use of insulin ([facility])  -     Exenatide ER (Bydureon) 2 MG PEN; INJECT THE CONTENTS OF 1  PEN SUBCUTANEOUSLY WEEKLY  ON SUNDAYS    Hyperlipidemia, unspecified hyperlipidemia type  -     fenofibrate (TRICOR) 145 mg tablet; Take 1 tablet (145 mg total) by mouth daily    Lumbar radiculopathy  -     gabapentin (NEURONTIN) 400 mg capsule; Take 1 PO BID.    Herniated nucleus pulposus, L3-4 left  -     gabapentin (NEURONTIN) 400 mg capsule; Take 1 PO BID.    Spinal stenosis of lumbar region with neurogenic claudication  -     gabapentin (NEURONTIN) 400 mg capsule; Take 1 PO BID.    Chronic bilateral low back pain without sciatica  -     gabapentin (NEURONTIN) 400 mg capsule; Take 1 PO BID.    Type 2 MI (myocardial infarction) ([facility])    Pain  -     methocarbamol (ROBAXIN) 500 mg tablet; Take 1 tablet (500 mg total) by mouth 4 (four) times a day    Gastroesophageal reflux disease with esophagitis without hemorrhage  -     Discontinue: pantoprazole (PROTONIX) 40 mg tablet; Take 1 tablet (40 mg total) by mouth daily  -     pantoprazole (PROTONIX) 40 mg tablet; Take 1 tablet (40 mg total) by mouth  "daily    Continuous opioid dependence (HCC)    Triple vessel coronary artery disease/chest pain/elevated troponin  -     metoprolol tartrate (LOPRESSOR) 25 mg tablet; Take 1 tablet (25 mg total) by mouth every 12 (twelve) hours    Screen for colon cancer  -     Cologuard    Wellness examination          Subjective:   Chief Complaint   Patient presents with    Physical Exam        Patient ID: Alex Vallecillo is a 63 y.o. male.    HPI    The following portions of the patient's history were reviewed and updated as appropriate: allergies, current medications, past family history, past medical history, past social history, past surgical history and problem list.    Review of Systems   Constitutional:  Negative for chills and fever.   HENT:  Negative for facial swelling and sinus pressure.    Eyes:  Negative for visual disturbance.   Respiratory:  Negative for shortness of breath and wheezing.    Cardiovascular:  Negative for chest pain.   Gastrointestinal:  Negative for abdominal pain.   Musculoskeletal:  Negative for myalgias.   Skin:  Negative for rash.   Neurological:  Negative for weakness.   Psychiatric/Behavioral:  Negative for confusion and hallucinations.          Objective:  Vitals:    01/19/24 0732   BP: 132/80   BP Location: Left arm   Patient Position: Sitting   Cuff Size: Standard   Pulse: 72   Temp: 97.6 °F (36.4 °C)   TempSrc: Tympanic   Weight: 110 kg (243 lb)   Height: 5' 8.9\" (1.75 m)      Physical Exam  Constitutional:       General: He is not in acute distress.     Appearance: He is well-developed. He is not diaphoretic.   HENT:      Right Ear: Ear canal normal. Tympanic membrane is not injected.      Left Ear: Ear canal normal. Tympanic membrane is not injected.      Nose: Nose normal.      Mouth/Throat:      Pharynx: No oropharyngeal exudate.   Eyes:      Conjunctiva/sclera: Conjunctivae normal.      Pupils: Pupils are equal, round, and reactive to light.   Neck:      Thyroid: No thyromegaly. "   Cardiovascular:      Rate and Rhythm: Normal rate and regular rhythm.      Heart sounds: Normal heart sounds. No murmur heard.  Pulmonary:      Effort: Pulmonary effort is normal. No respiratory distress.      Breath sounds: Normal breath sounds. No wheezing.   Abdominal:      General: Bowel sounds are normal.      Palpations: Abdomen is soft. There is no hepatomegaly, splenomegaly or mass.      Tenderness: There is no abdominal tenderness.   Musculoskeletal:         General: No tenderness or deformity. Normal range of motion.      Cervical back: Normal range of motion and neck supple.   Skin:     General: Skin is warm and dry.      Coloration: Skin is not pale.      Findings: No rash.   Neurological:      Mental Status: He is alert and oriented to person, place, and time. He is not disoriented.      Sensory: No sensory deficit.      Gait: Gait normal.   Psychiatric:         Speech: Speech normal.         Behavior: Behavior normal.

## 2024-01-19 NOTE — PROGRESS NOTES
HPI:  Alex Vallecillo is a 63 y.o. male here for his yearly health maintenance exam.   Patient Active Problem List   Diagnosis    Hyperlipidemia    Hypertension    Type 2 diabetes mellitus without complication, without long-term current use of insulin (HCC)    Esophageal reflux    Aftercare following left knee joint replacement surgery    It band syndrome, left    Continuous opioid dependence (HCC)    Herniated nucleus pulposus, L3-4 left    Lumbar radiculopathy    Spinal stenosis of lumbar region with neurogenic claudication    Chronic left-sided low back pain without sciatica    Myofascial pain syndrome    Lumbar spondylosis    Triple vessel coronary artery disease/chest pain/elevated troponin    Tobacco abuse    Alcohol abuse    Type 2 MI (myocardial infarction) (HCC)    S/P CABG (coronary artery bypass graft)    Leukocytosis    RAFAELA (acute kidney injury) (HCC)     Past Medical History:   Diagnosis Date    Chews tobacco regularly     Consumes three beers daily     Controlled diabetes mellitus (HCC)     Diabetes (HCC)     Esophageal reflux     GERD (gastroesophageal reflux disease)     Hyperlipidemia     Hypertension     Osteoarthritis     LEFT KNEE    Primary localized osteoarthritis of left knee     last assessed: 1/16/2018       1. Advanced Directive: n     2. Durable Power of  for Healthcare: n     3. Social History:           Drug and alcohol History: n                  4. Immunizations up to date: y                 Lifestyle:                           Healthy Diet:n                          Alcohol Use:n                          Tobacco Use:n                          Regular exercise:y                          Weight concerns:n                               5. Over the past 2 weeks, how often have you been bothered by the following:              Little interest or pleasure in doing things:n              Felling down, depressed or hopeless:n       Current Outpatient Medications   Medication Sig Dispense  Refill    acetaminophen (TYLENOL) 325 mg tablet Take 2 tablets (650 mg total) by mouth every 6 (six) hours as needed for mild pain, headaches or fever 30 tablet 0    Alcohol Swabs 70 % PADS May substitute brand based on insurance coverage. Check glucose TID. 100 each 0    aspirin 325 mg tablet Take 1 tablet (325 mg total) by mouth daily Do not start before November 5, 2023. 90 tablet 0    atorvastatin (LIPITOR) 40 mg tablet Take 1 tablet (40 mg total) by mouth daily 90 tablet 3    Blood Glucose Monitoring Suppl (OneTouch Verio Reflect) w/Device KIT May substitute brand based on insurance coverage. Check glucose TID. 1 kit 0    diphenhydrAMINE-acetaminophen (TYLENOL PM)  MG TABS Take 1 tablet by mouth daily at bedtime as needed for sleep      Empagliflozin (Jardiance) 25 MG TABS Take 1 tablet (25 mg total) by mouth every morning 90 tablet 3    Exenatide ER (Bydureon) 2 MG PEN INJECT THE CONTENTS OF 1  PEN SUBCUTANEOUSLY WEEKLY  ON SUNDAYS 12 each 3    fenofibrate (TRICOR) 145 mg tablet Take 1 tablet (145 mg total) by mouth daily 90 tablet 3    gabapentin (NEURONTIN) 400 mg capsule Take 1 PO BID. 180 capsule 3    glucose blood (OneTouch Verio) test strip May substitute brand based on insurance coverage. Check glucose TID. 100 each 0    insulin glargine (LANTUS SOLOSTAR) 100 units/mL injection pen Inject 20 Units under the skin daily 15 mL 10    Insulin Glargine Solostar (Lantus SoloStar) 100 UNIT/ML SOPN Inject 0.2 mL (20 Units total) under the skin daily at bedtime 100 mL 5    Insulin Pen Needle (BD Pen Needle Audelia 2nd Gen) 32G X 4 MM MISC For use with insulin pen. Pharmacy may dispense brand covered by insurance. 100 each 0    metFORMIN (GLUCOPHAGE-XR) 500 mg 24 hr tablet Take 1 tablet (500 mg total) by mouth 2 (two) times a day with meals 180 tablet 3    methocarbamol (ROBAXIN) 500 mg tablet Take 1 tablet (500 mg total) by mouth 4 (four) times a day 270 tablet 5    metoprolol tartrate (LOPRESSOR) 25 mg tablet  Take 1 tablet (25 mg total) by mouth every 12 (twelve) hours 180 tablet 3    OneTouch Delica Lancets 33G MISC May substitute brand based on insurance coverage. Check glucose TID. 100 each 10    pantoprazole (PROTONIX) 40 mg tablet Take 1 tablet (40 mg total) by mouth daily 30 tablet 5    polyethylene glycol (GLYCOLAX) 17 GM/SCOOP powder Take 17 g by mouth daily       No current facility-administered medications for this visit.     No Known Allergies  Immunization History   Administered Date(s) Administered    COVID-19 PFIZER VACCINE 0.3 ML IM 03/16/2021, 04/09/2021, 12/18/2021    Influenza Quadrivalent Preservative Free 3 years and older IM 01/16/2018    Influenza, injectable, quadrivalent, preservative free 0.5 mL 10/17/2023    Influenza, seasonal, injectable, preservative free 09/30/2016       Patient Care Team:  Robbi Amanda MD as PCP - General  MD Jazmin Jones MD    Review of Systems   Constitutional:  Negative for fatigue, fever and unexpected weight change.   HENT:  Negative for congestion, sinus pain and sore throat.    Eyes:  Negative for visual disturbance.   Respiratory:  Negative for shortness of breath and wheezing.    Cardiovascular:  Negative for chest pain and palpitations.   Gastrointestinal:  Negative for abdominal pain, nausea and vomiting.   Musculoskeletal: Negative.  Negative for arthralgias and myalgias.   Neurological:  Negative for syncope, weakness and numbness.   Psychiatric/Behavioral: Negative.  Negative for confusion, dysphoric mood and suicidal ideas.        Physical Exam :  Physical Exam  Constitutional:       Appearance: He is well-developed.   HENT:      Right Ear: Ear canal normal. Tympanic membrane is not injected.      Left Ear: Ear canal normal. Tympanic membrane is not injected.      Nose: Nose normal.   Eyes:      General:         Right eye: No discharge.         Left eye: No discharge.      Conjunctiva/sclera: Conjunctivae normal.      Pupils: Pupils are  equal, round, and reactive to light.   Neck:      Thyroid: No thyromegaly.   Cardiovascular:      Rate and Rhythm: Normal rate and regular rhythm.      Heart sounds: Normal heart sounds. No murmur heard.  Pulmonary:      Effort: Pulmonary effort is normal. No respiratory distress.      Breath sounds: Normal breath sounds. No wheezing.   Abdominal:      General: Bowel sounds are normal. There is no distension.      Palpations: Abdomen is soft.      Tenderness: There is no abdominal tenderness.   Musculoskeletal:         General: Normal range of motion.      Cervical back: Normal range of motion and neck supple.   Lymphadenopathy:      Cervical: No cervical adenopathy.   Skin:     General: Skin is warm and dry.   Neurological:      Mental Status: He is alert and oriented to person, place, and time. He is not disoriented.      Sensory: No sensory deficit.      Motor: No weakness.      Coordination: Coordination normal.      Gait: Gait normal.      Deep Tendon Reflexes: Reflexes are normal and symmetric.   Psychiatric:         Speech: Speech normal.         Behavior: Behavior normal.         Thought Content: Thought content normal.         Judgment: Judgment normal.           Assessment and Plan:  1. Type 2 diabetes mellitus without complication, with long-term current use of insulin ([facility])  IRIS Diabetic eye exam    atorvastatin (LIPITOR) 40 mg tablet    Empagliflozin (Jardiance) 25 MG TABS    fenofibrate (TRICOR) 145 mg tablet    metFORMIN (GLUCOPHAGE-XR) 500 mg 24 hr tablet    insulin glargine (LANTUS SOLOSTAR) 100 units/mL injection pen      2. Type 2 diabetes mellitus without complication, without long-term current use of insulin ([facility])  Exenatide ER (Bydureon) 2 MG PEN      3. Hyperlipidemia, unspecified hyperlipidemia type  fenofibrate (TRICOR) 145 mg tablet      4. Lumbar radiculopathy  gabapentin (NEURONTIN) 400 mg capsule      5. Herniated nucleus pulposus, L3-4 left  gabapentin (NEURONTIN) 400 mg capsule       6. Spinal stenosis of lumbar region with neurogenic claudication  gabapentin (NEURONTIN) 400 mg capsule      7. Chronic bilateral low back pain without sciatica  gabapentin (NEURONTIN) 400 mg capsule      8. Type 2 MI (myocardial infarction) (HCC)        9. Pain  methocarbamol (ROBAXIN) 500 mg tablet      10. Gastroesophageal reflux disease with esophagitis without hemorrhage  pantoprazole (PROTONIX) 40 mg tablet    DISCONTINUED: pantoprazole (PROTONIX) 40 mg tablet      11. Continuous opioid dependence (HCC)        12. Triple vessel coronary artery disease/chest pain/elevated troponin  metoprolol tartrate (LOPRESSOR) 25 mg tablet      13. Screen for colon cancer  Cologuard      14. Wellness examination            Health Maintenance Due   Topic Date Due    Pneumococcal Vaccine: Pediatrics (0 to 5 Years) and At-Risk Patients (6 to 64 Years) (1 - PCV) Never done    DM Eye Exam  Never done    HIV Screening  Never done    Hepatitis A Vaccine (1 of 2 - Risk 2-dose series) Never done    DTaP,Tdap,and Td Vaccines (1 - Tdap) Never done    Zoster Vaccine (1 of 2) Never done    Colorectal Cancer Screening  04/23/2020    COVID-19 Vaccine (4 - 2023-24 season) 09/01/2023

## 2024-01-19 NOTE — PROGRESS NOTES
Cardiology Outpatient Follow-Up Note - Alex Vallecillo 63 y.o. male MRN: [id number]      Assessment/Plan:  1. Coronary artery disease involving native coronary artery of native heart without angina pectoris  S/p CABGx4  No angina  Continue metoprolol 25 mg BID  Continue atorvastatin 40 mg daily -- could not tolerate 80 mg due to myalgia  LDL goal < 55 mg/dL, last was 119 mg/dL on atorvastatin 40 mg daily. Add Repatha 140 mg SC q14d  Reduce aspirin to 81 mg daily  - Evolocumab 140 MG/ML SOAJ; Inject 1 mL (140 mg total) under the skin every 14 (fourteen) days  Dispense: 6 mL; Refill: 3  - aspirin (ECOTRIN LOW STRENGTH) 81 mg EC tablet; Take 1 tablet (81 mg total) by mouth in the morning    2. Mixed hyperlipidemia  Continue atorvastatin 40 mg daily  LDL far above goal  Add Repatha as above  - Evolocumab 140 MG/ML SOAJ; Inject 1 mL (140 mg total) under the skin every 14 (fourteen) days  Dispense: 6 mL; Refill: 3    3. Primary hypertension  Controlled on current therapy.       We will see Alex Vallecillo back in 6 months for routine follow-up.    Subjective:     HPI: Alex Vallecillo is a 63 y.o. year old male with mvCAD s/p CABGx4 10/2023 (LIMA LAD, SVG RPDA, SVG OM1, SVG D2), mild AS, T2DM, HTN, HLD, tobacco use, GERD presenting for follow-up.     No cardiac complaints      Cardiac Testing:    Cardiac Cath 10/18/23  Impression         MVD: pLAD (iFR positive 0.79), pLCX (unable to advance iFR wire beyond calcified lesion), pRCA     Recommendation    Heart team discussion with CTS given MVD  Cont GDMT optimization for CAD  Aggressive risk factor reduction   on diet/lifestyle modification   on tobacco cessation  Eventual cardiac rehab upon discharge        Echo 10/17/23    Interpretation Summary         Left Ventricle: Left ventricular cavity size is normal. Wall thickness is mildly increased. The left ventricular ejection fraction is 60%. Systolic function is normal. Wall motion is normal. Diastolic function  "is normal.    Right Ventricle: Right ventricular cavity size is normal. Systolic function is normal.    Aortic Valve: There is mild stenosis. The aortic valve mean gradient is 10 mmHg.     No prior studies for comparison.       EKGs, personally reviewed:  N/a    Relevant Labs & Results:  Lipid profile 1/12/24 -- , , HDL 30,  mg/dL       ROS:  Review of Systems:  Review of Systems    Objective:     Vitals:   Vitals:    01/19/24 1528   BP: 120/68   BP Location: Left arm   Patient Position: Sitting   Cuff Size: Standard   Pulse: 71   Weight: 110 kg (243 lb)   Height: 5' 8\" (1.727 m)    Body surface area is 2.22 meters squared.  Wt Readings from Last 3 Encounters:   01/19/24 110 kg (243 lb)   01/19/24 110 kg (243 lb)   12/01/23 109 kg (240 lb 1.6 oz)       Physical Exam:  General: Alex Vallecillo is a well appearing male, in no acute distress, sitting comfortably  HEENT: moist mucous membranes, EOMI  Neck:  No JVD, supple, trachea midline  Cardiovascular: unremarkable S1/S2, regular rate and rhythm, no murmurs, rubs or gallops  Pulmonary: normal respiratory effort, CTAB  Abdomen: soft and nondistended  Extremities: No lower extremity edema. Warm and well perfused extremities.  Neuro: no focal motor deficits, AAOx3 (person, place, time)  Psych: Normal mood and affect, cooperative      Medications (at the START of this encounter):  Outpatient Medications Prior to Visit   Medication Sig Dispense Refill    acetaminophen (TYLENOL) 325 mg tablet Take 2 tablets (650 mg total) by mouth every 6 (six) hours as needed for mild pain, headaches or fever 30 tablet 0    Alcohol Swabs 70 % PADS May substitute brand based on insurance coverage. Check glucose TID. 100 each 0    aspirin 325 mg tablet Take 1 tablet (325 mg total) by mouth daily Do not start before November 5, 2023. 90 tablet 0    atorvastatin (LIPITOR) 40 mg tablet Take 1 tablet (40 mg total) by mouth daily 90 tablet 3    Blood Glucose Monitoring Suppl " (OneTouch Verio Reflect) w/Device KIT May substitute brand based on insurance coverage. Check glucose TID. 1 kit 0    diphenhydrAMINE-acetaminophen (TYLENOL PM)  MG TABS Take 1 tablet by mouth daily at bedtime as needed for sleep      Empagliflozin (Jardiance) 25 MG TABS Take 1 tablet (25 mg total) by mouth every morning 90 tablet 3    Exenatide ER (Bydureon) 2 MG PEN INJECT THE CONTENTS OF 1  PEN SUBCUTANEOUSLY WEEKLY  ON SUNDAYS 12 each 3    fenofibrate (TRICOR) 145 mg tablet Take 1 tablet (145 mg total) by mouth daily 90 tablet 3    gabapentin (NEURONTIN) 400 mg capsule Take 1 PO BID. 180 capsule 3    glucose blood (OneTouch Verio) test strip May substitute brand based on insurance coverage. Check glucose TID. 100 each 0    insulin glargine (LANTUS SOLOSTAR) 100 units/mL injection pen Inject 20 Units under the skin daily 15 mL 10    Insulin Pen Needle (BD Pen Needle Audelia 2nd Gen) 32G X 4 MM MISC For use with insulin pen. Pharmacy may dispense brand covered by insurance. 100 each 0    metFORMIN (GLUCOPHAGE-XR) 500 mg 24 hr tablet Take 1 tablet (500 mg total) by mouth 2 (two) times a day with meals (Patient taking differently: Take 500 mg by mouth 2 (two) times a day with meals Takes daily) 180 tablet 3    methocarbamol (ROBAXIN) 500 mg tablet Take 1 tablet (500 mg total) by mouth 4 (four) times a day 270 tablet 5    metoprolol tartrate (LOPRESSOR) 25 mg tablet Take 1 tablet (25 mg total) by mouth every 12 (twelve) hours 180 tablet 3    OneTouch Delica Lancets 33G MISC May substitute brand based on insurance coverage. Check glucose TID. 100 each 10    pantoprazole (PROTONIX) 40 mg tablet Take 1 tablet (40 mg total) by mouth daily 30 tablet 5    Insulin Glargine Solostar (Lantus SoloStar) 100 UNIT/ML SOPN Inject 0.2 mL (20 Units total) under the skin daily at bedtime 100 mL 5    polyethylene glycol (GLYCOLAX) 17 GM/SCOOP powder Take 17 g by mouth daily       No facility-administered medications prior to visit.  "                Time Spent:  Total time (face-to-face and non-face-to-face) spent on today's visit was 20 minutes. This includes preparation for the visits (i.e. reviewing test results), performance of a medically appropriate history and examination, and orders for medications, tests or other procedures. This time is exclusive of procedures performed and time spent teaching.      This note was completed in part utilizing Dragon Medical One voice recognition software. Grammatical errors, random word insertion, spelling mistakes, occasional wrong word or \"sound-alike\" substitutions and incomplete sentences may be an occasional consequence of the system secondary to software limitations, ambient noise and hardware issues. At the time of dictation, efforts were made to edit, clarify and /or correct errors.  Please read the chart carefully and recognize, using context, where substitutions have occurred.  If you have any questions or concerns about the context, text or information contained within the body of this dictation, please contact myself, the provider, for further clarification.    "

## 2024-01-23 ENCOUNTER — APPOINTMENT (OUTPATIENT)
Dept: CARDIAC REHAB | Facility: HOSPITAL | Age: 64
End: 2024-01-23
Payer: COMMERCIAL

## 2024-01-23 ENCOUNTER — CLINICAL SUPPORT (OUTPATIENT)
Dept: CARDIAC REHAB | Facility: HOSPITAL | Age: 64
End: 2024-01-23
Payer: COMMERCIAL

## 2024-01-23 DIAGNOSIS — Z95.1 S/P CABG (CORONARY ARTERY BYPASS GRAFT): Primary | ICD-10-CM

## 2024-01-23 PROCEDURE — 93798 PHYS/QHP OP CAR RHAB W/ECG: CPT

## 2024-01-25 ENCOUNTER — APPOINTMENT (OUTPATIENT)
Dept: CARDIAC REHAB | Facility: HOSPITAL | Age: 64
End: 2024-01-25
Payer: COMMERCIAL

## 2024-01-25 ENCOUNTER — CLINICAL SUPPORT (OUTPATIENT)
Dept: CARDIAC REHAB | Facility: HOSPITAL | Age: 64
End: 2024-01-25
Payer: COMMERCIAL

## 2024-01-25 DIAGNOSIS — Z95.1 S/P CABG (CORONARY ARTERY BYPASS GRAFT): Primary | ICD-10-CM

## 2024-01-25 PROCEDURE — 93798 PHYS/QHP OP CAR RHAB W/ECG: CPT

## 2024-01-25 NOTE — PROGRESS NOTES
Cardiac Rehabilitation Plan of Care   60 Day Reassessment          Today's date: 2024   # of Exercise Sessions Completed: 17  Patient name: Alex Vallecillo      : 1960  Age: 63 y.o.       MRN: [id number]  Referring Physician: Dr. Franco  Cardiologist: Dr. Ramses Ross  Provider: Shaq  Clinician: Kanchan Conley MS, CEP      Dx:   Encounter Diagnosis   Name Primary?    S/P CABG (coronary artery bypass graft) Yes     Date of onset: 2023      SUMMARY OF PROGRESS:  Alex is compliant attending cardiac rehab exercise sessions 3x/wk. He tolerates 50 mins at 3.30 - 4.62 METs.  A light strength training component has been added to their exercise program.   He is tolerating progression of intensity levels to maintain RPE 4-6.   Resting BP  112/68 - 134/78 with Normal response to exercise reaching 136/78- 136/90.  NSR on tele w/ occ PVCs observed.  RHR 80 - 86  with Normal response to exercise reaching 98 - 115. He has added home exercise 5-7 days/wk which includes walking 1-2 miles.  No cardiac complaints. Current weight 245.4 lbs, up 1.4 lbs in last 30 days.   Patient has been working on  dietary modifications with the goal of rare red/processed meats, low fat dairy, reduced added sugars and refined flours. The patient has T2D.  When addressed, the patient denies depression/anxiety and deferred initial questionnaires.  Patient reports excellent  social/emotional support from wife and family.  Alex rates their stress as 3/10. Stress management techniques were reinforced.  Patient attends group educational classes on cardiac risk factor modification.    His exercise program will be progressed as tolerated to maintain RPE 4-6. The patient has the following personal goals he hopes to achieved by discharge: continue to increase strength, consistent exercise.   They will continue to be educated on lifestyle modification and encouraged to supplement with a home exercise program as tolerated to reach  the following goals in the next 30 days: continue to walk 1-2 miles daily.      Medication compliance: Yes   Comments: Pt reports to be compliant with medications  Fall Risk: Low   Comments: Ambulates with a steady gait with no assist device    EKG Interpretation: NSR w/ occ PVCs      EXERCISE ASSESSMENT and PLAN    Exercise Prescription:      Frequency: 3 days/week   Supplement with home exercise 2+ days/wk as tolerated       Minutes:50         METS: 3.30-4.62           HR: 20-30 BPM above RHR   RPE: 4-6         Modalities: Treadmill, UBE, NuStep, and Recumbent bike      30 Day Goals for Exercise Progression:    Frequency: 3 days/week of cardiac rehab       Supplement with home exercise 2+ days/wk as tolerated    Minutes: 45-60                             >150 mins/wk of moderate intensity exercise   METS: 4.5-5.0   HR: 20-30 BPM above RHR   RPE: 4-6   Modalities: Treadmill, UBE, Lifecycle, Rower, NuStep, and Recumbent bike    Strength trainin-3 days / week  12-15 repetitions  1-2 sets per modality   Will be added following at least 8 weeks post surgery and 8-10 monitored sessions   Modalities: Pull Downs, Lateral Raise, Arm Extension, Arm Curl, Front Raises, Shoulder Shrugs, and leg ext    Home Exercise: Type: walking, Frequency: 5 days/week, Duration: 30 mins, Distance 1-2 miles    Goals: 10% improvement in functional capacity - based on max METs achieved in fitness assessment, Reduced dyspnea with physical activity  0-1/10, improved DASI score by 10%, Increase in exercise capacity by 40% - based on peak METs tolerated in cardiac rehab exercise session, Exercise 5 days/wk, >150mins/wk of moderate intensity exercise, Resume ADLs with increased strength, Return to work unrestricted, Attend Rehab regularly, and start a home exercise program    Progression Toward Goals:  Pt is progressing and showing improvement  toward the following goals:  attending rehab regularly - adding in 3rd day next week, increasing  functional METs, increasing strength and endurance, resumed all ADLs, completing formal home exercise : walking, and tolerating exercise well with no cardiac complaints.  , Will continue to educate and progress as tolerated.    Education: benefit of exercise for CAD risk factors, home exercise guidelines, AHA guidelines to achieve >150 mins/wk of moderate exercise, RPE scale, class: Risk Factors for Heart Disease, and class: Exercise      Plan:education on home exercise guidelines, home exercise 30+ mins 2 days opposite CR, and Education class: Risk Factors for Heart Disease  Readiness to change: Action:  (Changing behavior)      NUTRITION ASSESSMENT AND PLAN    Weight control:    Starting weight: 237 lb   Current weight:   245.4 lbs       Diabetes: T2D  A1c: 7.9    last measured: 11/10/2023    Lipid management: Discussed diet and lipid management and Last lipid profile 1/12/24  Chol 192    HDL 30      Goals:LDL <100, HDL >40, TRG <150, and CHOL <200    Measurable goals were based Rate Your Plate Dietary Self-Assessment. These are the areas in which the patient could score higher on the assessment.  Goals include recommendations for a heart healthy diet based on American Heart Association.    Progression Toward Goals: Pt is progressing and showing improvement  toward the following goals:  reduced cholesterol, heart healthy diet.  , Will continue to educate and progress as tolerated.    Education: heart healthy eating  low sodium diet  hydration  nutrition for  lipid management  nutrition for Improved BG control  target goal for A1c <7.0  exercise and diabetes management   wt. loss   portion control    Plan: Education class: Reading Food Labels and Education Class: Heart Healthy Eating  Readiness to change: Preparation:  (Getting ready to change)  and Action:  (Changing behavior)      PSYCHOSOCIAL ASSESSMENT AND PLAN    Emotional:  Depression assessment:  PHQ-9 = did not complete              Anxiety  measure:  LYNNETTE-7 = did not complete    Self-reported stress level:  3  Social support: Very Good and Patient reports excellent emotional/social support from wife and family    Goals: Reports no concerns. Defer PHQ-9 and LYNNETTE-7.     Progression Toward Goals:    Education: signs/sxs of depression, benefits of a positive support system, and stress management techniques    Plan: Class: Stress and Your Health, Class: Relaxation, and Enroll in [employer]  Readiness to change: Action:  (Changing behavior)      OTHER CORE COMPONENTS     Tobacco:   Social History     Tobacco Use   Smoking Status Former    Current packs/day: 1.00    Average packs/day: 1 pack/day for 11.0 years (11.0 ttl pk-yrs)    Types: Cigarettes   Smokeless Tobacco Current    Types: Chew   Tobacco Comments    quit 30 + yrs ago        Tobacco Use Intervention:    uses chewing tobacco    Anginal Symptoms:  chest pressure   NTG use: No prescription    Blood pressure:    Restin//78   Exercise: 136//90    Goals: consistent BP < 130/80, reduced dietary sodium <2300mg, moderate intensity exercise >150 mins/wk, medication compliance, and reduce number of medications  needed for BP control    Progression Toward Goals: Pt is progressing and showing improvement  toward the following goals:  medication compliance, blood pressures within normal limits at rest and normal response to exercise, and achieving >150 mins/week of moderate intensity exercise, watching and reducing sodium intake.  , Will continue to educate and progress as tolerated.    Education:  understanding high blood pressure and it's relationship to CAD, low sodium diet and HTN, and Education class: Understanding Heart Disease    Plan: Class: Understanding Heart Disease, Class: Common Heart Medications, and medication compliance  Readiness to change: Action:  (Changing behavior)

## 2024-01-26 ENCOUNTER — CLINICAL SUPPORT (OUTPATIENT)
Dept: CARDIAC REHAB | Facility: HOSPITAL | Age: 64
End: 2024-01-26
Payer: COMMERCIAL

## 2024-01-26 DIAGNOSIS — Z95.1 S/P CABG (CORONARY ARTERY BYPASS GRAFT): Primary | ICD-10-CM

## 2024-01-26 PROCEDURE — 93798 PHYS/QHP OP CAR RHAB W/ECG: CPT

## 2024-01-30 ENCOUNTER — APPOINTMENT (OUTPATIENT)
Dept: CARDIAC REHAB | Facility: HOSPITAL | Age: 64
End: 2024-01-30
Payer: COMMERCIAL

## 2024-01-30 ENCOUNTER — CLINICAL SUPPORT (OUTPATIENT)
Dept: CARDIAC REHAB | Facility: HOSPITAL | Age: 64
End: 2024-01-30
Payer: COMMERCIAL

## 2024-01-30 DIAGNOSIS — Z95.1 S/P CABG (CORONARY ARTERY BYPASS GRAFT): Primary | ICD-10-CM

## 2024-01-30 PROCEDURE — 93798 PHYS/QHP OP CAR RHAB W/ECG: CPT

## 2024-02-01 ENCOUNTER — CLINICAL SUPPORT (OUTPATIENT)
Dept: CARDIAC REHAB | Facility: HOSPITAL | Age: 64
End: 2024-02-01
Payer: COMMERCIAL

## 2024-02-01 ENCOUNTER — APPOINTMENT (OUTPATIENT)
Dept: CARDIAC REHAB | Facility: HOSPITAL | Age: 64
End: 2024-02-01
Payer: COMMERCIAL

## 2024-02-01 DIAGNOSIS — Z95.1 S/P CABG (CORONARY ARTERY BYPASS GRAFT): Primary | ICD-10-CM

## 2024-02-01 PROCEDURE — 93798 PHYS/QHP OP CAR RHAB W/ECG: CPT

## 2024-02-05 ENCOUNTER — TELEPHONE (OUTPATIENT)
Dept: CARDIOLOGY CLINIC | Facility: CLINIC | Age: 64
End: 2024-02-05

## 2024-02-05 NOTE — TELEPHONE ENCOUNTER
Pt called - states [employer] is denying Repatha.    Spoke to pt - advised him likely needs a prior auth.   Office will initiate and let pt know if approved.

## 2024-02-06 ENCOUNTER — CLINICAL SUPPORT (OUTPATIENT)
Dept: CARDIAC REHAB | Facility: HOSPITAL | Age: 64
End: 2024-02-06
Payer: COMMERCIAL

## 2024-02-06 DIAGNOSIS — Z95.1 S/P CABG (CORONARY ARTERY BYPASS GRAFT): Primary | ICD-10-CM

## 2024-02-06 PROCEDURE — 93798 PHYS/QHP OP CAR RHAB W/ECG: CPT

## 2024-02-08 ENCOUNTER — CLINICAL SUPPORT (OUTPATIENT)
Dept: CARDIAC REHAB | Facility: HOSPITAL | Age: 64
End: 2024-02-08
Payer: COMMERCIAL

## 2024-02-08 DIAGNOSIS — Z95.1 S/P CABG (CORONARY ARTERY BYPASS GRAFT): Primary | ICD-10-CM

## 2024-02-08 PROCEDURE — 93798 PHYS/QHP OP CAR RHAB W/ECG: CPT

## 2024-02-09 ENCOUNTER — CLINICAL SUPPORT (OUTPATIENT)
Dept: CARDIAC REHAB | Facility: HOSPITAL | Age: 64
End: 2024-02-09
Payer: COMMERCIAL

## 2024-02-09 DIAGNOSIS — Z95.1 S/P CABG (CORONARY ARTERY BYPASS GRAFT): Primary | ICD-10-CM

## 2024-02-09 PROCEDURE — 93798 PHYS/QHP OP CAR RHAB W/ECG: CPT

## 2024-02-12 NOTE — TELEPHONE ENCOUNTER
Spoke to pt.     Auth states pt has no coverage. Verified ##'s w/ pt. Asked pt to check to see if he has a new card and CB if his ##'s are different for 2024.    Attempting to speak to [facility] [facility].

## 2024-02-13 ENCOUNTER — APPOINTMENT (OUTPATIENT)
Dept: CARDIAC REHAB | Facility: HOSPITAL | Age: 64
End: 2024-02-13
Payer: COMMERCIAL

## 2024-02-15 ENCOUNTER — CLINICAL SUPPORT (OUTPATIENT)
Dept: CARDIAC REHAB | Facility: HOSPITAL | Age: 64
End: 2024-02-15
Payer: COMMERCIAL

## 2024-02-15 DIAGNOSIS — Z95.1 S/P CABG (CORONARY ARTERY BYPASS GRAFT): Primary | ICD-10-CM

## 2024-02-15 PROCEDURE — 93798 PHYS/QHP OP CAR RHAB W/ECG: CPT

## 2024-02-16 NOTE — TELEPHONE ENCOUNTER
Pt currently has [facility]. [facility] would not process.  Called [facility] - pt does not have coverage for specialty medications.  Called, LM for pt to CB.  Pt will need to call [employer] program and apply for assistance.  Will message pt via My Chart.

## 2024-02-20 ENCOUNTER — CLINICAL SUPPORT (OUTPATIENT)
Dept: CARDIAC REHAB | Facility: HOSPITAL | Age: 64
End: 2024-02-20
Payer: COMMERCIAL

## 2024-02-20 DIAGNOSIS — Z95.1 S/P CABG (CORONARY ARTERY BYPASS GRAFT): Primary | ICD-10-CM

## 2024-02-20 PROCEDURE — 93798 PHYS/QHP OP CAR RHAB W/ECG: CPT

## 2024-02-23 NOTE — PROGRESS NOTES
Cardiac Rehabilitation Plan of Care   90 Day Reassessment          Today's date: 2024   # of Exercise Sessions Completed: 25  Patient name: Alex Vallecillo      : 1960  Age: 63 y.o.       MRN: [id number]  Referring Physician: Dr. Franco  Cardiologist: Dr. Ramses Ross  Provider: Shaq  Clinician: Frances May MS, CEP        Dx:   Encounter Diagnosis   Name Primary?    S/P CABG (coronary artery bypass graft) Yes     Date of onset: 2023      SUMMARY OF PROGRESS:  Alex is compliant attending cardiac rehab exercise sessions 3x/wk. He tolerates 50 mins at 3.30 - 4.62 METs.  A light strength training component has been added to their exercise program.   He is tolerating progression of intensity levels to maintain RPE 4-6.   Resting BP  112/68 - 134/78 with Normal response to exercise reaching 136/78- 136/90.  NSR on tele w/ occ PVCs observed.  RHR 80 - 86  with Normal response to exercise reaching 98 - 115. He has added home exercise 5-7 days/wk which includes walking 1-2 miles with his dogs.  No cardiac complaints. Current weight 241 lbs.  Patient has been working on  dietary modifications with the goal of rare red/processed meats, low fat dairy, reduced added sugars and refined flours. The patient has T2D.  When addressed, the patient denies depression/anxiety and deferred initial questionnaires.  Patient reports excellent  social/emotional support from wife and family.  Alex rates their stress as 3/10. Stress management techniques were reinforced.  Patient attends group educational classes on cardiac risk factor modification.    His exercise program will be progressed as tolerated to maintain RPE 4-6. The patient has the following personal goals he hopes to achieved by discharge: continue to increase strength, consistent exercise. He reports he is on target with his goals at 90 days. He is planning to return to work soon. He will continue to be educated on lifestyle modification and  encouraged to supplement with a home exercise program as tolerated to reach the following goals in the next 30 days: continue to walk 1-2 miles daily.      Medication compliance: Yes   Comments: Pt reports to be compliant with medications  Fall Risk: Low   Comments: Ambulates with a steady gait with no assist device    EKG Interpretation: NSR w/ occ PVCs      EXERCISE ASSESSMENT and PLAN    Exercise Prescription:      Frequency: 3 days/week   Supplement with home exercise 2+ days/wk as tolerated       Minutes:50         METS: 3.30-4.62           HR: 20-30 BPM above RHR   RPE: 4-6         Modalities: Treadmill, UBE, NuStep, and Recumbent bike      30 Day Goals for Exercise Progression:    Frequency: 3 days/week of cardiac rehab       Supplement with home exercise 2+ days/wk as tolerated    Minutes: 45-60                             >150 mins/wk of moderate intensity exercise   METS: 4.5-5.0   HR: 20-30 BPM above RHR   RPE: 4-6   Modalities: Treadmill, UBE, Lifecycle, Rower, NuStep, and Recumbent bike    Strength trainin-3 days / week  12-15 repetitions  1-2 sets per modality   Will be added following at least 8 weeks post surgery and 8-10 monitored sessions   Modalities: Pull Downs, Lateral Raise, Arm Extension, Arm Curl, Front Raises, Shoulder Shrugs, and leg ext    Home Exercise: Type: walking, Frequency: 5 days/week, Duration: 30 mins, Distance 1-2 miles    Goals: 10% improvement in functional capacity - based on max METs achieved in fitness assessment, Reduced dyspnea with physical activity  0-1/10, improved DASI score by 10%, Increase in exercise capacity by 40% - based on peak METs tolerated in cardiac rehab exercise session, Exercise 5 days/wk, >150mins/wk of moderate intensity exercise, Resume ADLs with increased strength, Return to work unrestricted, Attend Rehab regularly, and start a home exercise program    Progression Toward Goals:  Pt is progressing and showing improvement  toward the following  goals:  attending rehab regularly - adding in 3rd day next week, increasing functional METs, increasing strength and endurance, resumed all ADLs, completing formal home exercise : walking, and tolerating exercise well with no cardiac complaints.  , Will continue to educate and progress as tolerated.    Education: benefit of exercise for CAD risk factors, home exercise guidelines, AHA guidelines to achieve >150 mins/wk of moderate exercise, RPE scale, class: Risk Factors for Heart Disease, and class: Exercise      Plan:education on home exercise guidelines, home exercise 30+ mins 2 days opposite CR, and Education class: Risk Factors for Heart Disease  Readiness to change: Action:  (Changing behavior)      NUTRITION ASSESSMENT AND PLAN    Weight control:    Starting weight: 237 lb   Current weight:   241 lbs       Diabetes: T2D  A1c: 7.9    last measured: 11/10/2023    Lipid management: Discussed diet and lipid management and Last lipid profile 1/12/24  Chol 192    HDL 30      Goals:LDL <100, HDL >40, TRG <150, and CHOL <200    Measurable goals were based Rate Your Plate Dietary Self-Assessment. These are the areas in which the patient could score higher on the assessment.  Goals include recommendations for a heart healthy diet based on American Heart Association.    Progression Toward Goals: Pt is progressing and showing improvement  toward the following goals:  reduced cholesterol, heart healthy diet.  , Will continue to educate and progress as tolerated.    Education: heart healthy eating  low sodium diet  hydration  nutrition for  lipid management  nutrition for Improved BG control  target goal for A1c <7.0  exercise and diabetes management   wt. loss   portion control    Plan: Education class: Reading Food Labels and Education Class: Heart Healthy Eating  Readiness to change: Preparation:  (Getting ready to change)  and Action:  (Changing behavior)      PSYCHOSOCIAL ASSESSMENT AND PLAN    Emotional:   Depression assessment:  PHQ-9 = did not complete              Anxiety measure:  LYNNETTE-7 = did not complete    Self-reported stress level:  3  Social support: Very Good and Patient reports excellent emotional/social support from wife and family    Goals: Reports no concerns. Defer PHQ-9 and LYNNETTE-7.     Progression Toward Goals:    Education: signs/sxs of depression, benefits of a positive support system, and stress management techniques    Plan: Class: Stress and Your Health, Class: Relaxation, and Enroll in [employer]  Readiness to change: Action:  (Changing behavior)      OTHER CORE COMPONENTS     Tobacco:   Social History     Tobacco Use   Smoking Status Former    Current packs/day: 1.00    Average packs/day: 1 pack/day for 11.0 years (11.0 ttl pk-yrs)    Types: Cigarettes   Smokeless Tobacco Current    Types: Chew   Tobacco Comments    quit 30 + yrs ago        Tobacco Use Intervention:    uses chewing tobacco    Anginal Symptoms:  chest pressure   NTG use: No prescription    Blood pressure:    Restin//78   Exercise: 136//90    Goals: consistent BP < 130/80, reduced dietary sodium <2300mg, moderate intensity exercise >150 mins/wk, medication compliance, and reduce number of medications  needed for BP control    Progression Toward Goals: Pt is progressing and showing improvement  toward the following goals:  medication compliance, blood pressures within normal limits at rest and normal response to exercise, and achieving >150 mins/week of moderate intensity exercise, watching and reducing sodium intake.  , Will continue to educate and progress as tolerated.    Education:  understanding high blood pressure and it's relationship to CAD, low sodium diet and HTN, and Education class: Understanding Heart Disease    Plan: Class: Understanding Heart Disease, Class: Common Heart Medications, and medication compliance  Readiness to change: Action:  (Changing behavior)

## 2024-02-26 ENCOUNTER — APPOINTMENT (OUTPATIENT)
Dept: RADIOLOGY | Facility: CLINIC | Age: 64
End: 2024-02-26
Payer: COMMERCIAL

## 2024-02-26 ENCOUNTER — TELEPHONE (OUTPATIENT)
Dept: PAIN MEDICINE | Facility: CLINIC | Age: 64
End: 2024-02-26

## 2024-02-26 ENCOUNTER — OFFICE VISIT (OUTPATIENT)
Dept: PAIN MEDICINE | Facility: CLINIC | Age: 64
End: 2024-02-26
Payer: COMMERCIAL

## 2024-02-26 VITALS
SYSTOLIC BLOOD PRESSURE: 130 MMHG | TEMPERATURE: 96.9 F | HEART RATE: 71 BPM | WEIGHT: 243 LBS | HEIGHT: 68 IN | BODY MASS INDEX: 36.83 KG/M2 | DIASTOLIC BLOOD PRESSURE: 82 MMHG

## 2024-02-26 DIAGNOSIS — M25.551 RIGHT HIP PAIN: ICD-10-CM

## 2024-02-26 DIAGNOSIS — M47.816 LUMBAR SPONDYLOSIS: ICD-10-CM

## 2024-02-26 DIAGNOSIS — M54.16 LUMBAR RADICULOPATHY: Primary | ICD-10-CM

## 2024-02-26 DIAGNOSIS — M48.061 SPINAL STENOSIS OF LUMBAR REGION WITHOUT NEUROGENIC CLAUDICATION: ICD-10-CM

## 2024-02-26 PROCEDURE — 99214 OFFICE O/P EST MOD 30 MIN: CPT | Performed by: PHYSICIAN ASSISTANT

## 2024-02-26 PROCEDURE — 73502 X-RAY EXAM HIP UNI 2-3 VIEWS: CPT

## 2024-02-26 NOTE — PROGRESS NOTES
Assessment:  1. Lumbar radiculopathy    2. Spinal stenosis of lumbar region without neurogenic claudication    3. Lumbar spondylosis    4. Right hip pain        Plan:  While the patient was in the office today, I did have a thorough conversation regarding their chronic pain syndrome, medication management, and treatment plan options.    After discussing options, I have recommended that the patient proceed with a right L4-5 epidural steroid injection to address the radicular component of his pain pattern.  Patient has varying degrees of multilevel stenosis as seen on his last lumbar MRI in 2022 and I did make him aware that injections may need to be repeated or we may need to try a transforaminal approach.    I have also placed an order for an x-ray of the right hip given the pain he is reporting in the right hip and right groin area.  We will contact him once we obtain the results and consider an intra-articular hip injection    Complete risks and benefits including bleeding, infection, tissue reaction, nerve injury and allergic reaction were discussed. The approach was demonstrated using models and literature was provided. Verbal and written consent was obtained.    The patient was advised to contact the office should their symptoms worsen in the interim. The patient was agreeable and verbalized an understanding.        History of Present Illness:    The patient is a 63 y.o. male last seen on 3/29/2023 who presents for a follow up office visit in regards to right-sided low back pain and right leg pain secondary to multilevel moderate to severe facet arthropathy with a history of a left disc herniation at L3-4. The patient currently reports right-sided low back pain with radiation into the right hip, right groin and down various portions of the right lower extremity.  He rates his current pain score an 8 out of 10 and describes it as a constant sharp pain.  It is significantly worse with prolonged standing and  walking.  He has a history of left-sided radicular low back pain that was treated successfully with a left L3 and L4 transforaminal epidural steroid injections in 2022.  He has no left-sided pain at this point since then.  Patient underwent cardiac bypass surgery in November and has been active with cardiac rehab/exercise.  He also remains active walking his dogs about 2 miles a day but finds it difficult due to the increasing pain.    I have personally reviewed and/or updated the patient's past medical history, past surgical history, family history, social history, current medications, allergies, and vital signs today.       Review of Systems:    Review of Systems   Respiratory:  Negative for shortness of breath.    Cardiovascular:  Negative for chest pain.   Gastrointestinal:  Negative for constipation, diarrhea, nausea and vomiting.   Musculoskeletal:  Positive for gait problem. Negative for arthralgias, joint swelling (Joint stiffness) and myalgias.   Skin:  Negative for rash.   Neurological:  Positive for weakness. Negative for dizziness and seizures.   All other systems reviewed and are negative.        Past Medical History:   Diagnosis Date   • Chews tobacco regularly    • Consumes three beers daily    • Controlled diabetes mellitus (HCC)    • Diabetes (HCC)    • Esophageal reflux    • GERD (gastroesophageal reflux disease)    • Hyperlipidemia    • Hypertension    • Osteoarthritis     LEFT KNEE   • Primary localized osteoarthritis of left knee     last assessed: 1/16/2018       Past Surgical History:   Procedure Laterality Date   • CARDIAC CATHETERIZATION Left 10/18/2023    Procedure: Cardiac pci;  Surgeon: Deloris Amanda DO;  [facility];  Service: Cardiology   • CARDIAC CATHETERIZATION N/A 10/18/2023    Procedure: Cardiac Coronary Angiogram;  Surgeon: Deloris Amanda DO;  [facility];  Service: Cardiology   • CARDIAC CATHETERIZATION N/A 10/18/2023    Procedure:  Cardiac other - IFR;  Surgeon: Deloris Amanda DO;  [facility];  Service: Cardiology   • COLONOSCOPY     • HARVEST VEIN Left 11/1/2023    Procedure: HARVEST VEIN ENDOSCOPIC (EVH);  Surgeon: TRINH Franco MD;  [facility];  Service: Cardiac Surgery   • WA ARTHRP KNE CONDYLE&PLATU MEDIAL&LAT COMPARTMENTS Left 1/8/2018    Procedure: ARTHROPLASTY KNEE TOTAL;  Surgeon: Jazmin Cortes MD;  [facility];  Service: Orthopedics   • [place] CORONARY ARTERY BYP W/VEIN & ARTERY GRAFT 4 VEIN N/A 11/1/2023    Procedure: CORONARY ARTERY BYPASS GRAFT (CABG) 4 VESSELS, LIMA TO LAD, SVG TO PDA, SVG TO OM1, SVG TO DIAG2; w/ AMBREEN;  Surgeon: TRINH Franco MD;  [facility];  Service: Cardiac Surgery   • REPLACEMENT TOTAL KNEE Left        Family History   Problem Relation Age of Onset   • Colon cancer Father    • Mental illness Family         mental disorder   • Substance Abuse Neg Hx        Social History     Occupational History   • Not on file   Tobacco Use   • Smoking status: Former     Current packs/day: 1.00     Average packs/day: 1 pack/day for 11.0 years (11.0 ttl pk-yrs)     Types: Cigarettes   • Smokeless tobacco: Current     Types: Chew   • Tobacco comments:     quit 30 + yrs ago    Vaping Use   • Vaping status: Never Used   Substance and Sexual Activity   • Alcohol use: Yes     Alcohol/week: 6.0 standard drinks of alcohol     Types: 6 Cans of beer per week     Comment: drinks 6 pack of beer on sundays   • Drug use: No   • Sexual activity: Not on file         Current Outpatient Medications:   •  acetaminophen (TYLENOL) 325 mg tablet, Take 2 tablets (650 mg total) by mouth every 6 (six) hours as needed for mild pain, headaches or fever, Disp: 30 tablet, Rfl: 0  •  aspirin (ECOTRIN LOW STRENGTH) 81 mg EC tablet, Take 1 tablet (81 mg total) by mouth in the morning, Disp: , Rfl:   •  atorvastatin (LIPITOR) 40 mg tablet, Take 1 tablet (40 mg total) by mouth daily, Disp: 90  tablet, Rfl: 3  •  diphenhydrAMINE-acetaminophen (TYLENOL PM)  MG TABS, Take 1 tablet by mouth daily at bedtime as needed for sleep, Disp: , Rfl:   •  Empagliflozin (Jardiance) 25 MG TABS, Take 1 tablet (25 mg total) by mouth every morning, Disp: 90 tablet, Rfl: 3  •  Evolocumab 140 MG/ML SOAJ, Inject 1 mL (140 mg total) under the skin every 14 (fourteen) days, Disp: 6 mL, Rfl: 3  •  Exenatide ER (Bydureon) 2 MG PEN, INJECT THE CONTENTS OF 1  PEN SUBCUTANEOUSLY WEEKLY  ON SUNDAYS, Disp: 12 each, Rfl: 3  •  fenofibrate (TRICOR) 145 mg tablet, Take 1 tablet (145 mg total) by mouth daily, Disp: 90 tablet, Rfl: 3  •  gabapentin (NEURONTIN) 400 mg capsule, Take 1 PO BID., Disp: 180 capsule, Rfl: 3  •  insulin glargine (LANTUS SOLOSTAR) 100 units/mL injection pen, Inject 20 Units under the skin daily, Disp: 15 mL, Rfl: 10  •  metFORMIN (GLUCOPHAGE-XR) 500 mg 24 hr tablet, Take 1 tablet (500 mg total) by mouth 2 (two) times a day with meals (Patient taking differently: Take 500 mg by mouth 2 (two) times a day with meals Takes daily), Disp: 180 tablet, Rfl: 3  •  methocarbamol (ROBAXIN) 500 mg tablet, Take 1 tablet (500 mg total) by mouth 4 (four) times a day, Disp: 270 tablet, Rfl: 5  •  metoprolol tartrate (LOPRESSOR) 25 mg tablet, Take 1 tablet (25 mg total) by mouth every 12 (twelve) hours, Disp: 180 tablet, Rfl: 3  •  pantoprazole (PROTONIX) 40 mg tablet, Take 1 tablet (40 mg total) by mouth daily, Disp: 30 tablet, Rfl: 5  •  Alcohol Swabs 70 % PADS, May substitute brand based on insurance coverage. Check glucose TID., Disp: 100 each, Rfl: 0  •  Blood Glucose Monitoring Suppl (OneTouch Verio Reflect) w/Device KIT, May substitute brand based on insurance coverage. Check glucose TID., Disp: 1 kit, Rfl: 0  •  glucose blood (OneTouch Verio) test strip, May substitute brand based on insurance coverage. Check glucose TID., Disp: 100 each, Rfl: 0  •  Insulin Glargine Solostar (Lantus SoloStar) 100 UNIT/ML SOPN, Inject  "0.2 mL (20 Units total) under the skin daily at bedtime, Disp: 100 mL, Rfl: 5  •  Insulin Pen Needle (BD Pen Needle Audelia 2nd Gen) 32G X 4 MM MISC, For use with insulin pen. Pharmacy may dispense brand covered by insurance., Disp: 100 each, Rfl: 0  •  OneTouch Delica Lancets 33G MISC, May substitute brand based on insurance coverage. Check glucose TID., Disp: 100 each, Rfl: 10    No Known Allergies    Physical Exam:    /82 (BP Location: Left arm, Patient Position: Sitting, Cuff Size: Large)   Pulse 71   Temp (!) 96.9 °F (36.1 °C)   Ht 5' 8\" (1.727 m)   Wt 110 kg (243 lb)   BMI 36.95 kg/m²     Constitutional:normal, well developed, well nourished, alert, in no distress and non-toxic and no overt pain behavior.  Eyes:anicteric  HEENT:grossly intact  Neck:supple, symmetric, trachea midline and no masses   Pulmonary:even and unlabored  Cardiovascular:No edema or pitting edema present  Skin:Normal without rashes or lesions and well hydrated  Psychiatric:Mood and affect appropriate  Neurologic:Cranial Nerves II-XII grossly intact  Musculoskeletal: Lumbar spine is nontender to palpation, sacroiliac joints are nontender to palpation, limited range of motion with flexion, painful range of motion of the right hip joint.      Imaging  MRI LUMBAR SPINE WITHOUT CONTRAST     INDICATION: M54.42: Lumbago with sciatica, left side  M54.41: Lumbago with sciatica, right side.     COMPARISON:  None.     TECHNIQUE:  Sagittal T1, sagittal T2, sagittal inversion recovery, axial T1 and axial T2, coronal T2.    IMAGE QUALITY:  Image quality degraded by patient motion artifact.     FINDINGS:     VERTEBRAL BODIES:  There are 5 lumbar type vertebral bodies.  Slight leftward scoliosis apex L4.  Normal marrow signal is identified within the visualized bony structures.  No discrete marrow lesion.     SACRUM:  Normal signal within the sacrum. No evidence of insufficiency or stress fracture.     DISTAL CORD AND CONUS:  Normal size and " signal within the distal cord and conus.     PARASPINAL SOFT TISSUES:  Paraspinal soft tissues are unremarkable.     LOWER THORACIC DISC SPACES:  Normal disc height and signal.  No disc herniation, canal stenosis or foraminal narrowing.     LUMBAR DISC SPACES:     L1-L2:  Normal.     L2-L3:  Mild annular bulge with marginal osteophytes result in mild central stenosis.  Foramina patent.     L3-L4:  Broad-based diffuse annular bulge with marginal osteophytes and facet hypertrophy combined result in severe central stenosis and severe bilateral lateral recess stenosis.  Left paramedian superiorly extruded disc fragment extends 1.5 cm cephalad   to the native disc margin further contributing to lateral recess stenosis on the left and likely impinging the descending left L3 nerve root.  Moderate bilateral foraminal narrowing noted.  There is compression of the thecal sac.       L4-L5:  Moderate to severe facet hypertrophy identified with diffuse annular bulging and marginal osteophytes which result in mild bilateral foraminal narrowing and mild to moderate central stenosis.     L5-S1:  Moderate facet hypertrophy.  Degenerative disc osteophyte complex with marginal osteophytes which result in minimal left foraminal narrowing.  Central canal is patent.     IMPRESSION:     Diffuse annular bulge at L3-4 identified with a left paramedian superiorly extruded/sequestered disc fragment extending 15 mm cephalad to the native disc margin.  There is severe lateral recess stenosis likely impinging the descending left L3 nerve root.    Annular bulging at the disc space resulting in severe central and bilateral lateral recess stenosis.  Nerve roots are stretched and redundant above this level secondary to the degree of spinal stenosis.     Spondylotic degenerative changes are seen at remaining levels resulting in mild to moderate central stenosis at L4-5 and no greater than mild central or foraminal narrowing elsewhere.     [place] spine  and pain procedure    (Results Pending)         Orders Placed This Encounter   Procedures   • XR hip/pelv 2-3 vws right if performed   • [place] spine and pain procedure

## 2024-02-26 NOTE — TELEPHONE ENCOUNTER
----- Message from Zehra Dwyer PA-C sent at 2/26/2024 10:39 AM EST -----  Please let patient know that the right hip x-ray is normal.  Continue with the current plan of scheduled lumbar epidural steroid injection.

## 2024-02-27 DIAGNOSIS — I25.10 CORONARY ARTERY DISEASE INVOLVING NATIVE CORONARY ARTERY OF NATIVE HEART WITHOUT ANGINA PECTORIS: ICD-10-CM

## 2024-02-27 DIAGNOSIS — E78.2 MIXED HYPERLIPIDEMIA: ICD-10-CM

## 2024-02-27 NOTE — TELEPHONE ENCOUNTER
Hi, my name is Priya. I'm a care coordinator for mutual patient Alex Vallecillo. YOB: 1960. I work with his insurance. We are partnered with his insurance. We're trying to put in an appeal for his Repatha since it's not covered, while in the meantime requesting a courtesy fill. The pharmacy for the insurance does require to go through their preferred pharmacy. I was just leaving a message to see if the nurse could send a prescription over to [facility]. The phone number is [phone]. Their fax number is [phone]. And again, that would be for Alex Vallecillo, his Repatha. If you have any questions, feel free to give me a call back at [phone]. Thank you so much.

## 2024-02-29 ENCOUNTER — APPOINTMENT (OUTPATIENT)
Dept: CARDIAC REHAB | Facility: HOSPITAL | Age: 64
End: 2024-02-29
Payer: COMMERCIAL

## 2024-02-29 ENCOUNTER — TELEPHONE (OUTPATIENT)
Dept: CARDIAC REHAB | Facility: HOSPITAL | Age: 64
End: 2024-02-29

## 2024-03-05 ENCOUNTER — TELEPHONE (OUTPATIENT)
Dept: PAIN MEDICINE | Facility: CLINIC | Age: 64
End: 2024-03-05

## 2024-03-05 NOTE — TELEPHONE ENCOUNTER
S/w Pt to reschedule ov with Zehra - 4/4/24    Pt requesting a nurse call and go over Xray again. Pt apologizes, it was a lot of info at one time

## 2024-03-05 NOTE — TELEPHONE ENCOUNTER
S/w pt, advised of the following:      ----- Message from Zehra Dwyer PA-C sent at 2/26/2024 10:39 AM EST -----  Please let patient know that the right hip x-ray is normal.  Continue with the current plan of scheduled lumbar epidural steroid injection.     Pt verbalized understanding and appreciation.

## 2024-03-08 ENCOUNTER — HOSPITAL ENCOUNTER (OUTPATIENT)
Dept: RADIOLOGY | Facility: CLINIC | Age: 64
Discharge: HOME | End: 2024-03-08
Payer: COMMERCIAL

## 2024-03-08 VITALS
RESPIRATION RATE: 18 BRPM | TEMPERATURE: 97.1 F | DIASTOLIC BLOOD PRESSURE: 81 MMHG | HEART RATE: 68 BPM | SYSTOLIC BLOOD PRESSURE: 149 MMHG | OXYGEN SATURATION: 95 %

## 2024-03-08 DIAGNOSIS — M54.16 LUMBAR RADICULOPATHY: ICD-10-CM

## 2024-03-08 PROCEDURE — 62323 NJX INTERLAMINAR LMBR/SAC: CPT | Performed by: ANESTHESIOLOGY

## 2024-03-08 RX ORDER — METHYLPREDNISOLONE ACETATE 80 MG/ML
80 INJECTION, SUSPENSION INTRA-ARTICULAR; INTRALESIONAL; INTRAMUSCULAR; PARENTERAL; SOFT TISSUE ONCE
Status: COMPLETED | OUTPATIENT
Start: 2024-03-08 | End: 2024-03-08

## 2024-03-08 RX ADMIN — IOHEXOL 1 ML: 300 INJECTION, SOLUTION INTRAVENOUS at 09:17

## 2024-03-08 RX ADMIN — METHYLPREDNISOLONE ACETATE 80 MG: 80 INJECTION, SUSPENSION INTRA-ARTICULAR; INTRALESIONAL; INTRAMUSCULAR; SOFT TISSUE at 09:17

## 2024-03-08 NOTE — H&P
History of Present Illness: The patient is a 63 y.o. male who presents with complaints of low back and leg pain.    Past Medical History:   Diagnosis Date    Chews tobacco regularly     Consumes three beers daily     Controlled diabetes mellitus (HCC)     Diabetes (HCC)     Esophageal reflux     GERD (gastroesophageal reflux disease)     Hyperlipidemia     Hypertension     Osteoarthritis     LEFT KNEE    Primary localized osteoarthritis of left knee     last assessed: 1/16/2018       Past Surgical History:   Procedure Laterality Date    CARDIAC CATHETERIZATION Left 10/18/2023    Procedure: Cardiac pci;  Surgeon: Deloris Amanda DO;  [facility];  Service: Cardiology    CARDIAC CATHETERIZATION N/A 10/18/2023    Procedure: Cardiac Coronary Angiogram;  Surgeon: Deloris Amanda DO;  [facility];  Service: Cardiology    CARDIAC CATHETERIZATION N/A 10/18/2023    Procedure: Cardiac other - IFR;  Surgeon: Deloris Amanda DO;  [facility];  Service: Cardiology    COLONOSCOPY      HARVEST VEIN Left 11/1/2023    Procedure: HARVEST VEIN ENDOSCOPIC (EVH);  Surgeon: TRINH Franco MD;  [facility];  Service: Cardiac Surgery    OK ARTHRP KNE CONDYLE&PLATU MEDIAL&LAT COMPARTMENTS Left 1/8/2018    Procedure: ARTHROPLASTY KNEE TOTAL;  Surgeon: Jazmin Cortes MD;  [facility];  Service: Orthopedics    OK CORONARY ARTERY BYP W/VEIN & ARTERY GRAFT 4 VEIN N/A 11/1/2023    Procedure: CORONARY ARTERY BYPASS GRAFT (CABG) 4 VESSELS, LIMA TO LAD, SVG TO PDA, SVG TO OM1, SVG TO DIAG2; w/ AMBREEN;  Surgeon: TRINH Franco MD;  [facility];  Service: Cardiac Surgery    REPLACEMENT TOTAL KNEE Left          Current Outpatient Medications:     acetaminophen (TYLENOL) 325 mg tablet, Take 2 tablets (650 mg total) by mouth every 6 (six) hours as needed for mild pain, headaches or fever, Disp: 30 tablet, Rfl: 0    Alcohol Swabs 70 % PADS, May substitute  brand based on insurance coverage. Check glucose TID., Disp: 100 each, Rfl: 0    aspirin (ECOTRIN LOW STRENGTH) 81 mg EC tablet, Take 1 tablet (81 mg total) by mouth in the morning, Disp: , Rfl:     atorvastatin (LIPITOR) 40 mg tablet, Take 1 tablet (40 mg total) by mouth daily, Disp: 90 tablet, Rfl: 3    Blood Glucose Monitoring Suppl (OneTouch Verio Reflect) w/Device KIT, May substitute brand based on insurance coverage. Check glucose TID., Disp: 1 kit, Rfl: 0    diphenhydrAMINE-acetaminophen (TYLENOL PM)  MG TABS, Take 1 tablet by mouth daily at bedtime as needed for sleep, Disp: , Rfl:     Empagliflozin (Jardiance) 25 MG TABS, Take 1 tablet (25 mg total) by mouth every morning, Disp: 90 tablet, Rfl: 3    Evolocumab 140 MG/ML SOAJ, Inject 1 mL (140 mg total) under the skin every 14 (fourteen) days, Disp: 1 mL, Rfl: 11    Exenatide ER (Bydureon) 2 MG PEN, INJECT THE CONTENTS OF 1  PEN SUBCUTANEOUSLY WEEKLY  ON SUNDAYS, Disp: 12 each, Rfl: 3    fenofibrate (TRICOR) 145 mg tablet, Take 1 tablet (145 mg total) by mouth daily, Disp: 90 tablet, Rfl: 3    gabapentin (NEURONTIN) 400 mg capsule, Take 1 PO BID., Disp: 180 capsule, Rfl: 3    glucose blood (OneTouch Verio) test strip, May substitute brand based on insurance coverage. Check glucose TID., Disp: 100 each, Rfl: 0    insulin glargine (LANTUS SOLOSTAR) 100 units/mL injection pen, Inject 20 Units under the skin daily, Disp: 15 mL, Rfl: 10    Insulin Glargine Solostar (Lantus SoloStar) 100 UNIT/ML SOPN, Inject 0.2 mL (20 Units total) under the skin daily at bedtime, Disp: 100 mL, Rfl: 5    Insulin Pen Needle (BD Pen Needle Audelia 2nd Gen) 32G X 4 MM MISC, For use with insulin pen. Pharmacy may dispense brand covered by insurance., Disp: 100 each, Rfl: 0    metFORMIN (GLUCOPHAGE-XR) 500 mg 24 hr tablet, Take 1 tablet (500 mg total) by mouth 2 (two) times a day with meals (Patient taking differently: Take 500 mg by mouth 2 (two) times a day with meals Takes  daily), Disp: 180 tablet, Rfl: 3    methocarbamol (ROBAXIN) 500 mg tablet, Take 1 tablet (500 mg total) by mouth 4 (four) times a day, Disp: 270 tablet, Rfl: 5    metoprolol tartrate (LOPRESSOR) 25 mg tablet, Take 1 tablet (25 mg total) by mouth every 12 (twelve) hours, Disp: 180 tablet, Rfl: 3    OneTouch Delica Lancets 33G MISC, May substitute brand based on insurance coverage. Check glucose TID., Disp: 100 each, Rfl: 10    pantoprazole (PROTONIX) 40 mg tablet, Take 1 tablet (40 mg total) by mouth daily, Disp: 30 tablet, Rfl: 5    Current Facility-Administered Medications:     iohexol (OMNIPAQUE) 300 mg/mL injection 1 mL, 1 mL, Epidural, Once, Elmer Fountain,     methylPREDNISolone acetate (DEPO-MEDROL) injection 80 mg, 80 mg, Epidural, Once, Elmer Fountain, DO    No Known Allergies    Physical Exam:   General: Awake, Alert, Oriented x 3, Mood and affect appropriate  Respiratory: Respirations even and unlabored  Cardiovascular: Peripheral pulses intact; no edema  Musculoskeletal Exam: Decreased range of motion lumbar spine    ASA Score: II    Patient/Chart Verification  Patient ID Verified: Verbal  ID Band Applied: No  Consents Confirmed: Procedural, To be obtained in the Pre-Procedure area  Interval H&P(within 24 hr) Complete (required for Outpatients and Surgery Admit only): To be obtained in the Pre-Procedure area  Allergies Reviewed: Yes  Anticoag/NSAID held?: NA  Currently on antibiotics?: No    Assessment:   1. Lumbar radiculopathy        Plan: L4-5 LESI (to the right)

## 2024-03-08 NOTE — DISCHARGE INSTRUCTIONS
Epidural Steroid Injection   WHAT YOU NEED TO KNOW:   An epidural steroid injection (SULTANA) is a procedure to inject steroid medicine into the epidural space. The epidural space is between your spinal cord and vertebrae. Steroids reduce inflammation and fluid buildup in your spine that may be causing pain. You may be given pain medicine along with the steroids.          ACTIVITY  Do not drive or operate machinery today.  No strenuous activity today - bending, lifting, etc.  You may resume normal activites starting tomorrow - start slowly and as tolerated.  You may shower today, but no tub baths or hot tubs.  You may have numbness for several hours from the local anesthetic. Please use caution and common sense, especially with weight-bearing activities.    CARE OF THE INJECTION SITE  If you have soreness or pain, apply ice to the area today (20 minutes on/20 minutes off).  Starting tomorrow, you may use warm, moist heat or ice if needed.  You may have an increase or change in your discomfort for 36-48 hours after your treatment.  Apply ice and continue with any pain medication you have been prescribed.  Notify [facility] if you have any of the following: redness, drainage, swelling, headache, stiff neck or fever above 100°F.    SPECIAL INSTRUCTIONS  Our office will contact you in approximately 7 days for a progress report.    MEDICATIONS  Continue to take all routine medications.  Our office may have instructed you to hold some medications.    As no general anesthesia was used in today's procedure, you should not experience any side effects related to anesthesia.     If you are diabetic, the steroids used in today's injection may temporarily increase your blood sugar levels after the first few days after your injection. Please keep a close eye on your sugars and alert the doctor who manages your diabetes if your sugars are significantly high from your baseline or you are symptomatic.     If you have a  problem specifically related to your procedure, please call our office at [phone].  Problems not related to your procedure should be directed to your primary care physician.

## 2024-03-15 ENCOUNTER — TELEPHONE (OUTPATIENT)
Dept: PAIN MEDICINE | Facility: CLINIC | Age: 64
End: 2024-03-15

## 2024-03-15 NOTE — TELEPHONE ENCOUNTER
1st attempt  Lm to cb with % improvement and pain level.       L4-5 LESI (to the right) 3/8, 4/4 F/u

## 2024-03-19 LAB — COLOGUARD RESULT REPORTABLE: NEGATIVE

## 2024-03-19 NOTE — TELEPHONE ENCOUNTER
Pt reports 20% improvement post inj   Pain level 5/10  Pt aware I will call next week for an update

## 2024-03-25 ENCOUNTER — TELEPHONE (OUTPATIENT)
Dept: PULMONOLOGY | Facility: HOSPITAL | Age: 64
End: 2024-03-25

## 2024-03-25 ENCOUNTER — TELEPHONE (OUTPATIENT)
Dept: CARDIOLOGY CLINIC | Facility: CLINIC | Age: 64
End: 2024-03-25

## 2024-03-25 NOTE — TELEPHONE ENCOUNTER
Pt called stating he has felt lightheaded the past two days when bending over/ standing up and asking if it could be his BP?    Pt I also starting with a cold and not sure if this may be attributing to these symptoms    I recommended purchasing a BP cuff, checking his pressures and calling the office if SBP < 100.    He verbalized understanding.

## 2024-04-05 DIAGNOSIS — Z79.4 TYPE 2 DIABETES MELLITUS WITHOUT COMPLICATION, WITH LONG-TERM CURRENT USE OF INSULIN (HCC): ICD-10-CM

## 2024-04-05 DIAGNOSIS — E11.9 TYPE 2 DIABETES MELLITUS WITHOUT COMPLICATION, WITH LONG-TERM CURRENT USE OF INSULIN (HCC): ICD-10-CM

## 2024-04-05 RX ORDER — METFORMIN HYDROCHLORIDE 500 MG/1
500 TABLET, EXTENDED RELEASE ORAL 2 TIMES DAILY WITH MEALS
Qty: 180 TABLET | Refills: 4 | Status: SHIPPED | OUTPATIENT
Start: 2024-04-05

## 2024-05-03 ENCOUNTER — VBI (OUTPATIENT)
Dept: ADMINISTRATIVE | Facility: OTHER | Age: 64
End: 2024-05-03

## 2024-05-03 DIAGNOSIS — E11.9 TYPE 2 DIABETES MELLITUS WITHOUT COMPLICATION, WITH LONG-TERM CURRENT USE OF INSULIN (HCC): ICD-10-CM

## 2024-05-03 DIAGNOSIS — Z79.4 TYPE 2 DIABETES MELLITUS WITHOUT COMPLICATION, WITH LONG-TERM CURRENT USE OF INSULIN (HCC): ICD-10-CM

## 2024-05-03 DIAGNOSIS — E78.5 HYPERLIPIDEMIA, UNSPECIFIED HYPERLIPIDEMIA TYPE: ICD-10-CM

## 2024-05-04 RX ORDER — FENOFIBRATE 145 MG/1
145 TABLET, COATED ORAL DAILY
Qty: 90 TABLET | Refills: 4 | Status: SHIPPED | OUTPATIENT
Start: 2024-05-04

## 2024-05-30 DIAGNOSIS — E11.9 TYPE 2 DIABETES MELLITUS WITHOUT COMPLICATION, WITHOUT LONG-TERM CURRENT USE OF INSULIN (HCC): ICD-10-CM

## 2024-05-31 RX ORDER — EXENATIDE 2 MG/.85ML
INJECTION, SUSPENSION, EXTENDED RELEASE SUBCUTANEOUS
Qty: 12 ML | Refills: 1 | Status: SHIPPED | OUTPATIENT
Start: 2024-05-31

## 2024-06-08 DIAGNOSIS — Z79.4 TYPE 2 DIABETES MELLITUS WITHOUT COMPLICATION, WITH LONG-TERM CURRENT USE OF INSULIN (HCC): ICD-10-CM

## 2024-06-08 DIAGNOSIS — E11.9 TYPE 2 DIABETES MELLITUS WITHOUT COMPLICATION, WITH LONG-TERM CURRENT USE OF INSULIN (HCC): ICD-10-CM

## 2024-06-08 RX ORDER — ATORVASTATIN CALCIUM 40 MG/1
40 TABLET, FILM COATED ORAL DAILY
Qty: 90 TABLET | Refills: 1 | Status: SHIPPED | OUTPATIENT
Start: 2024-06-08

## 2024-06-27 DIAGNOSIS — K21.00 GASTROESOPHAGEAL REFLUX DISEASE WITH ESOPHAGITIS WITHOUT HEMORRHAGE: ICD-10-CM

## 2024-06-27 RX ORDER — PANTOPRAZOLE SODIUM 40 MG/1
40 TABLET, DELAYED RELEASE ORAL DAILY
Qty: 30 TABLET | Refills: 5 | Status: SHIPPED | OUTPATIENT
Start: 2024-06-27

## 2024-07-23 NOTE — PROGRESS NOTES
Cardiology Office Follow Up  Alex Vallecillo  1960  [id number]      ASSESSMENT:  Coronary artery disease  10/2023 NSTEMI  10/2023 LHC: Multivessel coronary disease  10/2023 CABG x4: LIMA-LAD, SVG-RPDA, SVG-OM1, SVG-second diagonal  10/2023 Echo: LVEF 60%, mild aortic stenosis  Type 2 diabetes  Hypertension  Dyslipidemia  10/2023 lipids: Cholesterol 223, triglycerides 409, HDL 37, direct    Current Rx: Lipitor 80  Tobacco use, chewing tobacco  GERD  Chronic back pain    PLAN/ DISCUSSION:  Coronary artery disease  This appears stable.  He did had some brief atypical sensation in his chest several weeks ago when moving kayaks in the severe heat wave but this has not recurred and he is now back to walking 1+ mile daily with his dogs without any symptoms.  He has had no symptoms reminiscent of his prior angina. He is does have repeated anginal symptoms, then will refer for nuclear stress testing.  Continue aspirin, statin, beta-blocker  Recheck lipid panel as below  Continue Repatha  Hypertension  BP well-controlled today  Continue metoprolol  Dyslipidemia  Repeat lipid panel now that he is taking Repatha    Interval History/ HPI:   Comes to the office today for routine follow-up  Has been feeling generally well and being quite active all summer although did have 2 instances when he had a feeling in his chest which was different.  He would not describe it exactly as a pain or pressure but felt that something was slightly off.  This occurred when he was moving kayaks in the 100+ degree weather.  It occurred again when he was mowing the lawn when he was in the high 90s and humid.  The sensation was very brief and did not remind him of prior angina.  It resolved once he went inside in the air conditioning.  He is now back to walking his dogs several days per week going 1+ mile daily without any issues.  He enjoys being active [place]     Vitals:  /84 (BP Location: Left  "arm, Patient Position: Sitting, Cuff Size: Standard)   Pulse 74   Ht 5' 8\" (1.727 m)   Wt 112 kg (247 lb)   BMI 37.56 kg/m²      Past Medical History:   Diagnosis Date    Chews tobacco regularly     Consumes three beers daily     Controlled diabetes mellitus (HCC)     Diabetes (HCC)     Esophageal reflux     GERD (gastroesophageal reflux disease)     Hyperlipidemia     Hypertension     Osteoarthritis     LEFT KNEE    Primary localized osteoarthritis of left knee     last assessed: 1/16/2018     Social History     Socioeconomic History    Marital status: /Civil Union     Spouse name: Not on file    Number of children: Not on file    Years of education: Not on file    Highest education level: Not on file   Occupational History    Not on file   Tobacco Use    Smoking status: Former     Current packs/day: 1.00     Average packs/day: 1 pack/day for 11.0 years (11.0 ttl pk-yrs)     Types: Cigarettes    Smokeless tobacco: Current     Types: Chew    Tobacco comments:     quit 30 + yrs ago    Vaping Use    Vaping status: Never Used   Substance and Sexual Activity    Alcohol use: Yes     Alcohol/week: 6.0 standard drinks of alcohol     Types: 6 Cans of beer per week     Comment: drinks 6 pack of beer on sundays    Drug use: No    Sexual activity: Not on file   Other Topics Concern    Not on file   Social History Narrative    Not on file     Social Determinants of Health     Financial Resource Strain: Not on file   Food Insecurity: No Food Insecurity (11/2/2023)    Hunger Vital Sign     Worried About Running Out of Food in the Last Year: Never true     Ran Out of Food in the Last Year: Never true   Transportation Needs: No Transportation Needs (11/2/2023)    PRAPARE - Transportation     Lack of Transportation (Medical): No     Lack of Transportation (Non-Medical): No   Physical Activity: Not on file   Stress: Not on file   Social Connections: Not on file   Intimate Partner Violence: Not on file   Housing Stability: " Low Risk  (11/2/2023)    Housing Stability Vital Sign     Unable to Pay for Housing in the Last Year: No     Number of Times Moved in the Last Year: 1     Homeless in the Last Year: No      Family History   Problem Relation Age of Onset    Colon cancer Father     Mental illness Family         mental disorder    Substance Abuse Neg Hx      Past Surgical History:   Procedure Laterality Date    CARDIAC CATHETERIZATION Left 10/18/2023    Procedure: Cardiac pci;  Surgeon: Deloris Amanda DO;  [facility];  Service: Cardiology    CARDIAC CATHETERIZATION N/A 10/18/2023    Procedure: Cardiac Coronary Angiogram;  Surgeon: Deloris Amanda DO;  [facility];  Service: Cardiology    CARDIAC CATHETERIZATION N/A 10/18/2023    Procedure: Cardiac other - IFR;  Surgeon: Deloris Amanda DO;  [facility];  Service: Cardiology    COLONOSCOPY      HARVEST VEIN Left 11/1/2023    Procedure: HARVEST VEIN ENDOSCOPIC ([facility]);  Surgeon: TRINH Franco MD;  [facility];  Service: Cardiac Surgery    [place] ARTHRP KNE CONDYLE&PLATU MEDIAL&LAT COMPARTMENTS Left 1/8/2018    Procedure: ARTHROPLASTY KNEE TOTAL;  Surgeon: Jazmin Cortes MD;  [facility];  Service: Orthopedics    [place] CORONARY ARTERY BYP W/VEIN & ARTERY GRAFT 4 VEIN N/A 11/1/2023    Procedure: CORONARY ARTERY BYPASS GRAFT (CABG) 4 VESSELS, LIMA TO LAD, SVG TO PDA, SVG TO OM1, SVG TO DIAG2; w/ AMBREEN;  Surgeon: TRINH Franco MD;  [facility];  Service: Cardiac Surgery    REPLACEMENT TOTAL KNEE Left        Current Outpatient Medications:     acetaminophen (TYLENOL) 325 mg tablet, Take 2 tablets (650 mg total) by mouth every 6 (six) hours as needed for mild pain, headaches or fever, Disp: 30 tablet, Rfl: 0    Alcohol Swabs 70 % PADS, May substitute brand based on insurance coverage. Check glucose TID., Disp: 100 each, Rfl: 0    aspirin (ECOTRIN LOW STRENGTH) 81 mg EC tablet, Take 1 tablet (81 mg  total) by mouth in the morning, Disp: , Rfl:     atorvastatin (LIPITOR) 40 mg tablet, TAKE 1 TABLET BY MOUTH EVERY DAY, Disp: 90 tablet, Rfl: 1    Blood Glucose Monitoring Suppl (OneTouch Verio Reflect) w/Device KIT, May substitute brand based on insurance coverage. Check glucose TID., Disp: 1 kit, Rfl: 0    diphenhydrAMINE-acetaminophen (TYLENOL PM)  MG TABS, Take 1 tablet by mouth daily at bedtime as needed for sleep, Disp: , Rfl:     Empagliflozin (Jardiance) 25 MG TABS, Take 1 tablet (25 mg total) by mouth every morning, Disp: 90 tablet, Rfl: 3    Evolocumab 140 MG/ML SOAJ, Inject 1 mL (140 mg total) under the skin every 14 (fourteen) days, Disp: 1 mL, Rfl: 11    Exenatide ER (Bydureon BCise) 2 MG/0.85ML AUIJ, INJECT THE CONTENTS OF 1 PEN SUBCUTANEOUSLY WEEKLY ON SUNDAYS, Disp: 12 mL, Rfl: 1    fenofibrate (TRICOR) 145 mg tablet, TAKE 1 TABLET BY MOUTH EVERY DAY, Disp: 90 tablet, Rfl: 4    gabapentin (NEURONTIN) 400 mg capsule, Take 1 PO BID., Disp: 180 capsule, Rfl: 3    glucose blood (OneTouch Verio) test strip, May substitute brand based on insurance coverage. Check glucose TID., Disp: 100 each, Rfl: 0    insulin glargine (LANTUS SOLOSTAR) 100 units/mL injection pen, Inject 20 Units under the skin daily, Disp: 15 mL, Rfl: 10    Insulin Glargine Solostar (Lantus SoloStar) 100 UNIT/ML SOPN, Inject 0.2 mL (20 Units total) under the skin daily at bedtime, Disp: 100 mL, Rfl: 5    Insulin Pen Needle (BD Pen Needle Audelia 2nd Gen) 32G X 4 MM MISC, For use with insulin pen. Pharmacy may dispense brand covered by insurance., Disp: 100 each, Rfl: 0    metFORMIN (GLUCOPHAGE-XR) 500 mg 24 hr tablet, TAKE 1 TABLET BY MOUTH TWICE A DAY WITH MEALS, Disp: 180 tablet, Rfl: 4    methocarbamol (ROBAXIN) 500 mg tablet, Take 1 tablet (500 mg total) by mouth 4 (four) times a day, Disp: 270 tablet, Rfl: 5    metoprolol tartrate (LOPRESSOR) 25 mg tablet, Take 1 tablet (25 mg total) by mouth every 12 (twelve) hours, Disp: 180  tablet, Rfl: 3    OneTouch Delica Lancets 33G MISC, May substitute brand based on insurance coverage. Check glucose TID., Disp: 100 each, Rfl: 10    pantoprazole (PROTONIX) 40 mg tablet, TAKE 1 TABLET BY MOUTH EVERY DAY, Disp: 30 tablet, Rfl: 5      Review of Systems:  Review of Systems   Constitutional:  Negative for chills and fever.   HENT:  Negative for ear pain and sore throat.    Eyes:  Negative for pain and visual disturbance.   Respiratory:  Negative for cough and shortness of breath.    Cardiovascular:  Negative for chest pain and palpitations.   Gastrointestinal:  Negative for abdominal pain and vomiting.   Genitourinary:  Negative for dysuria and hematuria.   Musculoskeletal:  Negative for arthralgias and back pain.   Skin:  Negative for color change and rash.   Neurological:  Negative for seizures and syncope.   All other systems reviewed and are negative.        Physical Exam:  Physical Exam  Constitutional:       Appearance: He is well-developed.   HENT:      Head: Normocephalic and atraumatic.   Eyes:      Pupils: Pupils are equal, round, and reactive to light.   Cardiovascular:      Rate and Rhythm: Normal rate and regular rhythm.      Heart sounds: No murmur heard.     No friction rub. No gallop.   Pulmonary:      Effort: Pulmonary effort is normal. No respiratory distress.      Breath sounds: Normal breath sounds. No wheezing or rales.   Abdominal:      General: Bowel sounds are normal. There is no distension.      Palpations: Abdomen is soft.      Tenderness: There is no abdominal tenderness.   Musculoskeletal:         General: No deformity. Normal range of motion.      Cervical back: Normal range of motion and neck supple.   Skin:     General: Skin is warm and dry.   Neurological:      Mental Status: He is alert and oriented to person, place, and time.   Psychiatric:         Behavior: Behavior normal.         This note was completed in part utilizing [employer] Direct Software.  Grammatical  errors, random word insertions, spelling mistakes, and incomplete sentences can be an occasional consequence of this system secondary to software limitations, ambient noise, and hardware issues.  If you have any questions or concerns about the content, text, or information contained within the body of this dictation, please contact the provider for clarification.

## 2024-07-24 ENCOUNTER — OFFICE VISIT (OUTPATIENT)
Dept: CARDIOLOGY CLINIC | Facility: CLINIC | Age: 64
End: 2024-07-24
Payer: COMMERCIAL

## 2024-07-24 VITALS
SYSTOLIC BLOOD PRESSURE: 136 MMHG | WEIGHT: 247 LBS | HEIGHT: 68 IN | BODY MASS INDEX: 37.44 KG/M2 | DIASTOLIC BLOOD PRESSURE: 84 MMHG | HEART RATE: 74 BPM

## 2024-07-24 DIAGNOSIS — I25.10 CORONARY ARTERY DISEASE INVOLVING NATIVE CORONARY ARTERY OF NATIVE HEART WITHOUT ANGINA PECTORIS: Primary | ICD-10-CM

## 2024-07-24 PROCEDURE — 99214 OFFICE O/P EST MOD 30 MIN: CPT | Performed by: PHYSICIAN ASSISTANT

## 2024-07-24 RX ORDER — INSULIN GLARGINE-YFGN 100 [IU]/ML
20 INJECTION, SOLUTION SUBCUTANEOUS DAILY
COMMUNITY
Start: 2024-06-03

## 2024-07-24 NOTE — PATIENT INSTRUCTIONS
No changes to medications today, please continue everything the same. If you need refills of your cardiac medications prescribed by our office, please call us ahead of time so that they can be filled.   If you have any return of pain in the chest please call us or go right to the hospital  Please check lab work in the next 1-2 months at your convenience   Follow up in about 4-5 months

## 2024-09-03 DIAGNOSIS — E11.9 TYPE 2 DIABETES MELLITUS WITHOUT COMPLICATION, WITH LONG-TERM CURRENT USE OF INSULIN (HCC): ICD-10-CM

## 2024-09-03 DIAGNOSIS — E78.5 HYPERLIPIDEMIA, UNSPECIFIED HYPERLIPIDEMIA TYPE: ICD-10-CM

## 2024-09-03 DIAGNOSIS — Z79.4 TYPE 2 DIABETES MELLITUS WITHOUT COMPLICATION, WITH LONG-TERM CURRENT USE OF INSULIN (HCC): ICD-10-CM

## 2024-09-04 DIAGNOSIS — K21.00 GASTROESOPHAGEAL REFLUX DISEASE WITH ESOPHAGITIS WITHOUT HEMORRHAGE: ICD-10-CM

## 2024-09-04 RX ORDER — PANTOPRAZOLE SODIUM 40 MG/1
40 TABLET, DELAYED RELEASE ORAL DAILY
Qty: 90 TABLET | Refills: 1 | Status: SHIPPED | OUTPATIENT
Start: 2024-09-04

## 2024-09-05 RX ORDER — ATORVASTATIN CALCIUM 40 MG/1
40 TABLET, FILM COATED ORAL DAILY
Qty: 90 TABLET | Refills: 1 | Status: SHIPPED | OUTPATIENT
Start: 2024-09-05

## 2024-09-05 RX ORDER — FENOFIBRATE 145 MG/1
145 TABLET, COATED ORAL DAILY
Qty: 90 TABLET | Refills: 1 | Status: SHIPPED | OUTPATIENT
Start: 2024-09-05

## 2024-09-28 LAB
CHOLEST SERPL-MCNC: 140 MG/DL (ref 100–199)
HDLC SERPL-MCNC: 39 MG/DL
LDLC SERPL CALC-MCNC: 68 MG/DL (ref 0–99)
LDLC/HDLC SERPL: 1.7 RATIO (ref 0–3.6)
SL AMB VLDL CHOLESTEROL CALC: 33 MG/DL (ref 5–40)
TRIGL SERPL-MCNC: 197 MG/DL (ref 0–149)

## 2024-10-22 NOTE — PROGRESS NOTES
Cardiology Office Follow Up  Alex Vallecillo  1960  [id number]      ASSESSMENT:  Coronary artery disease  10/2023 NSTEMI  10/2023 LHC: Multivessel coronary disease  10/2023 CABG x4: LIMA-LAD, SVG-RPDA, SVG-OM1, SVG-second diagonal  10/2023 Echo: LVEF 60%, mild aortic stenosis  Type 2 diabetes  Hypertension  Dyslipidemia  10/2023 lipids: Cholesterol 223, triglycerides 409, HDL 37, direct    Current Rx: Lipitor 80  Tobacco use, chewing tobacco  GERD  Chronic back pain     PLAN/ DISCUSSION:  Coronary artery disease  This appears stable.  He continues to have good exercise tolerance able to walk 2-3 miles daily with his dogs without any symptoms of dyspnea, chest pain, or chest pressure  Remains on aspirin, atorvastatin, metoprolol, and Repatha  Hypertension  BP is up today but is generally under better control.  He just got news that his family member was in a car accident so he is under an unusual amount of stress this morning.  We will continue treatment with metoprolol  Dyslipidemia  Recent cholesterol panel looks significantly better with LDL now <70.  Continue Lipitor and Repatha    Interval History/ HPI:   64-year-old gentleman coming in for routine follow-up.  He reports feeling generally well.  He walks his dogs on a nearly daily basis.  He can go 2-3 miles without any issues.  He does note that on a rare occasion he gets short of breath when climbing the stairs.  He says this happens perhaps 1 out of every 20 times that he walks up the stairs.  The shortness of breath resolves relatively quickly.  He is not particular concerned but did want to bring it up as he feels that in the past he had ignored a lot of his symptoms which eventually led to open heart surgery.  He had 1 brief episode of chest pressure in the middle of the night several weeks ago which resolved spontaneously.  He is unsure if he may have had a dream.  He has no exertional symptoms.  When he is walking his dogs he feels well from a  "cardiac standpoint and notes that his ambulation is limited more so by hip and back pain.     Vitals:  /80 (BP Location: Left arm, Patient Position: Sitting, Cuff Size: Standard)   Pulse 71   Ht 5' 8\" (1.727 m)   Wt 114 kg (251 lb)   BMI 38.16 kg/m²      Past Medical History:   Diagnosis Date    Chews tobacco regularly     Consumes three beers daily     Controlled diabetes mellitus (HCC)     Diabetes (HCC)     Esophageal reflux     GERD (gastroesophageal reflux disease)     Hyperlipidemia     Hypertension     Osteoarthritis     LEFT KNEE    Primary localized osteoarthritis of left knee     last assessed: 1/16/2018     Social History     Socioeconomic History    Marital status: /Civil Union     Spouse name: Not on file    Number of children: Not on file    Years of education: Not on file    Highest education level: Not on file   Occupational History    Not on file   Tobacco Use    Smoking status: Former     Current packs/day: 1.00     Average packs/day: 1 pack/day for 11.0 years (11.0 ttl pk-yrs)     Types: Cigarettes    Smokeless tobacco: Current     Types: Chew    Tobacco comments:     quit 30 + yrs ago    Vaping Use    Vaping status: Never Used   Substance and Sexual Activity    Alcohol use: Yes     Alcohol/week: 6.0 standard drinks of alcohol     Types: 6 Cans of beer per week     Comment: drinks 6 pack of beer on sundays    Drug use: No    Sexual activity: Not on file   Other Topics Concern    Not on file   Social History Narrative    Not on file     Social Determinants of Health     Financial Resource Strain: Not on file   Food Insecurity: No Food Insecurity (11/2/2023)    Nursing - Inadequate Food Risk Classification     Worried About Running Out of Food in the Last Year: Never true     Ran Out of Food in the Last Year: Never true     Ran Out of Food in the Last Year: Not on file   Transportation Needs: No Transportation Needs (11/21/2023)    [place] : Transportation     Lack of " Transportation (Medical): No     Lack of Transportation (Non-Medical): No     Patient Unable or Declines to Respond: No   Physical Activity: Not on file   Stress: Not on file   Social Connections: Not on file   Intimate Partner Violence: Not on file   Housing Stability: Low Risk  (11/2/2023)    Housing Stability Vital Sign     Unable to Pay for Housing in the Last Year: No     Number of Times Moved in the Last Year: 1     Homeless in the Last Year: No      Family History   Problem Relation Age of Onset    Colon cancer Father     Mental illness Family         mental disorder    Substance Abuse Neg Hx      Past Surgical History:   Procedure Laterality Date    CARDIAC CATHETERIZATION Left 10/18/2023    Procedure: Cardiac pci;  Surgeon: Deloris Amanda DO;  [facility];  Service: Cardiology    CARDIAC CATHETERIZATION N/A 10/18/2023    Procedure: Cardiac Coronary Angiogram;  Surgeon: Deloris Amanda DO;  [facility];  Service: Cardiology    CARDIAC CATHETERIZATION N/A 10/18/2023    Procedure: Cardiac other - IFR;  Surgeon: Deloris Amanda DO;  [facility];  Service: Cardiology    COLONOSCOPY      HARVEST VEIN Left 11/1/2023    Procedure: HARVEST VEIN ENDOSCOPIC ([facility]);  Surgeon: TRINH Franco MD;  [facility];  Service: Cardiac Surgery    OK ARTHRP KNE CONDYLE&PLATU MEDIAL&LAT COMPARTMENTS Left 1/8/2018    Procedure: ARTHROPLASTY KNEE TOTAL;  Surgeon: Jazmin Cortes MD;  [facility];  Service: Orthopedics    [place] CORONARY ARTERY BYP W/VEIN & ARTERY GRAFT 4 VEIN N/A 11/1/2023    Procedure: CORONARY ARTERY BYPASS GRAFT (CABG) 4 VESSELS, LIMA TO LAD, SVG TO PDA, SVG TO OM1, SVG TO DIAG2; w/ AMBREEN;  Surgeon: TRINH Franco MD;  [facility];  Service: Cardiac Surgery    REPLACEMENT TOTAL KNEE Left        Current Outpatient Medications:     acetaminophen (TYLENOL) 325 mg tablet, Take 2 tablets (650 mg total) by mouth every 6  (six) hours as needed for mild pain, headaches or fever, Disp: 30 tablet, Rfl: 0    Alcohol Swabs 70 % PADS, May substitute brand based on insurance coverage. Check glucose TID., Disp: 100 each, Rfl: 0    aspirin (ECOTRIN LOW STRENGTH) 81 mg EC tablet, Take 1 tablet (81 mg total) by mouth in the morning, Disp: , Rfl:     atorvastatin (LIPITOR) 40 mg tablet, TAKE 1 TABLET BY MOUTH EVERY DAY, Disp: 90 tablet, Rfl: 1    Blood Glucose Monitoring Suppl (OneTouch Verio Reflect) w/Device KIT, May substitute brand based on insurance coverage. Check glucose TID., Disp: 1 kit, Rfl: 0    diphenhydrAMINE-acetaminophen (TYLENOL PM)  MG TABS, Take 1 tablet by mouth daily at bedtime as needed for sleep, Disp: , Rfl:     Empagliflozin (Jardiance) 25 MG TABS, Take 1 tablet (25 mg total) by mouth every morning, Disp: 90 tablet, Rfl: 3    Evolocumab 140 MG/ML SOAJ, Inject 1 mL (140 mg total) under the skin every 14 (fourteen) days, Disp: 1 mL, Rfl: 11    Exenatide ER (Bydureon BCise) 2 MG/0.85ML AUIJ, INJECT THE CONTENTS OF 1 PEN SUBCUTANEOUSLY WEEKLY ON SUNDAYS, Disp: 12 mL, Rfl: 1    fenofibrate (TRICOR) 145 mg tablet, TAKE 1 TABLET BY MOUTH EVERY DAY, Disp: 90 tablet, Rfl: 1    gabapentin (NEURONTIN) 400 mg capsule, Take 1 PO BID., Disp: 180 capsule, Rfl: 3    glucose blood (OneTouch Verio) test strip, May substitute brand based on insurance coverage. Check glucose TID., Disp: 100 each, Rfl: 0    insulin glargine (LANTUS SOLOSTAR) 100 units/mL injection pen, Inject 20 Units under the skin daily, Disp: 15 mL, Rfl: 10    Insulin Glargine Solostar (Lantus SoloStar) 100 UNIT/ML SOPN, Inject 0.2 mL (20 Units total) under the skin daily at bedtime, Disp: 100 mL, Rfl: 5    Insulin Glargine-yfgn 100 UNIT/ML SOPN, Inject 20 Units as directed daily, Disp: , Rfl:     Insulin Pen Needle (BD Pen Needle Audelia 2nd Gen) 32G X 4 MM MISC, For use with insulin pen. Pharmacy may dispense brand covered by insurance., Disp: 100 each, Rfl: 0     metFORMIN (GLUCOPHAGE-XR) 500 mg 24 hr tablet, TAKE 1 TABLET BY MOUTH TWICE A DAY WITH MEALS, Disp: 180 tablet, Rfl: 4    methocarbamol (ROBAXIN) 500 mg tablet, Take 1 tablet (500 mg total) by mouth 4 (four) times a day, Disp: 270 tablet, Rfl: 5    metoprolol tartrate (LOPRESSOR) 25 mg tablet, Take 1 tablet (25 mg total) by mouth every 12 (twelve) hours, Disp: 180 tablet, Rfl: 3    OneTouch Delica Lancets 33G MISC, May substitute brand based on insurance coverage. Check glucose TID., Disp: 100 each, Rfl: 10    pantoprazole (PROTONIX) 40 mg tablet, TAKE 1 TABLET BY MOUTH EVERY DAY, Disp: 90 tablet, Rfl: 1      Review of Systems:  Review of Systems   Constitutional:  Negative for chills and fever.   HENT:  Negative for ear pain and sore throat.    Eyes:  Negative for pain and visual disturbance.   Respiratory:  Positive for shortness of breath. Negative for cough.    Cardiovascular:  Negative for chest pain and palpitations.   Gastrointestinal:  Negative for abdominal pain and vomiting.   Genitourinary:  Negative for dysuria and hematuria.   Musculoskeletal:  Positive for back pain. Negative for arthralgias.        Hip pain   Skin:  Negative for color change and rash.   Neurological:  Negative for seizures and syncope.   All other systems reviewed and are negative.        Physical Exam:  Physical Exam  Constitutional:       Appearance: He is well-developed.   HENT:      Head: Normocephalic and atraumatic.   Eyes:      Pupils: Pupils are equal, round, and reactive to light.   Cardiovascular:      Rate and Rhythm: Normal rate and regular rhythm.      Heart sounds: No murmur heard.     No friction rub. No gallop.   Pulmonary:      Effort: Pulmonary effort is normal. No respiratory distress.      Breath sounds: Normal breath sounds. No wheezing or rales.   Abdominal:      General: Bowel sounds are normal. There is no distension.      Palpations: Abdomen is soft.      Tenderness: There is no abdominal tenderness.    Musculoskeletal:         General: No deformity. Normal range of motion.      Cervical back: Normal range of motion and neck supple.   Skin:     General: Skin is warm and dry.   Neurological:      Mental Status: He is alert and oriented to person, place, and time.   Psychiatric:         Behavior: Behavior normal.         This note was completed in part utilizing [employer] Direct Software.  Grammatical errors, random word insertions, spelling mistakes, and incomplete sentences can be an occasional consequence of this system secondary to software limitations, ambient noise, and hardware issues.  If you have any questions or concerns about the content, text, or information contained within the body of this dictation, please contact the provider for clarification.

## 2024-10-28 ENCOUNTER — OFFICE VISIT (OUTPATIENT)
Dept: CARDIOLOGY CLINIC | Facility: CLINIC | Age: 64
End: 2024-10-28
Payer: COMMERCIAL

## 2024-10-28 VITALS
BODY MASS INDEX: 38.04 KG/M2 | DIASTOLIC BLOOD PRESSURE: 80 MMHG | SYSTOLIC BLOOD PRESSURE: 158 MMHG | HEIGHT: 68 IN | WEIGHT: 251 LBS | HEART RATE: 71 BPM

## 2024-10-28 DIAGNOSIS — I25.10 CORONARY ARTERY DISEASE INVOLVING NATIVE CORONARY ARTERY OF NATIVE HEART WITHOUT ANGINA PECTORIS: Primary | ICD-10-CM

## 2024-10-28 PROCEDURE — 99214 OFFICE O/P EST MOD 30 MIN: CPT | Performed by: PHYSICIAN ASSISTANT

## 2024-11-02 DIAGNOSIS — E78.5 HYPERLIPIDEMIA, UNSPECIFIED HYPERLIPIDEMIA TYPE: ICD-10-CM

## 2024-11-02 DIAGNOSIS — Z79.4 TYPE 2 DIABETES MELLITUS WITHOUT COMPLICATION, WITH LONG-TERM CURRENT USE OF INSULIN (HCC): ICD-10-CM

## 2024-11-02 DIAGNOSIS — E11.9 TYPE 2 DIABETES MELLITUS WITHOUT COMPLICATION, WITH LONG-TERM CURRENT USE OF INSULIN (HCC): ICD-10-CM

## 2024-11-04 RX ORDER — FENOFIBRATE 145 MG/1
145 TABLET, COATED ORAL DAILY
Qty: 90 TABLET | Refills: 1 | Status: SHIPPED | OUTPATIENT
Start: 2024-11-04

## 2024-11-04 RX ORDER — ATORVASTATIN CALCIUM 40 MG/1
40 TABLET, FILM COATED ORAL DAILY
Qty: 90 TABLET | Refills: 1 | Status: SHIPPED | OUTPATIENT
Start: 2024-11-04

## 2024-11-07 DIAGNOSIS — K21.00 GASTROESOPHAGEAL REFLUX DISEASE WITH ESOPHAGITIS WITHOUT HEMORRHAGE: ICD-10-CM

## 2024-11-07 RX ORDER — PANTOPRAZOLE SODIUM 40 MG/1
40 TABLET, DELAYED RELEASE ORAL DAILY
Qty: 90 TABLET | Refills: 2 | Status: SHIPPED | OUTPATIENT
Start: 2024-11-07

## 2024-11-14 ENCOUNTER — OFFICE VISIT (OUTPATIENT)
Dept: FAMILY MEDICINE CLINIC | Facility: CLINIC | Age: 64
End: 2024-11-14
Payer: COMMERCIAL

## 2024-11-14 VITALS — WEIGHT: 246 LBS | DIASTOLIC BLOOD PRESSURE: 80 MMHG | BODY MASS INDEX: 37.4 KG/M2 | SYSTOLIC BLOOD PRESSURE: 128 MMHG

## 2024-11-14 DIAGNOSIS — I25.10 CORONARY ARTERY DISEASE INVOLVING NATIVE CORONARY ARTERY OF NATIVE HEART WITHOUT ANGINA PECTORIS: Primary | ICD-10-CM

## 2024-11-14 DIAGNOSIS — Z12.5 SCREENING FOR PROSTATE CANCER: ICD-10-CM

## 2024-11-14 DIAGNOSIS — H83.3X3 NOISE-INDUCED HEARING LOSS OF BOTH EARS: ICD-10-CM

## 2024-11-14 DIAGNOSIS — M47.816 LUMBAR SPONDYLOSIS: ICD-10-CM

## 2024-11-14 DIAGNOSIS — M75.52 BURSITIS OF LEFT SHOULDER: ICD-10-CM

## 2024-11-14 DIAGNOSIS — E11.9 TYPE 2 DIABETES MELLITUS WITHOUT COMPLICATION, WITHOUT LONG-TERM CURRENT USE OF INSULIN (HCC): ICD-10-CM

## 2024-11-14 LAB — SL AMB POCT HEMOGLOBIN AIC: 7.2 (ref ?–6.5)

## 2024-11-14 PROCEDURE — 83036 HEMOGLOBIN GLYCOSYLATED A1C: CPT | Performed by: FAMILY MEDICINE

## 2024-11-14 PROCEDURE — 99214 OFFICE O/P EST MOD 30 MIN: CPT | Performed by: FAMILY MEDICINE

## 2024-11-14 NOTE — PROGRESS NOTES
Assessment/Plan:    No problem-specific Assessment & Plan notes found for this encounter.       Diagnoses and all orders for this visit:    Coronary artery disease involving native coronary artery of native heart without angina pectoris    Type 2 diabetes mellitus without complication, without long-term current use of insulin (HCC)  -     Albumin / creatinine urine ratio; Future  -     Basic metabolic panel; Future  -     Hemoglobin A1C; Future  -     Albumin / creatinine urine ratio  -     Basic metabolic panel  -     Hemoglobin A1C    Lumbar spondylosis          Subjective:   Chief Complaint   Patient presents with    diabetic check        Patient ID: Alex Vallecillo is a 64 y.o. male.    HPI    The following portions of the patient's history were reviewed and updated as appropriate: allergies, current medications, past family history, past medical history, past social history, past surgical history and problem list.    Review of Systems   Constitutional:  Negative for fatigue, fever and unexpected weight change.   HENT:  Negative for congestion, sinus pain and sore throat.    Eyes:  Negative for visual disturbance.   Respiratory:  Negative for shortness of breath and wheezing.    Cardiovascular:  Negative for chest pain and palpitations.   Gastrointestinal:  Negative for abdominal pain, nausea and vomiting.   Musculoskeletal: Negative.  Negative for arthralgias and myalgias.   Neurological:  Negative for syncope, weakness and numbness.   Psychiatric/Behavioral: Negative.  Negative for confusion, dysphoric mood and suicidal ideas.          Objective:  Vitals:    11/14/24 1218   BP: 128/80   BP Location: Left arm   Patient Position: Sitting   Cuff Size: Standard   Weight: 112 kg (246 lb)      Physical Exam  Constitutional:       Appearance: He is well-developed.   HENT:      Right Ear: Ear canal normal. Tympanic membrane is not injected.      Left Ear: Ear canal normal. Tympanic membrane is not injected.      Nose:  Nose normal.   Eyes:      General:         Right eye: No discharge.         Left eye: No discharge.      Conjunctiva/sclera: Conjunctivae normal.      Pupils: Pupils are equal, round, and reactive to light.   Neck:      Thyroid: No thyromegaly.   Cardiovascular:      Rate and Rhythm: Normal rate and regular rhythm.      Pulses: no weak pulses.           Dorsalis pedis pulses are 2+ on the right side and 2+ on the left side.        Posterior tibial pulses are 2+ on the right side and 2+ on the left side.      Heart sounds: Normal heart sounds. No murmur heard.  Pulmonary:      Effort: Pulmonary effort is normal. No respiratory distress.      Breath sounds: Normal breath sounds. No wheezing.   Abdominal:      General: Bowel sounds are normal. There is no distension.      Palpations: Abdomen is soft.      Tenderness: There is no abdominal tenderness.   Musculoskeletal:         General: Normal range of motion.      Cervical back: Normal range of motion and neck supple.   Feet:      Right foot:      Skin integrity: No ulcer, skin breakdown, erythema, warmth, callus or dry skin.      Left foot:      Skin integrity: No ulcer, skin breakdown, erythema, warmth, callus or dry skin.   Lymphadenopathy:      Cervical: No cervical adenopathy.   Skin:     General: Skin is warm and dry.   Neurological:      Mental Status: He is alert and oriented to person, place, and time. He is not disoriented.      Sensory: No sensory deficit.      Motor: No weakness.      Coordination: Coordination normal.      Gait: Gait normal.      Deep Tendon Reflexes: Reflexes are normal and symmetric.   Psychiatric:         Speech: Speech normal.         Behavior: Behavior normal.         Thought Content: Thought content normal.         Judgment: Judgment normal.       Patient's shoes and socks removed.    Right Foot/Ankle   Right Foot Inspection  Skin Exam: skin normal and skin intact. No dry skin, no warmth, no callus, no erythema, no maceration, no  abnormal color, no pre-ulcer, no ulcer and no callus.     Toe Exam: ROM and strength within normal limits.     Sensory   Vibration: intact  Proprioception: intact  Monofilament testing: intact    Vascular  Capillary refills: < 3 seconds  The right DP pulse is 2+. The right PT pulse is 2+.     Left Foot/Ankle  Left Foot Inspection  Skin Exam: skin normal and skin intact. No dry skin, no warmth, no erythema, no maceration, normal color, no pre-ulcer, no ulcer and no callus.     Toe Exam: ROM and strength within normal limits.     Sensory   Vibration: intact  Proprioception: intact  Monofilament testing: intact    Vascular  Capillary refills: < 3 seconds  The left DP pulse is 2+. The left PT pulse is 2+.     Assign Risk Category  No deformity present  No loss of protective sensation  No weak pulses  Risk: 0

## 2024-12-09 ENCOUNTER — TELEPHONE (OUTPATIENT)
Age: 64
End: 2024-12-09

## 2024-12-09 DIAGNOSIS — M25.519 SHOULDER PAIN, UNSPECIFIED CHRONICITY, UNSPECIFIED LATERALITY: Primary | ICD-10-CM

## 2024-12-09 NOTE — TELEPHONE ENCOUNTER
Ambulatory referral for orthopedic right shoulder pain.    Please forward to [facility] Procedure Prior Auth POD for insurance referral once Ambulatory referral done.    Patient is requesting an insurance referral for the following specialty:      Test Name / Order Name: Shoulder pain    DX Code: n/a    Date Of Service: 12/12    Location/Facility Name/Address/Phone #: [facility] [id number] [facility].Practice Location Phone/Fax  Phone [phone]  Fax [phone]    Location / Facility NPI: [id number]    Best Phone # To Reach The Patient: [phone]

## 2024-12-10 ENCOUNTER — TELEPHONE (OUTPATIENT)
Age: 64
End: 2024-12-10

## 2024-12-10 NOTE — TELEPHONE ENCOUNTER
Patient is requesting an insurance referral for the following specialty:  Orthopedics     Test Name / Order Name:   Initial and treat    DX Code: M25.512    Date Of Service:  12/11/24    Location/Facility Name/Address/Phone #:   [phone]    Location / Facility NPI:   [id number]    Best Phone # To Reach The Patient:   [phone]

## 2024-12-11 ENCOUNTER — CLINICAL SUPPORT (OUTPATIENT)
Dept: FAMILY MEDICINE CLINIC | Facility: CLINIC | Age: 64
End: 2024-12-11

## 2024-12-11 DIAGNOSIS — E11.9 TYPE 2 DIABETES MELLITUS WITHOUT COMPLICATION, WITHOUT LONG-TERM CURRENT USE OF INSULIN (HCC): Primary | ICD-10-CM

## 2024-12-11 LAB
LEFT EYE DIABETIC RETINOPATHY: NORMAL
LEFT EYE IMAGE QUALITY: NORMAL
LEFT EYE MACULAR EDEMA: NORMAL
LEFT EYE OTHER RETINOPATHY: NORMAL
RIGHT EYE DIABETIC RETINOPATHY: NORMAL
RIGHT EYE IMAGE QUALITY: NORMAL
RIGHT EYE MACULAR EDEMA: NORMAL
RIGHT EYE OTHER RETINOPATHY: NORMAL
SEVERITY (EYE EXAM): NORMAL

## 2024-12-12 LAB
ALBUMIN 24H UR-MRATE: NORMAL MG/DAY (ref 0–29)
MICROALBUMIN UR-MCNC: 8.3 UG/ML

## 2025-01-15 DIAGNOSIS — I25.10 TRIPLE VESSEL CORONARY ARTERY DISEASE: ICD-10-CM

## 2025-01-15 RX ORDER — METOPROLOL TARTRATE 25 MG/1
25 TABLET, FILM COATED ORAL 2 TIMES DAILY
Qty: 180 TABLET | Refills: 1 | Status: SHIPPED | OUTPATIENT
Start: 2025-01-15

## 2025-01-22 DIAGNOSIS — R52 PAIN: ICD-10-CM

## 2025-01-22 RX ORDER — METHOCARBAMOL 500 MG/1
500 TABLET, FILM COATED ORAL 4 TIMES DAILY
Qty: 120 TABLET | Refills: 13 | Status: SHIPPED | OUTPATIENT
Start: 2025-01-22

## 2025-01-23 DIAGNOSIS — E11.9 TYPE 2 DIABETES MELLITUS WITHOUT COMPLICATION, WITH LONG-TERM CURRENT USE OF INSULIN (HCC): ICD-10-CM

## 2025-01-23 DIAGNOSIS — Z79.4 TYPE 2 DIABETES MELLITUS WITHOUT COMPLICATION, WITH LONG-TERM CURRENT USE OF INSULIN (HCC): ICD-10-CM

## 2025-01-23 RX ORDER — EMPAGLIFLOZIN 25 MG/1
25 TABLET, FILM COATED ORAL EVERY MORNING
Qty: 30 TABLET | Refills: 5 | Status: SHIPPED | OUTPATIENT
Start: 2025-01-23

## 2025-02-03 ENCOUNTER — TELEPHONE (OUTPATIENT)
Dept: CARDIOLOGY CLINIC | Facility: CLINIC | Age: 65
End: 2025-02-03

## 2025-02-03 DIAGNOSIS — I25.10 CORONARY ARTERY DISEASE INVOLVING NATIVE CORONARY ARTERY OF NATIVE HEART WITHOUT ANGINA PECTORIS: ICD-10-CM

## 2025-02-03 DIAGNOSIS — E78.2 MIXED HYPERLIPIDEMIA: ICD-10-CM

## 2025-02-03 NOTE — TELEPHONE ENCOUNTER
Pt received msg from [employer] stating in order to proceed with his Repatha assistance that they need a letter from us stating that to the best of our knowledge that he has no employment income. He states that his last day he worked was in November and he will be officially retired once he turns 65 on 3/21/25. OK to prep letter? Please advise. Thank you.

## 2025-02-04 DIAGNOSIS — M54.16 LUMBAR RADICULOPATHY: ICD-10-CM

## 2025-02-04 DIAGNOSIS — M54.50 CHRONIC BILATERAL LOW BACK PAIN WITHOUT SCIATICA: ICD-10-CM

## 2025-02-04 DIAGNOSIS — M48.062 SPINAL STENOSIS OF LUMBAR REGION WITH NEUROGENIC CLAUDICATION: ICD-10-CM

## 2025-02-04 DIAGNOSIS — M51.26 HERNIATED NUCLEUS PULPOSUS, L3-4 LEFT: ICD-10-CM

## 2025-02-04 DIAGNOSIS — G89.29 CHRONIC BILATERAL LOW BACK PAIN WITHOUT SCIATICA: ICD-10-CM

## 2025-02-05 RX ORDER — EVOLOCUMAB 140 MG/ML
INJECTION, SOLUTION SUBCUTANEOUS
Qty: 2 ML | Refills: 5 | Status: SHIPPED | OUTPATIENT
Start: 2025-02-05

## 2025-02-05 RX ORDER — GABAPENTIN 400 MG/1
400 CAPSULE ORAL 2 TIMES DAILY
Qty: 60 CAPSULE | Refills: 0 | Status: SHIPPED | OUTPATIENT
Start: 2025-02-05

## 2025-02-07 LAB
ALBUMIN SERPL-MCNC: 4.6 G/DL (ref 3.9–4.9)
ALP SERPL-CCNC: 89 IU/L (ref 44–121)
ALT SERPL-CCNC: 28 IU/L (ref 0–44)
AST SERPL-CCNC: 30 IU/L (ref 0–40)
BILIRUB SERPL-MCNC: 0.3 MG/DL (ref 0–1.2)
BUN SERPL-MCNC: 19 MG/DL (ref 8–27)
BUN/CREAT SERPL: 17 (ref 10–24)
CALCIUM SERPL-MCNC: 9.5 MG/DL (ref 8.6–10.2)
CHLORIDE SERPL-SCNC: 107 MMOL/L (ref 96–106)
CO2 SERPL-SCNC: 19 MMOL/L (ref 20–29)
CREAT SERPL-MCNC: 1.13 MG/DL (ref 0.76–1.27)
EGFR: 73 ML/MIN/1.73
GLOBULIN SER-MCNC: 2.5 G/DL (ref 1.5–4.5)
GLUCOSE SERPL-MCNC: 131 MG/DL (ref 70–99)
POTASSIUM SERPL-SCNC: 4.4 MMOL/L (ref 3.5–5.2)
PROT SERPL-MCNC: 7.1 G/DL (ref 6–8.5)
PSA SERPL-MCNC: 0.4 NG/ML (ref 0–4)
SL AMB REFLEX CRITERIA: NORMAL
SODIUM SERPL-SCNC: 143 MMOL/L (ref 134–144)

## 2025-02-19 ENCOUNTER — TELEPHONE (OUTPATIENT)
Age: 65
End: 2025-02-19

## 2025-02-19 NOTE — TELEPHONE ENCOUNTER
Caller: Michael-care coordinator    Doctor: Dr. Ross    Reason for call:   Patient sent [employer] info about him being out of work in order to get repatha at a reduced cost.  It will take [employer]  at least a week to review and make a decision.He would like samples of Repatha 140 mg for Alex. Please call him.    Thank you    Call back#: [phone]

## 2025-03-11 DIAGNOSIS — M54.50 CHRONIC BILATERAL LOW BACK PAIN WITHOUT SCIATICA: ICD-10-CM

## 2025-03-11 DIAGNOSIS — G89.29 CHRONIC BILATERAL LOW BACK PAIN WITHOUT SCIATICA: ICD-10-CM

## 2025-03-11 DIAGNOSIS — M48.062 SPINAL STENOSIS OF LUMBAR REGION WITH NEUROGENIC CLAUDICATION: ICD-10-CM

## 2025-03-11 DIAGNOSIS — M54.16 LUMBAR RADICULOPATHY: ICD-10-CM

## 2025-03-11 DIAGNOSIS — M51.26 HERNIATED NUCLEUS PULPOSUS, L3-4 LEFT: ICD-10-CM

## 2025-03-12 RX ORDER — GABAPENTIN 400 MG/1
400 CAPSULE ORAL 2 TIMES DAILY
Qty: 60 CAPSULE | Refills: 5 | Status: SHIPPED | OUTPATIENT
Start: 2025-03-12

## 2025-03-18 DIAGNOSIS — I25.10 CORONARY ARTERY DISEASE INVOLVING NATIVE CORONARY ARTERY OF NATIVE HEART WITHOUT ANGINA PECTORIS: ICD-10-CM

## 2025-03-18 DIAGNOSIS — E78.2 MIXED HYPERLIPIDEMIA: ICD-10-CM

## 2025-03-18 NOTE — TELEPHONE ENCOUNTER
Pt stopped into office looking to have a refill sent to [facility] for Repatha 140 mg/ml every 14 days for 90 day supply

## 2025-03-19 RX ORDER — EVOLOCUMAB 140 MG/ML
INJECTION, SOLUTION SUBCUTANEOUS
Qty: 6 ML | Refills: 1 | Status: SHIPPED | OUTPATIENT
Start: 2025-03-19

## 2025-03-19 NOTE — TELEPHONE ENCOUNTER
Received a call from Teena from [facility] pharmacy stating that she received a message to not refill repatha. Advised for Teena to contact pt as he asked for the refill to go to another pharmacy.

## 2025-03-27 ENCOUNTER — TELEPHONE (OUTPATIENT)
Dept: CARDIOLOGY CLINIC | Facility: CLINIC | Age: 65
End: 2025-03-27

## 2025-03-27 NOTE — TELEPHONE ENCOUNTER
OB PA for Repatha SUBMITTED to [facility]     via    [x]CMM-KEY: [id number]  []Surescripts-Case ID #   []Availity-Auth ID # NDC #   []Faxed to plan   []Other website   []Phone call Case ID #     []PA sent as URGENT    All office notes, labs and other pertaining documents and studies sent. Clinical questions answered. Awaiting determination from insurance company.     Turnaround time for your insurance to make a decision on your Prior Authorization can take 7-21 business days.

## 2025-03-27 NOTE — TELEPHONE ENCOUNTER
PA for Repatha 140 mg/ml SUBMITTED to [facility]     via    [x]CMM-KEY: [id number]  []Surescripts-Case ID #   []Availity-Auth ID # NDC #   []Faxed to plan   []Other website   []Phone call Case ID #     []PA sent as URGENT    All office notes, labs and other pertaining documents and studies sent. Clinical questions answered. Awaiting determination from insurance company.     Turnaround time for your insurance to make a decision on your Prior Authorization can take 7-21 business days.

## 2025-03-27 NOTE — TELEPHONE ENCOUNTER
PA FOR REPATHA 140 MG/ML APPROVED    Date(s) approved 3/27/25-3/27/26    Case #[id number]  -  Patient advised by          [x]DataXuhart Message  []Phone call   [x]LMOM  []L/M to call office as no active Communication consent on file  []Unable to leave detailed message as VM not approved on Communication consent       Pharmacy advised by    []Fax  []Phone call  []Secure Chat    Specialty Pharmacy    []       Approval letter scanned into Media Yes    //

## 2025-03-27 NOTE — TELEPHONE ENCOUNTER
Per pharmacy patient has [employer] insurance   [facility] [id number]  [id number]  ID [id number]

## 2025-03-27 NOTE — TELEPHONE ENCOUNTER
PA for repatha 140 mg/ml  CANCELLED due to     []Approval on file-dates approved   []Medication already on Formulary  []Brand Name Preferred  [x]Patient no longer covered by insurance                    Patient advised by     []My Chart Message  []Phone call    Message sent to office clinical pool   No n    Scanned into Media no

## 2025-03-27 NOTE — TELEPHONE ENCOUNTER
Caller: Patient     Doctor: Dr. Ramses Ross MD     Reason for call: Pt called regarding his Repatha medication. Pt stated he is not able to receive his medication due to a prior-auth not being done, Please call pt & advise.     Call back#: [phone]

## 2025-03-30 DIAGNOSIS — E11.9 TYPE 2 DIABETES MELLITUS WITHOUT COMPLICATION, WITH LONG-TERM CURRENT USE OF INSULIN (HCC): ICD-10-CM

## 2025-03-30 DIAGNOSIS — Z79.4 TYPE 2 DIABETES MELLITUS WITHOUT COMPLICATION, WITH LONG-TERM CURRENT USE OF INSULIN (HCC): ICD-10-CM

## 2025-03-31 DIAGNOSIS — E11.9 TYPE 2 DIABETES MELLITUS WITHOUT COMPLICATION, WITHOUT LONG-TERM CURRENT USE OF INSULIN (HCC): ICD-10-CM

## 2025-03-31 RX ORDER — EXENATIDE 2 MG/.85ML
INJECTION, SUSPENSION, EXTENDED RELEASE SUBCUTANEOUS
Qty: 3.4 ML | Refills: 5 | Status: SHIPPED | OUTPATIENT
Start: 2025-03-31

## 2025-03-31 RX ORDER — INSULIN GLARGINE-YFGN 100 [IU]/ML
20 INJECTION, SOLUTION SUBCUTANEOUS DAILY
Qty: 15 ML | Refills: 1 | Status: SHIPPED | OUTPATIENT
Start: 2025-03-31

## 2025-04-17 DIAGNOSIS — E11.9 TYPE 2 DIABETES MELLITUS WITHOUT COMPLICATION, WITH LONG-TERM CURRENT USE OF INSULIN (HCC): ICD-10-CM

## 2025-04-17 DIAGNOSIS — Z79.4 TYPE 2 DIABETES MELLITUS WITHOUT COMPLICATION, WITH LONG-TERM CURRENT USE OF INSULIN (HCC): ICD-10-CM

## 2025-04-18 RX ORDER — METFORMIN HYDROCHLORIDE 500 MG/1
500 TABLET, EXTENDED RELEASE ORAL 2 TIMES DAILY WITH MEALS
Qty: 180 TABLET | Refills: 4 | Status: SHIPPED | OUTPATIENT
Start: 2025-04-18

## 2025-04-28 ENCOUNTER — VBI (OUTPATIENT)
Dept: ADMINISTRATIVE | Facility: OTHER | Age: 65
End: 2025-04-28

## 2025-04-28 ENCOUNTER — OFFICE VISIT (OUTPATIENT)
Dept: CARDIOLOGY CLINIC | Facility: CLINIC | Age: 65
End: 2025-04-28
Payer: COMMERCIAL

## 2025-04-28 VITALS
HEART RATE: 65 BPM | DIASTOLIC BLOOD PRESSURE: 74 MMHG | WEIGHT: 254 LBS | SYSTOLIC BLOOD PRESSURE: 136 MMHG | BODY MASS INDEX: 38.62 KG/M2

## 2025-04-28 DIAGNOSIS — I35.0 NONRHEUMATIC AORTIC VALVE STENOSIS: ICD-10-CM

## 2025-04-28 DIAGNOSIS — I25.10 CORONARY ARTERY DISEASE INVOLVING NATIVE CORONARY ARTERY OF NATIVE HEART WITHOUT ANGINA PECTORIS: Primary | ICD-10-CM

## 2025-04-28 DIAGNOSIS — E11.9 TYPE 2 DIABETES MELLITUS WITHOUT COMPLICATION, WITHOUT LONG-TERM CURRENT USE OF INSULIN (HCC): ICD-10-CM

## 2025-04-28 DIAGNOSIS — E78.2 MIXED HYPERLIPIDEMIA: ICD-10-CM

## 2025-04-28 LAB
ATRIAL RATE: 65 BPM
P AXIS: 52 DEGREES
PR INTERVAL: 292 MS
QRS AXIS: -16 DEGREES
QRSD INTERVAL: 96 MS
QT INTERVAL: 430 MS
QTC INTERVAL: 447 MS
T WAVE AXIS: 135 DEGREES
VENTRICULAR RATE: 65 BPM

## 2025-04-28 PROCEDURE — 93000 ELECTROCARDIOGRAM COMPLETE: CPT | Performed by: INTERNAL MEDICINE

## 2025-04-28 PROCEDURE — 99214 OFFICE O/P EST MOD 30 MIN: CPT | Performed by: INTERNAL MEDICINE

## 2025-04-28 RX ORDER — EVOLOCUMAB 140 MG/ML
INJECTION, SOLUTION SUBCUTANEOUS
Qty: 6 ML | Refills: 3 | Status: SHIPPED | OUTPATIENT
Start: 2025-04-28

## 2025-04-28 NOTE — PROGRESS NOTES
Cardiology Outpatient Follow-Up Note - Alex Vallecillo 65 y.o. male MRN: [id number]      Assessment/Plan:    1. Coronary artery disease involving native coronary artery of native heart without angina pectoris (Primary)  Stable without angina  Continue current regimen   - POCT ECG    2. Type 2 diabetes mellitus without complication, without long-term current use of insulin (HCC)  Continue current regimen    3. Nonrheumatic aortic valve stenosis  Last mild in Oct 2023  Repeat echo in Oct 2025      We will see Alex Vallecillo back in 12 months for routine follow-up.    Subjective:     HPI: Alex Vallecillo is a 65 y.o. year old male with mvCAD s/p CABGx4 10/2023 (LIMA LAD, SVG RPDA, SVG OM1, SVG D2), mild AS, T2DM, HTN, HLD, tobacco use (dip), GERD presenting for follow-up.     No cardiac complaints today.  Has been walking the dogs daily 2 miles, no cardiac symptoms except on Monday he feels a little tightness in his chest after having drank alcohol on Sat/Sun. But with exertion the remainder of the week he feels fine.          Cardiac Testing:    Cardiac Cath 10/18/23  Impression       •  MVD: pLAD (iFR positive 0.79), pLCX (unable to advance iFR wire beyond calcified lesion), pRCA     Recommendation    Heart team discussion with CTS given MVD  Cont GDMT optimization for CAD  Aggressive risk factor reduction   on diet/lifestyle modification   on tobacco cessation  Eventual cardiac rehab upon discharge        Echo 10/17/23    Interpretation Summary       •  Left Ventricle: Left ventricular cavity size is normal. Wall thickness is mildly increased. The left ventricular ejection fraction is 60%. Systolic function is normal. Wall motion is normal. Diastolic function is normal.  •  Right Ventricle: Right ventricular cavity size is normal. Systolic function is normal.  •  Aortic Valve: There is mild stenosis. The aortic valve mean gradient is 10 mmHg.     No prior studies for comparison.       EKGs, personally  reviewed:  N/a    Relevant Labs & Results:  Lipid profile 1/12/24 -- , , HDL 30,  mg/dL     Lipid profile 9/24/24 - , , HDL 39, LDL 68 mg/dL     CMP 2/6/25 - Cr 1.13, K 4.4    ROS:  Review of Systems:  Review of Systems    Objective:     Vitals:   There were no vitals filed for this visit.   There is no height or weight on file to calculate BSA.  Wt Readings from Last 3 Encounters:   11/14/24 112 kg (246 lb)   10/28/24 114 kg (251 lb)   07/24/24 112 kg (247 lb)       Physical Exam:  General: Alex Vallecillo is a well appearing male, in no acute distress, sitting comfortably  HEENT: moist mucous membranes, EOMI  Neck:  No JVD, supple, trachea midline  Cardiovascular: unremarkable S1/S2, regular rate and rhythm, 2/6 SM RUSB  Pulmonary: normal respiratory effort, CTAB  Abdomen: soft and nondistended  Extremities: No lower extremity edema. Warm and well perfused extremities.  Neuro: no focal motor deficits, AAOx3 (person, place, time)  Psych: Normal mood and affect, cooperative        Medications (at the START of this encounter):  Outpatient Medications Prior to Visit   Medication Sig Dispense Refill   • acetaminophen (TYLENOL) 325 mg tablet Take 2 tablets (650 mg total) by mouth every 6 (six) hours as needed for mild pain, headaches or fever 30 tablet 0   • Alcohol Swabs 70 % PADS May substitute brand based on insurance coverage. Check glucose TID. 100 each 0   • aspirin (ECOTRIN LOW STRENGTH) 81 mg EC tablet Take 1 tablet (81 mg total) by mouth in the morning     • atorvastatin (LIPITOR) 40 mg tablet TAKE 1 TABLET BY MOUTH EVERY DAY 90 tablet 1   • Blood Glucose Monitoring Suppl (OneTouch Verio Reflect) w/Device KIT May substitute brand based on insurance coverage. Check glucose TID. 1 kit 0   • diphenhydrAMINE-acetaminophen (TYLENOL PM)  MG TABS Take 1 tablet by mouth daily at bedtime as needed for sleep     • Empagliflozin (Jardiance) 25 MG TABS TAKE 1 TABLET BY MOUTH EVERY DAY IN  THE MORNING 30 tablet 5   • Evolocumab (Repatha SureClick) 140 MG/ML SOAJ Inject 1 mL (140MG) subcutaneously every 2 weeks 6 mL 1   • Exenatide ER (Bydureon BCise) 2 MG/0.85ML AUIJ INJECT THE CONTENTS OF 1 PEN SUBCUTANEOUSLY WEEKLY ON SUNDAYS 3.4 mL 5   • fenofibrate (TRICOR) 145 mg tablet TAKE 1 TABLET BY MOUTH EVERY DAY 90 tablet 1   • gabapentin (NEURONTIN) 400 mg capsule TAKE 1 CAPSULE BY MOUTH TWICE A DAY 60 capsule 5   • glucose blood (OneTouch Verio) test strip May substitute brand based on insurance coverage. Check glucose TID. 100 each 0   • Insulin Glargine-yfgn 100 UNIT/ML SOPN INJECT 20 UNITS UNDER THE SKIN DAILY 15 mL 1   • Insulin Pen Needle (BD Pen Needle Audelia 2nd Gen) 32G X 4 MM MISC For use with insulin pen. Pharmacy may dispense brand covered by insurance. 100 each 0   • metFORMIN (GLUCOPHAGE-XR) 500 mg 24 hr tablet TAKE 1 TABLET BY MOUTH TWICE A DAY WITH FOOD 180 tablet 4   • methocarbamol (ROBAXIN) 500 mg tablet TAKE 1 TABLET (500 MG TOTAL) BY MOUTH 4 (FOUR) TIMES A DAY. 120 tablet 13   • metoprolol tartrate (LOPRESSOR) 25 mg tablet TAKE 1 TABLET BY MOUTH EVERY 12  HOURS 180 tablet 1   • OneTouch Delica Lancets 33G MISC May substitute brand based on insurance coverage. Check glucose TID. 100 each 10   • pantoprazole (PROTONIX) 40 mg tablet TAKE 1 TABLET BY MOUTH EVERY DAY 90 tablet 2     No facility-administered medications prior to visit.                 Time Spent:  Total time (face-to-face and non-face-to-face) spent on today's visit was 20 minutes. This includes preparation for the visits (i.e. reviewing test results), performance of a medically appropriate history and examination, and orders for medications, tests or other procedures. This time is exclusive of procedures performed and time spent teaching.      This note was completed in part utilizing Dragon Medical One voice recognition software. Grammatical errors, random word insertion, spelling mistakes, occasional wrong word or  "\"sound-alike\" substitutions and incomplete sentences may be an occasional consequence of the system secondary to software limitations, ambient noise and hardware issues. At the time of dictation, efforts were made to edit, clarify and /or correct errors.  Please read the chart carefully and recognize, using context, where substitutions have occurred.  If you have any questions or concerns about the context, text or information contained within the body of this dictation, please contact myself, the provider, for further clarification.    "

## 2025-04-28 NOTE — TELEPHONE ENCOUNTER
04/28/25 2:48 PM     Chart reviewed for Diabetic Eye Exam was/were submitted to the patient's insurance.     Alex Kumar MA   [facility]    04/28/25 2:48 PM     Chart reviewed for eGFR and Microalbumin Creatinine Urine Ratio OR Albumin Creatinine Urine Ratio  was/were not submitted to the patient's insurance.     Alex Kumar MA   [facility]

## 2025-05-03 DIAGNOSIS — E78.5 HYPERLIPIDEMIA, UNSPECIFIED HYPERLIPIDEMIA TYPE: ICD-10-CM

## 2025-05-03 DIAGNOSIS — E11.9 TYPE 2 DIABETES MELLITUS WITHOUT COMPLICATION, WITH LONG-TERM CURRENT USE OF INSULIN (HCC): ICD-10-CM

## 2025-05-03 DIAGNOSIS — Z79.4 TYPE 2 DIABETES MELLITUS WITHOUT COMPLICATION, WITH LONG-TERM CURRENT USE OF INSULIN (HCC): ICD-10-CM

## 2025-05-05 RX ORDER — ATORVASTATIN CALCIUM 40 MG/1
40 TABLET, FILM COATED ORAL DAILY
Qty: 30 TABLET | Refills: 5 | Status: SHIPPED | OUTPATIENT
Start: 2025-05-05

## 2025-05-05 RX ORDER — FENOFIBRATE 145 MG/1
145 TABLET, FILM COATED ORAL DAILY
Qty: 30 TABLET | Refills: 5 | Status: SHIPPED | OUTPATIENT
Start: 2025-05-05

## 2025-05-29 DIAGNOSIS — Z79.4 TYPE 2 DIABETES MELLITUS WITHOUT COMPLICATION, WITH LONG-TERM CURRENT USE OF INSULIN (HCC): ICD-10-CM

## 2025-05-29 DIAGNOSIS — E11.9 TYPE 2 DIABETES MELLITUS WITHOUT COMPLICATION, WITH LONG-TERM CURRENT USE OF INSULIN (HCC): ICD-10-CM

## 2025-05-30 RX ORDER — INSULIN GLARGINE-YFGN 100 [IU]/ML
20 INJECTION, SOLUTION SUBCUTANEOUS DAILY
Refills: 1 | OUTPATIENT
Start: 2025-05-30

## 2025-06-13 ENCOUNTER — TELEPHONE (OUTPATIENT)
Dept: FAMILY MEDICINE CLINIC | Facility: CLINIC | Age: 65
End: 2025-06-13

## 2025-06-13 DIAGNOSIS — E11.9 TYPE 2 DIABETES MELLITUS WITHOUT COMPLICATION, WITH LONG-TERM CURRENT USE OF INSULIN (HCC): ICD-10-CM

## 2025-06-13 DIAGNOSIS — Z12.5 SCREENING FOR PROSTATE CANCER: Primary | ICD-10-CM

## 2025-06-13 DIAGNOSIS — E78.2 MIXED HYPERLIPIDEMIA: ICD-10-CM

## 2025-06-13 DIAGNOSIS — Z79.4 TYPE 2 DIABETES MELLITUS WITHOUT COMPLICATION, WITH LONG-TERM CURRENT USE OF INSULIN (HCC): ICD-10-CM

## 2025-06-19 DIAGNOSIS — I25.10 TRIPLE VESSEL CORONARY ARTERY DISEASE: ICD-10-CM

## 2025-06-20 ENCOUNTER — NURSE TRIAGE (OUTPATIENT)
Age: 65
End: 2025-06-20

## 2025-06-20 DIAGNOSIS — I25.10 CORONARY ARTERY DISEASE INVOLVING NATIVE CORONARY ARTERY OF NATIVE HEART WITHOUT ANGINA PECTORIS: ICD-10-CM

## 2025-06-20 DIAGNOSIS — E78.2 MIXED HYPERLIPIDEMIA: ICD-10-CM

## 2025-06-20 RX ORDER — METOPROLOL TARTRATE 25 MG/1
25 TABLET, FILM COATED ORAL 2 TIMES DAILY
Qty: 180 TABLET | Refills: 0 | Status: SHIPPED | OUTPATIENT
Start: 2025-06-20

## 2025-06-20 RX ORDER — EVOLOCUMAB 140 MG/ML
INJECTION, SOLUTION SUBCUTANEOUS
Qty: 6 ML | Refills: 3 | Status: SHIPPED | OUTPATIENT
Start: 2025-06-20

## 2025-06-20 NOTE — TELEPHONE ENCOUNTER
"Reason for Disposition   Prescription prescribed recently is not at pharmacy and triager has access to patient's EMR and prescription is recorded in the EMR    Answer Assessment - Initial Assessment Questions  1. NAME of MEDICINE: \"What medicine(s) are you calling about?\"      Repatha  2. QUESTION: \"What is your question?\" (e.g., double dose of medicine, side effect)      Sent to wrong pharmacy  3. PRESCRIBER: \"Who prescribed the medicine?\" Reason: if prescribed by specialist, call should be referred to that group.      Dr Ross    Protocols used: Medication Question Call-Adult-[place]    "

## 2025-07-04 LAB
ALBUMIN SERPL-MCNC: 4.6 G/DL (ref 3.9–4.9)
ALP SERPL-CCNC: 72 IU/L (ref 44–121)
ALT SERPL-CCNC: 27 IU/L (ref 0–44)
AST SERPL-CCNC: 26 IU/L (ref 0–40)
BILIRUB SERPL-MCNC: 0.4 MG/DL (ref 0–1.2)
BUN SERPL-MCNC: 20 MG/DL (ref 8–27)
BUN/CREAT SERPL: 17 (ref 10–24)
CALCIUM SERPL-MCNC: 9.6 MG/DL (ref 8.6–10.2)
CHLORIDE SERPL-SCNC: 103 MMOL/L (ref 96–106)
CO2 SERPL-SCNC: 18 MMOL/L (ref 20–29)
CREAT SERPL-MCNC: 1.16 MG/DL (ref 0.76–1.27)
EGFR: 70 ML/MIN/1.73
EST. AVERAGE GLUCOSE BLD GHB EST-MCNC: 197 MG/DL
GLOBULIN SER-MCNC: 2.5 G/DL (ref 1.5–4.5)
GLUCOSE SERPL-MCNC: 151 MG/DL (ref 70–99)
HBA1C MFR BLD: 8.5 % (ref 4.8–5.6)
POTASSIUM SERPL-SCNC: 4.1 MMOL/L (ref 3.5–5.2)
PROT SERPL-MCNC: 7.1 G/DL (ref 6–8.5)
PSA FREE MFR SERPL: 70 %
PSA FREE SERPL-MCNC: 0.21 NG/ML
PSA SERPL-MCNC: 0.3 NG/ML (ref 0–4)
SODIUM SERPL-SCNC: 140 MMOL/L (ref 134–144)

## 2025-07-14 ENCOUNTER — OFFICE VISIT (OUTPATIENT)
Dept: FAMILY MEDICINE CLINIC | Facility: CLINIC | Age: 65
End: 2025-07-14
Payer: COMMERCIAL

## 2025-07-14 VITALS — WEIGHT: 254 LBS | BODY MASS INDEX: 38.62 KG/M2

## 2025-07-14 DIAGNOSIS — E11.9 TYPE 2 DIABETES MELLITUS WITHOUT COMPLICATION, WITH LONG-TERM CURRENT USE OF INSULIN (HCC): ICD-10-CM

## 2025-07-14 DIAGNOSIS — Z00.00 WELLNESS EXAMINATION: ICD-10-CM

## 2025-07-14 DIAGNOSIS — Z79.4 TYPE 2 DIABETES MELLITUS WITHOUT COMPLICATION, WITH LONG-TERM CURRENT USE OF INSULIN (HCC): ICD-10-CM

## 2025-07-14 DIAGNOSIS — I25.10 CORONARY ARTERY DISEASE INVOLVING NATIVE CORONARY ARTERY OF NATIVE HEART WITHOUT ANGINA PECTORIS: ICD-10-CM

## 2025-07-14 DIAGNOSIS — M48.062 SPINAL STENOSIS OF LUMBAR REGION WITH NEUROGENIC CLAUDICATION: ICD-10-CM

## 2025-07-14 DIAGNOSIS — E11.9 TYPE 2 DIABETES MELLITUS WITHOUT COMPLICATION, WITHOUT LONG-TERM CURRENT USE OF INSULIN (HCC): Primary | ICD-10-CM

## 2025-07-14 PROBLEM — F11.20 CONTINUOUS OPIOID DEPENDENCE (HCC): Status: RESOLVED | Noted: 2022-06-27 | Resolved: 2025-07-14

## 2025-07-14 PROCEDURE — 99397 PER PM REEVAL EST PAT 65+ YR: CPT | Performed by: FAMILY MEDICINE

## 2025-07-14 PROCEDURE — 99214 OFFICE O/P EST MOD 30 MIN: CPT | Performed by: FAMILY MEDICINE

## 2025-07-14 RX ORDER — INSULIN GLARGINE-YFGN 100 [IU]/ML
20 INJECTION, SOLUTION SUBCUTANEOUS DAILY
Qty: 45 ML | Refills: 1 | Status: SHIPPED | OUTPATIENT
Start: 2025-07-14

## 2025-07-14 NOTE — PROGRESS NOTES
Name: Alex Vallecillo      : 1960      MRN: [id number]  Encounter Provider: Robbi Amanda MD  Encounter Date: 2025   Encounter department: [facility]  :  Assessment & Plan  Type 2 diabetes mellitus without complication, without long-term current use of insulin (HCC)  Start Ozempic  Lab Results   Component Value Date    HGBA1C 8.5 (H) 2025            Spinal stenosis of lumbar region with neurogenic claudication  Stable on current medication regimen. Patient to continue prescribed medication.            Coronary artery disease involving native coronary artery of native heart without angina pectoris  Stable on current medication regimen. Patient to continue prescribed medication.                   History of Present Illness   HPI  Review of Systems   Constitutional:  Negative for fatigue, fever and unexpected weight change.   HENT:  Negative for congestion, sinus pain and sore throat.    Eyes:  Negative for visual disturbance.   Respiratory:  Negative for shortness of breath and wheezing.    Cardiovascular:  Negative for chest pain and palpitations.   Gastrointestinal:  Negative for abdominal pain, nausea and vomiting.   Musculoskeletal: Negative.  Negative for arthralgias and myalgias.   Neurological:  Negative for syncope, weakness and numbness.   Psychiatric/Behavioral: Negative.  Negative for confusion, dysphoric mood and suicidal ideas.        Objective   Wt 115 kg (254 lb)   BMI 38.62 kg/m²      Physical Exam  Vitals and nursing note reviewed.   Constitutional:       General: He is not in acute distress.     Appearance: He is well-developed.   HENT:      Head: Normocephalic and atraumatic.     Eyes:      Conjunctiva/sclera: Conjunctivae normal.       Cardiovascular:      Rate and Rhythm: Normal rate and regular rhythm.      Heart sounds: No murmur heard.  Pulmonary:      Effort: Pulmonary effort is normal. No respiratory distress.      Breath sounds: Normal  breath sounds.   Abdominal:      Palpations: Abdomen is soft.      Tenderness: There is no abdominal tenderness.     Musculoskeletal:         General: No swelling.      Cervical back: Neck supple.     Skin:     General: Skin is warm and dry.      Capillary Refill: Capillary refill takes less than 2 seconds.     Neurological:      Mental Status: He is alert.     Psychiatric:         Mood and Affect: Mood normal.

## 2025-07-14 NOTE — PROGRESS NOTES
Adult Annual Physical  Name: Alex Vallecillo      : 1960      MRN: [id number]  Encounter Provider: Robbi Amanda MD  Encounter Date: 2025   Encounter department: [facility]    :  Assessment & Plan  Type 2 diabetes mellitus without complication, without long-term current use of insulin (HCC)    Lab Results   Component Value Date    HGBA1C 8.5 (H) 2025            Spinal stenosis of lumbar region with neurogenic claudication         Coronary artery disease involving native coronary artery of native heart without angina pectoris             Preventive Screenings:    - Prostate cancer screening: screening up-to-date     Immunizations:  - Immunizations due: Prevnar 20, Tdap and Zoster (Shingrix)         History of Present Illness     Adult Annual Physical:  Patient presents for annual physical.     Diet and Physical Activity:  - Diet/Nutrition: well balanced diet.  - Exercise: walking and 3-4 times a week on average.    Depression Screening:  - PHQ-2 Score: 0    General Health:  - Sleep: 7-8 hours of sleep on average and sleeps well.  - Hearing: normal hearing bilateral ears.  - Vision: no vision problems.  - Dental: regular dental visits and brushes teeth twice daily.    /GYN Health:    - History of STDs: no     Health:  - History of STDs: no.     Advanced Care Planning:  - Has an advanced directive?: no    - Has a durable medical POA?: no    - ACP document given to patient?: no      Review of Systems   Constitutional:  Negative for fatigue, fever and unexpected weight change.   HENT:  Negative for congestion, sinus pain and sore throat.    Eyes:  Negative for visual disturbance.   Respiratory:  Negative for shortness of breath and wheezing.    Cardiovascular:  Negative for chest pain and palpitations.   Gastrointestinal:  Negative for abdominal pain, nausea and vomiting.   Musculoskeletal: Negative.  Negative for arthralgias and myalgias.   Neurological:   Negative for syncope, weakness and numbness.   Psychiatric/Behavioral: Negative.  Negative for confusion, dysphoric mood and suicidal ideas.          Objective   Wt 115 kg (254 lb)   BMI 38.62 kg/m²     Physical Exam  Vitals and nursing note reviewed.   Constitutional:       General: He is not in acute distress.     Appearance: He is well-developed.   HENT:      Head: Normocephalic and atraumatic.     Eyes:      Conjunctiva/sclera: Conjunctivae normal.       Cardiovascular:      Rate and Rhythm: Normal rate and regular rhythm.      Heart sounds: No murmur heard.  Pulmonary:      Effort: Pulmonary effort is normal. No respiratory distress.      Breath sounds: Normal breath sounds.   Abdominal:      Palpations: Abdomen is soft.      Tenderness: There is no abdominal tenderness.     Musculoskeletal:         General: No swelling.      Cervical back: Neck supple.     Skin:     General: Skin is warm and dry.      Capillary Refill: Capillary refill takes less than 2 seconds.     Neurological:      Mental Status: He is alert.     Psychiatric:         Mood and Affect: Mood normal.

## 2025-07-17 DIAGNOSIS — R52 PAIN: ICD-10-CM

## 2025-07-18 RX ORDER — METHOCARBAMOL 500 MG/1
500 TABLET, FILM COATED ORAL 4 TIMES DAILY
Qty: 360 TABLET | Refills: 5 | Status: SHIPPED | OUTPATIENT
Start: 2025-07-18

## 2025-07-21 ENCOUNTER — TELEPHONE (OUTPATIENT)
Dept: FAMILY MEDICINE CLINIC | Facility: CLINIC | Age: 65
End: 2025-07-21

## 2025-07-21 NOTE — TELEPHONE ENCOUNTER
PA Ozempic 0.25 or 0.5 MG/DOSE SUBMITTED    to [facility]     via    [x]CMM-KEY: [id number]   []Surescripts-Case ID #     []Availity-Auth ID #  NDC #    []Faxed to plan   []Other website    []Phone call Case ID #      []PA sent as URGENT    All office notes, labs and other pertaining documents and studies sent. Clinical questions answered. Awaiting determination from insurance company.     Turnaround time for your insurance to make a decision on your Prior Authorization can take 7-21 business days.

## 2025-07-22 NOTE — TELEPHONE ENCOUNTER
PA Ozempic 0.25 or 0.5 MG/DOSE APPROVED     Date(s) approved until 7/21/26    Case # [id number]    Patient advised by          []MyChart Message  []Phone call   []LMOM  []L/M to call office as no active Communication consent on file  []Unable to leave detailed message as VM not approved on Communication consent       Pharmacy advised by    [x]Fax  []Phone call  []Secure Chat    Specialty Pharmacy    []

## (undated) DEVICE — ACE WRAP 6 IN UNSTERILE

## (undated) DEVICE — PACK CUSTOM PERFUSION ANH

## (undated) DEVICE — GLOVE SRG BIOGEL ORTHOPEDIC 7.5

## (undated) DEVICE — HEMOCLIP CARTRIDGE LRG

## (undated) DEVICE — ABDOMINAL PAD: Brand: DERMACEA

## (undated) DEVICE — ADHESIVE SKIN HIGH VISCOSITY EXOFIN 1ML

## (undated) DEVICE — SUT MONOCRYL 4-0 PS-2 18 IN Y496G

## (undated) DEVICE — SUT PROLENE 7-0 BV175-8/BV175-8 24 IN EPM8747

## (undated) DEVICE — 32 FR STRAIGHT – SOFT PVC CATHETER: Brand: PVC THORACIC CATHETERS

## (undated) DEVICE — CEMENT MIXING CARTRIDGE PRISM II

## (undated) DEVICE — RECIP.STERNUM SAW BLADE 34/7.5/0.7MM: Brand: AESCULAP

## (undated) DEVICE — GAUZE SPONGES,USP TYPE VII GAUZE, 12 PLY: Brand: CURITY

## (undated) DEVICE — AORTIC PUNCH 5.2 MM DISP

## (undated) DEVICE — INTENDED FOR TISSUE SEPARATION, AND OTHER PROCEDURES THAT REQUIRE A SHARP SURGICAL BLADE TO PUNCTURE OR CUT.: Brand: BARD-PARKER ® CARBON RIB-BACK BLADES

## (undated) DEVICE — ALCON OPHTHALMIC KNIFE 15 °: Brand: ALCON

## (undated) DEVICE — PACK CUSTOM PERFUSION PLEG PK

## (undated) DEVICE — SUT PROLENE 4-0 BB 36 IN 8581H

## (undated) DEVICE — THE SIMPULSE SOLO SYSTEM WITH ULTREX RETRACTABLE SPLASH SHIELD TIP: Brand: SIMPULSE SOLO

## (undated) DEVICE — IMPERVIOUS STOCKINETTE: Brand: DEROYAL

## (undated) DEVICE — STERNAL WIRE

## (undated) DEVICE — TRAY FOLEY 16FR SURESTEP TEMP SENS URIMETER STAT LOK

## (undated) DEVICE — DRAPE SHEET THREE QUARTER

## (undated) DEVICE — SILVER-COATED ANTIBACTERIAL BARRIER DRESSING: Brand: ACTICOAT SURGIC 10X12CM 5PK US

## (undated) DEVICE — FILTER SMOKE EVAC VIROSAFE

## (undated) DEVICE — THERMOFLECT BLANKET, L, 25EA                               TS THERMOFLECT BLANKET, 48" X 84", SILVER, 5/BG, 5 BG/CS NW: Brand: THERMOFLECT

## (undated) DEVICE — PACK MAJOR ORTHO W/SPLITS PBDS

## (undated) DEVICE — HOOD: Brand: FLYTE, SURGICOOL

## (undated) DEVICE — PUMP TUBING FUSION PACK

## (undated) DEVICE — SAW BLADE RECIPROCATING 179

## (undated) DEVICE — BANDAGE, ESMARK LF STR 6"X9' (20/CS): Brand: CYPRESS

## (undated) DEVICE — OASIS DRAIN, SINGLE, INLINE & ATS COMPATIBLE: Brand: OASIS

## (undated) DEVICE — 3M™ STERI-STRIP™ REINFORCED ADHESIVE SKIN CLOSURES, R1547, 1/2 IN X 4 IN (12 MM X 100 MM), 6 STRIPS/ENVELOPE: Brand: 3M™ STERI-STRIP™

## (undated) DEVICE — BLADE BEAVER MINI SZ 69

## (undated) DEVICE — GLOVE INDICATOR PI UNDERGLOVE SZ 8 BLUE

## (undated) DEVICE — SUT ETHIBOND 2-0 SH-1/SH-1 30 IN X763H

## (undated) DEVICE — NEEDLE 18 G X 1 1/2

## (undated) DEVICE — GLOVE SRG BIOGEL 7

## (undated) DEVICE — AIRLIFE™ TRI-FLO™ SUCTION CATHETER: Brand: AIRLIFE™

## (undated) DEVICE — SYRINGE 50ML LL

## (undated) DEVICE — HEAVY DUTY TABLE COVER: Brand: CONVERTORS

## (undated) DEVICE — GAUZE SPONGES,16 PLY: Brand: CURITY

## (undated) DEVICE — 32 FR RIGHT ANGLE – SOFT PVC CATHETER: Brand: PVC THORACIC CATHETERS

## (undated) DEVICE — GLIDESHEATH BASIC HYDROPHILIC COATED INTRODUCER SHEATH: Brand: GLIDESHEATH

## (undated) DEVICE — RED RUBBER URETHRAL CATHETER: Brand: DOVER

## (undated) DEVICE — SUT VICRYL PLUS 1 CTB-1 36 IN VCPB947H

## (undated) DEVICE — BLADE INTREX LRG BONE OSCILLATING

## (undated) DEVICE — GLOVE INDICATOR PI UNDERGLOVE SZ 7.5 BLUE

## (undated) DEVICE — SUT STRATAFIX SPIRAL 3-0 PGA/PCL 30 X 30 CM SXMD2B408

## (undated) DEVICE — SUT SILK 0 CT-1 30 IN 424H

## (undated) DEVICE — STANDARD SURGICAL GOWN, L: Brand: CONVERTORS

## (undated) DEVICE — CAPIT KNEE ATTUNE RP CEMENT - DEPUY

## (undated) DEVICE — PACK CABG PBDS

## (undated) DEVICE — BLANKET HYPOTHERMIA ADULT GAYMAR

## (undated) DEVICE — GUIDEWIRE .035 260CM 3MM J TIP

## (undated) DEVICE — NEEDLE HYPO 22G X 1-1/2 IN

## (undated) DEVICE — SUT MONOCRYL PLUS 3-0 PS-2 27 IN MCP427H

## (undated) DEVICE — ANTIBACTERIAL UNDYED BRAIDED (POLYGLACTIN 910), SYNTHETIC ABSORBABLE SUTURE: Brand: COATED VICRYL

## (undated) DEVICE — SUT ETHIBOND 5 V-37 30 IN MB66G

## (undated) DEVICE — COBAN 6 IN STERILE

## (undated) DEVICE — PREP SURGICAL PURPREP 26ML

## (undated) DEVICE — CATH GUIDE LAUNCHER 6FR EBU 3.5

## (undated) DEVICE — PLEDGET CARDIO PTFE 9.5 X 4.8 SOFT LF (6EA/PK)

## (undated) DEVICE — BIPOLAR SEALER 23-301-1 AQM MBS: Brand: AQUAMANTYS™

## (undated) DEVICE — Device: Brand: ASAHI SILVERWAY

## (undated) DEVICE — SUT PROLENE 5-0 C-1/C-1 36 IN 8321H

## (undated) DEVICE — INTENDED FOR TISSUE SEPARATION, AND OTHER PROCEDURES THAT REQUIRE A SHARP SURGICAL BLADE TO PUNCTURE OR CUT.: Brand: BARD-PARKER SAFETY BLADES SIZE 10, STERILE

## (undated) DEVICE — GLOVE SRG BIOGEL ECLIPSE 8

## (undated) DEVICE — SUT PROLENE 7-0 BV-1/BV-1 24 IN 8304H

## (undated) DEVICE — SUT ETHIBOND 2-0 SH/SH 36 IN X523H

## (undated) DEVICE — 2000CC GUARDIAN II: Brand: GUARDIAN

## (undated) DEVICE — IMMOBILIZER KNEE UNIVERSAL 22 IN

## (undated) DEVICE — 3000CC GUARDIAN II: Brand: GUARDIAN

## (undated) DEVICE — BONE WAX WHITE: Brand: BONE WAX WHITE

## (undated) DEVICE — PLUMEPEN PRO 10FT

## (undated) DEVICE — ELECTRODE BLADE E-Z CLEAN 4IN -0014A

## (undated) DEVICE — CUFF TOURNIQUET 34 X 4 IN QUICK CONNECT DISP 1BLA

## (undated) DEVICE — 3M™ IOBAN™ 2 ANTIMICROBIAL INCISE DRAPE 6650EZ: Brand: IOBAN™ 2

## (undated) DEVICE — YELLOW BOAT

## (undated) DEVICE — SYRINGE 20ML LL

## (undated) DEVICE — EVERGRIP INSERT SET 86MM: Brand: FOGARTY EVERGRIP

## (undated) DEVICE — LIGHT HANDLE COVER SLEEVE DISP BLUE STELLAR

## (undated) DEVICE — CATH DIAG 5FR IMPULSE 110CM PIG

## (undated) DEVICE — 40601 PROLONGED POSITIONING SYSTEM: Brand: 40601 PROLONGED POSITIONING SYSTEM

## (undated) DEVICE — OCCLUSIVE GAUZE STRIP,3% BISMUTH TRIBROMOPHENATE IN PETROLATUM BLEND: Brand: XEROFORM

## (undated) DEVICE — SUT PDS PLUS 1 CTB 36 IN PDPB359T

## (undated) DEVICE — GLOVE SRG BIOGEL ORTHOPEDIC 7

## (undated) DEVICE — SUT VICRYL 0 CT-1 27 IN J260H

## (undated) DEVICE — VASOVIEW HEMOPRO 2: Brand: VASOVIEW HEMOPRO 2

## (undated) DEVICE — RADIFOCUS OPTITORQUE ANGIOGRAPHIC CATHETER: Brand: OPTITORQUE

## (undated) DEVICE — INTENDED FOR TISSUE SEPARATION, AND OTHER PROCEDURES THAT REQUIRE A SHARP SURGICAL BLADE TO PUNCTURE OR CUT.: Brand: BARD-PARKER SAFETY BLADES SIZE 15, STERILE

## (undated) DEVICE — CAST PADDING 6 IN STERILE

## (undated) DEVICE — SUT SILK 2-0 SH CR/8 18 IN C012D

## (undated) DEVICE — DRESSING ALLEVYN LIFE HEEL 25 X 25.2CM

## (undated) DEVICE — PENCIL ELECTROSURG E-Z CLEAN -0035H

## (undated) DEVICE — TUBING INSUFFLATION SET ISO CONNECTOR

## (undated) DEVICE — Device: Brand: RETRACT-O-TAPE 18G X 30.5CM BLUNT NEEDLE

## (undated) DEVICE — ASTOUND STANDARD SURGICAL GOWN, XXL: Brand: CONVERTORS

## (undated) DEVICE — SUT SILK 2 60 IN SA8H

## (undated) DEVICE — GLOVE INDICATOR PI UNDERGLOVE SZ 7 BLUE

## (undated) DEVICE — DRESSING MEPILEX AG BORDER 4 X 4 IN

## (undated) DEVICE — GLOVE SRG BIOGEL 7.5

## (undated) DEVICE — 5065 IMPAD REGULAR PAIR: Brand: A-V IMPULSE

## (undated) DEVICE — GUIDEWIRE FFR VERRATA PLUS 185CM STR

## (undated) DEVICE — DRESSING MEPILEX AG BORDER POST-OP 4 X 10 IN